# Patient Record
Sex: FEMALE | Race: WHITE | NOT HISPANIC OR LATINO | Employment: FULL TIME | URBAN - METROPOLITAN AREA
[De-identification: names, ages, dates, MRNs, and addresses within clinical notes are randomized per-mention and may not be internally consistent; named-entity substitution may affect disease eponyms.]

---

## 2017-04-18 ENCOUNTER — HOSPITAL ENCOUNTER (OUTPATIENT)
Dept: CARDIOLOGY | Facility: HOSPITAL | Age: 42
Discharge: HOME | End: 2017-04-18
Attending: INTERNAL MEDICINE | Admitting: INTERNAL MEDICINE

## 2019-08-05 ENCOUNTER — OFFICE VISIT (OUTPATIENT)
Dept: FAMILY MEDICINE CLINIC | Facility: CLINIC | Age: 44
End: 2019-08-05

## 2019-08-05 VITALS
OXYGEN SATURATION: 99 % | SYSTOLIC BLOOD PRESSURE: 162 MMHG | HEIGHT: 69 IN | TEMPERATURE: 97.8 F | DIASTOLIC BLOOD PRESSURE: 95 MMHG | BODY MASS INDEX: 36.58 KG/M2 | HEART RATE: 99 BPM | WEIGHT: 247 LBS

## 2019-08-05 DIAGNOSIS — R10.814 LEFT LOWER QUADRANT ABDOMINAL TENDERNESS WITHOUT REBOUND TENDERNESS: Primary | ICD-10-CM

## 2019-08-05 DIAGNOSIS — D49.0 LIVER NEOPLASM: ICD-10-CM

## 2019-08-05 DIAGNOSIS — R19.04 LEFT LOWER QUADRANT ABDOMINAL SWELLING: ICD-10-CM

## 2019-08-05 DIAGNOSIS — Z13.220 SCREENING, LIPID: ICD-10-CM

## 2019-08-05 DIAGNOSIS — Z12.39 SCREENING FOR BREAST CANCER: ICD-10-CM

## 2019-08-05 DIAGNOSIS — F41.9 ANXIETY: ICD-10-CM

## 2019-08-05 DIAGNOSIS — I27.20 PULMONARY HYPERTENSION, UNSPECIFIED (HCC): ICD-10-CM

## 2019-08-05 PROCEDURE — 99203 OFFICE O/P NEW LOW 30 MIN: CPT | Performed by: FAMILY MEDICINE

## 2019-08-05 RX ORDER — ESCITALOPRAM OXALATE 5 MG/1
5 TABLET ORAL DAILY
Qty: 30 TABLET | Refills: 1 | Status: SHIPPED | OUTPATIENT
Start: 2019-08-05 | End: 2019-09-09 | Stop reason: SDUPTHER

## 2019-08-05 RX ORDER — METOPROLOL SUCCINATE 25 MG/1
TABLET, EXTENDED RELEASE ORAL
COMMUNITY
Start: 2017-04-27 | End: 2020-06-29 | Stop reason: SDUPTHER

## 2019-08-05 NOTE — PROGRESS NOTES
Subjective   Chief Complaint   Patient presents with   • new pt to [facility] care   • Hip Pain   • Abdominal Pain     or pressure   • Anxiety     Raul MAHESH Gardner is a 44 y.o. female.     The pt reports left anterior hip pain and a fullness of her left lower abdomen.  No fever or chills.  No change in bowel habits.       Hip Pain    The incident occurred more than 1 week ago. There was no injury mechanism. The pain is present in the left hip. The quality of the pain is described as aching and cramping. The pain is moderate. The pain has been fluctuating since onset. She reports no foreign bodies present. The symptoms are aggravated by palpation.   Abdominal Pain   This is a new problem. The current episode started more than 1 month ago. The onset quality is gradual. The problem occurs daily. The problem has been waxing and waning. The pain is located in the LLQ. The pain is moderate. The quality of the pain is colicky. The abdominal pain does not radiate. Pertinent negatives include no constipation, diarrhea, fever, frequency, hematochezia, hematuria, melena or nausea. The pain is aggravated by certain positions. The pain is relieved by nothing.   Anxiety   Presents for initial visit. Onset was 1 to 6 months ago. The problem has been gradually worsening. Symptoms include excessive worry and nervous/anxious behavior. Patient reports no chest pain, decreased concentration, depressed mood, dizziness, nausea, palpitations, panic or shortness of breath. Symptoms occur most days. The severity of symptoms is moderate.     There are no known risk factors. Past treatments include nothing.      Past Medical History:   Diagnosis Date   • Melanoma (CMS/HCC)    • Pulmonary hypertension (CMS/HCC)    • Urinary tract infection     chronic hematuria, seen urology     Past Surgical History:   Procedure Laterality Date   •  SECTION  13 and 14   • SKIN CANCER EXCISION     • TUBAL ABDOMINAL LIGATION       Allergies   Allergen  Reactions   • Cephalexin Rash   • Hydrocodone-Acetaminophen Rash     Social History     Socioeconomic History   • Marital status: Single     Spouse name: Not on file   • Number of children: Not on file   • Years of education: Not on file   • Highest education level: Not on file   Occupational History   • Occupation: MA at [employer], ENT   Tobacco Use   • Smoking status: Never Smoker   • Smokeless tobacco: Never Used   Substance and Sexual Activity   • Alcohol use: Yes     Frequency: Monthly or less     Comment: caffeine 1 cup qd   • Drug use: No   • Sexual activity: Yes     Birth control/protection: Surgical     Social History     Tobacco Use   Smoking Status Never Smoker   Smokeless Tobacco Never Used       family history includes Cancer in her father; Diabetes in her brother; Heart disease in her brother; Kidney disease in her father and mother.  Current Outpatient Medications on File Prior to Visit   Medication Sig Dispense Refill   • metoprolol succinate XL (TOPROL-XL) 25 MG 24 hr tablet TAKE 1 TABLET BY MOUTH EVERY DAY       No current facility-administered medications on file prior to visit.      Patient Active Problem List   Diagnosis   • Pulmonary hypertension, unspecified (CMS/HCC)   • Screening for breast cancer   • Left lower quadrant abdominal tenderness without rebound tenderness   • Left lower quadrant abdominal swelling   • Anxiety   • Screening, lipid       The following portions of the patient's history were reviewed and updated as appropriate: allergies, current medications, past family history, past medical history, past social history, past surgical history and problem list.    Review of Systems   Constitutional: Negative for appetite change, chills and fever.   HENT: Negative for sinus pressure and sore throat.    Eyes: Negative for blurred vision.   Respiratory: Negative for cough and shortness of breath.    Cardiovascular: Negative for chest pain and palpitations.   Gastrointestinal: Positive for  "abdominal pain. Negative for constipation, diarrhea, hematochezia, melena and nausea.   Endocrine: Negative for polyuria.   Genitourinary: Negative for frequency and hematuria.   Skin: Negative for rash.   Neurological: Negative for dizziness and headache.   Hematological: Negative for adenopathy.   Psychiatric/Behavioral: Negative for decreased concentration and depressed mood. The patient is nervous/anxious.        Objective   /95 (BP Location: Right arm, Patient Position: Sitting, Cuff Size: Adult)   Pulse 99   Temp 97.8 °F (36.6 °C) (Oral)   Ht 174 cm (68.5\")   Wt 112 kg (247 lb)   SpO2 99%   BMI 37.01 kg/m²   Physical Exam   Constitutional: She is oriented to person, place, and time. She appears well-developed. No distress.   HENT:   Head: Normocephalic.   Eyes: Conjunctivae and lids are normal.   Neck: Trachea normal. No thyroid mass and no thyromegaly present.   Cardiovascular: Normal rate, regular rhythm and normal heart sounds.   Pulmonary/Chest: Effort normal and breath sounds normal.   Abdominal: Soft. There is tenderness in the left lower quadrant. There is no rigidity, no rebound and no guarding. No hernia.   Musculoskeletal: Normal range of motion.   Lymphadenopathy:     She has no cervical adenopathy.   Neurological: She is alert and oriented to person, place, and time.   Skin: Skin is warm and dry.   Psychiatric: Her speech is normal and behavior is normal. Thought content normal. Her mood appears anxious. She is attentive.       No visits with results within 1 Week(s) from this visit.   Latest known visit with results is:   No results found for any previous visit.           Assessment/Plan   Problems Addressed this Visit        Cardiovascular and Mediastinum    Pulmonary hypertension, unspecified (CMS/HCC)     Exercise induced.  Evaluated by Dr. Cavanaugh.  Stable on Toprol.         Relevant Orders    Lipid Panel    Comprehensive Metabolic Panel       Nervous and Auditory    Left lower " quadrant abdominal tenderness without rebound tenderness - Primary     Patient reports left hip pain but on exam her pain and area of swelling are in the left lower abdomen.  Plan to check CT scan.         Relevant Orders    CT Abdomen Pelvis With Contrast       Other    Screening for breast cancer    Relevant Orders    Mammo Screening Digital Tomosynthesis Bilateral With CAD    Left lower quadrant abdominal swelling    Relevant Orders    CT Abdomen Pelvis With Contrast    Anxiety     New problem.  Begin Lexapro 5 mg once daily as prescribed.          Screening, lipid    Relevant Orders    Lipid Panel    Comprehensive Metabolic Panel          Raul was seen today for new pt to est care, hip pain, abdominal pain and anxiety.    Diagnoses and all orders for this visit:    Left lower quadrant abdominal tenderness without rebound tenderness  -     CT Abdomen Pelvis With Contrast; Future    Left lower quadrant abdominal swelling  -     CT Abdomen Pelvis With Contrast; Future    Anxiety    Pulmonary hypertension, unspecified (CMS/HCC)  -     Lipid Panel  -     Comprehensive Metabolic Panel    Screening for breast cancer  -     Mammo Screening Digital Tomosynthesis Bilateral With CAD    Screening, lipid  -     Lipid Panel  -     Comprehensive Metabolic Panel    Other orders  -     escitalopram (LEXAPRO) 5 MG tablet; Take 1 tablet by mouth Daily.

## 2019-08-05 NOTE — ASSESSMENT & PLAN NOTE
Patient reports left hip pain but on exam her pain and area of swelling are in the left lower abdomen.  Plan to check CT scan.

## 2019-08-15 ENCOUNTER — TELEPHONE (OUTPATIENT)
Dept: FAMILY MEDICINE CLINIC | Facility: CLINIC | Age: 44
End: 2019-08-15

## 2019-09-09 RX ORDER — ESCITALOPRAM OXALATE 5 MG/1
TABLET ORAL
Qty: 30 TABLET | Refills: 5 | Status: SHIPPED | OUTPATIENT
Start: 2019-09-09 | End: 2019-11-15

## 2019-11-15 ENCOUNTER — OFFICE VISIT (OUTPATIENT)
Dept: FAMILY MEDICINE CLINIC | Facility: CLINIC | Age: 44
End: 2019-11-15

## 2019-11-15 VITALS
BODY MASS INDEX: 39.26 KG/M2 | SYSTOLIC BLOOD PRESSURE: 162 MMHG | OXYGEN SATURATION: 98 % | DIASTOLIC BLOOD PRESSURE: 97 MMHG | WEIGHT: 262 LBS | TEMPERATURE: 97.7 F | HEART RATE: 89 BPM

## 2019-11-15 DIAGNOSIS — E55.9 VITAMIN D DEFICIENCY: ICD-10-CM

## 2019-11-15 DIAGNOSIS — R53.83 FATIGUE, UNSPECIFIED TYPE: ICD-10-CM

## 2019-11-15 DIAGNOSIS — I10 ESSENTIAL HYPERTENSION: Primary | ICD-10-CM

## 2019-11-15 PROBLEM — Q75.2 HYPERTELORISM: Status: ACTIVE | Noted: 2019-11-15

## 2019-11-15 PROCEDURE — 99214 OFFICE O/P EST MOD 30 MIN: CPT | Performed by: FAMILY MEDICINE

## 2019-11-15 RX ORDER — HYDROCHLOROTHIAZIDE 12.5 MG/1
12.5 CAPSULE, GELATIN COATED ORAL DAILY
Qty: 90 CAPSULE | Refills: 1 | Status: SHIPPED | OUTPATIENT
Start: 2019-11-15 | End: 2020-07-31

## 2019-11-15 RX ORDER — LISINOPRIL 5 MG/1
5 TABLET ORAL DAILY
Qty: 30 TABLET | Refills: 0 | Status: SHIPPED | OUTPATIENT
Start: 2019-11-15 | End: 2019-12-08 | Stop reason: SDUPTHER

## 2019-11-15 RX ORDER — ESCITALOPRAM OXALATE 10 MG/1
10 TABLET ORAL DAILY
Qty: 90 TABLET | Refills: 3 | Status: SHIPPED | OUTPATIENT
Start: 2019-11-15 | End: 2021-01-08

## 2019-11-17 PROBLEM — I10 ESSENTIAL HYPERTENSION: Status: ACTIVE | Noted: 2019-11-17

## 2019-11-18 NOTE — PROGRESS NOTES
Subjective   Chief Complaint   Patient presents with   • Hypertension   • Headache     Raul Gardner is a 44 y.o. female.     Hypertension   This is a new problem. The current episode started more than 1 month ago. The problem has been waxing and waning since onset. The problem is uncontrolled. Associated symptoms include headaches and malaise/fatigue. Pertinent negatives include no anxiety, blurred vision, chest pain, palpitations, peripheral edema or shortness of breath. Current antihypertension treatment includes nothing. There are no compliance problems.    Headache    Pertinent negatives include no abdominal pain, blurred vision, coughing, dizziness, fever, sinus pressure or sore throat.   Fatigue   This is a new problem. The current episode started more than 1 month ago. The problem occurs intermittently. The problem has been waxing and waning. Associated symptoms include fatigue and headaches. Pertinent negatives include no abdominal pain, chest pain, chills, coughing, fever, rash or sore throat. Nothing aggravates the symptoms.      Past Medical History:   Diagnosis Date   • Melanoma (CMS/HCC)    • Pulmonary hypertension (CMS/HCC)    • Urinary tract infection     chronic hematuria, seen urology     Past Surgical History:   Procedure Laterality Date   •  SECTION  13 and 14   • SKIN CANCER EXCISION     • TUBAL ABDOMINAL LIGATION       Allergies   Allergen Reactions   • Cephalexin Rash   • Hydrocodone-Acetaminophen Rash     Social History     Socioeconomic History   • Marital status: Single     Spouse name: Not on file   • Number of children: Not on file   • Years of education: Not on file   • Highest education level: Not on file   Occupational History   • Occupation: MA at [employer], ENT   Tobacco Use   • Smoking status: Never Smoker   • Smokeless tobacco: Never Used   Substance and Sexual Activity   • Alcohol use: Yes     Frequency: Monthly or less     Comment: caffeine 1 cup qd   • Drug use: No    • Sexual activity: Yes     Birth control/protection: Surgical     Social History     Tobacco Use   Smoking Status Never Smoker   Smokeless Tobacco Never Used       family history includes Cancer in her father; Diabetes in her brother; Heart disease in her brother; Kidney disease in her father and mother.  Current Outpatient Medications on File Prior to Visit   Medication Sig Dispense Refill   • metoprolol succinate XL (TOPROL-XL) 25 MG 24 hr tablet TAKE 1 TABLET BY MOUTH EVERY DAY       No current facility-administered medications on file prior to visit.      Patient Active Problem List   Diagnosis   • Pulmonary hypertension, unspecified (CMS/HCC)   • Screening for breast cancer   • Left lower quadrant abdominal tenderness without rebound tenderness   • Left lower quadrant abdominal swelling   • Anxiety   • Screening, lipid   • Hypertelorism   • Fatigue   • Vitamin D deficiency   • Essential hypertension       The following portions of the patient's history were reviewed and updated as appropriate: allergies, current medications, past family history, past medical history, past social history, past surgical history and problem list.    Review of Systems   Constitutional: Positive for fatigue and malaise/fatigue. Negative for chills and fever.   HENT: Negative for sinus pressure and sore throat.    Eyes: Negative for blurred vision.   Respiratory: Negative for cough and shortness of breath.    Cardiovascular: Negative for chest pain and palpitations.   Gastrointestinal: Negative for abdominal pain.   Endocrine: Negative for polyuria.   Skin: Negative for rash.   Neurological: Negative for dizziness and headache.   Hematological: Negative for adenopathy.   Psychiatric/Behavioral: Negative for depressed mood.       Objective   /97 (BP Location: Left arm, Patient Position: Sitting, Cuff Size: Large Adult)   Pulse 89   Temp 97.7 °F (36.5 °C) (Oral)   Wt 119 kg (262 lb)   SpO2 98%   BMI 39.26 kg/m²   Physical  Exam   Constitutional: She is oriented to person, place, and time. She appears well-developed. No distress.   HENT:   Head: Normocephalic.   Eyes: Conjunctivae and lids are normal.   Neck: Trachea normal. No thyroid mass and no thyromegaly present.   Cardiovascular: Normal rate, regular rhythm and normal heart sounds.   Pulmonary/Chest: Effort normal and breath sounds normal.   Musculoskeletal: She exhibits edema.   Lymphadenopathy:     She has no cervical adenopathy.   Neurological: She is alert and oriented to person, place, and time.   Skin: Skin is warm and dry.   Psychiatric: She has a normal mood and affect. Her speech is normal and behavior is normal. She is attentive.       No visits with results within 1 Week(s) from this visit.   Latest known visit with results is:   No results found for any previous visit.           Assessment/Plan   Problems Addressed this Visit        Cardiovascular and Mediastinum    Essential hypertension - Primary    Relevant Medications    lisinopril (PRINIVIL,ZESTRIL) 5 MG tablet    hydroCHLOROthiazide (MICROZIDE) 12.5 MG capsule       Digestive    Vitamin D deficiency    Relevant Orders    Vitamin D 25 Hydroxy       Other    Fatigue    Relevant Orders    Vitamin D 25 Hydroxy    TSH          Raul was seen today for hypertension and headache.    Diagnoses and all orders for this visit:    Essential hypertension    Fatigue, unspecified type  -     Vitamin D 25 Hydroxy  -     TSH    Vitamin D deficiency  -     Vitamin D 25 Hydroxy    Other orders  -     lisinopril (PRINIVIL,ZESTRIL) 5 MG tablet; Take 1 tablet by mouth Daily.  -     escitalopram (LEXAPRO) 10 MG tablet; Take 1 tablet by mouth Daily.  -     hydroCHLOROthiazide (MICROZIDE) 12.5 MG capsule; Take 1 capsule by mouth Daily.

## 2019-12-09 RX ORDER — LISINOPRIL 5 MG/1
TABLET ORAL
Qty: 90 TABLET | Refills: 3 | Status: SHIPPED | OUTPATIENT
Start: 2019-12-09 | End: 2021-01-08

## 2019-12-23 RX ORDER — ESCITALOPRAM OXALATE 5 MG/1
TABLET ORAL
Qty: 90 TABLET | Refills: 1 | OUTPATIENT
Start: 2019-12-23

## 2020-01-19 RX ORDER — ESCITALOPRAM OXALATE 5 MG/1
TABLET ORAL
Qty: 90 TABLET | Refills: 1 | OUTPATIENT
Start: 2020-01-19

## 2020-06-29 RX ORDER — METOPROLOL SUCCINATE 25 MG/1
25 TABLET, EXTENDED RELEASE ORAL DAILY
Qty: 60 TABLET | Refills: 1 | Status: SHIPPED | OUTPATIENT
Start: 2020-06-29 | End: 2020-09-08

## 2020-06-29 RX ORDER — METOPROLOL SUCCINATE 25 MG/1
25 TABLET, EXTENDED RELEASE ORAL DAILY
Qty: 60 TABLET | Refills: 1 | Status: SHIPPED | OUTPATIENT
Start: 2020-06-29 | End: 2020-06-29 | Stop reason: SDUPTHER

## 2020-07-17 ENCOUNTER — TELEMEDICINE (OUTPATIENT)
Dept: FAMILY MEDICINE CLINIC | Facility: CLINIC | Age: 45
End: 2020-07-17

## 2020-07-17 DIAGNOSIS — J40 BRONCHITIS: Primary | ICD-10-CM

## 2020-07-17 PROCEDURE — 99213 OFFICE O/P EST LOW 20 MIN: CPT | Performed by: FAMILY MEDICINE

## 2020-07-17 RX ORDER — DOXYCYCLINE 100 MG/1
100 CAPSULE ORAL 2 TIMES DAILY
Qty: 20 CAPSULE | Refills: 0 | Status: SHIPPED | OUTPATIENT
Start: 2020-07-17 | End: 2020-07-31

## 2020-07-17 RX ORDER — GUAIFENESIN AND CODEINE PHOSPHATE 100; 10 MG/5ML; MG/5ML
5 SOLUTION ORAL 3 TIMES DAILY PRN
Qty: 120 ML | Refills: 0 | Status: SHIPPED | OUTPATIENT
Start: 2020-07-17 | End: 2020-07-31

## 2020-07-17 NOTE — PROGRESS NOTES
Subjective   Chief Complaint   Patient presents with   • Cough     Raul Gardner is a 45 y.o. female.   You have chosen to receive care through a telehealth visit.  Do you consent to use a video/audio connection for your medical care today? 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  Due to her symptoms and potential for exposure she was tested for COVID19 earlier this week and it was negative.     Cough   This is a new problem. The current episode started in the past 7 days. The problem has been gradually worsening. The problem occurs every few minutes. The cough is productive of sputum. Associated symptoms include a fever, a sore throat and shortness of breath. Pertinent negatives include no chest pain, chills, ear pain, rash or wheezing. Associated symptoms comments: fatigue. The symptoms are aggravated by exercise. Risk factors for lung disease include occupational exposure.      Past Medical History:   Diagnosis Date   • Melanoma (CMS/HCC)    • Pulmonary hypertension (CMS/HCC)    • Urinary tract infection     chronic hematuria, seen urology     Past Surgical History:   Procedure Laterality Date   •  SECTION  13 and 14   • SKIN CANCER EXCISION     • TUBAL ABDOMINAL LIGATION       Allergies   Allergen Reactions   • Cephalexin Rash   • Hydrocodone-Acetaminophen Rash     Social History     Socioeconomic History   • Marital status: Single     Spouse name: Not on file   • Number of children: Not on file   • Years of education: Not on file   • Highest education level: Not on file   Occupational History   • Occupation: MA at [employer], ENT   Tobacco Use   • Smoking status: Never Smoker   • Smokeless tobacco: Never Used   Substance and Sexual Activity   • Alcohol use: Yes     Frequency: Monthly or less     Comment: caffeine 1 cup qd   • Drug use: No   • Sexual activity: Yes     Birth control/protection: Surgical     Social History     Tobacco Use   Smoking Status Never Smoker   Smokeless Tobacco Never Used        family history includes Cancer in her father; Diabetes in her brother; Heart disease in her brother; Kidney disease in her father and mother.  Current Outpatient Medications on File Prior to Visit   Medication Sig Dispense Refill   • escitalopram (LEXAPRO) 10 MG tablet Take 1 tablet by mouth Daily. 90 tablet 3   • hydroCHLOROthiazide (MICROZIDE) 12.5 MG capsule Take 1 capsule by mouth Daily. 90 capsule 1   • lisinopril (PRINIVIL,ZESTRIL) 5 MG tablet TAKE 1 TABLET BY MOUTH EVERY DAY 90 tablet 3   • metoprolol succinate XL (TOPROL-XL) 25 MG 24 hr tablet Take 1 tablet by mouth Daily. 60 tablet 1     No current facility-administered medications on file prior to visit.      Patient Active Problem List   Diagnosis   • Pulmonary hypertension, unspecified (CMS/HCC)   • Screening for breast cancer   • Left lower quadrant abdominal tenderness without rebound tenderness   • Left lower quadrant abdominal swelling   • Anxiety   • Screening, lipid   • Hypertelorism   • Fatigue   • Vitamin D deficiency   • Essential hypertension   • Bronchitis       The following portions of the patient's history were reviewed and updated as appropriate: allergies, current medications, past family history, past medical history, past social history, past surgical history and problem list.    Review of Systems   Constitutional: Positive for fever. Negative for chills.   HENT: Positive for sore throat. Negative for ear pain and sinus pressure.    Eyes: Negative for blurred vision.   Respiratory: Positive for cough and shortness of breath. Negative for wheezing.    Cardiovascular: Negative for chest pain and palpitations.   Gastrointestinal: Negative for abdominal pain.   Endocrine: Negative for polyuria.   Skin: Negative for rash.   Neurological: Negative for dizziness and headache.   Hematological: Negative for adenopathy.   Psychiatric/Behavioral: Negative for depressed mood.       Objective   There were no vitals taken for this  visit.  Physical Exam   Constitutional: She appears well-developed and well-nourished.   HENT:   Head: Normocephalic.   Eyes: EOM are normal.   Neck: Normal range of motion.   Pulmonary/Chest: Effort normal.   Neurological: She is alert.   Psychiatric: She has a normal mood and affect.       No visits with results within 1 Week(s) from this visit.   Latest known visit with results is:   No results found for any previous visit.           Assessment/Plan   Problems Addressed this Visit        Respiratory    Bronchitis - Primary    Relevant Medications    guaiFENesin-codeine (GUAIFENESIN AC) 100-10 MG/5ML liquid    doxycycline (MONODOX) 100 MG capsule          Total time spent on evaluation of patient:  15 min

## 2020-07-20 ENCOUNTER — TELEPHONE (OUTPATIENT)
Dept: FAMILY MEDICINE CLINIC | Facility: CLINIC | Age: 45
End: 2020-07-20

## 2020-07-20 DIAGNOSIS — J40 BRONCHITIS: Primary | ICD-10-CM

## 2020-07-20 NOTE — TELEPHONE ENCOUNTER
PT STATES THAT SHE IS FEELING NO BETTER AND IS ASKING FOR A CHEST XRAY.  PLEASE SEND ORDER TO [facility] AT [phone]

## 2020-07-31 ENCOUNTER — OFFICE VISIT (OUTPATIENT)
Dept: CARDIOLOGY | Facility: CLINIC | Age: 45
End: 2020-07-31

## 2020-07-31 VITALS
HEIGHT: 69 IN | RESPIRATION RATE: 18 BRPM | SYSTOLIC BLOOD PRESSURE: 136 MMHG | HEART RATE: 87 BPM | DIASTOLIC BLOOD PRESSURE: 86 MMHG | WEIGHT: 274 LBS | BODY MASS INDEX: 40.58 KG/M2

## 2020-07-31 DIAGNOSIS — R06.09 DOE (DYSPNEA ON EXERTION): Primary | ICD-10-CM

## 2020-07-31 DIAGNOSIS — R53.83 FATIGUE, UNSPECIFIED TYPE: ICD-10-CM

## 2020-07-31 DIAGNOSIS — R06.02 SOB (SHORTNESS OF BREATH): ICD-10-CM

## 2020-07-31 DIAGNOSIS — R00.2 PALPITATIONS: ICD-10-CM

## 2020-07-31 DIAGNOSIS — I27.20 PULMONARY HYPERTENSION, UNSPECIFIED (HCC): ICD-10-CM

## 2020-07-31 DIAGNOSIS — I10 ESSENTIAL HYPERTENSION: ICD-10-CM

## 2020-07-31 DIAGNOSIS — Q75.2 HYPERTELORISM: ICD-10-CM

## 2020-07-31 PROCEDURE — 93000 ELECTROCARDIOGRAM COMPLETE: CPT | Performed by: INTERNAL MEDICINE

## 2020-07-31 PROCEDURE — 99203 OFFICE O/P NEW LOW 30 MIN: CPT | Performed by: INTERNAL MEDICINE

## 2020-07-31 NOTE — PROGRESS NOTES
Cardiology Office Visit      Encounter Date:  07/31/2020    Patient ID:   Raul Gardner is a 45 y.o. female.    Reason For Followup:  Shortness of breath  Dyspnea on exertion  Intermittent tachycardia  Severe fatigue    Brief Clinical History:  Dear Dr. Graf, Maribel Dumont MD    I had the pleasure of seeing Raul Gardner today. As you are well aware, this is a 45 y.o. female with past medical history that is significant for prior diagnosis of pulmonary hypertension history of palpitations in the past was seen and evaluated by cardiology 3 years back was diagnosed with a mild to moderate pulmonary hypertension and moderate tricuspid regurgitation patient lost follow-up in between here for follow-up for evaluation of symptoms of fatigue weakness shortness of breath dyspnea on exertion    Interval History:  Patient says for the last 2 months she is having intermittent episodes of severe shortness of breath with minimal activity  Significant change in the functional status in the last 2 months  Has had episodes of increased heart rate with minimal activity  Lower extremity edema requiring diuretics that is new  Symptoms have been progressively getting worse  Had a history of heart murmur with no specific diagnosis  Complaining of intermittent palpitations dizziness  Denies any syncope  Patient describes every time she has to do any activity that involves lifting her upper extremities above the head level starts having significant shortness of breath and tachyarrhythmias  Heart rate jumps up to 130-140 with minimal activity    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    Recommendations:  Recommend an echocardiogram to assess the LV systolic function rule out any significant valve pathology  Also consider doing a an echocardiogram with a definitive contrast to rule out any atrial myxoma  "that was suspected before  Schedule patient for exercise Cardiolite stress test to rule out any significant obstructive coronary artery disease contributing to patient's symptoms  Recommend a Holter monitor for 2 weeks to rule out underlying cardiac arrhythmia like atrial fibrillation contributing to patient's symptoms  Check basic labs including CBC chemistry and thyroid function  Follow-up in office in 1 month      Objective:    Vitals:  Vitals:    07/31/20 1101   BP: 136/86   BP Location: Left arm   Pulse: 87   Resp: 18   Weight: 124 kg (274 lb)   Height: 174 cm (68.5\")       Physical Exam:    General: Alert, cooperative, no distress, appears stated age  Head:  Normocephalic, atraumatic, mucous membranes moist  Eyes:  Conjunctiva/corneas clear, EOM's intact     Neck:  Supple,  no adenopathy;      Lungs: Clear to auscultation bilaterally, no wheezes rhonchi rales are noted  Chest wall: No tenderness  Heart::  Regular rate and rhythm, S1 and S2 normal, 2 x 6 systolic murmur right sternal border that is worse with standing position from sitting position  Abdomen: Soft, non-tender, nondistended bowel sounds active  Extremities: No cyanosis, clubbing, or edema  Pulses: 2+ and symmetric all extremities  Skin:  No rashes or lesions  Neuro/psych: A&O x3. CN II through XII are grossly intact with appropriate affect      Allergies:  Allergies   Allergen Reactions   • Cephalexin Rash   • Hydrocodone-Acetaminophen Rash       Medication Review:     Current Outpatient Medications:   •  escitalopram (LEXAPRO) 10 MG tablet, Take 1 tablet by mouth Daily., Disp: 90 tablet, Rfl: 3  •  lisinopril (PRINIVIL,ZESTRIL) 5 MG tablet, TAKE 1 TABLET BY MOUTH EVERY DAY, Disp: 90 tablet, Rfl: 3  •  metoprolol succinate XL (TOPROL-XL) 25 MG 24 hr tablet, Take 1 tablet by mouth Daily., Disp: 60 tablet, Rfl: 1    Family History:  Family History   Problem Relation Age of Onset   • Kidney disease Mother    • Kidney disease Father    • Cancer " Father         lung   • Diabetes Brother    • Heart disease Brother         CHF       Past Medical History:  Past Medical History:   Diagnosis Date   • Melanoma (CMS/HCC)    • Pulmonary hypertension (CMS/HCC)    • Urinary tract infection     chronic hematuria, seen urology       Past surgical History:  Past Surgical History:   Procedure Laterality Date   •  SECTION  13 and 14   • SKIN CANCER EXCISION     • TUBAL ABDOMINAL LIGATION         Social History:  Social History     Socioeconomic History   • Marital status: Single     Spouse name: Not on file   • Number of children: Not on file   • Years of education: Not on file   • Highest education level: Not on file   Occupational History   • Occupation: MA at [employer], ENT   Tobacco Use   • Smoking status: Never Smoker   • Smokeless tobacco: Never Used   Substance and Sexual Activity   • Alcohol use: Yes     Frequency: Monthly or less     Comment: caffeine 1 cup qd   • Drug use: No   • Sexual activity: Yes     Birth control/protection: Surgical       Review of Systems:  The following systems were reviewed as they relate to the cardiovascular system: Constitutional, Eyes, ENT, Cardiovascular, Respiratory, Gastrointestinal, Integumentary, Neurological, Psychiatric, Hematologic, Endocrine, Musculoskeletal, and Genitourinary. The pertinent cardiovascular findings are reported above with all other cardiovascular points within those systems being negative.    Diagnostic Study Review:     Current Electrocardiogram:    ECG 12 Lead  Date/Time: 2020 4:18 PM  Performed by: Dominic Richey MD  Authorized by: Dominic Richey MD   Comparison: compared with previous ECG   Similar to previous ECG  Rhythm: sinus rhythm  Rate: normal  BPM: 88  Conduction: conduction normal  QRS axis: normal    Clinical impression: normal ECG              NOTE: The following portions of the patient's history were reviewed and updated this visit as appropriate:  allergies, current medications, past family history, past medical history, past social history, past surgical history and problem list.

## 2020-08-26 ENCOUNTER — HOSPITAL ENCOUNTER (OUTPATIENT)
Dept: CARDIOLOGY | Facility: HOSPITAL | Age: 45
Discharge: HOME | End: 2020-08-26

## 2020-08-26 ENCOUNTER — HOSPITAL ENCOUNTER (OUTPATIENT)
Dept: CARDIOLOGY | Facility: HOSPITAL | Age: 45
Discharge: HOME | End: 2020-08-26
Admitting: INTERNAL MEDICINE

## 2020-08-26 VITALS
HEIGHT: 70 IN | SYSTOLIC BLOOD PRESSURE: 146 MMHG | HEART RATE: 76 BPM | DIASTOLIC BLOOD PRESSURE: 104 MMHG | BODY MASS INDEX: 39.22 KG/M2 | WEIGHT: 274 LBS

## 2020-08-26 DIAGNOSIS — I27.20 PULMONARY HYPERTENSION, UNSPECIFIED (HCC): ICD-10-CM

## 2020-08-26 DIAGNOSIS — Q75.2 HYPERTELORISM: ICD-10-CM

## 2020-08-26 DIAGNOSIS — R53.83 FATIGUE, UNSPECIFIED TYPE: ICD-10-CM

## 2020-08-26 DIAGNOSIS — I10 ESSENTIAL HYPERTENSION: ICD-10-CM

## 2020-08-26 LAB
ASCENDING AORTA: 3.1 CM
BH CV ECHO MEAS - ACS: 2.1 CM
BH CV ECHO MEAS - AO MAX PG (FULL): 6.6 MMHG
BH CV ECHO MEAS - AO MAX PG: 21.9 MMHG
BH CV ECHO MEAS - AO MEAN PG (FULL): 3.2 MMHG
BH CV ECHO MEAS - AO MEAN PG: 11.2 MMHG
BH CV ECHO MEAS - AO ROOT AREA (BSA CORRECTED): 1.4
BH CV ECHO MEAS - AO ROOT AREA: 8.3 CM^2
BH CV ECHO MEAS - AO ROOT DIAM: 3.3 CM
BH CV ECHO MEAS - AO V2 MAX: 233.8 CM/SEC
BH CV ECHO MEAS - AO V2 MEAN: 158.1 CM/SEC
BH CV ECHO MEAS - AO V2 VTI: 49.5 CM
BH CV ECHO MEAS - ASC AORTA: 3.1 CM
BH CV ECHO MEAS - AVA(I,A): 2.1 CM^2
BH CV ECHO MEAS - AVA(I,D): 2.1 CM^2
BH CV ECHO MEAS - AVA(V,A): 2.2 CM^2
BH CV ECHO MEAS - AVA(V,D): 2.2 CM^2
BH CV ECHO MEAS - BSA(HAYCOCK): 2.5 M^2
BH CV ECHO MEAS - BSA: 2.4 M^2
BH CV ECHO MEAS - BZI_BMI: 39.3 KILOGRAMS/M^2
BH CV ECHO MEAS - BZI_METRIC_HEIGHT: 177.8 CM
BH CV ECHO MEAS - BZI_METRIC_WEIGHT: 124.3 KG
BH CV ECHO MEAS - EDV(CUBED): 151.3 ML
BH CV ECHO MEAS - EDV(MOD-SP2): 90.6 ML
BH CV ECHO MEAS - EDV(MOD-SP4): 94.2 ML
BH CV ECHO MEAS - EDV(TEICH): 137 ML
BH CV ECHO MEAS - EF(CUBED): 71.5 %
BH CV ECHO MEAS - EF(MOD-BP): 59 %
BH CV ECHO MEAS - EF(MOD-SP2): 58.3 %
BH CV ECHO MEAS - EF(MOD-SP4): 59.8 %
BH CV ECHO MEAS - EF(TEICH): 62.8 %
BH CV ECHO MEAS - ESV(CUBED): 43 ML
BH CV ECHO MEAS - ESV(MOD-SP2): 37.7 ML
BH CV ECHO MEAS - ESV(MOD-SP4): 37.9 ML
BH CV ECHO MEAS - ESV(TEICH): 51 ML
BH CV ECHO MEAS - FS: 34.2 %
BH CV ECHO MEAS - IVS/LVPW: 1
BH CV ECHO MEAS - IVSD: 0.89 CM
BH CV ECHO MEAS - LA DIMENSION(2D): 4.3 CM
BH CV ECHO MEAS - LA DIMENSION: 4.2 CM
BH CV ECHO MEAS - LA/AO: 1.3
BH CV ECHO MEAS - LAT PEAK E' VEL: 13 CM/SEC
BH CV ECHO MEAS - LV DIASTOLIC VOL/BSA (35-75): 39.5 ML/M^2
BH CV ECHO MEAS - LV IVRT: 0.06 SEC
BH CV ECHO MEAS - LV MASS(C)D: 170.4 GRAMS
BH CV ECHO MEAS - LV MASS(C)DI: 71.4 GRAMS/M^2
BH CV ECHO MEAS - LV MAX PG: 15.3 MMHG
BH CV ECHO MEAS - LV MEAN PG: 8.1 MMHG
BH CV ECHO MEAS - LV SYSTOLIC VOL/BSA (12-30): 15.9 ML/M^2
BH CV ECHO MEAS - LV V1 MAX: 195.3 CM/SEC
BH CV ECHO MEAS - LV V1 MEAN: 134.6 CM/SEC
BH CV ECHO MEAS - LV V1 VTI: 41 CM
BH CV ECHO MEAS - LVIDD: 5.3 CM
BH CV ECHO MEAS - LVIDS: 3.5 CM
BH CV ECHO MEAS - LVOT AREA: 2.6 CM^2
BH CV ECHO MEAS - LVOT DIAM: 1.8 CM
BH CV ECHO MEAS - LVPWD: 0.86 CM
BH CV ECHO MEAS - MED PEAK E' VEL: 7 CM/SEC
BH CV ECHO MEAS - MV A MAX VEL: 83.4 CM/SEC
BH CV ECHO MEAS - MV DEC SLOPE: 640.5 CM/SEC^2
BH CV ECHO MEAS - MV DEC TIME: 0.19 SEC
BH CV ECHO MEAS - MV E MAX VEL: 121.8 CM/SEC
BH CV ECHO MEAS - MV E/A: 1.5
BH CV ECHO MEAS - MV MAX PG: 5.8 MMHG
BH CV ECHO MEAS - MV MEAN PG: 2 MMHG
BH CV ECHO MEAS - MV P1/2T: 49 MSEC
BH CV ECHO MEAS - MV V2 MAX: 120.6 CM/SEC
BH CV ECHO MEAS - MV V2 MEAN: 65.4 CM/SEC
BH CV ECHO MEAS - MV V2 VTI: 25.1 CM
BH CV ECHO MEAS - MVA(P1/2T): 4.5 CM2
BH CV ECHO MEAS - MVA(VTI): 4.2 CM^2
BH CV ECHO MEAS - PA ACC SLOPE: 804 CM/SEC2
BH CV ECHO MEAS - PA ACC TIME: 0.12 SEC
BH CV ECHO MEAS - PA MAX PG (FULL): 1.4 MMHG
BH CV ECHO MEAS - PA MAX PG: 5.8 MMHG
BH CV ECHO MEAS - PA MEAN PG (FULL): 1 MMHG
BH CV ECHO MEAS - PA MEAN PG: 3.4 MMHG
BH CV ECHO MEAS - PA PR(ACCEL): 23.2 MMHG
BH CV ECHO MEAS - PA V2 MAX: 120 CM/SEC
BH CV ECHO MEAS - PA V2 MEAN: 87.3 CM/SEC
BH CV ECHO MEAS - PA V2 VTI: 28.7 CM
BH CV ECHO MEAS - PULM A REVS DUR: 0.08 SEC
BH CV ECHO MEAS - PULM A REVS VEL: 20.8 CM/SEC
BH CV ECHO MEAS - PULM DIAS VEL: 59.6 CM/SEC
BH CV ECHO MEAS - PULM S/D: 0.87
BH CV ECHO MEAS - PULM SYS VEL: 51.7 CM/SEC
BH CV ECHO MEAS - RAP SYSTOLE: 8 MMHG
BH CV ECHO MEAS - RV MAX PG: 4.4 MMHG
BH CV ECHO MEAS - RV MEAN PG: 2.4 MMHG
BH CV ECHO MEAS - RV V1 MAX: 104.4 CM/SEC
BH CV ECHO MEAS - RV V1 MEAN: 73.5 CM/SEC
BH CV ECHO MEAS - RV V1 VTI: 21.8 CM
BH CV ECHO MEAS - RVSP: 40.5 MMHG
BH CV ECHO MEAS - SI(AO): 172.2 ML/M^2
BH CV ECHO MEAS - SI(CUBED): 45.4 ML/M^2
BH CV ECHO MEAS - SI(LVOT): 44.6 ML/M^2
BH CV ECHO MEAS - SI(MOD-SP2): 22.1 ML/M^2
BH CV ECHO MEAS - SI(MOD-SP4): 23.6 ML/M^2
BH CV ECHO MEAS - SI(TEICH): 36 ML/M^2
BH CV ECHO MEAS - SV(AO): 410.8 ML
BH CV ECHO MEAS - SV(CUBED): 108.2 ML
BH CV ECHO MEAS - SV(LVOT): 106.3 ML
BH CV ECHO MEAS - SV(MOD-SP2): 52.8 ML
BH CV ECHO MEAS - SV(MOD-SP4): 56.3 ML
BH CV ECHO MEAS - SV(TEICH): 86 ML
BH CV ECHO MEAS - TAPSE (>1.6): 2.4 CM2
BH CV ECHO MEAS - TR MAX PG: 33 MMHG
BH CV ECHO MEAS - TR MAX VEL: 285 CM/SEC
BH CV ECHO MEASUREMENTS AVERAGE E/E' RATIO: 12.18
BH CV STRESS BP STAGE 1: NORMAL
BH CV STRESS BP STAGE 2: NORMAL
BH CV STRESS DURATION MIN STAGE 1: 3
BH CV STRESS DURATION MIN STAGE 2: 2
BH CV STRESS DURATION SEC STAGE 1: 0
BH CV STRESS DURATION SEC STAGE 2: 30
BH CV STRESS GRADE STAGE 1: 10
BH CV STRESS GRADE STAGE 2: 12
BH CV STRESS HR STAGE 1: 129
BH CV STRESS HR STAGE 2: 157
BH CV STRESS METS STAGE 1: 5
BH CV STRESS METS STAGE 2: 7.5
BH CV STRESS PROTOCOL 1: NORMAL
BH CV STRESS RECOVERY BP: NORMAL MMHG
BH CV STRESS RECOVERY HR: 96 BPM
BH CV STRESS SPEED STAGE 1: 1.7
BH CV STRESS SPEED STAGE 2: 2.5
BH CV STRESS STAGE 1: 1
BH CV STRESS STAGE 2: 2
BH CV XLRA - RV BASE: 3.2 CM
BH CV XLRA - RV MID: 2.4 CM
BH CV XLRA - TDI S': 11 CM/SEC
IVRT: 61 MSEC
LEFT ATRIUM VOLUME INDEX: 24 ML/M2
LEFT ATRIUM VOLUME: 57 CM3
LV EF 2D ECHO EST: 60 %
LV EF NUC BP: 68 %
MAXIMAL PREDICTED HEART RATE: 177 BPM
PERCENT MAX PREDICTED HR: 88.7 %
STRESS BASELINE BP: NORMAL MMHG
STRESS BASELINE HR: 81 BPM
STRESS PERCENT HR: 104 %
STRESS POST ESTIMATED WORKLOAD: 7 METS
STRESS POST EXERCISE DUR MIN: 5 MIN
STRESS POST EXERCISE DUR SEC: 30 SEC
STRESS POST PEAK BP: NORMAL MMHG
STRESS POST PEAK HR: 157 BPM
STRESS TARGET HR: 150 BPM

## 2020-08-26 PROCEDURE — 0 TECHNETIUM SESTAMIBI: Performed by: INTERNAL MEDICINE

## 2020-08-26 PROCEDURE — A9500 TC99M SESTAMIBI: HCPCS | Performed by: INTERNAL MEDICINE

## 2020-08-26 PROCEDURE — 93017 CV STRESS TEST TRACING ONLY: CPT

## 2020-08-26 PROCEDURE — 93306 TTE W/DOPPLER COMPLETE: CPT | Performed by: INTERNAL MEDICINE

## 2020-08-26 PROCEDURE — 93018 CV STRESS TEST I&R ONLY: CPT | Performed by: INTERNAL MEDICINE

## 2020-08-26 PROCEDURE — 0296T HC EXT ECG > 48HR TO 21 DAY RCRD W/CONECT INTL RCRD: CPT

## 2020-08-26 PROCEDURE — 25010000002 SULFUR HEXAFLUORIDE MICROSPH 60.7-25 MG RECONSTITUTED SUSPENSION: Performed by: INTERNAL MEDICINE

## 2020-08-26 PROCEDURE — 78452 HT MUSCLE IMAGE SPECT MULT: CPT

## 2020-08-26 PROCEDURE — 93306 TTE W/DOPPLER COMPLETE: CPT

## 2020-08-26 PROCEDURE — 78452 HT MUSCLE IMAGE SPECT MULT: CPT | Performed by: INTERNAL MEDICINE

## 2020-08-26 PROCEDURE — 93016 CV STRESS TEST SUPVJ ONLY: CPT | Performed by: INTERNAL MEDICINE

## 2020-08-26 RX ADMIN — SULFUR HEXAFLUORIDE 1 ML: KIT at 09:40

## 2020-08-26 RX ADMIN — TECHNETIUM TC 99M SESTAMIBI 1 DOSE: 1 INJECTION INTRAVENOUS at 11:00

## 2020-08-26 RX ADMIN — TECHNETIUM TC 99M SESTAMIBI 1 DOSE: 1 INJECTION INTRAVENOUS at 08:40

## 2020-09-08 ENCOUNTER — OFFICE VISIT (OUTPATIENT)
Dept: CARDIOLOGY | Facility: CLINIC | Age: 45
End: 2020-09-08

## 2020-09-08 VITALS
OXYGEN SATURATION: 97 % | HEART RATE: 69 BPM | DIASTOLIC BLOOD PRESSURE: 84 MMHG | RESPIRATION RATE: 18 BRPM | HEIGHT: 70 IN | WEIGHT: 275 LBS | SYSTOLIC BLOOD PRESSURE: 149 MMHG | BODY MASS INDEX: 39.37 KG/M2

## 2020-09-08 DIAGNOSIS — R06.02 SOB (SHORTNESS OF BREATH): ICD-10-CM

## 2020-09-08 DIAGNOSIS — I27.20 PULMONARY HYPERTENSION, UNSPECIFIED (HCC): ICD-10-CM

## 2020-09-08 DIAGNOSIS — R00.2 PALPITATIONS: ICD-10-CM

## 2020-09-08 DIAGNOSIS — I10 ESSENTIAL HYPERTENSION: Primary | ICD-10-CM

## 2020-09-08 PROCEDURE — 99214 OFFICE O/P EST MOD 30 MIN: CPT | Performed by: INTERNAL MEDICINE

## 2020-09-08 RX ORDER — METOPROLOL SUCCINATE 50 MG/1
50 TABLET, EXTENDED RELEASE ORAL DAILY
Qty: 90 TABLET | Refills: 3 | Status: SHIPPED | OUTPATIENT
Start: 2020-09-08 | End: 2021-04-19

## 2020-09-08 NOTE — PROGRESS NOTES
Cardiology Office Visit      Encounter Date:  09/08/2020    Patient ID:   Raul Gardner is a 45 y.o. female.    Reason For Followup:  Shortness of breath  Dyspnea on exertion  Intermittent tachycardia  Severe fatigue    Brief Clinical History:  Dear Dr. Graf, Maribel Dumont MD    I had the pleasure of seeing Raul Gardner today. As you are well aware, this is a 45 y.o. female with past medical history that is significant for prior diagnosis of pulmonary hypertension history of palpitations in the past was seen and evaluated by cardiology 3 years back was diagnosed with a mild to moderate pulmonary hypertension and moderate tricuspid regurgitation patient lost follow-up in between here for follow-up for evaluation of symptoms of fatigue weakness shortness of breath dyspnea on exertion    Interval History:  Patient says for the last 2 months she is having intermittent episodes of severe shortness of breath with minimal activity  Significant change in the functional status in the last 2 months  Has had episodes of increased heart rate with minimal activity  Lower extremity edema requiring diuretics that is new  Symptoms have been progressively getting worse  Had a history of heart murmur with no specific diagnosis  Complaining of intermittent palpitations dizziness  Denies any syncope  Patient describes every time she has to do any activity that involves lifting her upper extremities above the head level starts having significant shortness of breath and tachyarrhythmias  Heart rate jumps up to 130-140 with minimal activity      Interpretation Summary     · Low risk for ischemic heart disease.  · Findings consistent with a normal ECG stress test.  · Left ventricular ejection fraction is normal (Calculated EF = 68%).  · Myocardial perfusion imaging indicates a normal myocardial perfusion study with no evidence of ischemia.  · Impressions are consistent with a low risk study.     Interpretation Summary     · Estimated EF =  "60%.  · Left ventricular systolic function is normal.           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    Recommendations:  Echocardiogram with normal LV systolic function and normal wall motion with no significant tricuspid regurgitation no significant pulmonary hypertension  Stress test with no evidence of any inducible ischemia  Patient does have some evidence of POTS syndrome with a tachycardia with minimal activity  Increase the dose of beta-blocker to help with symptoms of tachycardia and palpitations  Follow-up on the results of the Holter monitor  Aggressive blood pressure control  Consider increasing the dose of lisinopril if the blood pressure is still elevated  Follow-up in office in 3 months  Objective:    Vitals:  Vitals:    09/08/20 1126   BP: 149/84   BP Location: Left arm   Pulse: 69   Resp: 18   SpO2: 97%   Weight: 125 kg (275 lb)   Height: 177.8 cm (70\")       Physical Exam:    General: Alert, cooperative, no distress, appears stated age  Head:  Normocephalic, atraumatic, mucous membranes moist  Eyes:  Conjunctiva/corneas clear, EOM's intact     Neck:  Supple,  no adenopathy;      Lungs: Clear to auscultation bilaterally, no wheezes rhonchi rales are noted  Chest wall: No tenderness  Heart::  Regular rate and rhythm, S1 and S2 normal, no murmur, rub or gallop  Abdomen: Soft, non-tender, nondistended bowel sounds active  Extremities: No cyanosis, clubbing, or edema  Pulses: 2+ and symmetric all extremities  Skin:  No rashes or lesions  Neuro/psych: A&O x3. CN II through XII are grossly intact with appropriate affect      Allergies:  Allergies   Allergen Reactions   • Cephalexin Rash   • Hydrocodone-Acetaminophen Rash       Medication Review:     Current Outpatient Medications:   •  escitalopram (LEXAPRO) 10 MG tablet, Take 1 tablet by mouth Daily., Disp: 90 " tablet, Rfl: 3  •  lisinopril (PRINIVIL,ZESTRIL) 5 MG tablet, TAKE 1 TABLET BY MOUTH EVERY DAY, Disp: 90 tablet, Rfl: 3  •  metoprolol succinate XL (TOPROL-XL) 25 MG 24 hr tablet, Take 1 tablet by mouth Daily., Disp: 60 tablet, Rfl: 1    Family History:  Family History   Problem Relation Age of Onset   • Kidney disease Mother    • Kidney disease Father    • Cancer Father         lung   • Diabetes Brother    • Heart disease Brother         CHF       Past Medical History:  Past Medical History:   Diagnosis Date   • Melanoma (CMS/HCC)    • Pulmonary hypertension (CMS/HCC)    • Urinary tract infection     chronic hematuria, seen urology       Past surgical History:  Past Surgical History:   Procedure Laterality Date   •  SECTION  13 and 14   • SKIN CANCER EXCISION     • TUBAL ABDOMINAL LIGATION         Social History:  Social History     Socioeconomic History   • Marital status: Single     Spouse name: Not on file   • Number of children: Not on file   • Years of education: Not on file   • Highest education level: Not on file   Occupational History   • Occupation: MA at [employer], ENT   Tobacco Use   • Smoking status: Never Smoker   • Smokeless tobacco: Never Used   Substance and Sexual Activity   • Alcohol use: Yes     Frequency: Monthly or less     Comment: caffeine 1 cup qd   • Drug use: No   • Sexual activity: Yes     Birth control/protection: Surgical       Review of Systems:  The following systems were reviewed as they relate to the cardiovascular system: Constitutional, Eyes, ENT, Cardiovascular, Respiratory, Gastrointestinal, Integumentary, Neurological, Psychiatric, Hematologic, Endocrine, Musculoskeletal, and Genitourinary. The pertinent cardiovascular findings are reported above with all other cardiovascular points within those systems being negative.    Diagnostic Study Review:     Current Electrocardiogram:  Procedures      NOTE: The following portions of the patient's history were reviewed  and updated this visit as appropriate: allergies, current medications, past family history, past medical history, past social history, past surgical history and problem list.

## 2020-09-22 PROCEDURE — 0298T HOLTER MONITOR - 72 HOUR UP TO 21 DAY: CPT | Performed by: INTERNAL MEDICINE

## 2020-10-14 ENCOUNTER — TELEMEDICINE (OUTPATIENT)
Dept: FAMILY MEDICINE CLINIC | Facility: CLINIC | Age: 45
End: 2020-10-14

## 2020-10-14 DIAGNOSIS — J40 BRONCHITIS: Primary | ICD-10-CM

## 2020-10-14 PROCEDURE — 99213 OFFICE O/P EST LOW 20 MIN: CPT | Performed by: FAMILY MEDICINE

## 2020-10-14 RX ORDER — DOXYCYCLINE 100 MG/1
100 CAPSULE ORAL 2 TIMES DAILY
Qty: 20 CAPSULE | Refills: 0 | Status: SHIPPED | OUTPATIENT
Start: 2020-10-14 | End: 2021-04-19

## 2020-10-14 NOTE — PROGRESS NOTES
Subjective   Chief Complaint   Patient presents with   • Cough   • Sore Throat     Raul Gardner is a 45 y.o. female.   You have chosen to receive care through a telehealth visit.  Do you consent to use a video/audio connection for your medical care today? Yes    Cough  This is a new problem. The current episode started 1 to 4 weeks ago. The problem has been gradually worsening. The problem occurs every few minutes. The cough is productive of purulent sputum. Associated symptoms include nasal congestion, rhinorrhea and a sore throat. Pertinent negatives include no chest pain, chills, ear pain, fever, rash, shortness of breath or wheezing. The symptoms are aggravated by exercise. She has tried prescription cough suppressant for the symptoms. The treatment provided mild relief.   Sore Throat   This is a new problem. The current episode started 1 to 4 weeks ago. The problem has been gradually worsening. There has been no fever. Associated symptoms include congestion, coughing and a hoarse voice. Pertinent negatives include no abdominal pain, ear pain, shortness of breath, swollen glands or trouble swallowing. She has had no exposure to strep or mono. Exposure to: COVID-19 but had a negative test yesterday..      Past Medical History:   Diagnosis Date   • Melanoma (CMS/HCC)    • Pulmonary hypertension (CMS/HCC)    • Urinary tract infection     chronic hematuria, seen urology     Past Surgical History:   Procedure Laterality Date   •  SECTION  13 and 14   • SKIN CANCER EXCISION     • TUBAL ABDOMINAL LIGATION       Allergies   Allergen Reactions   • Cephalexin Rash   • Hydrocodone-Acetaminophen Rash     Social History     Socioeconomic History   • Marital status: Single     Spouse name: Not on file   • Number of children: Not on file   • Years of education: Not on file   • Highest education level: Not on file   Occupational History   • 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  Current Outpatient Medications on File Prior to Visit   Medication Sig Dispense Refill   • escitalopram (LEXAPRO) 10 MG tablet Take 1 tablet by mouth Daily. 90 tablet 3   • lisinopril (PRINIVIL,ZESTRIL) 5 MG tablet TAKE 1 TABLET BY MOUTH EVERY DAY 90 tablet 3   • metoprolol succinate XL (TOPROL-XL) 50 MG 24 hr tablet Take 1 tablet by mouth Daily. 90 tablet 3     No current facility-administered medications on file prior to visit.      Patient Active Problem List   Diagnosis   • Pulmonary hypertension, unspecified (CMS/HCC)   • Screening for breast cancer   • Left lower quadrant abdominal tenderness without rebound tenderness   • Left lower quadrant abdominal swelling   • Anxiety   • Screening, lipid   • Hypertelorism   • Fatigue   • Vitamin D deficiency   • Essential hypertension   • Bronchitis       The following portions of the patient's history were reviewed and updated as appropriate: allergies, current medications, past family history, past medical history, past social history, past surgical history and problem list.    Review of Systems   Constitutional: Negative for chills and fever.   HENT: Positive for congestion, hoarse voice, rhinorrhea and sore throat. Negative for ear pain, sinus pressure, swollen glands and trouble swallowing.    Eyes: Negative for blurred vision.   Respiratory: Positive for cough. Negative for shortness of breath and wheezing.    Cardiovascular: Negative for chest pain and palpitations.   Gastrointestinal:  Negative for abdominal pain.   Endocrine: Negative for polyuria.   Genitourinary: Negative for difficulty urinating.   Skin: Negative for rash.   Neurological: Negative for dizziness, seizures and headache.   Hematological: Negative for adenopathy.   Psychiatric/Behavioral: Negative for depressed mood.       Objective   There were no vitals taken for this visit.  Physical Exam  HENT:      Head: Normocephalic.   Pulmonary:      Effort: No respiratory distress.      Breath sounds: Wheezing present.   Neurological:      General: No focal deficit present.      Mental Status: She is alert.   Psychiatric:         Mood and Affect: Mood normal.         No visits with results within 1 Week(s) from this visit.   Latest known visit with results is:   Hospital Outpatient Visit on 08/26/2020   Component Date Value Ref Range Status   • BH CV STRESS PROTOCOL 1 08/26/2020 Nain   Final   • Stage 1 08/26/2020 1   Final   • HR Stage 1 08/26/2020 129   Final   • BP Stage 1 08/26/2020 160/90   Final   • Duration Min Stage 1 08/26/2020 3   Final   • Duration Sec Stage 1 08/26/2020 0   Final   • Grade Stage 1 08/26/2020 10   Final   • Speed Stage 1 08/26/2020 1.7   Final   • BH CV STRESS METS STAGE 1 08/26/2020 5   Final   • Stage 2 08/26/2020 2   Final   • HR Stage 2 08/26/2020 157   Final   • BP Stage 2 08/26/2020 210/80   Final   • Duration Min Stage 2 08/26/2020 2   Final   • Duration Sec Stage 2 08/26/2020 30   Final   • Grade Stage 2 08/26/2020 12   Final   • Speed Stage 2 08/26/2020 2.5   Final   • BH CV STRESS METS STAGE 2 08/26/2020 7.5   Final   • Baseline HR 08/26/2020 81  bpm Final   • Baseline BP 08/26/2020 148/88  mmHg Final   • Peak HR 08/26/2020 157  bpm Final   • Percent Max Pred HR 08/26/2020 88.70  % Final   • Percent Target HR 08/26/2020 104  % Final   • Peak BP 08/26/2020 210/80  mmHg Final   • Recovery HR 08/26/2020 96  bpm Final   • Recovery BP 08/26/2020 158/84  mmHg Final   • Target HR (85%) 08/26/2020 150  bpm  Final   • Max. Pred. HR (100%) 08/26/2020 177  bpm Final   • Exercise duration (sec) 08/26/2020 30  sec Final   • Exercise duration (min) 08/26/2020 5  min Final   • Estimated workload 08/26/2020 7.0  METS Final   • Nuc Stress EF 08/26/2020 68  % Final           Assessment/Plan   Diagnoses and all orders for this visit:    1. Bronchitis (Primary)  -     doxycycline (MONODOX) 100 MG capsule; Take 1 capsule by mouth 2 (Two) Times a Day.  Dispense: 20 capsule; Refill: 0      Total time spent on evaluation of patient:  15 min

## 2021-01-08 RX ORDER — ESCITALOPRAM OXALATE 10 MG/1
TABLET ORAL
Qty: 90 TABLET | Refills: 2 | Status: SHIPPED | OUTPATIENT
Start: 2021-01-08 | End: 2021-12-03

## 2021-01-08 RX ORDER — LISINOPRIL 5 MG/1
TABLET ORAL
Qty: 90 TABLET | Refills: 2 | Status: SHIPPED | OUTPATIENT
Start: 2021-01-08 | End: 2021-12-03

## 2021-04-19 ENCOUNTER — OFFICE VISIT (OUTPATIENT)
Dept: FAMILY MEDICINE CLINIC | Facility: CLINIC | Age: 46
End: 2021-04-19

## 2021-04-19 VITALS
DIASTOLIC BLOOD PRESSURE: 81 MMHG | BODY MASS INDEX: 36.16 KG/M2 | WEIGHT: 252 LBS | TEMPERATURE: 97.5 F | HEART RATE: 71 BPM | SYSTOLIC BLOOD PRESSURE: 125 MMHG | OXYGEN SATURATION: 98 %

## 2021-04-19 DIAGNOSIS — M76.891 TENDONITIS OF KNEE, RIGHT: Primary | ICD-10-CM

## 2021-04-19 PROCEDURE — 99213 OFFICE O/P EST LOW 20 MIN: CPT | Performed by: FAMILY MEDICINE

## 2021-04-19 RX ORDER — METOPROLOL SUCCINATE 25 MG/1
25 TABLET, EXTENDED RELEASE ORAL DAILY
COMMUNITY
Start: 2021-03-08 | End: 2021-06-01

## 2021-04-19 RX ORDER — NAPROXEN 500 MG/1
500 TABLET ORAL 2 TIMES DAILY WITH MEALS
Qty: 60 TABLET | Refills: 0 | Status: SHIPPED | OUTPATIENT
Start: 2021-04-19 | End: 2021-05-12

## 2021-04-19 NOTE — PROGRESS NOTES
Chief Complaint  Knee Pain (rt knee)    Subjective          Raul Gardner presents to [facility]  Knee Pain   The incident occurred 12 to 24 hours ago. The incident occurred at home. There was no injury mechanism. The pain is present in the right knee. The quality of the pain is described as stabbing. The pain is moderate. The pain has been constant since onset. Pertinent negatives include no numbness or tingling. She reports no foreign bodies present. The symptoms are aggravated by movement and weight bearing. She has tried acetaminophen and rest for the symptoms.       Objective   Vital Signs:   /81 (BP Location: Right arm, Patient Position: Sitting, Cuff Size: Adult)   Pulse 71   Temp 97.5 °F (36.4 °C)   Wt 114 kg (252 lb)   SpO2 98%   BMI 36.16 kg/m²     Physical Exam  Vitals reviewed.   Constitutional:       General: She is not in acute distress.     Appearance: She is well-developed.   HENT:      Head: Normocephalic.   Eyes:      General: Lids are normal.      Conjunctiva/sclera: Conjunctivae normal.   Neck:      Thyroid: No thyroid mass or thyromegaly.      Trachea: Trachea normal.   Cardiovascular:      Rate and Rhythm: Normal rate and regular rhythm.      Heart sounds: Normal heart sounds.   Pulmonary:      Effort: Pulmonary effort is normal.      Breath sounds: Normal breath sounds.   Abdominal:      Palpations: Abdomen is soft.   Musculoskeletal:      Cervical back: Normal range of motion.      Right knee: Swelling present. Decreased range of motion. Tenderness present over the lateral joint line.   Lymphadenopathy:      Cervical: No cervical adenopathy.   Skin:     General: Skin is warm and dry.   Neurological:      Mental Status: She is alert and oriented to person, place, and time.   Psychiatric:         Attention and Perception: She is attentive.         Mood and Affect: Mood normal.         Speech: Speech normal.         Behavior: Behavior normal.         Result Review :                 Assessment and Plan    Diagnoses and all orders for this visit:    1. Tendonitis of knee, right (Primary)  Assessment & Plan:  RICE  Naprosyn as prescribed  If no better in 2-3 days then consider x-ray of knee.    Orders:  -     naproxen (Naprosyn) 500 MG tablet; Take 1 tablet by mouth 2 (Two) Times a Day With Meals.  Dispense: 60 tablet; Refill: 0      Follow Up   Return if symptoms worsen or fail to improve.  Patient was given instructions and counseling regarding her condition or for health maintenance advice. Please see specific information pulled into the AVS if appropriate.

## 2021-05-12 DIAGNOSIS — M76.891 TENDONITIS OF KNEE, RIGHT: ICD-10-CM

## 2021-05-12 RX ORDER — NAPROXEN 500 MG/1
TABLET ORAL
Qty: 60 TABLET | Refills: 2 | Status: ON HOLD | OUTPATIENT
Start: 2021-05-12 | End: 2022-08-05

## 2021-06-01 RX ORDER — METOPROLOL SUCCINATE 25 MG/1
TABLET, EXTENDED RELEASE ORAL
Qty: 90 TABLET | Refills: 1 | Status: SHIPPED | OUTPATIENT
Start: 2021-06-01 | End: 2021-12-06

## 2021-12-03 RX ORDER — ESCITALOPRAM OXALATE 10 MG/1
TABLET ORAL
Qty: 90 TABLET | Refills: 2 | Status: SHIPPED | OUTPATIENT
Start: 2021-12-03 | End: 2022-08-29

## 2021-12-03 RX ORDER — LISINOPRIL 5 MG/1
TABLET ORAL
Qty: 90 TABLET | Refills: 2 | Status: SHIPPED | OUTPATIENT
Start: 2021-12-03 | End: 2022-08-17 | Stop reason: HOSPADM

## 2021-12-04 DIAGNOSIS — I10 ESSENTIAL HYPERTENSION: Primary | ICD-10-CM

## 2021-12-06 RX ORDER — METOPROLOL SUCCINATE 25 MG/1
TABLET, EXTENDED RELEASE ORAL
Qty: 90 TABLET | Refills: 1 | Status: ON HOLD | OUTPATIENT
Start: 2021-12-06 | End: 2022-08-05

## 2022-06-02 DIAGNOSIS — I10 ESSENTIAL HYPERTENSION: ICD-10-CM

## 2022-06-02 RX ORDER — METOPROLOL SUCCINATE 25 MG/1
TABLET, EXTENDED RELEASE ORAL
Qty: 90 TABLET | Refills: 1 | OUTPATIENT
Start: 2022-06-02

## 2022-07-28 DIAGNOSIS — I10 ESSENTIAL HYPERTENSION: ICD-10-CM

## 2022-07-28 RX ORDER — METOPROLOL SUCCINATE 25 MG/1
25 TABLET, EXTENDED RELEASE ORAL DAILY
Qty: 90 TABLET | Refills: 1 | OUTPATIENT
Start: 2022-07-28

## 2022-08-05 ENCOUNTER — APPOINTMENT (OUTPATIENT)
Dept: CT IMAGING | Facility: HOSPITAL | Age: 47
End: 2022-08-05

## 2022-08-05 ENCOUNTER — APPOINTMENT (OUTPATIENT)
Dept: ULTRASOUND IMAGING | Facility: HOSPITAL | Age: 47
End: 2022-08-05

## 2022-08-05 ENCOUNTER — APPOINTMENT (OUTPATIENT)
Dept: GENERAL RADIOLOGY | Facility: HOSPITAL | Age: 47
End: 2022-08-05

## 2022-08-05 ENCOUNTER — HOSPITAL ENCOUNTER (INPATIENT)
Facility: HOSPITAL | Age: 47
LOS: 12 days | Discharge: HOME | End: 2022-08-17
Attending: EMERGENCY MEDICINE | Admitting: INTERNAL MEDICINE

## 2022-08-05 ENCOUNTER — APPOINTMENT (OUTPATIENT)
Dept: CARDIOLOGY | Facility: HOSPITAL | Age: 47
End: 2022-08-05

## 2022-08-05 DIAGNOSIS — S30.1XXA ABDOMINAL HEMATOMA: ICD-10-CM

## 2022-08-05 DIAGNOSIS — R65.21 SEPTIC SHOCK: ICD-10-CM

## 2022-08-05 DIAGNOSIS — A41.9 SEPTIC SHOCK: ICD-10-CM

## 2022-08-05 DIAGNOSIS — N17.9 ACUTE KIDNEY INJURY: Primary | ICD-10-CM

## 2022-08-05 PROBLEM — R55 NEAR SYNCOPE: Status: ACTIVE | Noted: 2017-04-04

## 2022-08-05 PROBLEM — R07.89 CHEST DISCOMFORT: Status: ACTIVE | Noted: 2017-04-04

## 2022-08-05 PROBLEM — K75.0 LIVER ABSCESS: Status: ACTIVE | Noted: 2022-08-05

## 2022-08-05 PROBLEM — R53.83 FATIGUE: Status: RESOLVED | Noted: 2019-11-15 | Resolved: 2022-08-05

## 2022-08-05 PROBLEM — R07.89 CHEST DISCOMFORT: Status: RESOLVED | Noted: 2017-04-04 | Resolved: 2022-08-05

## 2022-08-05 PROBLEM — Z33.1 PREGNANCY, INCIDENTAL: Status: RESOLVED | Noted: 2022-08-05 | Resolved: 2022-08-05

## 2022-08-05 PROBLEM — R19.04: Status: RESOLVED | Noted: 2019-08-05 | Resolved: 2022-08-05

## 2022-08-05 PROBLEM — Z13.220 SCREENING, LIPID: Status: RESOLVED | Noted: 2019-08-05 | Resolved: 2022-08-05

## 2022-08-05 PROBLEM — Z12.39 SCREENING FOR BREAST CANCER: Status: RESOLVED | Noted: 2019-08-05 | Resolved: 2022-08-05

## 2022-08-05 PROBLEM — E87.20 METABOLIC ACIDOSIS: Status: ACTIVE | Noted: 2022-08-05

## 2022-08-05 PROBLEM — R06.00 DYSPNEA: Status: RESOLVED | Noted: 2017-04-04 | Resolved: 2022-08-05

## 2022-08-05 PROBLEM — R55 NEAR SYNCOPE: Status: RESOLVED | Noted: 2017-04-04 | Resolved: 2022-08-05

## 2022-08-05 PROBLEM — M76.891 TENDONITIS OF KNEE, RIGHT: Status: RESOLVED | Noted: 2021-04-19 | Resolved: 2022-08-05

## 2022-08-05 PROBLEM — Z33.1 PREGNANCY, INCIDENTAL: Status: ACTIVE | Noted: 2022-08-05

## 2022-08-05 PROBLEM — J40 BRONCHITIS: Status: RESOLVED | Noted: 2020-07-17 | Resolved: 2022-08-05

## 2022-08-05 PROBLEM — R19.7 DIARRHEA: Status: ACTIVE | Noted: 2022-08-05

## 2022-08-05 PROBLEM — R10.814 LEFT LOWER QUADRANT ABDOMINAL TENDERNESS WITHOUT REBOUND TENDERNESS: Status: RESOLVED | Noted: 2019-08-05 | Resolved: 2022-08-05

## 2022-08-05 PROBLEM — R06.00 DYSPNEA: Status: ACTIVE | Noted: 2017-04-04

## 2022-08-05 LAB
ABO GROUP BLD: NORMAL
ADV 40+41 DNA STL QL NAA+NON-PROBE: NOT DETECTED
ALBUMIN SERPL-MCNC: 2.9 G/DL (ref 3.5–5.2)
ALBUMIN SERPL-MCNC: 3.3 G/DL (ref 3.5–5.2)
ALBUMIN/GLOB SERPL: 1.1 G/DL
ALBUMIN/GLOB SERPL: 1.2 G/DL
ALP SERPL-CCNC: 64 U/L (ref 39–117)
ALP SERPL-CCNC: 64 U/L (ref 39–117)
ALT SERPL W P-5'-P-CCNC: 27 U/L (ref 1–33)
ALT SERPL W P-5'-P-CCNC: 30 U/L (ref 1–33)
AMYLASE SERPL-CCNC: 81 U/L (ref 28–100)
ANION GAP SERPL CALCULATED.3IONS-SCNC: 15 MMOL/L (ref 5–15)
ANION GAP SERPL CALCULATED.3IONS-SCNC: 17 MMOL/L (ref 5–15)
APTT PPP: 43.1 SECONDS (ref 24–31)
APTT PPP: 48.8 SECONDS (ref 24–31)
ARTERIAL PATENCY WRIST A: POSITIVE
AST SERPL-CCNC: 41 U/L (ref 1–32)
AST SERPL-CCNC: 45 U/L (ref 1–32)
ASTRO TYP 1-8 RNA STL QL NAA+NON-PROBE: NOT DETECTED
ATMOSPHERIC PRESS: ABNORMAL MM[HG]
B PARAPERT DNA SPEC QL NAA+PROBE: NOT DETECTED
B PERT DNA SPEC QL NAA+PROBE: NOT DETECTED
B-HCG UR QL: NEGATIVE
BACTERIA UR QL AUTO: ABNORMAL /HPF
BACTERIA UR QL AUTO: ABNORMAL /HPF
BASE EXCESS BLDA CALC-SCNC: -8.9 MMOL/L (ref 0–3)
BDY SITE: ABNORMAL
BH CV ECHO MEAS - ACS: 1.73 CM
BH CV ECHO MEAS - AO MAX PG: 11.6 MMHG
BH CV ECHO MEAS - AO MEAN PG: 5.4 MMHG
BH CV ECHO MEAS - AO ROOT DIAM: 2.9 CM
BH CV ECHO MEAS - AO V2 MAX: 169.9 CM/SEC
BH CV ECHO MEAS - AO V2 VTI: 24.6 CM
BH CV ECHO MEAS - AVA(I,D): 2.12 CM2
BH CV ECHO MEAS - EDV(CUBED): 54.1 ML
BH CV ECHO MEAS - EDV(MOD-SP4): 37.1 ML
BH CV ECHO MEAS - EF(MOD-SP4): 67.7 %
BH CV ECHO MEAS - ESV(CUBED): 18.9 ML
BH CV ECHO MEAS - ESV(MOD-SP4): 12 ML
BH CV ECHO MEAS - FS: 29.6 %
BH CV ECHO MEAS - IVS/LVPW: 1.04 CM
BH CV ECHO MEAS - IVSD: 1.39 CM
BH CV ECHO MEAS - LA DIMENSION: 3.1 CM
BH CV ECHO MEAS - LV DIASTOLIC VOL/BSA (35-75): 17.1 CM2
BH CV ECHO MEAS - LV MASS(C)D: 186.3 GRAMS
BH CV ECHO MEAS - LV MAX PG: 7.3 MMHG
BH CV ECHO MEAS - LV MEAN PG: 4.2 MMHG
BH CV ECHO MEAS - LV SYSTOLIC VOL/BSA (12-30): 5.5 CM2
BH CV ECHO MEAS - LV V1 MAX: 134.7 CM/SEC
BH CV ECHO MEAS - LV V1 VTI: 19.6 CM
BH CV ECHO MEAS - LVIDD: 3.8 CM
BH CV ECHO MEAS - LVIDS: 2.7 CM
BH CV ECHO MEAS - LVOT AREA: 2.7 CM2
BH CV ECHO MEAS - LVOT DIAM: 1.84 CM
BH CV ECHO MEAS - LVPWD: 1.34 CM
BH CV ECHO MEAS - MV A MAX VEL: 87.6 CM/SEC
BH CV ECHO MEAS - MV DEC SLOPE: 320.9 CM/SEC2
BH CV ECHO MEAS - MV DEC TIME: 0.2 MSEC
BH CV ECHO MEAS - MV E MAX VEL: 64 CM/SEC
BH CV ECHO MEAS - MV E/A: 0.73
BH CV ECHO MEAS - MV MAX PG: 3.9 MMHG
BH CV ECHO MEAS - MV MEAN PG: 2.08 MMHG
BH CV ECHO MEAS - MV V2 VTI: 13.9 CM
BH CV ECHO MEAS - MVA(VTI): 3.7 CM2
BH CV ECHO MEAS - PA ACC TIME: 0.09 SEC
BH CV ECHO MEAS - PA PR(ACCEL): 37 MMHG
BH CV ECHO MEAS - PA V2 MAX: 113.7 CM/SEC
BH CV ECHO MEAS - RAP SYSTOLE: 3 MMHG
BH CV ECHO MEAS - RV MAX PG: 3.9 MMHG
BH CV ECHO MEAS - RV V1 MAX: 98.6 CM/SEC
BH CV ECHO MEAS - RV V1 VTI: 15.8 CM
BH CV ECHO MEAS - RVDD: 2.43 CM
BH CV ECHO MEAS - RVSP: 26.7 MMHG
BH CV ECHO MEAS - SI(MOD-SP4): 11.6 ML/M2
BH CV ECHO MEAS - SV(LVOT): 52.2 ML
BH CV ECHO MEAS - SV(MOD-SP4): 25.1 ML
BH CV ECHO MEAS - TR MAX PG: 23.7 MMHG
BH CV ECHO MEAS - TR MAX VEL: 243.5 CM/SEC
BILIRUB SERPL-MCNC: 4.3 MG/DL (ref 0–1.2)
BILIRUB SERPL-MCNC: 4.6 MG/DL (ref 0–1.2)
BILIRUB UR QL STRIP: ABNORMAL
BILIRUB UR QL STRIP: ABNORMAL
BLD GP AB SCN SERPL QL: NEGATIVE
BUN SERPL-MCNC: 32 MG/DL (ref 6–20)
BUN SERPL-MCNC: 38 MG/DL (ref 6–20)
BUN/CREAT SERPL: 8.4 (ref 7–25)
BUN/CREAT SERPL: 9.4 (ref 7–25)
C CAYETANENSIS DNA STL QL NAA+NON-PROBE: NOT DETECTED
C COLI+JEJ+UPSA DNA STL QL NAA+NON-PROBE: NOT DETECTED
C PNEUM DNA NPH QL NAA+NON-PROBE: NOT DETECTED
CA-I BLDA-SCNC: 1 MMOL/L (ref 1.15–1.33)
CALCIUM SPEC-SCNC: 7.5 MG/DL (ref 8.6–10.5)
CALCIUM SPEC-SCNC: 8.3 MG/DL (ref 8.6–10.5)
CHLORIDE SERPL-SCNC: 94 MMOL/L (ref 98–107)
CHLORIDE SERPL-SCNC: 99 MMOL/L (ref 98–107)
CK SERPL-CCNC: 327 U/L (ref 20–180)
CLARITY UR: ABNORMAL
CLARITY UR: ABNORMAL
CO2 BLDA-SCNC: 16.3 MMOL/L (ref 22–29)
CO2 SERPL-SCNC: 19 MMOL/L (ref 22–29)
CO2 SERPL-SCNC: 21 MMOL/L (ref 22–29)
COLOR UR: ABNORMAL
COLOR UR: ABNORMAL
CORTIS SERPL-MCNC: 42.25 MCG/DL
CREAT SERPL-MCNC: 3.82 MG/DL (ref 0.57–1)
CREAT SERPL-MCNC: 4.03 MG/DL (ref 0.57–1)
CRP SERPL-MCNC: 19.55 MG/DL (ref 0–0.5)
CRYPTOSP DNA STL QL NAA+NON-PROBE: NOT DETECTED
D-LACTATE SERPL-SCNC: 2.4 MMOL/L (ref 0.5–2)
D-LACTATE SERPL-SCNC: 2.9 MMOL/L (ref 0.5–2)
D-LACTATE SERPL-SCNC: 3.2 MMOL/L (ref 0.5–2)
D-LACTATE SERPL-SCNC: 3.7 MMOL/L (ref 0.5–2)
DEPRECATED RDW RBC AUTO: 43.3 FL (ref 37–54)
DEPRECATED RDW RBC AUTO: 44.2 FL (ref 37–54)
E HISTOLYT DNA STL QL NAA+NON-PROBE: NOT DETECTED
EAEC PAA PLAS AGGR+AATA ST NAA+NON-PRB: NOT DETECTED
EC STX1+STX2 GENES STL QL NAA+NON-PROBE: NOT DETECTED
EGFRCR SERPLBLD CKD-EPI 2021: 13.3 ML/MIN/1.73
EGFRCR SERPLBLD CKD-EPI 2021: 14.2 ML/MIN/1.73
EOSINOPHIL SPEC QL MICRO: 0 % EOS/100 CELLS (ref 0–0)
EPEC EAE GENE STL QL NAA+NON-PROBE: DETECTED
ERYTHROCYTE [DISTWIDTH] IN BLOOD BY AUTOMATED COUNT: 14.4 % (ref 12.3–15.4)
ERYTHROCYTE [DISTWIDTH] IN BLOOD BY AUTOMATED COUNT: 14.5 % (ref 12.3–15.4)
ETEC LTA+ST1A+ST1B TOX ST NAA+NON-PROBE: NOT DETECTED
FLUAV SUBTYP SPEC NAA+PROBE: NOT DETECTED
FLUBV RNA ISLT QL NAA+PROBE: NOT DETECTED
G LAMBLIA DNA STL QL NAA+NON-PROBE: NOT DETECTED
GLOBULIN UR ELPH-MCNC: 2.6 GM/DL
GLOBULIN UR ELPH-MCNC: 2.8 GM/DL
GLUCOSE BLDC GLUCOMTR-MCNC: 117 MG/DL (ref 70–105)
GLUCOSE BLDC GLUCOMTR-MCNC: 122 MG/DL (ref 74–100)
GLUCOSE BLDC GLUCOMTR-MCNC: 122 MG/DL (ref 74–100)
GLUCOSE BLDC GLUCOMTR-MCNC: 149 MG/DL (ref 70–105)
GLUCOSE SERPL-MCNC: 141 MG/DL (ref 65–99)
GLUCOSE SERPL-MCNC: 165 MG/DL (ref 65–99)
GLUCOSE UR STRIP-MCNC: ABNORMAL MG/DL
GLUCOSE UR STRIP-MCNC: NEGATIVE MG/DL
GRAN CASTS URNS QL MICRO: ABNORMAL /LPF
HADV DNA SPEC NAA+PROBE: NOT DETECTED
HCO3 BLDA-SCNC: 15.5 MMOL/L (ref 21–28)
HCOV 229E RNA SPEC QL NAA+PROBE: NOT DETECTED
HCOV HKU1 RNA SPEC QL NAA+PROBE: NOT DETECTED
HCOV NL63 RNA SPEC QL NAA+PROBE: NOT DETECTED
HCOV OC43 RNA SPEC QL NAA+PROBE: NOT DETECTED
HCT VFR BLD AUTO: 40.8 % (ref 34–46.6)
HCT VFR BLD AUTO: 45.7 % (ref 34–46.6)
HCT VFR BLDA CALC: 35 % (ref 38–51)
HEMODILUTION: NO
HGB BLD-MCNC: 13.7 G/DL (ref 12–15.9)
HGB BLD-MCNC: 14.8 G/DL (ref 12–15.9)
HGB BLDA-MCNC: 12 G/DL (ref 12–17)
HGB UR QL STRIP.AUTO: ABNORMAL
HGB UR QL STRIP.AUTO: ABNORMAL
HMPV RNA NPH QL NAA+NON-PROBE: NOT DETECTED
HOLD SPECIMEN: NORMAL
HPIV1 RNA ISLT QL NAA+PROBE: NOT DETECTED
HPIV2 RNA SPEC QL NAA+PROBE: NOT DETECTED
HPIV3 RNA NPH QL NAA+PROBE: NOT DETECTED
HPIV4 P GENE NPH QL NAA+PROBE: NOT DETECTED
HYALINE CASTS UR QL AUTO: ABNORMAL /LPF
HYALINE CASTS UR QL AUTO: ABNORMAL /LPF
INHALED O2 CONCENTRATION: 100 %
INR PPP: 2.16 (ref 0.93–1.1)
INR PPP: 2.47 (ref 0.93–1.1)
INR PPP: 2.47 (ref 0.93–1.1)
KETONES UR QL STRIP: ABNORMAL
KETONES UR QL STRIP: NEGATIVE
L PNEUMO1 AG UR QL IA: NEGATIVE
LARGE PLATELETS: ABNORMAL
LARGE PLATELETS: ABNORMAL
LEUKOCYTE ESTERASE UR QL STRIP.AUTO: ABNORMAL
LEUKOCYTE ESTERASE UR QL STRIP.AUTO: ABNORMAL
LIPASE SERPL-CCNC: 12 U/L (ref 13–60)
LV EF 2D ECHO EST: 75 %
LYMPHOCYTES # BLD MANUAL: 0 10*3/MM3 (ref 0.7–3.1)
LYMPHOCYTES # BLD MANUAL: 0.16 10*3/MM3 (ref 0.7–3.1)
LYMPHOCYTES NFR BLD MANUAL: 4 % (ref 5–12)
LYMPHOCYTES NFR BLD MANUAL: 5 % (ref 5–12)
M PNEUMO IGG SER IA-ACNC: NOT DETECTED
MAGNESIUM SERPL-MCNC: 1.2 MG/DL (ref 1.6–2.6)
MAGNESIUM SERPL-MCNC: 1.8 MG/DL (ref 1.6–2.6)
MAXIMAL PREDICTED HEART RATE: 175 BPM
MCH RBC QN AUTO: 28.2 PG (ref 26.6–33)
MCH RBC QN AUTO: 28.8 PG (ref 26.6–33)
MCHC RBC AUTO-ENTMCNC: 32.4 G/DL (ref 31.5–35.7)
MCHC RBC AUTO-ENTMCNC: 33.7 G/DL (ref 31.5–35.7)
MCV RBC AUTO: 85.4 FL (ref 79–97)
MCV RBC AUTO: 86.9 FL (ref 79–97)
METAMYELOCYTES NFR BLD MANUAL: 20 % (ref 0–0)
METAMYELOCYTES NFR BLD MANUAL: 3 % (ref 0–0)
MODALITY: ABNORMAL
MONOCYTES # BLD: 0.64 10*3/MM3 (ref 0.1–0.9)
MONOCYTES # BLD: 1.02 10*3/MM3 (ref 0.1–0.9)
MRSA DNA SPEC QL NAA+PROBE: NORMAL
MYELOCYTES NFR BLD MANUAL: 4 % (ref 0–0)
NEUTROPHILS # BLD AUTO: 11.93 10*3/MM3 (ref 1.7–7)
NEUTROPHILS # BLD AUTO: 17.86 10*3/MM3 (ref 1.7–7)
NEUTROPHILS NFR BLD MANUAL: 37 % (ref 42.7–76)
NEUTROPHILS NFR BLD MANUAL: 59 % (ref 42.7–76)
NEUTS BAND NFR BLD MANUAL: 29 % (ref 0–5)
NEUTS BAND NFR BLD MANUAL: 38 % (ref 0–5)
NEUTS VAC BLD QL SMEAR: ABNORMAL
NEUTS VAC BLD QL SMEAR: ABNORMAL
NITRITE UR QL STRIP: NEGATIVE
NITRITE UR QL STRIP: NEGATIVE
NOROVIRUS GI+II RNA STL QL NAA+NON-PROBE: NOT DETECTED
NT-PROBNP SERPL-MCNC: ABNORMAL PG/ML (ref 0–450)
P SHIGELLOIDES DNA STL QL NAA+NON-PROBE: NOT DETECTED
PATHOLOGY REVIEW: YES
PCO2 BLDA: 28.3 MM HG (ref 35–48)
PH BLDA: 7.34 PH UNITS (ref 7.35–7.45)
PH UR STRIP.AUTO: 5 [PH] (ref 5–8)
PH UR STRIP.AUTO: <=5 [PH] (ref 5–8)
PHOSPHATE SERPL-MCNC: 2.9 MG/DL (ref 2.5–4.5)
PHOSPHATE SERPL-MCNC: 3.8 MG/DL (ref 2.5–4.5)
PLATELET # BLD AUTO: 188 10*3/MM3 (ref 140–450)
PLATELET # BLD AUTO: 241 10*3/MM3 (ref 140–450)
PMV BLD AUTO: 8.6 FL (ref 6–12)
PMV BLD AUTO: 8.9 FL (ref 6–12)
PO2 BLDA: 221.4 MM HG (ref 83–108)
POTASSIUM BLDA-SCNC: 3.2 MMOL/L (ref 3.5–4.5)
POTASSIUM SERPL-SCNC: 3.7 MMOL/L (ref 3.5–5.2)
POTASSIUM SERPL-SCNC: 3.9 MMOL/L (ref 3.5–5.2)
PROCALCITONIN SERPL-MCNC: 99.59 NG/ML (ref 0–0.25)
PROT ?TM UR-MCNC: 102 MG/DL
PROT SERPL-MCNC: 5.5 G/DL (ref 6–8.5)
PROT SERPL-MCNC: 6.1 G/DL (ref 6–8.5)
PROT UR QL STRIP: ABNORMAL
PROT UR QL STRIP: ABNORMAL
PROTHROMBIN TIME: 21.3 SECONDS (ref 9.6–11.7)
PROTHROMBIN TIME: 24.2 SECONDS (ref 9.6–11.7)
PROTHROMBIN TIME: 24.2 SECONDS (ref 9.6–11.7)
RBC # BLD AUTO: 4.77 10*6/MM3 (ref 3.77–5.28)
RBC # BLD AUTO: 5.26 10*6/MM3 (ref 3.77–5.28)
RBC # UR STRIP: ABNORMAL /HPF
RBC # UR STRIP: ABNORMAL /HPF
RBC MORPH BLD: NORMAL
RBC MORPH BLD: NORMAL
REF LAB TEST METHOD: ABNORMAL
REF LAB TEST METHOD: ABNORMAL
RH BLD: NEGATIVE
RHINOVIRUS RNA SPEC NAA+PROBE: NOT DETECTED
RSV RNA NPH QL NAA+NON-PROBE: NOT DETECTED
RVA RNA STL QL NAA+NON-PROBE: NOT DETECTED
S ENT+BONG DNA STL QL NAA+NON-PROBE: NOT DETECTED
S PNEUM AG SPEC QL LA: NEGATIVE
SAO2 % BLDCOA: 99.8 % (ref 94–98)
SAPO I+II+IV+V RNA STL QL NAA+NON-PROBE: NOT DETECTED
SARS-COV-2 RNA NPH QL NAA+NON-PROBE: NOT DETECTED
SCAN SLIDE: NORMAL
SCAN SLIDE: NORMAL
SHIGELLA SP+EIEC IPAH ST NAA+NON-PROBE: NOT DETECTED
SODIUM BLD-SCNC: 134 MMOL/L (ref 138–146)
SODIUM SERPL-SCNC: 132 MMOL/L (ref 136–145)
SODIUM SERPL-SCNC: 133 MMOL/L (ref 136–145)
SODIUM UR-SCNC: 61 MMOL/L
SP GR UR STRIP: 1.02 (ref 1–1.03)
SP GR UR STRIP: 1.03 (ref 1–1.03)
SQUAMOUS #/AREA URNS HPF: ABNORMAL /HPF
SQUAMOUS #/AREA URNS HPF: ABNORMAL /HPF
STRESS TARGET HR: 149 BPM
T&S EXPIRATION DATE: NORMAL
TROPONIN T SERPL-MCNC: 0.03 NG/ML (ref 0–0.03)
TROPONIN T SERPL-MCNC: 0.03 NG/ML (ref 0–0.03)
TROPONIN T SERPL-MCNC: 0.04 NG/ML (ref 0–0.03)
TROPONIN T SERPL-MCNC: <0.01 NG/ML (ref 0–0.03)
TSH SERPL DL<=0.05 MIU/L-ACNC: 1.57 UIU/ML (ref 0.27–4.2)
UROBILINOGEN UR QL STRIP: ABNORMAL
UROBILINOGEN UR QL STRIP: ABNORMAL
V CHOL+PARA+VUL DNA STL QL NAA+NON-PROBE: NOT DETECTED
V CHOLERAE DNA STL QL NAA+NON-PROBE: NOT DETECTED
VARIANT LYMPHS NFR BLD MANUAL: 0 % (ref 19.6–45.3)
VARIANT LYMPHS NFR BLD MANUAL: 1 % (ref 19.6–45.3)
WBC # UR STRIP: ABNORMAL /HPF
WBC # UR STRIP: ABNORMAL /HPF
WBC NRBC COR # BLD: 15.9 10*3/MM3 (ref 3.4–10.8)
WBC NRBC COR # BLD: 20.3 10*3/MM3 (ref 3.4–10.8)
WHOLE BLOOD HOLD COAG: NORMAL
WHOLE BLOOD HOLD SPECIMEN: NORMAL
Y ENTEROCOL DNA STL QL NAA+NON-PROBE: NOT DETECTED

## 2022-08-05 PROCEDURE — 86923 COMPATIBILITY TEST ELECTRIC: CPT

## 2022-08-05 PROCEDURE — 87086 URINE CULTURE/COLONY COUNT: CPT | Performed by: EMERGENCY MEDICINE

## 2022-08-05 PROCEDURE — 82570 ASSAY OF URINE CREATININE: CPT | Performed by: INTERNAL MEDICINE

## 2022-08-05 PROCEDURE — 88307 TISSUE EXAM BY PATHOLOGIST: CPT | Performed by: RADIOLOGY

## 2022-08-05 PROCEDURE — 82784 ASSAY IGA/IGD/IGG/IGM EACH: CPT | Performed by: INTERNAL MEDICINE

## 2022-08-05 PROCEDURE — 84300 ASSAY OF URINE SODIUM: CPT | Performed by: INTERNAL MEDICINE

## 2022-08-05 PROCEDURE — 25010000002 PIPERACILLIN SOD-TAZOBACTAM PER 1 G: Performed by: INTERNAL MEDICINE

## 2022-08-05 PROCEDURE — 81001 URINALYSIS AUTO W/SCOPE: CPT | Performed by: INTERNAL MEDICINE

## 2022-08-05 PROCEDURE — 86160 COMPLEMENT ANTIGEN: CPT | Performed by: INTERNAL MEDICINE

## 2022-08-05 PROCEDURE — 86927 PLASMA FRESH FROZEN: CPT

## 2022-08-05 PROCEDURE — 83516 IMMUNOASSAY NONANTIBODY: CPT | Performed by: INTERNAL MEDICINE

## 2022-08-05 PROCEDURE — 85610 PROTHROMBIN TIME: CPT | Performed by: NURSE PRACTITIONER

## 2022-08-05 PROCEDURE — 81025 URINE PREGNANCY TEST: CPT | Performed by: EMERGENCY MEDICINE

## 2022-08-05 PROCEDURE — 83880 ASSAY OF NATRIURETIC PEPTIDE: CPT | Performed by: INTERNAL MEDICINE

## 2022-08-05 PROCEDURE — 25010000002 SULFUR HEXAFLUORIDE MICROSPH 60.7-25 MG RECONSTITUTED SUSPENSION: Performed by: INTERNAL MEDICINE

## 2022-08-05 PROCEDURE — 83605 ASSAY OF LACTIC ACID: CPT

## 2022-08-05 PROCEDURE — 82962 GLUCOSE BLOOD TEST: CPT

## 2022-08-05 PROCEDURE — 93306 TTE W/DOPPLER COMPLETE: CPT

## 2022-08-05 PROCEDURE — 87507 IADNA-DNA/RNA PROBE TQ 12-25: CPT | Performed by: NURSE PRACTITIONER

## 2022-08-05 PROCEDURE — 82803 BLOOD GASES ANY COMBINATION: CPT

## 2022-08-05 PROCEDURE — 83605 ASSAY OF LACTIC ACID: CPT | Performed by: NURSE PRACTITIONER

## 2022-08-05 PROCEDURE — 71275 CT ANGIOGRAPHY CHEST: CPT

## 2022-08-05 PROCEDURE — 36430 TRANSFUSION BLD/BLD COMPNT: CPT

## 2022-08-05 PROCEDURE — 87449 NOS EACH ORGANISM AG IA: CPT | Performed by: NURSE PRACTITIONER

## 2022-08-05 PROCEDURE — 87040 BLOOD CULTURE FOR BACTERIA: CPT | Performed by: EMERGENCY MEDICINE

## 2022-08-05 PROCEDURE — P9017 PLASMA 1 DONOR FRZ W/IN 8 HR: HCPCS

## 2022-08-05 PROCEDURE — 82550 ASSAY OF CK (CPK): CPT | Performed by: NURSE PRACTITIONER

## 2022-08-05 PROCEDURE — 84484 ASSAY OF TROPONIN QUANT: CPT | Performed by: NURSE PRACTITIONER

## 2022-08-05 PROCEDURE — 86901 BLOOD TYPING SEROLOGIC RH(D): CPT | Performed by: NURSE PRACTITIONER

## 2022-08-05 PROCEDURE — 87205 SMEAR GRAM STAIN: CPT | Performed by: INTERNAL MEDICINE

## 2022-08-05 PROCEDURE — 86037 ANCA TITER EACH ANTIBODY: CPT | Performed by: INTERNAL MEDICINE

## 2022-08-05 PROCEDURE — 76775 US EXAM ABDO BACK WALL LIM: CPT

## 2022-08-05 PROCEDURE — 25010000002 MAGNESIUM SULFATE 2 GM/50ML SOLUTION: Performed by: NURSE PRACTITIONER

## 2022-08-05 PROCEDURE — 84156 ASSAY OF PROTEIN URINE: CPT | Performed by: INTERNAL MEDICINE

## 2022-08-05 PROCEDURE — 25010000002 HYDROCORTISONE SODIUM SUCCINATE 100 MG RECONSTITUTED SOLUTION: Performed by: NURSE PRACTITIONER

## 2022-08-05 PROCEDURE — 84165 PROTEIN E-PHORESIS SERUM: CPT | Performed by: INTERNAL MEDICINE

## 2022-08-05 PROCEDURE — 86334 IMMUNOFIX E-PHORESIS SERUM: CPT | Performed by: INTERNAL MEDICINE

## 2022-08-05 PROCEDURE — 81001 URINALYSIS AUTO W/SCOPE: CPT | Performed by: EMERGENCY MEDICINE

## 2022-08-05 PROCEDURE — 82330 ASSAY OF CALCIUM: CPT

## 2022-08-05 PROCEDURE — 36600 WITHDRAWAL OF ARTERIAL BLOOD: CPT

## 2022-08-05 PROCEDURE — C1751 CATH, INF, PER/CENT/MIDLINE: HCPCS

## 2022-08-05 PROCEDURE — 84100 ASSAY OF PHOSPHORUS: CPT | Performed by: NURSE PRACTITIONER

## 2022-08-05 PROCEDURE — 83690 ASSAY OF LIPASE: CPT | Performed by: EMERGENCY MEDICINE

## 2022-08-05 PROCEDURE — 25010000002 VANCOMYCIN 10 G RECONSTITUTED SOLUTION: Performed by: EMERGENCY MEDICINE

## 2022-08-05 PROCEDURE — 83735 ASSAY OF MAGNESIUM: CPT | Performed by: NURSE PRACTITIONER

## 2022-08-05 PROCEDURE — 87075 CULTR BACTERIA EXCEPT BLOOD: CPT | Performed by: INTERNAL MEDICINE

## 2022-08-05 PROCEDURE — 82533 TOTAL CORTISOL: CPT | Performed by: NURSE PRACTITIONER

## 2022-08-05 PROCEDURE — 99291 CRITICAL CARE FIRST HOUR: CPT

## 2022-08-05 PROCEDURE — 0 IOPAMIDOL PER 1 ML: Performed by: EMERGENCY MEDICINE

## 2022-08-05 PROCEDURE — 25010000002 FENTANYL CITRATE (PF) 50 MCG/ML SOLUTION: Performed by: RADIOLOGY

## 2022-08-05 PROCEDURE — 0202U NFCT DS 22 TRGT SARS-COV-2: CPT | Performed by: EMERGENCY MEDICINE

## 2022-08-05 PROCEDURE — 84145 PROCALCITONIN (PCT): CPT | Performed by: EMERGENCY MEDICINE

## 2022-08-05 PROCEDURE — 85025 COMPLETE CBC W/AUTO DIFF WBC: CPT | Performed by: EMERGENCY MEDICINE

## 2022-08-05 PROCEDURE — 76937 US GUIDE VASCULAR ACCESS: CPT | Performed by: NURSE PRACTITIONER

## 2022-08-05 PROCEDURE — 74177 CT ABD & PELVIS W/CONTRAST: CPT

## 2022-08-05 PROCEDURE — 0 LIDOCAINE 1 % SOLUTION: Performed by: RADIOLOGY

## 2022-08-05 PROCEDURE — 80051 ELECTROLYTE PANEL: CPT

## 2022-08-05 PROCEDURE — 86850 RBC ANTIBODY SCREEN: CPT | Performed by: NURSE PRACTITIONER

## 2022-08-05 PROCEDURE — 85730 THROMBOPLASTIN TIME PARTIAL: CPT | Performed by: EMERGENCY MEDICINE

## 2022-08-05 PROCEDURE — 82150 ASSAY OF AMYLASE: CPT | Performed by: NURSE PRACTITIONER

## 2022-08-05 PROCEDURE — 0FB03ZX EXCISION OF LIVER, PERCUTANEOUS APPROACH, DIAGNOSTIC: ICD-10-PCS | Performed by: RADIOLOGY

## 2022-08-05 PROCEDURE — 85025 COMPLETE CBC W/AUTO DIFF WBC: CPT | Performed by: NURSE PRACTITIONER

## 2022-08-05 PROCEDURE — 85007 BL SMEAR W/DIFF WBC COUNT: CPT | Performed by: NURSE PRACTITIONER

## 2022-08-05 PROCEDURE — 86901 BLOOD TYPING SEROLOGIC RH(D): CPT

## 2022-08-05 PROCEDURE — 85610 PROTHROMBIN TIME: CPT | Performed by: EMERGENCY MEDICINE

## 2022-08-05 PROCEDURE — 85018 HEMOGLOBIN: CPT

## 2022-08-05 PROCEDURE — 86038 ANTINUCLEAR ANTIBODIES: CPT | Performed by: INTERNAL MEDICINE

## 2022-08-05 PROCEDURE — 80053 COMPREHEN METABOLIC PANEL: CPT | Performed by: EMERGENCY MEDICINE

## 2022-08-05 PROCEDURE — 86900 BLOOD TYPING SEROLOGIC ABO: CPT

## 2022-08-05 PROCEDURE — 25010000002 CALCIUM GLUCONATE 2-0.675 GM/100ML-% SOLUTION: Performed by: NURSE PRACTITIONER

## 2022-08-05 PROCEDURE — 93306 TTE W/DOPPLER COMPLETE: CPT | Performed by: INTERNAL MEDICINE

## 2022-08-05 PROCEDURE — 86140 C-REACTIVE PROTEIN: CPT | Performed by: NURSE PRACTITIONER

## 2022-08-05 PROCEDURE — 77012 CT SCAN FOR NEEDLE BIOPSY: CPT

## 2022-08-05 PROCEDURE — 36620 INSERTION CATHETER ARTERY: CPT | Performed by: NURSE PRACTITIONER

## 2022-08-05 PROCEDURE — 86900 BLOOD TYPING SEROLOGIC ABO: CPT | Performed by: NURSE PRACTITIONER

## 2022-08-05 PROCEDURE — 80053 COMPREHEN METABOLIC PANEL: CPT | Performed by: NURSE PRACTITIONER

## 2022-08-05 PROCEDURE — 93005 ELECTROCARDIOGRAM TRACING: CPT

## 2022-08-05 PROCEDURE — 51702 INSERT TEMP BLADDER CATH: CPT

## 2022-08-05 PROCEDURE — 87070 CULTURE OTHR SPECIMN AEROBIC: CPT | Performed by: INTERNAL MEDICINE

## 2022-08-05 PROCEDURE — 87641 MR-STAPH DNA AMP PROBE: CPT | Performed by: NURSE PRACTITIONER

## 2022-08-05 PROCEDURE — 25010000002 PIPERACILLIN SOD-TAZOBACTAM PER 1 G: Performed by: EMERGENCY MEDICINE

## 2022-08-05 PROCEDURE — 84484 ASSAY OF TROPONIN QUANT: CPT | Performed by: EMERGENCY MEDICINE

## 2022-08-05 PROCEDURE — 84443 ASSAY THYROID STIM HORMONE: CPT | Performed by: NURSE PRACTITIONER

## 2022-08-05 PROCEDURE — 85007 BL SMEAR W/DIFF WBC COUNT: CPT | Performed by: EMERGENCY MEDICINE

## 2022-08-05 PROCEDURE — 76705 ECHO EXAM OF ABDOMEN: CPT

## 2022-08-05 PROCEDURE — 4A133B3 MONITORING OF ARTERIAL PRESSURE, PULMONARY, PERCUTANEOUS APPROACH: ICD-10-PCS | Performed by: NURSE PRACTITIONER

## 2022-08-05 PROCEDURE — 71045 X-RAY EXAM CHEST 1 VIEW: CPT

## 2022-08-05 RX ORDER — OLANZAPINE 10 MG/2ML
1 INJECTION, POWDER, LYOPHILIZED, FOR SOLUTION INTRAMUSCULAR
Status: DISCONTINUED | OUTPATIENT
Start: 2022-08-05 | End: 2022-08-17 | Stop reason: HOSPADM

## 2022-08-05 RX ORDER — SODIUM CHLORIDE, SODIUM LACTATE, POTASSIUM CHLORIDE, CALCIUM CHLORIDE 600; 310; 30; 20 MG/100ML; MG/100ML; MG/100ML; MG/100ML
125 INJECTION, SOLUTION INTRAVENOUS CONTINUOUS
Status: DISCONTINUED | OUTPATIENT
Start: 2022-08-05 | End: 2022-08-05

## 2022-08-05 RX ORDER — NICOTINE POLACRILEX 4 MG
15 LOZENGE BUCCAL
Status: DISCONTINUED | OUTPATIENT
Start: 2022-08-05 | End: 2022-08-17 | Stop reason: HOSPADM

## 2022-08-05 RX ORDER — SODIUM CHLORIDE 0.9 % (FLUSH) 0.9 %
10 SYRINGE (ML) INJECTION AS NEEDED
Status: DISCONTINUED | OUTPATIENT
Start: 2022-08-05 | End: 2022-08-17 | Stop reason: HOSPADM

## 2022-08-05 RX ORDER — DEXTROSE MONOHYDRATE 25 G/50ML
25 INJECTION, SOLUTION INTRAVENOUS
Status: DISCONTINUED | OUTPATIENT
Start: 2022-08-05 | End: 2022-08-17 | Stop reason: HOSPADM

## 2022-08-05 RX ORDER — MAGNESIUM SULFATE HEPTAHYDRATE 40 MG/ML
2 INJECTION, SOLUTION INTRAVENOUS ONCE
Status: COMPLETED | OUTPATIENT
Start: 2022-08-05 | End: 2022-08-05

## 2022-08-05 RX ORDER — ACETAMINOPHEN 650 MG/1
650 SUPPOSITORY RECTAL EVERY 4 HOURS PRN
Status: DISCONTINUED | OUTPATIENT
Start: 2022-08-05 | End: 2022-08-17 | Stop reason: HOSPADM

## 2022-08-05 RX ORDER — NOREPINEPHRINE BIT/0.9 % NACL 8 MG/250ML
.02-.3 INFUSION BOTTLE (ML) INTRAVENOUS
Status: DISCONTINUED | OUTPATIENT
Start: 2022-08-05 | End: 2022-08-05

## 2022-08-05 RX ORDER — SODIUM CHLORIDE 0.9 % (FLUSH) 0.9 %
10 SYRINGE (ML) INJECTION EVERY 12 HOURS SCHEDULED
Status: DISCONTINUED | OUTPATIENT
Start: 2022-08-05 | End: 2022-08-17 | Stop reason: HOSPADM

## 2022-08-05 RX ORDER — ONDANSETRON 2 MG/ML
4 INJECTION INTRAMUSCULAR; INTRAVENOUS EVERY 6 HOURS PRN
Status: DISCONTINUED | OUTPATIENT
Start: 2022-08-05 | End: 2022-08-17 | Stop reason: HOSPADM

## 2022-08-05 RX ORDER — CALCIUM GLUCONATE 20 MG/ML
2 INJECTION, SOLUTION INTRAVENOUS ONCE
Status: COMPLETED | OUTPATIENT
Start: 2022-08-05 | End: 2022-08-05

## 2022-08-05 RX ORDER — ONDANSETRON 4 MG/1
4 TABLET, FILM COATED ORAL EVERY 6 HOURS PRN
Status: DISCONTINUED | OUTPATIENT
Start: 2022-08-05 | End: 2022-08-17 | Stop reason: HOSPADM

## 2022-08-05 RX ORDER — NITROGLYCERIN 0.4 MG/1
0.4 TABLET SUBLINGUAL
Status: DISCONTINUED | OUTPATIENT
Start: 2022-08-05 | End: 2022-08-17 | Stop reason: HOSPADM

## 2022-08-05 RX ORDER — LIDOCAINE HYDROCHLORIDE 10 MG/ML
INJECTION, SOLUTION INFILTRATION; PERINEURAL
Status: COMPLETED | OUTPATIENT
Start: 2022-08-05 | End: 2022-08-05

## 2022-08-05 RX ORDER — ACETAMINOPHEN 500 MG
1000 TABLET ORAL ONCE
Status: COMPLETED | OUTPATIENT
Start: 2022-08-05 | End: 2022-08-05

## 2022-08-05 RX ORDER — FENTANYL CITRATE 50 UG/ML
INJECTION, SOLUTION INTRAMUSCULAR; INTRAVENOUS
Status: COMPLETED | OUTPATIENT
Start: 2022-08-05 | End: 2022-08-05

## 2022-08-05 RX ORDER — METRONIDAZOLE 500 MG/100ML
500 INJECTION, SOLUTION INTRAVENOUS EVERY 8 HOURS
Status: DISCONTINUED | OUTPATIENT
Start: 2022-08-05 | End: 2022-08-05

## 2022-08-05 RX ORDER — NOREPINEPHRINE BIT/0.9 % NACL 8 MG/250ML
INFUSION BOTTLE (ML) INTRAVENOUS
Status: COMPLETED
Start: 2022-08-05 | End: 2022-08-05

## 2022-08-05 RX ORDER — ACETAMINOPHEN 325 MG/1
650 TABLET ORAL EVERY 4 HOURS PRN
Status: DISCONTINUED | OUTPATIENT
Start: 2022-08-05 | End: 2022-08-17 | Stop reason: HOSPADM

## 2022-08-05 RX ORDER — INSULIN LISPRO 100 [IU]/ML
0-7 INJECTION, SOLUTION INTRAVENOUS; SUBCUTANEOUS EVERY 6 HOURS SCHEDULED
Status: DISCONTINUED | OUTPATIENT
Start: 2022-08-05 | End: 2022-08-06

## 2022-08-05 RX ORDER — ALUMINA, MAGNESIA, AND SIMETHICONE 2400; 2400; 240 MG/30ML; MG/30ML; MG/30ML
15 SUSPENSION ORAL EVERY 6 HOURS PRN
Status: DISCONTINUED | OUTPATIENT
Start: 2022-08-05 | End: 2022-08-06

## 2022-08-05 RX ORDER — PANTOPRAZOLE SODIUM 40 MG/10ML
40 INJECTION, POWDER, LYOPHILIZED, FOR SOLUTION INTRAVENOUS DAILY
Status: DISCONTINUED | OUTPATIENT
Start: 2022-08-05 | End: 2022-08-06

## 2022-08-05 RX ADMIN — Medication 0.3 MCG/KG/MIN: at 10:45

## 2022-08-05 RX ADMIN — Medication 50 MEQ: at 15:39

## 2022-08-05 RX ADMIN — HYDROCORTISONE SODIUM SUCCINATE 100 MG: 100 INJECTION, POWDER, FOR SOLUTION INTRAMUSCULAR; INTRAVENOUS at 18:42

## 2022-08-05 RX ADMIN — PIPERACILLIN AND TAZOBACTAM 4.5 G: 4; .5 INJECTION, POWDER, FOR SOLUTION INTRAVENOUS at 16:37

## 2022-08-05 RX ADMIN — HYDROCORTISONE SODIUM SUCCINATE 100 MG: 100 INJECTION, POWDER, FOR SOLUTION INTRAMUSCULAR; INTRAVENOUS at 23:56

## 2022-08-05 RX ADMIN — CALCIUM GLUCONATE 2 G: 20 INJECTION, SOLUTION INTRAVENOUS at 10:24

## 2022-08-05 RX ADMIN — Medication 10 ML: at 11:31

## 2022-08-05 RX ADMIN — VASOPRESSIN 0.03 UNITS/MIN: 0.2 INJECTION INTRAVENOUS at 09:10

## 2022-08-05 RX ADMIN — SULFUR HEXAFLUORIDE 4 ML: KIT at 14:36

## 2022-08-05 RX ADMIN — PANTOPRAZOLE SODIUM 40 MG: 40 INJECTION, POWDER, FOR SOLUTION INTRAVENOUS at 16:37

## 2022-08-05 RX ADMIN — FENTANYL CITRATE 50 MCG: 50 INJECTION, SOLUTION INTRAMUSCULAR; INTRAVENOUS at 17:48

## 2022-08-05 RX ADMIN — IOPAMIDOL 100 ML: 755 INJECTION, SOLUTION INTRAVENOUS at 08:30

## 2022-08-05 RX ADMIN — SODIUM CHLORIDE, POTASSIUM CHLORIDE, SODIUM LACTATE AND CALCIUM CHLORIDE 1000 ML: 600; 310; 30; 20 INJECTION, SOLUTION INTRAVENOUS at 09:14

## 2022-08-05 RX ADMIN — SODIUM CHLORIDE, POTASSIUM CHLORIDE, SODIUM LACTATE AND CALCIUM CHLORIDE 2000 ML: 600; 310; 30; 20 INJECTION, SOLUTION INTRAVENOUS at 09:14

## 2022-08-05 RX ADMIN — PIPERACILLIN AND TAZOBACTAM 4.5 G: 4; .5 INJECTION, POWDER, FOR SOLUTION INTRAVENOUS at 09:04

## 2022-08-05 RX ADMIN — NOREPINEPHRINE BITARTRATE 0.3 MCG/KG/MIN: 1 INJECTION, SOLUTION, CONCENTRATE INTRAVENOUS at 11:08

## 2022-08-05 RX ADMIN — FENTANYL CITRATE 50 MCG: 50 INJECTION, SOLUTION INTRAMUSCULAR; INTRAVENOUS at 17:25

## 2022-08-05 RX ADMIN — VANCOMYCIN HYDROCHLORIDE 1500 MG: 10 INJECTION, POWDER, LYOPHILIZED, FOR SOLUTION INTRAVENOUS at 09:58

## 2022-08-05 RX ADMIN — METRONIDAZOLE 500 MG: 500 INJECTION, SOLUTION INTRAVENOUS at 11:31

## 2022-08-05 RX ADMIN — SODIUM BICARBONATE 150 MEQ: 84 INJECTION, SOLUTION INTRAVENOUS at 18:42

## 2022-08-05 RX ADMIN — SODIUM BICARBONATE 50 MEQ: 84 INJECTION, SOLUTION INTRAVENOUS at 15:39

## 2022-08-05 RX ADMIN — CALCIUM GLUCONATE 2 G: 20 INJECTION, SOLUTION INTRAVENOUS at 15:50

## 2022-08-05 RX ADMIN — LIDOCAINE HYDROCHLORIDE 10 ML: 10 INJECTION, SOLUTION INFILTRATION; PERINEURAL at 17:26

## 2022-08-05 RX ADMIN — Medication 0.1 MCG/KG/MIN: at 08:14

## 2022-08-05 RX ADMIN — SODIUM CHLORIDE 500 ML: 9 INJECTION, SOLUTION INTRAVENOUS at 15:49

## 2022-08-05 RX ADMIN — ACETAMINOPHEN 650 MG: 325 TABLET, FILM COATED ORAL at 15:26

## 2022-08-05 RX ADMIN — GELATIN ABSORBABLE SPONGE SIZE 100 1 EACH: MISC at 17:56

## 2022-08-05 RX ADMIN — SODIUM CHLORIDE, POTASSIUM CHLORIDE, SODIUM LACTATE AND CALCIUM CHLORIDE 125 ML/HR: 600; 310; 30; 20 INJECTION, SOLUTION INTRAVENOUS at 09:16

## 2022-08-05 RX ADMIN — ACETAMINOPHEN 1000 MG: 500 TABLET ORAL at 09:49

## 2022-08-05 RX ADMIN — NOREPINEPHRINE BITARTRATE 0.2 MCG/KG/MIN: 1 INJECTION, SOLUTION, CONCENTRATE INTRAVENOUS at 18:42

## 2022-08-05 RX ADMIN — HYDROCORTISONE SODIUM SUCCINATE 100 MG: 100 INJECTION, POWDER, FOR SOLUTION INTRAMUSCULAR; INTRAVENOUS at 11:31

## 2022-08-05 RX ADMIN — SODIUM BICARBONATE 100 MEQ: 84 INJECTION, SOLUTION INTRAVENOUS at 09:57

## 2022-08-05 RX ADMIN — Medication 10 ML: at 20:41

## 2022-08-05 RX ADMIN — FENTANYL CITRATE 50 MCG: 50 INJECTION, SOLUTION INTRAMUSCULAR; INTRAVENOUS at 17:16

## 2022-08-05 RX ADMIN — PIPERACILLIN AND TAZOBACTAM 4.5 G: 4; .5 INJECTION, POWDER, FOR SOLUTION INTRAVENOUS at 23:56

## 2022-08-05 RX ADMIN — VASOPRESSIN 0.03 UNITS/MIN: 0.2 INJECTION INTRAVENOUS at 18:42

## 2022-08-05 RX ADMIN — MAGNESIUM SULFATE HEPTAHYDRATE 2 G: 40 INJECTION, SOLUTION INTRAVENOUS at 15:51

## 2022-08-05 RX ADMIN — SODIUM BICARBONATE 150 MEQ: 84 INJECTION, SOLUTION INTRAVENOUS at 10:49

## 2022-08-05 RX ADMIN — ACETAMINOPHEN 650 MG: 325 TABLET, FILM COATED ORAL at 21:47

## 2022-08-05 NOTE — ED PROVIDER NOTES
Subjective   History of Present Illness  47-year-old female presents with fever chills some body aches.  She states that started 2 days ago.  No cough or shortness of breath.  She states she had had vomiting once had some diarrhea today.  No blood in stool or emesis.  She complained of abdominal pain today.  She had syncopal episode this morning.  Review of Systems  Complete review of systems not obtained due to critical nature of situation.  Past Medical History:   Diagnosis Date   • Melanoma (CMS/HCC)    • Pulmonary hypertension (CMS/HCC)    • Urinary tract infection     chronic hematuria, seen urology       Allergies   Allergen Reactions   • Cephalexin Rash   • Hydrocodone-Acetaminophen Rash       Past Surgical History:   Procedure Laterality Date   •  SECTION  13 and 14   • SKIN CANCER EXCISION     • TUBAL ABDOMINAL LIGATION         Family History   Problem Relation Age of Onset   • Kidney disease Mother    • Kidney disease Father    • Cancer Father         lung   • Diabetes Brother    • Heart disease Brother         CHF       Social History     Socioeconomic History   • Marital status:    Tobacco Use   • Smoking status: Never Smoker   • Smokeless tobacco: Never Used   Substance and Sexual Activity   • Alcohol use: Yes     Comment: caffeine 1 cup qd   • Drug use: No   • Sexual activity: Yes     Birth control/protection: Surgical     Prior to Admission medications    Medication Sig Start Date End Date Taking? Authorizing Provider   escitalopram (LEXAPRO) 10 MG tablet TAKE 1 TABLET BY MOUTH EVERY DAY 12/3/21   Maribel Graf MD   lisinopril (PRINIVIL,ZESTRIL) 5 MG tablet TAKE 1 TABLET BY MOUTH EVERY DAY 12/3/21   Maribel Graf MD   metoprolol succinate XL (TOPROL-XL) 25 MG 24 hr tablet TAKE 1 TABLET BY MOUTH EVERY DAY 21   Hugh Lancaster,    naproxen (NAPROSYN) 500 MG tablet TAKE 1 TABLET BY MOUTH TWICE A DAY WITH MEALS 21   Maribel Graf MD            Objective   Physical Exam  47-year-old female lethargic.  She is ill.  Skin is dusky.  Pupils conjunctiva injected.  Oropharynx mucous membranes mildly dry neck supple chest clear cardiovascular regular in rhythm abdomen is soft complains of some upper abdominal tenderness without involuntary guarding or rebound.  Extremities without tenderness significant edema.  Neurologic exam lethargic without focal findings.  Central Line At Bedside    Date/Time: 8/5/2022 9:12 AM  Performed by: Leonard Francis MD  Authorized by: Leonard Francis MD     Consent:     Consent obtained:  Emergent situation    Consent given by:  Patient  Universal protocol:     Procedure explained and questions answered to patient or proxy's satisfaction: yes      Immediately prior to procedure, a time out was called: yes      Patient identity confirmed:  Verbally with patient  Pre-procedure details:     Skin preparation:  Chlorhexidine    Skin preparation agent: Skin preparation agent completely dried prior to procedure    Anesthesia:     Anesthesia method:  Local infiltration    Local anesthetic:  Lidocaine 1% w/o epi  Procedure details:     Location:  R internal jugular    Patient position:  Supine    Procedural supplies:  Triple lumen    Landmarks identified: yes      Ultrasound guidance: yes      Ultrasound guidance timing: prior to insertion and real time      Sterile ultrasound techniques: Sterile gel and sterile probe covers were used    Post-procedure details:     Post-procedure:  Dressing applied and line sutured    Assessment:  Blood return through all ports and free fluid flow    Procedure completion:  Tolerated well, no immediate complications      I performed immediate abdominal ultrasound on patient's arrival.  I did not see evidence of free fluid in any quadrant examined         ED Course  ED Course as of 08/05/22 0924   Fri Aug 05, 2022   0919 Modality: NRB [SP]      ED Course User Index  [SP] Leonard Francis MD       Results for orders placed or performed during the hospital encounter of 08/05/22   Respiratory Panel PCR w/COVID-19(SARS-CoV-2) CELSA/JONATHAN/DOMINIQUE/PAD/COR/MAD/LISA In-House, NP Swab in UTM/VTM, 3-4 HR TAT - Swab, Nasopharynx    Specimen: Nasopharynx; Swab   Result Value Ref Range    ADENOVIRUS, PCR Not Detected Not Detected    Coronavirus 229E Not Detected Not Detected    Coronavirus HKU1 Not Detected Not Detected    Coronavirus NL63 Not Detected Not Detected    Coronavirus OC43 Not Detected Not Detected    COVID19 Not Detected Not Detected - Ref. Range    Human Metapneumovirus Not Detected Not Detected    Human Rhinovirus/Enterovirus Not Detected Not Detected    Influenza A PCR Not Detected Not Detected    Influenza B PCR Not Detected Not Detected    Parainfluenza Virus 1 Not Detected Not Detected    Parainfluenza Virus 2 Not Detected Not Detected    Parainfluenza Virus 3 Not Detected Not Detected    Parainfluenza Virus 4 Not Detected Not Detected    RSV, PCR Not Detected Not Detected    Bordetella pertussis pcr Not Detected Not Detected    Bordetella parapertussis PCR Not Detected Not Detected    Chlamydophila pneumoniae PCR Not Detected Not Detected    Mycoplasma pneumo by PCR Not Detected Not Detected   Comprehensive Metabolic Panel    Specimen: Blood   Result Value Ref Range    Glucose 141 (H) 65 - 99 mg/dL    BUN 32 (H) 6 - 20 mg/dL    Creatinine 3.82 (H) 0.57 - 1.00 mg/dL    Sodium 132 (L) 136 - 145 mmol/L    Potassium 3.9 3.5 - 5.2 mmol/L    Chloride 94 (L) 98 - 107 mmol/L    CO2 21.0 (L) 22.0 - 29.0 mmol/L    Calcium 8.3 (L) 8.6 - 10.5 mg/dL    Total Protein 6.1 6.0 - 8.5 g/dL    Albumin 3.30 (L) 3.50 - 5.20 g/dL    ALT (SGPT) 30 1 - 33 U/L    AST (SGOT) 41 (H) 1 - 32 U/L    Alkaline Phosphatase 64 39 - 117 U/L    Total Bilirubin 4.3 (H) 0.0 - 1.2 mg/dL    Globulin 2.8 gm/dL    A/G Ratio 1.2 g/dL    BUN/Creatinine Ratio 8.4 7.0 - 25.0    Anion Gap 17.0 (H) 5.0 - 15.0 mmol/L    eGFR 14.2 (L) >60.0 mL/min/1.73    Protime-INR    Specimen: Blood   Result Value Ref Range    Protime 21.3 (H) 9.6 - 11.7 Seconds    INR 2.16 (C) 0.93 - 1.10   aPTT    Specimen: Blood   Result Value Ref Range    PTT 43.1 (H) 24.0 - 31.0 seconds   Procalcitonin    Specimen: Blood   Result Value Ref Range    Procalcitonin 99.59 (H) 0.00 - 0.25 ng/mL   Troponin    Specimen: Blood   Result Value Ref Range    Troponin T <0.010 0.000 - 0.030 ng/mL   CBC Auto Differential    Specimen: Blood   Result Value Ref Range    WBC 15.90 (H) 3.40 - 10.80 10*3/mm3    RBC 5.26 3.77 - 5.28 10*6/mm3    Hemoglobin 14.8 12.0 - 15.9 g/dL    Hematocrit 45.7 34.0 - 46.6 %    MCV 86.9 79.0 - 97.0 fL    MCH 28.2 26.6 - 33.0 pg    MCHC 32.4 31.5 - 35.7 g/dL    RDW 14.5 12.3 - 15.4 %    RDW-SD 44.2 37.0 - 54.0 fl    MPV 8.9 6.0 - 12.0 fL    Platelets 241 140 - 450 10*3/mm3   Scan Slide    Specimen: Blood   Result Value Ref Range    Scan Slide     Blood Gas, Arterial -    Specimen: Arterial Blood   Result Value Ref Range    Site Right Radial     Jonathan's Test Positive     pH, Arterial 7.345 (L) 7.350 - 7.450 pH units    pCO2, Arterial 28.3 (L) 35.0 - 48.0 mm Hg    pO2, Arterial 221.4 (H) 83.0 - 108.0 mm Hg    HCO3, Arterial 15.5 (L) 21.0 - 28.0 mmol/L    Base Excess, Arterial -8.9 (L) 0.0 - 3.0 mmol/L    O2 Saturation, Arterial 99.8 (H) 94.0 - 98.0 %    CO2 Content 16.3 (L) 22 - 29 mmol/L    Barometric Pressure for Blood Gas      Modality NRB     FIO2 100 %    Hemodilution No    Lipase    Specimen: Blood   Result Value Ref Range    Lipase 12 (L) 13 - 60 U/L   Manual Differential    Specimen: Blood   Result Value Ref Range    Neutrophil % 37.0 (L) 42.7 - 76.0 %    Lymphocyte % 1.0 (L) 19.6 - 45.3 %    Monocyte % 4.0 (L) 5.0 - 12.0 %    Bands %  38.0 (H) 0.0 - 5.0 %    Metamyelocyte % 20.0 (H) 0.0 - 0.0 %    Neutrophils Absolute 11.93 (H) 1.70 - 7.00 10*3/mm3    Lymphocytes Absolute 0.16 (L) 0.70 - 3.10 10*3/mm3    Monocytes Absolute 0.64 0.10 - 0.90 10*3/mm3    RBC Morphology  Normal Normal    Vacuolated Neutrophils Slight/1+ None Seen    Large Platelets Slight/1+ None Seen   Path Consult Reflex    Specimen: Blood   Result Value Ref Range    Pathology Review Yes    POCT Electrolytes +HGB +HCT    Specimen: Blood   Result Value Ref Range    Sodium 134 (L) 138 - 146 mmol/L    POC Potassium 3.2 (L) 3.5 - 4.5 mmol/L    Ionized Calcium 1.00 (L) 1.15 - 1.33 mmol/L    Glucose 122 (H) 74 - 100 mg/dL    Hematocrit 35 (L) 38 - 51 %    Hemoglobin 12.0 12.0 - 17.0 g/dL   POC Glucose Once    Specimen: Blood   Result Value Ref Range    Glucose 122 (H) 74 - 100 mg/dL   ECG 12 Lead   Result Value Ref Range    QT Interval 286 ms   Lavender Top   Result Value Ref Range    Extra Tube done    Gold Top - SST   Result Value Ref Range    Extra Tube Hold for add-ons.    Light Blue Top   Result Value Ref Range    Extra Tube Hold for add-ons.      CT Abdomen Pelvis With Contrast    Result Date: 8/5/2022  1. Multiple ill-defined low-density lesions are scattered within the liver parenchyma. These do not represent the appearance of typical cysts. In the context of history of septic shock, hepatic phlegmon or evolving hepatic abscesses could be considered. 2. Minimal stranding is seen at the level of the pancreatic tail which is nonspecific. This could represent normal variant for this patient. Very mild pancreatitis could have a similar appearance, although the remainder the pancreas itself appears unremarkable. Correlate with laboratory findings. 3. There is very mild interstitial thickening in both lungs and faint stranding within the anterior superior mediastinum, which may raise the possibility of mild early edema. 4. There is long segment thickening of the mid to lower thoracic esophagus with small esophageal hiatal hernia. Correlate for esophagitis symptoms. Endoscopic correlation may prove helpful. 5. Mild dependent atelectasis in both lungs. 6. No pulmonary embolism is seen.    Electronically Signed By-Franca  MD Rosy On:8/5/2022 8:58 AM This report was finalized on [id number] by  Franca Gallegos MD.    CT Angiogram Chest Pulmonary Embolism    Result Date: 8/5/2022  1. Multiple ill-defined low-density lesions are scattered within the liver parenchyma. These do not represent the appearance of typical cysts. In the context of history of septic shock, hepatic phlegmon or evolving hepatic abscesses could be considered. 2. Minimal stranding is seen at the level of the pancreatic tail which is nonspecific. This could represent normal variant for this patient. Very mild pancreatitis could have a similar appearance, although the remainder the pancreas itself appears unremarkable. Correlate with laboratory findings. 3. There is very mild interstitial thickening in both lungs and faint stranding within the anterior superior mediastinum, which may raise the possibility of mild early edema. 4. There is long segment thickening of the mid to lower thoracic esophagus with small esophageal hiatal hernia. Correlate for esophagitis symptoms. Endoscopic correlation may prove helpful. 5. Mild dependent atelectasis in both lungs. 6. No pulmonary embolism is seen.    Electronically Signed By-Franca Gallegos MD On:8/5/2022 8:58 AM This report was finalized on [id number] by  Franca Gallegos MD.    Medications   sodium chloride 0.9 % flush 10 mL (has no administration in time range)   piperacillin-tazobactam (ZOSYN) IVPB 4.5 g in 100 mL NS (CD) (4.5 g Intravenous New Bag 8/5/22 0904)   norepinephrine (LEVOPHED) 8 mg in 250 mL NS infusion (premix) (0.3 mcg/kg/min × 113 kg Intravenous Rate/Dose Change 8/5/22 2061)   Vasopressin (VASOSTRICT) 0.2 UNIT/ML solution (0.03 Units/min Intravenous New Bag 8/5/22 0910)   lactated ringers infusion (125 mL/hr Intravenous New Bag 8/5/22 0916)   vancomycin (VANCOCIN) 2,250 mg in sodium chloride 0.9 % 500 mL IVPB (has no administration in time range)   sodium chloride 0.9% - IBW for BMI > 30 bolus 1,507  "mL (has no administration in time range)   lactated ringers bolus 2,000 mL (0 mL Intravenous Stopped 8/5/22 0915)   lactated ringers bolus 1,000 mL (0 mL Intravenous Stopped 8/5/22 0915)   iopamidol (ISOVUE-370) 76 % injection 100 mL (100 mL Intravenous Given 8/5/22 0830)     BP (!) 83/53   Pulse (!) 131   Temp (!) 103.1 °F (39.5 °C) (Rectal)   Resp 27   Ht 165.1 cm (65\")   Wt 113 kg (250 lb)   SpO2 100%   BMI 41.60 kg/m²                                        MDM  Chart review: Patient has had previous cardiology evaluation for pulmonary hypertension and essential hypertension  Comorbidity: As per past history  Differential: Septic shock  My EKG interpretation: Sinus tachycardia rate of 145  Lab: Arterial blood gas pH 7.34 PCO2 28 PO2 of 221 on a nonrebreather.  She has base excess of -10 lactic acid was 2.37.  Glucose 122, white count 15.9 with hemoglobin 14.8 platelet count 241 differential shows 37 segs 38 bands 20 metamyelocytes.  Respiratory virus panel all negative.  Procalcitonin 99.59 troponin negative.  Patient's initial PTT was 43 INR 2.16 this was rechecked when central line was placed.  Radiology: I reviewed CT chest abdomen pelvis and discussed with radiologist.  There are multiple ill-defined low-density lesions scattered within the liver parenchyma.  There is minimal nonspecific stranding seen in level of the pancreatic tail.  There is very mild interstitial thickening of both lungs.  There is a long segment of thickening of the mid to lower thoracic esophagus with small hiatal hernia.  There is no vascular abnormality noted.  I reviewed postprocedure chest x-ray.  Central line in good position without complication  Discussion/treatment: Patient received 3 L IV fluid.  Patient was started on Levophed and vasopressin.  She was started on Zosyn and vancomycin she was noted to have improvement in her mental status and also her tissue perfusion.  She had improvement in skin color.  She is now able " to answer questions.  She reported she had just vomited once had diarrhea once or twice today.  No blood in stool or emesis.  She states she had not been eating or drinking much.  She reports she has known hemangiomas in her liver that have been evaluated previously.  She did not have history of kidney disease.  Patient was discussed with the nurse practitioner the intensivist.  Admitted to the ICU for continued care.  Patient critical care time 40 minutes dependent of procedures  Patient was evaluated using appropriate PPE    SEPTIC SHOCK FOCUSED EXAM ATTESTATION    I attest that I have reassessed tissue perfusion after the fluid bolus given.    Leonard Francis MD  08/05/22  09:24 EDT  Final diagnoses:   Septic shock (HCC)   Acute kidney injury (HCC)       ED Disposition  ED Disposition     ED Disposition   Decision to Admit    Condition   --    Comment   Level of Care: Critical Care [6]  Admitting Physician: TRENA THOMPSON [[id number]]               No follow-up provider specified.       Medication List      No changes were made to your prescriptions during this visit.          Leonard Francis MD  08/05/22 0922       Leonard Francis MD  08/05/22 0922

## 2022-08-05 NOTE — ED NOTES
Patient placed in room, hooked up to the monitor, noticed patients color wasn't looking good, called for ER provider to room

## 2022-08-05 NOTE — H&P
Patient Care Team:  Maribel Graf MD as PCP - General (Family Medicine)  AVA Cavanaugh MD as Consulting Physician (Cardiology)    Chief complaint fever and vomiting        Assessment & Plan   Sepsis with septic shock  UTI  MARIANNE  Coagulopathy probably from DIC  Leukocytosis  Anion gap metabolic acidosis  Electrolyte imbalance with hypokalemia and hypocalcemia and hyponatremia  Abnormal CT scan of the liver with possible hepatic abscess  Mild atelectasis with early noncardiogenic edema probably early ARDS  Esophagitis  Mild pancreatic changes on the CT scan but the lipase is not elevated  History of melanoma resected in  without signs of spread or recurrence  Mild exercise-induced pulmonary hypertension for which she has been on metoprolol but she is out of it for 1 month      Plan:  Patient is critically ill we will keep her in ICU  Hemodynamic support: Pressors  IV fluid  Ultrasound of the liver  Antibiotics: Consult ID  DVT: SCD/GI prophylaxis  Check daily labs and correct electrolytes as needed    History  47-year-old female with past medical history of melanoma, pulmonary hypertension who was feeling well until 2 days ago when she started to have fever with body aches and today she started to have vomiting and diarrhea.  In ED she had a fever of 103.1 and heart rate 146 with blood pressure 36/23 however that improved after IV fluid resuscitation and vasopressors to 103/62.          Septic shock (HCC)      Past Medical History:   Diagnosis Date   • Melanoma (CMS/HCC)    • Pulmonary hypertension (CMS/HCC)    • Urinary tract infection     chronic hematuria, seen urology       Past Surgical History:   Procedure Laterality Date   •  SECTION  13 and 14   • SKIN CANCER EXCISION     • TUBAL ABDOMINAL LIGATION         Family History   Problem Relation Age of Onset   • Kidney disease Mother    • Kidney disease Father    • Cancer Father         lung   • Diabetes Brother    • Heart disease  Brother         CHF       Social History     Socioeconomic History   • Marital status:    Tobacco Use   • Smoking status: Never Smoker   • Smokeless tobacco: Never Used   Substance and Sexual Activity   • Alcohol use: Yes     Comment: caffeine 1 cup qd   • Drug use: No   • Sexual activity: Yes     Birth control/protection: Surgical       Review of Systems  Review of Systems   Constitutional: Positive for chills, fever and malaise/fatigue.   HENT: Negative.    Eyes: Negative.    Cardiovascular: Negative.    Respiratory: Negative for cough and shortness of breath.    Skin: Negative.    Musculoskeletal: Negative.    Gastrointestinal: Mild abdominal pain with vomiting and diarrhea  Genitourinary: Chronic cystitis  Neurological: Negative.    Psychiatric/Behavioral: Negative.  Vital Signs  Temp:  [97.7 °F (36.5 °C)-103.1 °F (39.5 °C)] 103.1 °F (39.5 °C)  Heart Rate:  [] 124  Resp:  [20-27] 27  BP: ()/(12-89) 103/62    Physical Exam:  Physical Exam  General Appearance:  Alert   HEENT:  Normocephalic, without obvious abnormality, Conjunctiva/corneas clear,.   Nares normal, no drainage, dry oral mucosa     Neck:  Supple, symmetrical, trachea midline. No JVD.  Lungs /Chest wall: Minimal bilateral basal rhonchi, respirations unlabored, symmetrical wall movement.     Heart:  Regular tachycardia, S1 S2 normal, no murmur  Abdomen: Soft, tenderness in right upper quadrant, no masses, no organomegaly.  Bowel sounds positive  Extremities: No edema, no clubbing or cyanosis, no palpable cords in the calves  Skin: No rash  Neuro exam: Moving 4 extremities no apparent focal deficit by observation    Radiology  Imaging Results (Last 24 Hours)     Procedure Component Value Units Date/Time    XR Chest 1 View [[id number]] Collected: 08/05/22 0927     Updated: 08/05/22 0936    Narrative:      DATE OF EXAM:  8/5/2022 9:14 AM     PROCEDURE:  XR CHEST 1 VW-     INDICATIONS:  Severe Sepsis Protocol     COMPARISON:  CT chest PE  protocol 08/05/2022. No previous chest x-ray for comparison.     TECHNIQUE:   Single radiographic AP view of the chest was obtained.     FINDINGS:  Tip of the right internal jugular central venous catheter terminates in  the right atrium. No pneumothorax is seen. Allowing for low lung  volumes, the lungs appear grossly clear. Cardia mediastinal contours  appear within normal limits.        Impression:      1.     Tip of the right internal jugular central venous catheter  terminates in the right atrium. No visible pneumothorax.  2.     Low lung volumes without evidence of acute cardiopulmonary  abnormality.     Electronically Signed By-Hui Raman MD On:8/5/2022 9:34 AM  This report was finalized on [id number] by  Hui Raman MD.    CT Angiogram Chest Pulmonary Embolism [[id number]] Collected: 08/05/22 0846     Updated: 08/05/22 0900    Narrative:      CT ABDOMEN PELVIS W CONTRAST-, CT ANGIOGRAM CHEST PULMONARY EMBOLISM-     Date of Exam: 8/5/2022 8:25 AM     Indication: septic shock abd pain.     Comparison: None available.     Technique: Contiguous axial CT images were obtained from the lung bases  to the pubic symphysis following uneventful administration of 100 cc  Isovue-370 intravenous and oral contrast. Sagittal and coronal  reconstructions were performed.  Automated exposure control and  iterative reconstruction methods were used.     FINDINGS:     CHEST: No pulmonary embolism is seen. Heart size is within normal  limits. There is no pericardial effusion. There is no pleural effusion.     There is mild concentric thickening of the mid to lower thoracic  esophagus, and there is a small esophageal hiatal hernia.     There is mild ill-defined the soft tissue stranding within the anterior  superior mediastinum. Trace fluid is seen within the pericardial  recesses. No pleural effusion is seen.     No dense lung consolidations are identified. There is dependent  atelectasis in both lungs. There is very  mild interstitial thickening in  the lungs which could reflect changes of early interstitial edema and  the appropriate clinical context. No abnormal bronchial wall thickening  or bronchiectasis is evident.     No acute or suspicious osseous abnormalities are identified.           ABDOMEN AND PELVIS: Multiple irregularly marginated and somewhat  ill-defined low-density lesions are seen within the liver parenchyma. A  dominant bilobed lesion in the posterior right hepatic lobe segment 6  measures 2.5 x 5.7 cm. Another index lesion within the right hepatic  lobe more superiorly and segment 8 measures 2.6 x 1.4 cm. An index  lesion in the left hepatic lobe laterally segment 2 measures 1.7 x 1.3  cm. Other scattered smaller lesions are noted. These do not have the  appearance of cysts. They could represent atypical hemangiomas; however,  in the context of septic shock and abdominal pain, hepatic phlegmon or  evolving abscess is could be considered.     The gallbladder is normal. No abnormal biliary dilation is identified.  The spleen, adrenals, and left kidney are normal. A right renal cyst  measures 1.3 cm. Kidneys maintain normal enhancement.     There is minimal stranding near the level of the pancreatic tail. The  pancreas itself, however, otherwise appears unremarkable.     The unopacified bowel does not appear abnormally thickened, dilated, or  inflamed. The appendix is normal. No free air, free fluid or  pathologically enlarged lymph nodes are identified.     The uterus and rectum appear unremarkable. The urinary bladder is  decompressed.     There are no acute or suspicious osseous abnormalities.             Impression:      1. Multiple ill-defined low-density lesions are scattered within the  liver parenchyma. These do not represent the appearance of typical  cysts. In the context of history of septic shock, hepatic phlegmon or  evolving hepatic abscesses could be considered.  2. Minimal stranding is seen at  the level of the pancreatic tail which  is nonspecific. This could represent normal variant for this patient.  Very mild pancreatitis could have a similar appearance, although the  remainder the pancreas itself appears unremarkable. Correlate with  laboratory findings.  3. There is very mild interstitial thickening in both lungs and faint  stranding within the anterior superior mediastinum, which may raise the  possibility of mild early edema.  4. There is long segment thickening of the mid to lower thoracic  esophagus with small esophageal hiatal hernia. Correlate for esophagitis  symptoms. Endoscopic correlation may prove helpful.  5. Mild dependent atelectasis in both lungs.  6. No pulmonary embolism is seen.           Electronically Signed By-Franca Gallegos MD On:8/5/2022 8:58 AM  This report was finalized on [id number] by  Franca Gallegos MD.    CT Abdomen Pelvis With Contrast [[id number]] Collected: 08/05/22 0846     Updated: 08/05/22 0900    Narrative:      CT ABDOMEN PELVIS W CONTRAST-, CT ANGIOGRAM CHEST PULMONARY EMBOLISM-     Date of Exam: 8/5/2022 8:25 AM     Indication: septic shock abd pain.     Comparison: None available.     Technique: Contiguous axial CT images were obtained from the lung bases  to the pubic symphysis following uneventful administration of 100 cc  Isovue-370 intravenous and oral contrast. Sagittal and coronal  reconstructions were performed.  Automated exposure control and  iterative reconstruction methods were used.     FINDINGS:     CHEST: No pulmonary embolism is seen. Heart size is within normal  limits. There is no pericardial effusion. There is no pleural effusion.     There is mild concentric thickening of the mid to lower thoracic  esophagus, and there is a small esophageal hiatal hernia.     There is mild ill-defined the soft tissue stranding within the anterior  superior mediastinum. Trace fluid is seen within the pericardial  recesses. No pleural effusion is seen.      No dense lung consolidations are identified. There is dependent  atelectasis in both lungs. There is very mild interstitial thickening in  the lungs which could reflect changes of early interstitial edema and  the appropriate clinical context. No abnormal bronchial wall thickening  or bronchiectasis is evident.     No acute or suspicious osseous abnormalities are identified.           ABDOMEN AND PELVIS: Multiple irregularly marginated and somewhat  ill-defined low-density lesions are seen within the liver parenchyma. A  dominant bilobed lesion in the posterior right hepatic lobe segment 6  measures 2.5 x 5.7 cm. Another index lesion within the right hepatic  lobe more superiorly and segment 8 measures 2.6 x 1.4 cm. An index  lesion in the left hepatic lobe laterally segment 2 measures 1.7 x 1.3  cm. Other scattered smaller lesions are noted. These do not have the  appearance of cysts. They could represent atypical hemangiomas; however,  in the context of septic shock and abdominal pain, hepatic phlegmon or  evolving abscess is could be considered.     The gallbladder is normal. No abnormal biliary dilation is identified.  The spleen, adrenals, and left kidney are normal. A right renal cyst  measures 1.3 cm. Kidneys maintain normal enhancement.     There is minimal stranding near the level of the pancreatic tail. The  pancreas itself, however, otherwise appears unremarkable.     The unopacified bowel does not appear abnormally thickened, dilated, or  inflamed. The appendix is normal. No free air, free fluid or  pathologically enlarged lymph nodes are identified.     The uterus and rectum appear unremarkable. The urinary bladder is  decompressed.     There are no acute or suspicious osseous abnormalities.             Impression:      1. Multiple ill-defined low-density lesions are scattered within the  liver parenchyma. These do not represent the appearance of typical  cysts. In the context of history of septic  shock, hepatic phlegmon or  evolving hepatic abscesses could be considered.  2. Minimal stranding is seen at the level of the pancreatic tail which  is nonspecific. This could represent normal variant for this patient.  Very mild pancreatitis could have a similar appearance, although the  remainder the pancreas itself appears unremarkable. Correlate with  laboratory findings.  3. There is very mild interstitial thickening in both lungs and faint  stranding within the anterior superior mediastinum, which may raise the  possibility of mild early edema.  4. There is long segment thickening of the mid to lower thoracic  esophagus with small esophageal hiatal hernia. Correlate for esophagitis  symptoms. Endoscopic correlation may prove helpful.  5. Mild dependent atelectasis in both lungs.  6. No pulmonary embolism is seen.           Electronically Signed By-Franca Gallegos MD On:8/5/2022 8:58 AM  This report was finalized on [id number] by  Franca Gallegos MD.          Labs:  Results from last 7 days   Lab Units 08/05/22  0815 08/05/22  0811   WBC 10*3/mm3  --  15.90*   HEMOGLOBIN g/dL  --  14.8   HEMOGLOBIN, POC g/dL 12.0  --    HEMATOCRIT %  --  45.7   HEMATOCRIT POC % 35*  --    PLATELETS 10*3/mm3  --  241     Results from last 7 days   Lab Units 08/05/22  0811   SODIUM mmol/L 132*   POTASSIUM mmol/L 3.9   CHLORIDE mmol/L 94*   CO2 mmol/L 21.0*   BUN mg/dL 32*   CREATININE mg/dL 3.82*   CALCIUM mg/dL 8.3*   BILIRUBIN mg/dL 4.3*   ALK PHOS U/L 64   ALT (SGPT) U/L 30   AST (SGOT) U/L 41*   GLUCOSE mg/dL 141*     Results from last 7 days   Lab Units 08/05/22  0815   PH, ARTERIAL pH units 7.345*   PO2 ART mm Hg 221.4*   PCO2, ARTERIAL mm Hg 28.3*   HCO3 ART mmol/L 15.5*     Results from last 7 days   Lab Units 08/05/22  0811   ALBUMIN g/dL 3.30*     Results from last 7 days   Lab Units 08/05/22  0811   CK TOTAL U/L 327*   TROPONIN T ng/mL <0.010             Results from last 7 days   Lab Units 08/05/22  0911  08/05/22  0811   INR  2.47* 2.16*   APTT seconds 48.8* 43.1*               Meds:   SCHEDULE  insulin lispro, 0-7 Units, Subcutaneous, Q6H  Norepinephrine-Sodium Chloride, , ,   sodium chloride, 10 mL, Intravenous, Q12H  sodium chloride 0.9% - IBW for BMI > 30, 30 mL/kg (Ideal), Intravenous, Once      Infusions  norepinephrine, 0.02-0.3 mcg/kg/min  sodium bicarbonate drip (greater than 75 mEq/bag), 150 mEq  vasopressin, 0.03 Units/min, Last Rate: 0.03 Units/min (08/05/22 0910)      PRNs  •  acetaminophen **OR** acetaminophen  •  aluminum-magnesium hydroxide-simethicone  •  dextrose  •  dextrose  •  glucagon (human recombinant)  •  nitroglycerin  •  ondansetron **OR** ondansetron  •  sodium chloride  •  sodium chloride      I discussed the patients findings and my recommendations with patient and nursing staff.     Shy Moreno MD  08/05/22  10:42 EDT    Time: Critical care 50 min

## 2022-08-05 NOTE — NURSING NOTE
1716 MD marks entry point with CT imaging.  1723 Site prepped with chlorhexidine and sterile drape when dry.  1727 MD places needle.  1730 MD unable to aspirate fluid from needle.  1744 MD images area with ultrasound.  1752 MD obtains core tissue samples.  1756 MD injects gel foam and removes needle.  1758 Site dressed with bacitracin, sterile gauze and tegaderm.

## 2022-08-05 NOTE — CONSULTS
INITIAL CONSULT NOTE      Patient Name: Raul Gardner  : 1975  MRN: [id number]  Primary Care Physician: Maribel Graf MD  Date of admission: 2022    Patient Care Team:  Maribel Graf MD as PCP - General (Family Medicine)  AVA Cavanaugh MD as Consulting Physician (Cardiology)        Reason for Consult:       MARIANNE  Subjective   History of Present Illness:   Chief Complaint:   Chief Complaint   Patient presents with   • Syncope     HISTORY:  Raul Gardner is a 47 y.o. female with past medical history of melanoma, hypertension, borderline diabetes, DVT, chronic kidney disease. who presents with fever of 103, nausea vomiting diarrhea.  Found to be in septic shock and found to have liver lesion patient has gone for aspiration of liver abscess.  Renal ultrasound consistent with some chronic kidney disease baseline creatinine is not known.        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 Otherwise pertinent FHx was reviewed and unremarkable.     Social History:  reports that she has never smoked. She has never used smokeless tobacco. She reports current alcohol use. She reports that she does not use drugs.    Medications:  Prior to Admission medications    Medication Sig Start Date End Date Taking? Authorizing Provider   escitalopram (LEXAPRO) 10 MG tablet TAKE 1 TABLET BY MOUTH EVERY DAY 12/3/21  Yes Maribel Graf MD   lisinopril  (PRINIVIL,ZESTRIL) 5 MG tablet TAKE 1 TABLET BY MOUTH EVERY DAY 12/3/21  Yes Maribel Graf MD   metoprolol succinate XL (TOPROL-XL) 25 MG 24 hr tablet TAKE 1 TABLET BY MOUTH EVERY DAY 12/6/21 8/5/22 Yes Hugh Lancaster DO   naproxen (NAPROSYN) 500 MG tablet TAKE 1 TABLET BY MOUTH TWICE A DAY WITH MEALS 5/12/21 8/5/22  Maribel Graf MD     Scheduled Meds:gelatin absorbable, , ,   hydrocortisone sodium succinate, 100 mg, Intravenous, Q6H  insulin lispro, 0-7 Units, Subcutaneous, Q6H  pantoprazole, 40 mg, Intravenous, Daily  piperacillin-tazobactam, 4.5 g, Intravenous, Q8H  sodium chloride, 10 mL, Intravenous, Q12H  sodium chloride 0.9% - IBW for BMI > 30, 30 mL/kg (Ideal), Intravenous, Once      Continuous Infusions:norepinephrine, 0.02-0.3 mcg/kg/min, Last Rate: 0.3 mcg/kg/min (08/05/22 1108)  sodium bicarbonate drip (greater than 75 mEq/bag), 150 mEq, Last Rate: 150 mEq (08/05/22 1049)  vasopressin, 0.03 Units/min, Last Rate: 0.03 Units/min (08/05/22 0910)      PRN Meds:•  acetaminophen **OR** acetaminophen  •  aluminum-magnesium hydroxide-simethicone  •  dextrose  •  dextrose  •  fentaNYL citrate (PF)  •  glucagon (human recombinant)  •  lidocaine  •  nitroglycerin  •  ondansetron **OR** ondansetron  •  sodium chloride  •  sodium chloride  Allergies:    Allergies   Allergen Reactions   • Cephalexin Rash   • Hydrocodone-Acetaminophen Rash       Objective   Exam:     Vital Signs  Temp:  [97.7 °F (36.5 °C)-103.1 °F (39.5 °C)] 98.1 °F (36.7 °C)  Heart Rate:  [] 119  Resp:  [20-28] 21  BP: ()/(12-92) 108/70  SpO2:  [0 %-100 %] 100 %  on   ;      Body mass index is 41.6 kg/m².  EXAM         Results Review:  I have personally reviewed most recent Data :  [facility]  CBC    Results from last 7 days   Lab Units 08/05/22  1400 08/05/22  0815 08/05/22  0811   WBC 10*3/mm3 20.30*  --  15.90*   HEMOGLOBIN g/dL 13.7  --  14.8   HEMOGLOBIN, POC g/dL  --  12.0  --    PLATELETS  10*3/mm3 188  --  241     CMP   Results from last 7 days   Lab Units 08/05/22  1400 08/05/22  0811   SODIUM mmol/L 133* 132*   POTASSIUM mmol/L 3.7 3.9   CHLORIDE mmol/L 99 94*   CO2 mmol/L 19.0* 21.0*   BUN mg/dL 38* 32*   CREATININE mg/dL 4.03* 3.82*   GLUCOSE mg/dL 165* 141*   ALBUMIN g/dL 2.90* 3.30*   BILIRUBIN mg/dL 4.6* 4.3*   ALK PHOS U/L 64 64   AST (SGOT) U/L 45* 41*   ALT (SGPT) U/L 27 30   AMYLASE U/L  --  81   LIPASE U/L  --  12*     ABG    Results from last 7 days   Lab Units 08/05/22  0815   PH, ARTERIAL pH units 7.345*   PCO2, ARTERIAL mm Hg 28.3*   PO2 ART mm Hg 221.4*   O2 SATURATION ART % 99.8*   BASE EXCESS ART mmol/L -8.9*     CT Abdomen Pelvis With Contrast    Result Date: 8/5/2022  1. Multiple ill-defined low-density lesions are scattered within the liver parenchyma. These do not represent the appearance of typical cysts. In the context of history of septic shock, hepatic phlegmon or evolving hepatic abscesses could be considered. 2. Minimal stranding is seen at the level of the pancreatic tail which is nonspecific. This could represent normal variant for this patient. Very mild pancreatitis could have a similar appearance, although the remainder the pancreas itself appears unremarkable. Correlate with laboratory findings. 3. There is very mild interstitial thickening in both lungs and faint stranding within the anterior superior mediastinum, which may raise the possibility of mild early edema. 4. There is long segment thickening of the mid to lower thoracic esophagus with small esophageal hiatal hernia. Correlate for esophagitis symptoms. Endoscopic correlation may prove helpful. 5. Mild dependent atelectasis in both lungs. 6. No pulmonary embolism is seen.    Electronically Signed By-Franca Gallegos MD On:8/5/2022 8:58 AM This report was finalized on [id number] by  Franca Gallegos MD.    US Liver    Result Date: 8/5/2022    IMPRESSION: 1. Echogenic foci within the right and left hepatic  lobe without internal hypervascularity, superimposed upon background hepatic hypoechoic parenchyma. The echogenic foci do not have a typical appearance for fluid collections or abscesses. Other etiologies such as hemangiomas, areas of fatty sparing upon background steatosis, focal fibrosis/scarring, could be considered. These may be further and better characterize utilizing MRI without and with contrast liver protocol. 2. No abnormal biliary dilation is seen.  Electronically Signed By-Franca Gallegos MD On:8/5/2022 1:17 PM This report was finalized on [id number] by  Franca Gallegos MD.    XR Chest 1 View    Result Date: 8/5/2022  1.     Tip of the right internal jugular central venous catheter terminates in the right atrium. No visible pneumothorax. 2.     Low lung volumes without evidence of acute cardiopulmonary abnormality.  Electronically Signed By-Hui Raman MD On:8/5/2022 9:34 AM This report was finalized on [id number] by  Hui Raman MD.    CT Angiogram Chest Pulmonary Embolism    Result Date: 8/5/2022  1. Multiple ill-defined low-density lesions are scattered within the liver parenchyma. These do not represent the appearance of typical cysts. In the context of history of septic shock, hepatic phlegmon or evolving hepatic abscesses could be considered. 2. Minimal stranding is seen at the level of the pancreatic tail which is nonspecific. This could represent normal variant for this patient. Very mild pancreatitis could have a similar appearance, although the remainder the pancreas itself appears unremarkable. Correlate with laboratory findings. 3. There is very mild interstitial thickening in both lungs and faint stranding within the anterior superior mediastinum, which may raise the possibility of mild early edema. 4. There is long segment thickening of the mid to lower thoracic esophagus with small esophageal hiatal hernia. Correlate for esophagitis symptoms. Endoscopic correlation may prove  helpful. 5. Mild dependent atelectasis in both lungs. 6. No pulmonary embolism is seen.    Electronically Signed By-Franca Gallegos MD On:8/5/2022 8:58 AM This report was finalized on [id number] by  Franca Gallegos MD.    US Renal Bilateral    Result Date: 8/5/2022  Nonobstructed hyperechoic kidneys compatible with medical renal disease  Electronically Signed By-Dave Cunningham On:8/5/2022 1:15 PM This report was finalized on [id number] by  Dave Cunningham, .      Results for orders placed during the hospital encounter of 08/05/22    Adult Transthoracic Echo Complete w/ Color, Spectral and Contrast if Necessary Per Protocol    Interpretation Summary  · Left ventricular wall thickness is consistent with mild concentric hypertrophy.  · Estimated left ventricular EF = 75% Left ventricular systolic function is hyperdynamic (EF > 70%).  · Left ventricular diastolic function is consistent with (grade I) impaired relaxation.        Assessment & Plan   Assessment and Plan:         Pulmonary hypertension, unspecified (HCC)    Anxiety    Vitamin D deficiency    Essential hypertension    Septic shock (HCC)    Suspected liver abscess    Diarrhea    Acute kidney injury (HCC)    ASSESSMENT:  • Acute kidney injury  • CKD secondary work-up consistent with chronic kidney disease  • Liver abscess  • Septic shock  • 3 hypertension        Plan:     • Patient in septic shock with MARIANNE continue ABX   • Agree with IV fluid at this time difficult IV fluid to monitor again  • Will get previous urine studies done  • Patient is already on bicarbonate based fluid we will follow-up with pH  • Urine analysis with significant active sediment with significant hematuria and proteinuria  • Will estimate protein excretion I will also send serology  • Thank you for letting me participate    Note started  by Vikram Acosta MD,   [facility] kidney consultant  [phone]

## 2022-08-05 NOTE — CONSULTS
Infectious Diseases Consult Note    Referring Provider: Shy Moreno MD    Reason for Consultation: Possible liver abscess    Patient Care Team:  Maribel Graf MD as PCP - General (Family Medicine)  AVA Cavanaugh MD as Consulting Physician (Cardiology)    Chief complaint     Fever and chills    Subjective     History of present illness:      This is 47-year-old white female who presented to [facility] on 2022 with a complains of fever chills and nausea vomiting.  She started having diarrhea after she presented emergency room.  Symptoms started night before coming to the hospital.  She was found to be hypotensive and required to be admitted to ICU and currently on norepinephrine and vasopressin drips.  The patient had abnormal CT scan of abdomen pelvis with abnormalities of the liver.  The patient was started on IV vancomycin and Zosyn x1 dose and she was started after that on IV Flagyl.    Review of Systems   Review of Systems   Constitutional: Positive for chills and fever.   Gastrointestinal: Positive for abdominal pain, diarrhea, nausea and vomiting.       Medications  Medications Prior to Admission   Medication Sig Dispense Refill Last Dose   • escitalopram (LEXAPRO) 10 MG tablet TAKE 1 TABLET BY MOUTH EVERY DAY 90 tablet 2 Past Week at Unknown time   • lisinopril (PRINIVIL,ZESTRIL) 5 MG tablet TAKE 1 TABLET BY MOUTH EVERY DAY 90 tablet 2 Past Week at Unknown time   • metoprolol succinate XL (TOPROL-XL) 25 MG 24 hr tablet TAKE 1 TABLET BY MOUTH EVERY DAY 90 tablet 1 Past Month at Unknown time   • naproxen (NAPROSYN) 500 MG tablet TAKE 1 TABLET BY MOUTH TWICE A DAY WITH MEALS 60 tablet 2 More than a month at Unknown time       History  Past Medical History:   Diagnosis Date   • Melanoma (CMS/HCC)    • Pulmonary hypertension (CMS/HCC)    • Urinary tract infection     chronic hematuria, seen urology     Past Surgical History:   Procedure Laterality Date   •  SECTION  13  and 12/16/14   • SKIN CANCER EXCISION     • TUBAL ABDOMINAL LIGATION         Family History  Family History   Problem Relation Age of Onset   • Kidney disease Mother    • Kidney disease Father    • Cancer Father         lung   • Diabetes Brother    • Heart disease Brother         CHF       Social History   reports that she has never smoked. She has never used smokeless tobacco. She reports current alcohol use. She reports that she does not use drugs.    Allergies  Cephalexin and Hydrocodone-acetaminophen    Objective     Vital Signs   Vital Signs (last 24 hours)       08/04 0700  08/05 0659 08/05 0700  08/05 1329   Most Recent      Temp (°F)   97.7 -  103.1     98.4 (36.9) 08/05 1100    Heart Rate   78 -  146     124 08/05 1013    Resp   20 -  27     27 08/05 0803    BP   32/19 -  148/80     103/62 08/05 1013    SpO2 (%)   0 -  100     100 08/05 1013          Physical Exam:  Physical Exam  Vitals and nursing note reviewed.   Constitutional:       Appearance: She is well-developed.   HENT:      Head: Normocephalic and atraumatic.   Eyes:      Pupils: Pupils are equal, round, and reactive to light.   Cardiovascular:      Rate and Rhythm: Normal rate and regular rhythm.      Heart sounds: Normal heart sounds.   Pulmonary:      Effort: Pulmonary effort is normal. No respiratory distress.      Breath sounds: Normal breath sounds. No wheezing or rales.   Abdominal:      General: Bowel sounds are normal. There is no distension.      Palpations: Abdomen is soft. There is no mass.      Tenderness: There is abdominal tenderness. There is no guarding or rebound.   Musculoskeletal:         General: No deformity. Normal range of motion.      Cervical back: Normal range of motion and neck supple.   Skin:     General: Skin is warm.      Findings: No erythema or rash.   Neurological:      Mental Status: She is alert and oriented to person, place, and time.      Cranial Nerves: No cranial nerve deficit.          Microbiology  Microbiology Results (last 10 days)     Procedure Component Value - Date/Time    S. Pneumo Ag Urine or CSF - Urine, Urine, Clean Catch [[id number]]  (Normal) Collected: 08/05/22 1154    Lab Status: Final result Specimen: Urine, Clean Catch Updated: 08/05/22 1234     Strep Pneumo Ag Negative    Legionella Antigen, Urine - Urine, Urine, Clean Catch [[id number]]  (Normal) Collected: 08/05/22 1154    Lab Status: Final result Specimen: Urine, Clean Catch Updated: 08/05/22 1234     LEGIONELLA ANTIGEN, URINE Negative    MRSA Screen, PCR (Inpatient) - Swab, Nares [[id number]]  (Normal) Collected: 08/05/22 1057    Lab Status: Final result Specimen: Swab from Nares Updated: 08/05/22 1222     MRSA PCR No MRSA Detected    Narrative:      The negative predictive value of this diagnostic test is high and should only be used to consider de-escalating anti-MRSA therapy. A positive result may indicate colonization with MRSA and must be correlated clinically.    Gastrointestinal Panel, PCR - Stool, Per Rectum [[id number]]  (Abnormal) Collected: 08/05/22 1057    Lab Status: Final result Specimen: Stool from Per Rectum Updated: 08/05/22 1238     Campylobacter Not Detected     Plesiomonas shigelloides Not Detected     Salmonella Not Detected     Vibrio Not Detected     Vibrio cholerae Not Detected     Yersinia enterocolitica Not Detected     Enteroaggregative E. coli (EAEC) Not Detected     Enteropathogenic E. coli (EPEC) Detected     Enterotoxigenic E. coli (ETEC) lt/st Not Detected     Shiga-like toxin-producing E. coli (STEC) stx1/stx2 Not Detected     Shigella/Enteroinvasive E. coli (EIEC) Not Detected     Cryptosporidium Not Detected     Cyclospora cayetanensis Not Detected     Entamoeba histolytica Not Detected     Giardia lamblia Not Detected     Adenovirus F40/41 Not Detected     Astrovirus Not Detected     Norovirus GI/GII Not Detected     Rotavirus A Not Detected     Sapovirus (I, II, IV or V) Not Detected     Respiratory Panel PCR w/COVID-19(SARS-CoV-2) CELSA/JONATHAN/DOMINIQUE/PAD/COR/MAD/LISA In-House, NP Swab in UTM/VTM, 3-4 HR TAT - Swab, Nasopharynx [[id number]]  (Normal) Collected: 08/05/22 0815    Lab Status: Final result Specimen: Swab from Nasopharynx Updated: 08/05/22 0909     ADENOVIRUS, PCR Not Detected     Coronavirus 229E Not Detected     Coronavirus HKU1 Not Detected     Coronavirus NL63 Not Detected     Coronavirus OC43 Not Detected     COVID19 Not Detected     Human Metapneumovirus Not Detected     Human Rhinovirus/Enterovirus Not Detected     Influenza A PCR Not Detected     Influenza B PCR Not Detected     Parainfluenza Virus 1 Not Detected     Parainfluenza Virus 2 Not Detected     Parainfluenza Virus 3 Not Detected     Parainfluenza Virus 4 Not Detected     RSV, PCR Not Detected     Bordetella pertussis pcr Not Detected     Bordetella parapertussis PCR Not Detected     Chlamydophila pneumoniae PCR Not Detected     Mycoplasma pneumo by PCR Not Detected    Narrative:      In the setting of a positive respiratory panel with a viral infection PLUS a negative procalcitonin without other underlying concern for bacterial infection, consider observing off antibiotics or discontinuation of antibiotics and continue supportive care. If the respiratory panel is positive for atypical bacterial infection (Bordetella pertussis, Chlamydophila pneumoniae, or Mycoplasma pneumoniae), consider antibiotic de-escalation to target atypical bacterial infection.          Laboratory  Results from last 7 days   Lab Units 08/05/22 0815 08/05/22 0811   WBC 10*3/mm3  --  15.90*   HEMOGLOBIN g/dL  --  14.8   HEMOGLOBIN, POC g/dL 12.0  --    HEMATOCRIT %  --  45.7   HEMATOCRIT POC % 35*  --    PLATELETS 10*3/mm3  --  241     Results from last 7 days   Lab Units 08/05/22  0811   SODIUM mmol/L 132*   POTASSIUM mmol/L 3.9   CHLORIDE mmol/L 94*   CO2 mmol/L 21.0*   BUN mg/dL 32*   CREATININE mg/dL 3.82*   GLUCOSE mg/dL 141*   CALCIUM mg/dL 8.3*      Results from last 7 days   Lab Units 08/05/22  0811   SODIUM mmol/L 132*   POTASSIUM mmol/L 3.9   CHLORIDE mmol/L 94*   CO2 mmol/L 21.0*   BUN mg/dL 32*   CREATININE mg/dL 3.82*   GLUCOSE mg/dL 141*   CALCIUM mg/dL 8.3*     Results from last 7 days   Lab Units 08/05/22  0811   CK TOTAL U/L 327*               Radiology  Imaging Results (Last 72 Hours)     Procedure Component Value Units Date/Time    US Liver [[id number]] Collected: 08/05/22 1309     Updated: 08/05/22 1319    Narrative:      DATE OF EXAM:  8/5/2022 12:05 PM     PROCEDURE:  US LIVER-     INDICATIONS:  Abdominal pain/tenderness; A41.9-Sepsis, unspecified organism;  R65.21-Severe sepsis with septic shock; N17.9-Acute kidney failure,  unspecified     COMPARISON:  CT abdomen and pelvis 8/5/2022.     TECHNIQUE:   Grayscale and color Doppler ultrasound evaluation of the limited abdomen  was performed.     FINDINGS:  The liver size is within normal limits, 17 cm in length. The liver  demonstrates a coarse heterogeneous echotexture with diffuse  hypoechogenicity. The liver surface contour remains smooth without  evidence of cirrhosis. Echogenic nonshadowing lesion is seen within the  posterior right hepatic lobe, 5.1 x 2.5 x 2.5 cm, without internal  hypervascularity. Echogenic circumscribed lesion within the left hepatic  lobe without acoustic shadowing or hypervascularity measures 0.9 x 0.9 x  1.1 cm.     Common bile duct caliber is normal at 4 mm. No intrahepatic biliary  ductal dilation is seen. No gross ascites is evident. Main portal vein  is patent with hepatopedal flow.        Impression:            IMPRESSION:  1. Echogenic foci within the right and left hepatic lobe without  internal hypervascularity, superimposed upon background hepatic  hypoechoic parenchyma. The echogenic foci do not have a typical  appearance for fluid collections or abscesses. Other etiologies such as  hemangiomas, areas of fatty sparing upon background steatosis,  focal  fibrosis/scarring, could be considered. These may be further and better  characterize utilizing MRI without and with contrast liver protocol.  2. No abnormal biliary dilation is seen.     Electronically Signed By-Franca Gallegos MD On:8/5/2022 1:17 PM  This report was finalized on [id number] by  Franca Gallegos MD.    US Renal Bilateral [[id number]] Collected: 08/05/22 1313     Updated: 08/05/22 1317    Narrative:      DATE OF EXAM:  8/5/2022 12:04 PM     PROCEDURE:  US RENAL BILATERAL-     INDICATIONS:  MARIANNE; A41.9-Sepsis, unspecified organism; R65.21-Severe sepsis with  septic shock; N17.9-Acute kidney failure, unspecified     COMPARISON:  No Comparisons Available     TECHNIQUE:   Grayscale and color Doppler ultrasound evaluation of the kidneys and  urinary bladder was performed.        FINDINGS:  Suboptimal visualization of the left kidney due to limited patient  mobility. Right kidney measures 10.6 cm in length. Left kidney measures  11.5 cm in length. The kidneys appear hyperechoic. There is no  hydronephrosis. Urinary bladder not visualized        Impression:      Nonobstructed hyperechoic kidneys compatible with medical renal disease     Electronically Signed By-Dave Cunningham On:8/5/2022 1:15 PM  This report was finalized on [id number] by  Dave Cunningham, .    XR Chest 1 View [[id number]] Collected: 08/05/22 0927     Updated: 08/05/22 0936    Narrative:      DATE OF EXAM:  8/5/2022 9:14 AM     PROCEDURE:  XR CHEST 1 VW-     INDICATIONS:  Severe Sepsis Protocol     COMPARISON:  CT chest PE protocol 08/05/2022. No previous chest x-ray for comparison.     TECHNIQUE:   Single radiographic AP view of the chest was obtained.     FINDINGS:  Tip of the right internal jugular central venous catheter terminates in  the right atrium. No pneumothorax is seen. Allowing for low lung  volumes, the lungs appear grossly clear. Cardia mediastinal contours  appear within normal limits.        Impression:      1.      Tip of the right internal jugular central venous catheter  terminates in the right atrium. No visible pneumothorax.  2.     Low lung volumes without evidence of acute cardiopulmonary  abnormality.     Electronically Signed By-Hui Raman MD On:8/5/2022 9:34 AM  This report was finalized on [id number] by  Hui Raman MD.    CT Angiogram Chest Pulmonary Embolism [[id number]] Collected: 08/05/22 0846     Updated: 08/05/22 0900    Narrative:      CT ABDOMEN PELVIS W CONTRAST-, CT ANGIOGRAM CHEST PULMONARY EMBOLISM-     Date of Exam: 8/5/2022 8:25 AM     Indication: septic shock abd pain.     Comparison: None available.     Technique: Contiguous axial CT images were obtained from the lung bases  to the pubic symphysis following uneventful administration of 100 cc  Isovue-370 intravenous and oral contrast. Sagittal and coronal  reconstructions were performed.  Automated exposure control and  iterative reconstruction methods were used.     FINDINGS:     CHEST: No pulmonary embolism is seen. Heart size is within normal  limits. There is no pericardial effusion. There is no pleural effusion.     There is mild concentric thickening of the mid to lower thoracic  esophagus, and there is a small esophageal hiatal hernia.     There is mild ill-defined the soft tissue stranding within the anterior  superior mediastinum. Trace fluid is seen within the pericardial  recesses. No pleural effusion is seen.     No dense lung consolidations are identified. There is dependent  atelectasis in both lungs. There is very mild interstitial thickening in  the lungs which could reflect changes of early interstitial edema and  the appropriate clinical context. No abnormal bronchial wall thickening  or bronchiectasis is evident.     No acute or suspicious osseous abnormalities are identified.           ABDOMEN AND PELVIS: Multiple irregularly marginated and somewhat  ill-defined low-density lesions are seen within the liver parenchyma.  A  dominant bilobed lesion in the posterior right hepatic lobe segment 6  measures 2.5 x 5.7 cm. Another index lesion within the right hepatic  lobe more superiorly and segment 8 measures 2.6 x 1.4 cm. An index  lesion in the left hepatic lobe laterally segment 2 measures 1.7 x 1.3  cm. Other scattered smaller lesions are noted. These do not have the  appearance of cysts. They could represent atypical hemangiomas; however,  in the context of septic shock and abdominal pain, hepatic phlegmon or  evolving abscess is could be considered.     The gallbladder is normal. No abnormal biliary dilation is identified.  The spleen, adrenals, and left kidney are normal. A right renal cyst  measures 1.3 cm. Kidneys maintain normal enhancement.     There is minimal stranding near the level of the pancreatic tail. The  pancreas itself, however, otherwise appears unremarkable.     The unopacified bowel does not appear abnormally thickened, dilated, or  inflamed. The appendix is normal. No free air, free fluid or  pathologically enlarged lymph nodes are identified.     The uterus and rectum appear unremarkable. The urinary bladder is  decompressed.     There are no acute or suspicious osseous abnormalities.             Impression:      1. Multiple ill-defined low-density lesions are scattered within the  liver parenchyma. These do not represent the appearance of typical  cysts. In the context of history of septic shock, hepatic phlegmon or  evolving hepatic abscesses could be considered.  2. Minimal stranding is seen at the level of the pancreatic tail which  is nonspecific. This could represent normal variant for this patient.  Very mild pancreatitis could have a similar appearance, although the  remainder the pancreas itself appears unremarkable. Correlate with  laboratory findings.  3. There is very mild interstitial thickening in both lungs and faint  stranding within the anterior superior mediastinum, which may raise  the  possibility of mild early edema.  4. There is long segment thickening of the mid to lower thoracic  esophagus with small esophageal hiatal hernia. Correlate for esophagitis  symptoms. Endoscopic correlation may prove helpful.  5. Mild dependent atelectasis in both lungs.  6. No pulmonary embolism is seen.           Electronically Signed By-Franca Gallegos MD On:8/5/2022 8:58 AM  This report was finalized on [id number] by  Franca Gallegos MD.    CT Abdomen Pelvis With Contrast [[id number]] Collected: 08/05/22 0846     Updated: 08/05/22 0900    Narrative:      CT ABDOMEN PELVIS W CONTRAST-, CT ANGIOGRAM CHEST PULMONARY EMBOLISM-     Date of Exam: 8/5/2022 8:25 AM     Indication: septic shock abd pain.     Comparison: None available.     Technique: Contiguous axial CT images were obtained from the lung bases  to the pubic symphysis following uneventful administration of 100 cc  Isovue-370 intravenous and oral contrast. Sagittal and coronal  reconstructions were performed.  Automated exposure control and  iterative reconstruction methods were used.     FINDINGS:     CHEST: No pulmonary embolism is seen. Heart size is within normal  limits. There is no pericardial effusion. There is no pleural effusion.     There is mild concentric thickening of the mid to lower thoracic  esophagus, and there is a small esophageal hiatal hernia.     There is mild ill-defined the soft tissue stranding within the anterior  superior mediastinum. Trace fluid is seen within the pericardial  recesses. No pleural effusion is seen.     No dense lung consolidations are identified. There is dependent  atelectasis in both lungs. There is very mild interstitial thickening in  the lungs which could reflect changes of early interstitial edema and  the appropriate clinical context. No abnormal bronchial wall thickening  or bronchiectasis is evident.     No acute or suspicious osseous abnormalities are identified.           ABDOMEN AND PELVIS:  Multiple irregularly marginated and somewhat  ill-defined low-density lesions are seen within the liver parenchyma. A  dominant bilobed lesion in the posterior right hepatic lobe segment 6  measures 2.5 x 5.7 cm. Another index lesion within the right hepatic  lobe more superiorly and segment 8 measures 2.6 x 1.4 cm. An index  lesion in the left hepatic lobe laterally segment 2 measures 1.7 x 1.3  cm. Other scattered smaller lesions are noted. These do not have the  appearance of cysts. They could represent atypical hemangiomas; however,  in the context of septic shock and abdominal pain, hepatic phlegmon or  evolving abscess is could be considered.     The gallbladder is normal. No abnormal biliary dilation is identified.  The spleen, adrenals, and left kidney are normal. A right renal cyst  measures 1.3 cm. Kidneys maintain normal enhancement.     There is minimal stranding near the level of the pancreatic tail. The  pancreas itself, however, otherwise appears unremarkable.     The unopacified bowel does not appear abnormally thickened, dilated, or  inflamed. The appendix is normal. No free air, free fluid or  pathologically enlarged lymph nodes are identified.     The uterus and rectum appear unremarkable. The urinary bladder is  decompressed.     There are no acute or suspicious osseous abnormalities.             Impression:      1. Multiple ill-defined low-density lesions are scattered within the  liver parenchyma. These do not represent the appearance of typical  cysts. In the context of history of septic shock, hepatic phlegmon or  evolving hepatic abscesses could be considered.  2. Minimal stranding is seen at the level of the pancreatic tail which  is nonspecific. This could represent normal variant for this patient.  Very mild pancreatitis could have a similar appearance, although the  remainder the pancreas itself appears unremarkable. Correlate with  laboratory findings.  3. There is very mild  interstitial thickening in both lungs and faint  stranding within the anterior superior mediastinum, which may raise the  possibility of mild early edema.  4. There is long segment thickening of the mid to lower thoracic  esophagus with small esophageal hiatal hernia. Correlate for esophagitis  symptoms. Endoscopic correlation may prove helpful.  5. Mild dependent atelectasis in both lungs.  6. No pulmonary embolism is seen.           Electronically Signed By-Franca Gallegos MD On:8/5/2022 8:58 AM  This report was finalized on [id number] by  Franca Gallegos MD.          Cardiology      Results Review:  I have reviewed all clinical data, test, lab, and imaging results.       Schedule Meds  hydrocortisone sodium succinate, 100 mg, Intravenous, Q6H  insulin lispro, 0-7 Units, Subcutaneous, Q6H  piperacillin-tazobactam, 4.5 g, Intravenous, Once  piperacillin-tazobactam, 4.5 g, Intravenous, Q8H  sodium chloride, 10 mL, Intravenous, Q12H  sodium chloride 0.9% - IBW for BMI > 30, 30 mL/kg (Ideal), Intravenous, Once        Infusion Meds  norepinephrine, 0.02-0.3 mcg/kg/min, Last Rate: 0.3 mcg/kg/min (08/05/22 1108)  sodium bicarbonate drip (greater than 75 mEq/bag), 150 mEq, Last Rate: 150 mEq (08/05/22 1049)  vasopressin, 0.03 Units/min, Last Rate: 0.03 Units/min (08/05/22 0910)        PRN Meds  •  acetaminophen **OR** acetaminophen  •  aluminum-magnesium hydroxide-simethicone  •  dextrose  •  dextrose  •  glucagon (human recombinant)  •  nitroglycerin  •  ondansetron **OR** ondansetron  •  sodium chloride  •  sodium chloride      Assessment & Plan       Assessment    Possible liver abscess.  Patient presented hospital with fever and chills and found to have abnormality of the liver.    Septic shock requiring 2 vasopressors including vasopressin and norepinephrine secondary to above    Acute kidney injury probably secondary to sepsis    Reactive leukocytosis with significant bandemia secondary to above    Positive stool  for enteropathogenic E. coli.    Plan    Start Zosyn 4.5 g IV every 8 hours  Waiting on blood culture results  Case was discussed with IR and patient will undergo aspirations/biopsy of the right liver lobe lesion later today after receiving FFP  Orders for pathology, fluid culture and anaerobic culture was put in the system  Continue supportive care and pressors  A.m. labs  Case was discussed with the patient and her  at bedside    Sagar Orozco MD  08/05/22  13:29 EDT    Note is dictated utilizing voice recognition software/Dragon

## 2022-08-05 NOTE — PAYOR COMM NOTE
"Clinical for case# [id number]    Inpatient order 8/5/22    ER admit to ICU   Treatment of UTI with septic shock. Patient requiring IV Levophed & Vasopressin support.   Clinical attached   ======  AUTHORIZATION PENDING:   PLEASE FAX OR CALL DETERMINATION TO CONTACT BELOW:       THANK YOU,    ARSEN Lopez, RN  Utilization Review  [facility]  Phone: [phone]  Fax: [phone]      NPI: [id number]  Tax ID: [id number]      Raul Middleton (47 y.o. Female)             Date of Birth   1975    Social Security Number       Address   [address] [place] [address]    Home Phone   [phone]    MRN   [id number]       Scientologist   None    Marital Status                               Admission Date   8/5/22    Admission Type   Emergency    Admitting Provider   Shy Moreno MD    Attending Provider   Shy Moreno MD    Department, Room/Bed   [facility] INTENSIVE CARE UNIT, 2305/1       Discharge Date       Discharge Disposition       Discharge Destination                               Attending Provider: Shy Moreno MD    Allergies: Cephalexin, Hydrocodone-acetaminophen    Isolation: Enh Drop/Con   Infection: COVID (rule out) (08/05/22)   Code Status: CPR   Advance Care Planning Activity    Ht: 165.1 cm (65\")   Wt: 113 kg (250 lb)    Admission Cmt: None   Principal Problem: None                Active Insurance as of 8/5/2022     Primary Coverage     Payor Plan Insurance Group Employer/Plan Group    KEY BENEFIT ADMINISTRATORS [facility] KEY BENEFIT ADMINISTRATORS [facility] [id number]     Payor Plan Address Payor Plan Phone Number Payor Plan Fax Number Effective Dates    [address] [phone]  7/28/2022 - None Entered    [facility] [id number]       Subscriber Name Subscriber Birth Date Member ID       RAUL MIDDLETON 1975 [id number]                 Emergency Contacts      (Rel.) Home Phone Work Phone Mobile Phone    CHUN MIDDLETON (Spouse) -- -- [phone]    "            History & Physical      Shy Moreno MD at 22 1042          Patient Care Team:  Maribel Graf MD as PCP - General (Family Medicine)  AVA Cavanaugh MD as Consulting Physician (Cardiology)    Chief complaint fever and vomiting        Assessment & Plan   Sepsis with septic shock  UTI  MARIANNE  Coagulopathy probably from DIC  Leukocytosis  Anion gap metabolic acidosis  Electrolyte imbalance with hypokalemia and hypocalcemia and hyponatremia  Abnormal CT scan of the liver with possible hepatic abscess  Mild atelectasis with early noncardiogenic edema probably early ARDS  Esophagitis  Mild pancreatic changes on the CT scan but the lipase is not elevated  History of melanoma resected in  without signs of spread or recurrence  Mild exercise-induced pulmonary hypertension for which she has been on metoprolol but she is out of it for 1 month      Plan:  Patient is critically ill we will keep her in ICU  Hemodynamic support: Pressors  IV fluid  Ultrasound of the liver  Antibiotics: Consult ID  DVT: SCD/GI prophylaxis  Check daily labs and correct electrolytes as needed    History  47-year-old female with past medical history of melanoma, pulmonary hypertension who was feeling well until 2 days ago when she started to have fever with body aches and today she started to have vomiting and diarrhea.  In ED she had a fever of 103.1 and heart rate 146 with blood pressure 36/23 however that improved after IV fluid resuscitation and vasopressors to 103/62.          Septic shock (HCC)      Past Medical History:   Diagnosis Date   • Melanoma (CMS/HCC)    • Pulmonary hypertension (CMS/HCC)    • Urinary tract infection     chronic hematuria, seen urology       Past Surgical History:   Procedure Laterality Date   •  SECTION  13 and 14   • SKIN CANCER EXCISION     • TUBAL ABDOMINAL LIGATION         Family History   Problem Relation Age of Onset   • Kidney disease Mother    • Kidney disease  Father    • Cancer Father         lung   • Diabetes Brother    • Heart disease Brother         CHF       Social History     Socioeconomic History   • Marital status:    Tobacco Use   • Smoking status: Never Smoker   • Smokeless tobacco: Never Used   Substance and Sexual Activity   • Alcohol use: Yes     Comment: caffeine 1 cup qd   • Drug use: No   • Sexual activity: Yes     Birth control/protection: Surgical       Review of Systems  Review of Systems   Constitutional: Positive for chills, fever and malaise/fatigue.   HENT: Negative.    Eyes: Negative.    Cardiovascular: Negative.    Respiratory: Negative for cough and shortness of breath.    Skin: Negative.    Musculoskeletal: Negative.    Gastrointestinal: Mild abdominal pain with vomiting and diarrhea  Genitourinary: Chronic cystitis  Neurological: Negative.    Psychiatric/Behavioral: Negative.  Vital Signs  Temp:  [97.7 °F (36.5 °C)-103.1 °F (39.5 °C)] 103.1 °F (39.5 °C)  Heart Rate:  [] 124  Resp:  [20-27] 27  BP: ()/(12-89) 103/62    Physical Exam:  Physical Exam  General Appearance:  Alert   HEENT:  Normocephalic, without obvious abnormality, Conjunctiva/corneas clear,.   Nares normal, no drainage, dry oral mucosa     Neck:  Supple, symmetrical, trachea midline. No JVD.  Lungs /Chest wall: Minimal bilateral basal rhonchi, respirations unlabored, symmetrical wall movement.     Heart:  Regular tachycardia, S1 S2 normal, no murmur  Abdomen: Soft, tenderness in right upper quadrant, no masses, no organomegaly.  Bowel sounds positive  Extremities: No edema, no clubbing or cyanosis, no palpable cords in the calves  Skin: No rash  Neuro exam: Moving 4 extremities no apparent focal deficit by observation    Radiology  Imaging Results (Last 24 Hours)     Procedure Component Value Units Date/Time    XR Chest 1 View [[id number]] Collected: 08/05/22 0927     Updated: 08/05/22 0936    Narrative:      DATE OF EXAM:  8/5/2022 9:14 AM     PROCEDURE:  XR  CHEST 1 VW-     INDICATIONS:  Severe Sepsis Protocol     COMPARISON:  CT chest PE protocol 08/05/2022. No previous chest x-ray for comparison.     TECHNIQUE:   Single radiographic AP view of the chest was obtained.     FINDINGS:  Tip of the right internal jugular central venous catheter terminates in  the right atrium. No pneumothorax is seen. Allowing for low lung  volumes, the lungs appear grossly clear. Cardia mediastinal contours  appear within normal limits.        Impression:      1.     Tip of the right internal jugular central venous catheter  terminates in the right atrium. No visible pneumothorax.  2.     Low lung volumes without evidence of acute cardiopulmonary  abnormality.     Electronically Signed By-Hui Raman MD On:8/5/2022 9:34 AM  This report was finalized on [id number] by  Hui Raman MD.    CT Angiogram Chest Pulmonary Embolism [[id number]] Collected: 08/05/22 0846     Updated: 08/05/22 0900    Narrative:      CT ABDOMEN PELVIS W CONTRAST-, CT ANGIOGRAM CHEST PULMONARY EMBOLISM-     Date of Exam: 8/5/2022 8:25 AM     Indication: septic shock abd pain.     Comparison: None available.     Technique: Contiguous axial CT images were obtained from the lung bases  to the pubic symphysis following uneventful administration of 100 cc  Isovue-370 intravenous and oral contrast. Sagittal and coronal  reconstructions were performed.  Automated exposure control and  iterative reconstruction methods were used.     FINDINGS:     CHEST: No pulmonary embolism is seen. Heart size is within normal  limits. There is no pericardial effusion. There is no pleural effusion.     There is mild concentric thickening of the mid to lower thoracic  esophagus, and there is a small esophageal hiatal hernia.     There is mild ill-defined the soft tissue stranding within the anterior  superior mediastinum. Trace fluid is seen within the pericardial  recesses. No pleural effusion is seen.     No dense lung  consolidations are identified. There is dependent  atelectasis in both lungs. There is very mild interstitial thickening in  the lungs which could reflect changes of early interstitial edema and  the appropriate clinical context. No abnormal bronchial wall thickening  or bronchiectasis is evident.     No acute or suspicious osseous abnormalities are identified.           ABDOMEN AND PELVIS: Multiple irregularly marginated and somewhat  ill-defined low-density lesions are seen within the liver parenchyma. A  dominant bilobed lesion in the posterior right hepatic lobe segment 6  measures 2.5 x 5.7 cm. Another index lesion within the right hepatic  lobe more superiorly and segment 8 measures 2.6 x 1.4 cm. An index  lesion in the left hepatic lobe laterally segment 2 measures 1.7 x 1.3  cm. Other scattered smaller lesions are noted. These do not have the  appearance of cysts. They could represent atypical hemangiomas; however,  in the context of septic shock and abdominal pain, hepatic phlegmon or  evolving abscess is could be considered.     The gallbladder is normal. No abnormal biliary dilation is identified.  The spleen, adrenals, and left kidney are normal. A right renal cyst  measures 1.3 cm. Kidneys maintain normal enhancement.     There is minimal stranding near the level of the pancreatic tail. The  pancreas itself, however, otherwise appears unremarkable.     The unopacified bowel does not appear abnormally thickened, dilated, or  inflamed. The appendix is normal. No free air, free fluid or  pathologically enlarged lymph nodes are identified.     The uterus and rectum appear unremarkable. The urinary bladder is  decompressed.     There are no acute or suspicious osseous abnormalities.             Impression:      1. Multiple ill-defined low-density lesions are scattered within the  liver parenchyma. These do not represent the appearance of typical  cysts. In the context of history of septic shock, hepatic  phlegmon or  evolving hepatic abscesses could be considered.  2. Minimal stranding is seen at the level of the pancreatic tail which  is nonspecific. This could represent normal variant for this patient.  Very mild pancreatitis could have a similar appearance, although the  remainder the pancreas itself appears unremarkable. Correlate with  laboratory findings.  3. There is very mild interstitial thickening in both lungs and faint  stranding within the anterior superior mediastinum, which may raise the  possibility of mild early edema.  4. There is long segment thickening of the mid to lower thoracic  esophagus with small esophageal hiatal hernia. Correlate for esophagitis  symptoms. Endoscopic correlation may prove helpful.  5. Mild dependent atelectasis in both lungs.  6. No pulmonary embolism is seen.           Electronically Signed By-Franca Gallegos MD On:8/5/2022 8:58 AM  This report was finalized on [id number] by  Franca Gallegos MD.    CT Abdomen Pelvis With Contrast [[id number]] Collected: 08/05/22 0846     Updated: 08/05/22 0900    Narrative:      CT ABDOMEN PELVIS W CONTRAST-, CT ANGIOGRAM CHEST PULMONARY EMBOLISM-     Date of Exam: 8/5/2022 8:25 AM     Indication: septic shock abd pain.     Comparison: None available.     Technique: Contiguous axial CT images were obtained from the lung bases  to the pubic symphysis following uneventful administration of 100 cc  Isovue-370 intravenous and oral contrast. Sagittal and coronal  reconstructions were performed.  Automated exposure control and  iterative reconstruction methods were used.     FINDINGS:     CHEST: No pulmonary embolism is seen. Heart size is within normal  limits. There is no pericardial effusion. There is no pleural effusion.     There is mild concentric thickening of the mid to lower thoracic  esophagus, and there is a small esophageal hiatal hernia.     There is mild ill-defined the soft tissue stranding within the anterior  superior  mediastinum. Trace fluid is seen within the pericardial  recesses. No pleural effusion is seen.     No dense lung consolidations are identified. There is dependent  atelectasis in both lungs. There is very mild interstitial thickening in  the lungs which could reflect changes of early interstitial edema and  the appropriate clinical context. No abnormal bronchial wall thickening  or bronchiectasis is evident.     No acute or suspicious osseous abnormalities are identified.           ABDOMEN AND PELVIS: Multiple irregularly marginated and somewhat  ill-defined low-density lesions are seen within the liver parenchyma. A  dominant bilobed lesion in the posterior right hepatic lobe segment 6  measures 2.5 x 5.7 cm. Another index lesion within the right hepatic  lobe more superiorly and segment 8 measures 2.6 x 1.4 cm. An index  lesion in the left hepatic lobe laterally segment 2 measures 1.7 x 1.3  cm. Other scattered smaller lesions are noted. These do not have the  appearance of cysts. They could represent atypical hemangiomas; however,  in the context of septic shock and abdominal pain, hepatic phlegmon or  evolving abscess is could be considered.     The gallbladder is normal. No abnormal biliary dilation is identified.  The spleen, adrenals, and left kidney are normal. A right renal cyst  measures 1.3 cm. Kidneys maintain normal enhancement.     There is minimal stranding near the level of the pancreatic tail. The  pancreas itself, however, otherwise appears unremarkable.     The unopacified bowel does not appear abnormally thickened, dilated, or  inflamed. The appendix is normal. No free air, free fluid or  pathologically enlarged lymph nodes are identified.     The uterus and rectum appear unremarkable. The urinary bladder is  decompressed.     There are no acute or suspicious osseous abnormalities.             Impression:      1. Multiple ill-defined low-density lesions are scattered within the  liver  parenchyma. These do not represent the appearance of typical  cysts. In the context of history of septic shock, hepatic phlegmon or  evolving hepatic abscesses could be considered.  2. Minimal stranding is seen at the level of the pancreatic tail which  is nonspecific. This could represent normal variant for this patient.  Very mild pancreatitis could have a similar appearance, although the  remainder the pancreas itself appears unremarkable. Correlate with  laboratory findings.  3. There is very mild interstitial thickening in both lungs and faint  stranding within the anterior superior mediastinum, which may raise the  possibility of mild early edema.  4. There is long segment thickening of the mid to lower thoracic  esophagus with small esophageal hiatal hernia. Correlate for esophagitis  symptoms. Endoscopic correlation may prove helpful.  5. Mild dependent atelectasis in both lungs.  6. No pulmonary embolism is seen.           Electronically Signed By-Franca Gallegos MD On:8/5/2022 8:58 AM  This report was finalized on [id number] by  Franca Gallegos MD.          Labs:  Results from last 7 days   Lab Units 08/05/22  0815 08/05/22  0811   WBC 10*3/mm3  --  15.90*   HEMOGLOBIN g/dL  --  14.8   HEMOGLOBIN, POC g/dL 12.0  --    HEMATOCRIT %  --  45.7   HEMATOCRIT POC % 35*  --    PLATELETS 10*3/mm3  --  241     Results from last 7 days   Lab Units 08/05/22  0811   SODIUM mmol/L 132*   POTASSIUM mmol/L 3.9   CHLORIDE mmol/L 94*   CO2 mmol/L 21.0*   BUN mg/dL 32*   CREATININE mg/dL 3.82*   CALCIUM mg/dL 8.3*   BILIRUBIN mg/dL 4.3*   ALK PHOS U/L 64   ALT (SGPT) U/L 30   AST (SGOT) U/L 41*   GLUCOSE mg/dL 141*     Results from last 7 days   Lab Units 08/05/22  0815   PH, ARTERIAL pH units 7.345*   PO2 ART mm Hg 221.4*   PCO2, ARTERIAL mm Hg 28.3*   HCO3 ART mmol/L 15.5*     Results from last 7 days   Lab Units 08/05/22  0811   ALBUMIN g/dL 3.30*     Results from last 7 days   Lab Units 08/05/22  0811   CK TOTAL U/L  327*   TROPONIN T ng/mL <0.010             Results from last 7 days   Lab Units 08/05/22  0911 08/05/22  0811   INR  2.47* 2.16*   APTT seconds 48.8* 43.1*               Meds:   SCHEDULE  insulin lispro, 0-7 Units, Subcutaneous, Q6H  Norepinephrine-Sodium Chloride, , ,   sodium chloride, 10 mL, Intravenous, Q12H  sodium chloride 0.9% - IBW for BMI > 30, 30 mL/kg (Ideal), Intravenous, Once      Infusions  norepinephrine, 0.02-0.3 mcg/kg/min  sodium bicarbonate drip (greater than 75 mEq/bag), 150 mEq  vasopressin, 0.03 Units/min, Last Rate: 0.03 Units/min (08/05/22 0910)      PRNs  •  acetaminophen **OR** acetaminophen  •  aluminum-magnesium hydroxide-simethicone  •  dextrose  •  dextrose  •  glucagon (human recombinant)  •  nitroglycerin  •  ondansetron **OR** ondansetron  •  sodium chloride  •  sodium chloride      I discussed the patients findings and my recommendations with patient and nursing staff.     Shy Moreno MD  08/05/22  10:42 EDT    Time: Critical care 50 min      Electronically signed by Shy Moreno MD at 08/05/22 1103          Emergency Department Notes      Yesica Solorio RN at 08/05/22 0745         Patient placed in room, hooked up to the monitor, noticed patients color wasn't looking good, called for ER provider to room    Electronically signed by Yesica Solorio RN at 08/05/22 6373     Yesica Solorio RN at 08/05/22 0747         bedside with nurse and patient.    Electronically signed by Yesica Solorio RN at 08/05/22 0800     Leonard Francis MD at 08/05/22 0816      Procedure Orders    1. Central Line At Bedside [[id number]] ordered by Leonard Francis MD               Subjective   History of Present Illness  47-year-old female presents with fever chills some body aches.  She states that started 2 days ago.  No cough or shortness of breath.  She states she had had vomiting once had some diarrhea today.  No blood in stool or emesis.  She complained of abdominal  pain today.  She had syncopal episode this morning.  Review of Systems  Complete review of systems not obtained due to critical nature of situation.  Past Medical History:   Diagnosis Date   • Melanoma (CMS/HCC)    • Pulmonary hypertension (CMS/HCC)    • Urinary tract infection     chronic hematuria, seen urology       Allergies   Allergen Reactions   • Cephalexin Rash   • Hydrocodone-Acetaminophen Rash       Past Surgical History:   Procedure Laterality Date   •  SECTION  13 and 14   • SKIN CANCER EXCISION     • TUBAL ABDOMINAL LIGATION         Family History   Problem Relation Age of Onset   • Kidney disease Mother    • Kidney disease Father    • Cancer Father         lung   • Diabetes Brother    • Heart disease Brother         CHF       Social History     Socioeconomic History   • Marital status:    Tobacco Use   • Smoking status: Never Smoker   • Smokeless tobacco: Never Used   Substance and Sexual Activity   • Alcohol use: Yes     Comment: caffeine 1 cup qd   • Drug use: No   • Sexual activity: Yes     Birth control/protection: Surgical     Prior to Admission medications    Medication Sig Start Date End Date Taking? Authorizing Provider   escitalopram (LEXAPRO) 10 MG tablet TAKE 1 TABLET BY MOUTH EVERY DAY 12/3/21   Maribel Graf MD   lisinopril (PRINIVIL,ZESTRIL) 5 MG tablet TAKE 1 TABLET BY MOUTH EVERY DAY 12/3/21   Maribel Graf MD   metoprolol succinate XL (TOPROL-XL) 25 MG 24 hr tablet TAKE 1 TABLET BY MOUTH EVERY DAY 21   Hugh Lancaster DO   naproxen (NAPROSYN) 500 MG tablet TAKE 1 TABLET BY MOUTH TWICE A DAY WITH MEALS 21   Maribel Graf MD           Objective   Physical Exam  47-year-old female lethargic.  She is ill.  Skin is dusky.  Pupils conjunctiva injected.  Oropharynx mucous membranes mildly dry neck supple chest clear cardiovascular regular in rhythm abdomen is soft complains of some upper abdominal tenderness without involuntary  guarding or rebound.  Extremities without tenderness significant edema.  Neurologic exam lethargic without focal findings.  Central Line At Bedside    Date/Time: 8/5/2022 9:12 AM  Performed by: Leonard Francis MD  Authorized by: Leonard Francis MD     Consent:     Consent obtained:  Emergent situation    Consent given by:  Patient  Universal protocol:     Procedure explained and questions answered to patient or proxy's satisfaction: yes      Immediately prior to procedure, a time out was called: yes      Patient identity confirmed:  Verbally with patient  Pre-procedure details:     Skin preparation:  Chlorhexidine    Skin preparation agent: Skin preparation agent completely dried prior to procedure    Anesthesia:     Anesthesia method:  Local infiltration    Local anesthetic:  Lidocaine 1% w/o epi  Procedure details:     Location:  R internal jugular    Patient position:  Supine    Procedural supplies:  Triple lumen    Landmarks identified: yes      Ultrasound guidance: yes      Ultrasound guidance timing: prior to insertion and real time      Sterile ultrasound techniques: Sterile gel and sterile probe covers were used    Post-procedure details:     Post-procedure:  Dressing applied and line sutured    Assessment:  Blood return through all ports and free fluid flow    Procedure completion:  Tolerated well, no immediate complications      I performed immediate abdominal ultrasound on patient's arrival.  I did not see evidence of free fluid in any quadrant examined        ED Course  ED Course as of 08/05/22 0924   Fri Aug 05, 2022   0919 Modality: NRB [SP]      ED Course User Index  [SP] Leonard Francis MD      Results for orders placed or performed during the hospital encounter of 08/05/22   Respiratory Panel PCR w/COVID-19(SARS-CoV-2) CELSA/JONATHAN/DOMINIQUE/PAD/COR/MAD/LISA In-House, NP Swab in UTM/VTM, 3-4 HR TAT - Swab, Nasopharynx    Specimen: Nasopharynx; Swab   Result Value Ref Range    ADENOVIRUS, PCR Not Detected  Not Detected    Coronavirus 229E Not Detected Not Detected    Coronavirus HKU1 Not Detected Not Detected    Coronavirus NL63 Not Detected Not Detected    Coronavirus OC43 Not Detected Not Detected    COVID19 Not Detected Not Detected - Ref. Range    Human Metapneumovirus Not Detected Not Detected    Human Rhinovirus/Enterovirus Not Detected Not Detected    Influenza A PCR Not Detected Not Detected    Influenza B PCR Not Detected Not Detected    Parainfluenza Virus 1 Not Detected Not Detected    Parainfluenza Virus 2 Not Detected Not Detected    Parainfluenza Virus 3 Not Detected Not Detected    Parainfluenza Virus 4 Not Detected Not Detected    RSV, PCR Not Detected Not Detected    Bordetella pertussis pcr Not Detected Not Detected    Bordetella parapertussis PCR Not Detected Not Detected    Chlamydophila pneumoniae PCR Not Detected Not Detected    Mycoplasma pneumo by PCR Not Detected Not Detected   Comprehensive Metabolic Panel    Specimen: Blood   Result Value Ref Range    Glucose 141 (H) 65 - 99 mg/dL    BUN 32 (H) 6 - 20 mg/dL    Creatinine 3.82 (H) 0.57 - 1.00 mg/dL    Sodium 132 (L) 136 - 145 mmol/L    Potassium 3.9 3.5 - 5.2 mmol/L    Chloride 94 (L) 98 - 107 mmol/L    CO2 21.0 (L) 22.0 - 29.0 mmol/L    Calcium 8.3 (L) 8.6 - 10.5 mg/dL    Total Protein 6.1 6.0 - 8.5 g/dL    Albumin 3.30 (L) 3.50 - 5.20 g/dL    ALT (SGPT) 30 1 - 33 U/L    AST (SGOT) 41 (H) 1 - 32 U/L    Alkaline Phosphatase 64 39 - 117 U/L    Total Bilirubin 4.3 (H) 0.0 - 1.2 mg/dL    Globulin 2.8 gm/dL    A/G Ratio 1.2 g/dL    BUN/Creatinine Ratio 8.4 7.0 - 25.0    Anion Gap 17.0 (H) 5.0 - 15.0 mmol/L    eGFR 14.2 (L) >60.0 mL/min/1.73   Protime-INR    Specimen: Blood   Result Value Ref Range    Protime 21.3 (H) 9.6 - 11.7 Seconds    INR 2.16 (C) 0.93 - 1.10   aPTT    Specimen: Blood   Result Value Ref Range    PTT 43.1 (H) 24.0 - 31.0 seconds   Procalcitonin    Specimen: Blood   Result Value Ref Range    Procalcitonin 99.59 (H) 0.00 - 0.25  ng/mL   Troponin    Specimen: Blood   Result Value Ref Range    Troponin T <0.010 0.000 - 0.030 ng/mL   CBC Auto Differential    Specimen: Blood   Result Value Ref Range    WBC 15.90 (H) 3.40 - 10.80 10*3/mm3    RBC 5.26 3.77 - 5.28 10*6/mm3    Hemoglobin 14.8 12.0 - 15.9 g/dL    Hematocrit 45.7 34.0 - 46.6 %    MCV 86.9 79.0 - 97.0 fL    MCH 28.2 26.6 - 33.0 pg    MCHC 32.4 31.5 - 35.7 g/dL    RDW 14.5 12.3 - 15.4 %    RDW-SD 44.2 37.0 - 54.0 fl    MPV 8.9 6.0 - 12.0 fL    Platelets 241 140 - 450 10*3/mm3   Scan Slide    Specimen: Blood   Result Value Ref Range    Scan Slide     Blood Gas, Arterial -    Specimen: Arterial Blood   Result Value Ref Range    Site Right Radial     Ojnathan's Test Positive     pH, Arterial 7.345 (L) 7.350 - 7.450 pH units    pCO2, Arterial 28.3 (L) 35.0 - 48.0 mm Hg    pO2, Arterial 221.4 (H) 83.0 - 108.0 mm Hg    HCO3, Arterial 15.5 (L) 21.0 - 28.0 mmol/L    Base Excess, Arterial -8.9 (L) 0.0 - 3.0 mmol/L    O2 Saturation, Arterial 99.8 (H) 94.0 - 98.0 %    CO2 Content 16.3 (L) 22 - 29 mmol/L    Barometric Pressure for Blood Gas      Modality NRB     FIO2 100 %    Hemodilution No    Lipase    Specimen: Blood   Result Value Ref Range    Lipase 12 (L) 13 - 60 U/L   Manual Differential    Specimen: Blood   Result Value Ref Range    Neutrophil % 37.0 (L) 42.7 - 76.0 %    Lymphocyte % 1.0 (L) 19.6 - 45.3 %    Monocyte % 4.0 (L) 5.0 - 12.0 %    Bands %  38.0 (H) 0.0 - 5.0 %    Metamyelocyte % 20.0 (H) 0.0 - 0.0 %    Neutrophils Absolute 11.93 (H) 1.70 - 7.00 10*3/mm3    Lymphocytes Absolute 0.16 (L) 0.70 - 3.10 10*3/mm3    Monocytes Absolute 0.64 0.10 - 0.90 10*3/mm3    RBC Morphology Normal Normal    Vacuolated Neutrophils Slight/1+ None Seen    Large Platelets Slight/1+ None Seen   Path Consult Reflex    Specimen: Blood   Result Value Ref Range    Pathology Review Yes    POCT Electrolytes +HGB +HCT    Specimen: Blood   Result Value Ref Range    Sodium 134 (L) 138 - 146 mmol/L    POC Potassium  3.2 (L) 3.5 - 4.5 mmol/L    Ionized Calcium 1.00 (L) 1.15 - 1.33 mmol/L    Glucose 122 (H) 74 - 100 mg/dL    Hematocrit 35 (L) 38 - 51 %    Hemoglobin 12.0 12.0 - 17.0 g/dL   POC Glucose Once    Specimen: Blood   Result Value Ref Range    Glucose 122 (H) 74 - 100 mg/dL   ECG 12 Lead   Result Value Ref Range    QT Interval 286 ms   Lavender Top   Result Value Ref Range    Extra Tube done    Gold Top - SST   Result Value Ref Range    Extra Tube Hold for add-ons.    Light Blue Top   Result Value Ref Range    Extra Tube Hold for add-ons.      CT Abdomen Pelvis With Contrast    Result Date: 8/5/2022  1. Multiple ill-defined low-density lesions are scattered within the liver parenchyma. These do not represent the appearance of typical cysts. In the context of history of septic shock, hepatic phlegmon or evolving hepatic abscesses could be considered. 2. Minimal stranding is seen at the level of the pancreatic tail which is nonspecific. This could represent normal variant for this patient. Very mild pancreatitis could have a similar appearance, although the remainder the pancreas itself appears unremarkable. Correlate with laboratory findings. 3. There is very mild interstitial thickening in both lungs and faint stranding within the anterior superior mediastinum, which may raise the possibility of mild early edema. 4. There is long segment thickening of the mid to lower thoracic esophagus with small esophageal hiatal hernia. Correlate for esophagitis symptoms. Endoscopic correlation may prove helpful. 5. Mild dependent atelectasis in both lungs. 6. No pulmonary embolism is seen.    Electronically Signed By-Franca Gallegos MD On:8/5/2022 8:58 AM This report was finalized on [id number] by  Franca Gallegos MD.    CT Angiogram Chest Pulmonary Embolism    Result Date: 8/5/2022  1. Multiple ill-defined low-density lesions are scattered within the liver parenchyma. These do not represent the appearance of typical cysts. In the  context of history of septic shock, hepatic phlegmon or evolving hepatic abscesses could be considered. 2. Minimal stranding is seen at the level of the pancreatic tail which is nonspecific. This could represent normal variant for this patient. Very mild pancreatitis could have a similar appearance, although the remainder the pancreas itself appears unremarkable. Correlate with laboratory findings. 3. There is very mild interstitial thickening in both lungs and faint stranding within the anterior superior mediastinum, which may raise the possibility of mild early edema. 4. There is long segment thickening of the mid to lower thoracic esophagus with small esophageal hiatal hernia. Correlate for esophagitis symptoms. Endoscopic correlation may prove helpful. 5. Mild dependent atelectasis in both lungs. 6. No pulmonary embolism is seen.    Electronically Signed By-Franca Gallegos MD On:8/5/2022 8:58 AM This report was finalized on [id number] by  Franca Gallegos MD.    Medications   sodium chloride 0.9 % flush 10 mL (has no administration in time range)   piperacillin-tazobactam (ZOSYN) IVPB 4.5 g in 100 mL NS (CD) (4.5 g Intravenous New Bag 8/5/22 0904)   norepinephrine (LEVOPHED) 8 mg in 250 mL NS infusion (premix) (0.3 mcg/kg/min × 113 kg Intravenous Rate/Dose Change 8/5/22 0853)   Vasopressin (VASOSTRICT) 0.2 UNIT/ML solution (0.03 Units/min Intravenous New Bag 8/5/22 0910)   lactated ringers infusion (125 mL/hr Intravenous New Bag 8/5/22 0916)   vancomycin (VANCOCIN) 2,250 mg in sodium chloride 0.9 % 500 mL IVPB (has no administration in time range)   sodium chloride 0.9% - IBW for BMI > 30 bolus 1,710 mL (has no administration in time range)   lactated ringers bolus 2,000 mL (0 mL Intravenous Stopped 8/5/22 0915)   lactated ringers bolus 1,000 mL (0 mL Intravenous Stopped 8/5/22 0915)   iopamidol (ISOVUE-370) 76 % injection 100 mL (100 mL Intravenous Given 8/5/22 0830)     BP (!) 83/53   Pulse (!) 131   Temp  "(!) 103.1 °F (39.5 °C) (Rectal)   Resp 27   Ht 165.1 cm (65\")   Wt 113 kg (250 lb)   SpO2 100%   BMI 41.60 kg/m²                                        MDM  Chart review: Patient has had previous cardiology evaluation for pulmonary hypertension and essential hypertension  Comorbidity: As per past history  Differential: Septic shock  My EKG interpretation: Sinus tachycardia rate of 145  Lab: Arterial blood gas pH 7.34 PCO2 28 PO2 of 221 on a nonrebreather.  She has base excess of -10 lactic acid was 2.37.  Glucose 122, white count 15.9 with hemoglobin 14.8 platelet count 241 differential shows 37 segs 38 bands 20 metamyelocytes.  Respiratory virus panel all negative.  Procalcitonin 99.59 troponin negative.  Patient's initial PTT was 43 INR 2.16 this was rechecked when central line was placed.  Radiology: I reviewed CT chest abdomen pelvis and discussed with radiologist.  There are multiple ill-defined low-density lesions scattered within the liver parenchyma.  There is minimal nonspecific stranding seen in level of the pancreatic tail.  There is very mild interstitial thickening of both lungs.  There is a long segment of thickening of the mid to lower thoracic esophagus with small hiatal hernia.  There is no vascular abnormality noted.  I reviewed postprocedure chest x-ray.  Central line in good position without complication  Discussion/treatment: Patient received 3 L IV fluid.  Patient was started on Levophed and vasopressin.  She was started on Zosyn and vancomycin she was noted to have improvement in her mental status and also her tissue perfusion.  She had improvement in skin color.  She is now able to answer questions.  She reported she had just vomited once had diarrhea once or twice today.  No blood in stool or emesis.  She states she had not been eating or drinking much.  She reports she has known hemangiomas in her liver that have been evaluated previously.  She did not have history of kidney disease.  " Patient was discussed with the nurse practitioner the intensivist.  Admitted to the ICU for continued care.  Patient critical care time 40 minutes dependent of procedures  Patient was evaluated using appropriate PPE    SEPTIC SHOCK FOCUSED EXAM ATTESTATION    I attest that I have reassessed tissue perfusion after the fluid bolus given.    Leonard Francis MD  08/05/22  09:24 EDT  Final diagnoses:   Septic shock (HCC)   Acute kidney injury (HCC)       ED Disposition  ED Disposition     ED Disposition   Decision to Admit    Condition   --    Comment   Level of Care: Critical Care [6]  Admitting Physician: TRENA THOMPSON [[id number]]               No follow-up provider specified.       Medication List      No changes were made to your prescriptions during this visit.          Leonard Francis MD  08/05/22 0922       Leonard Francis MD  08/05/22 0924      Electronically signed by Leonard Francis MD at 08/05/22 0924       Vital Signs (last day)     Date/Time Temp Temp src Pulse Resp BP Patient Position SpO2    08/05/22 1100 98.4 (36.9) Oral -- -- -- -- --    08/05/22 1013 -- -- 124 -- 103/62 -- 100    08/05/22 1008 -- -- 131 -- 88/71 -- 100    08/05/22 1005 -- -- 132 -- 128/50 -- 98    08/05/22 1000 -- -- 126 -- 102/64 -- 99    08/05/22 0943 -- -- 117 -- 103/63 -- 95    08/05/22 0940 -- -- 119 -- 98/68 -- 98    08/05/22 0937 -- -- 121 -- 100/57 -- 100    08/05/22 0935 -- -- 128 -- 105/89 -- 95    08/05/22 0931 -- -- 126 -- 90/62 -- 97    08/05/22 0925 -- -- 129 -- 88/52 -- 100    08/05/22 0916 -- -- 131 -- 104/86 -- 100    08/05/22 0914 -- -- 132 -- 101/84 -- 100    08/05/22 0911 -- -- 131 -- 83/53 -- 100    08/05/22 0907 -- -- 128 -- 83/53 -- 100    08/05/22 0905 -- -- 124 -- 71/33 -- 100    08/05/22 0902 -- -- 121 -- 95/74 -- 100    08/05/22 0857 -- -- 81 -- 96/51 -- 100    08/05/22 0852 -- -- 123 -- 79/53 -- 100    08/05/22 0847 -- -- 123 -- 70/52 -- 99    08/05/22 0842 -- -- 119 -- 85/55 -- 100    08/05/22 0837 --  -- 121 -- -- -- 93    08/05/22 0832 -- -- 117 -- 77/48 -- 96    08/05/22 0827 -- -- 121 -- 107/82 -- 100    08/05/22 0822 -- -- 122 -- 93/71 -- 100    08/05/22 0817 -- -- 120 -- 85/66 -- 94    08/05/22 0812 -- -- 119 -- 61/27 -- 90    08/05/22 0807 -- -- 122 -- 32/19 -- 100    08/05/22 08:03:52 103.1 (39.5) Rectal 127 27 36/12 -- 100    08/05/22 0802 -- -- 127 -- 36/12 -- 100    08/05/22 0757 -- -- 146 -- 48/27 -- --    08/05/22 0752 -- -- 146 -- 36/23 -- 0    08/05/22 0741 97.7 (36.5) -- 78 20 148/80 -- 95            Facility-Administered Medications as of 8/5/2022   Medication Dose Route Frequency Provider Last Rate Last Admin   • acetaminophen (TYLENOL) tablet 650 mg  650 mg Oral Q4H PRN Connor Rod APRN        Or   • acetaminophen (TYLENOL) suppository 650 mg  650 mg Rectal Q4H PRN Connor Rod APRN       • [COMPLETED] acetaminophen (TYLENOL) tablet 1,000 mg  1,000 mg Oral Once Leonard Francis MD   1,000 mg at 08/05/22 0949   • aluminum-magnesium hydroxide-simethicone (MAALOX MAX) 400-400-40 MG/5ML suspension 15 mL  15 mL Oral Q6H PRN Connor Rod APRN       • [COMPLETED] calcium gluconate 2-0.675 GM/100ML NACL IVPB  2 g Intravenous Once Connor Rod APRN   2 g at 08/05/22 1024   • dextrose (D50W) (25 g/50 mL) IV injection 25 g  25 g Intravenous Q15 Min PRN Connor Rod APRN       • dextrose (GLUTOSE) oral gel 15 g  15 g Oral Q15 Min PRN Love, Connor E, APRN       • glucagon (human recombinant) (GLUCAGEN DIAGNOSTIC) 1 mg in sterile water (preservative free) 1 mL injection  1 mg Intramuscular Q15 Min PRN Connor Rod APRN       • hydrocortisone sodium succinate (Solu-CORTEF) injection 100 mg  100 mg Intravenous Q6H Connor Rod APRN   100 mg at 08/05/22 1131   • insulin lispro (ADMELOG) injection 0-7 Units  0-7 Units Subcutaneous Q6H Connor Rod APRN       • [COMPLETED] iopamidol (ISOVUE-370) 76 % injection 100 mL  100 mL Intravenous Once in imaging Leonard Francis MD   100  mL at 08/05/22 0830   • [COMPLETED] lactated ringers bolus 1,000 mL  1,000 mL Intravenous Once Leonard Francis MD   Stopped at 08/05/22 0915   • [COMPLETED] lactated ringers bolus 2,000 mL  2,000 mL Intravenous Once Leonard Francis MD   Stopped at 08/05/22 0915   • metroNIDAZOLE (FLAGYL) IVPB 500 mg  500 mg Intravenous Q8H Connor Rod APRN   500 mg at 08/05/22 1131   • nitroglycerin (NITROSTAT) SL tablet 0.4 mg  0.4 mg Sublingual Q5 Min PRN Connor Rod APRN       • norepinephrine (LEVOPHED) 16 mg in 250 mL NS infusion  0.02-0.3 mcg/kg/min Intravenous Titrated Connor Rod APRN 31.8 mL/hr at 08/05/22 1108 0.3 mcg/kg/min at 08/05/22 1108   • ondansetron (ZOFRAN) tablet 4 mg  4 mg Oral Q6H PRN Connor Rod APRN        Or   • ondansetron (ZOFRAN) injection 4 mg  4 mg Intravenous Q6H PRN Connor Rod APRN       • [COMPLETED] piperacillin-tazobactam (ZOSYN) IVPB 4.5 g in 100 mL NS (CD)  4.5 g Intravenous Once Leonard Francis MD   Stopped at 08/05/22 1011   • sodium bicarbonate 8.4 % 150 mEq in dextrose (D5W) 5 % 1,000 mL infusion (greater than 75 mEq)  150 mEq Intravenous Continuous Connor Rod APRN 125 mL/hr at 08/05/22 1049 150 mEq at 08/05/22 1049   • [COMPLETED] sodium bicarbonate injection 8.4% 100 mEq  100 mEq Intravenous Once Connor Rod APRN   100 mEq at 08/05/22 0957   • sodium chloride 0.9 % flush 10 mL  10 mL Intravenous PRN Leonard Francis MD       • sodium chloride 0.9 % flush 10 mL  10 mL Intravenous Q12H Connor Rod APRN   10 mL at 08/05/22 1131   • sodium chloride 0.9 % flush 10 mL  10 mL Intravenous PRN Connor Rod APRN       • sodium chloride 0.9% - IBW for BMI > 30 bolus 1,710 mL  30 mL/kg (Ideal) Intravenous Once Leonard Francis MD       • [COMPLETED] vancomycin 1500 mg/500 mL 0.9% NS IVPB ([facility])  20 mg/kg (Adjusted) Intravenous Once Leonard Francis MD   1,500 mg at 08/05/22 0958   • Vasopressin (VASOSTRICT) 0.2 UNIT/ML solution  0.03 Units/min  Intravenous Continuous Leonard Francis MD 9 mL/hr at 08/05/22 0910 0.03 Units/min at 08/05/22 0910       Lab Results (last 24 hours)     Procedure Component Value Units Date/Time    Pregnancy, Urine - Urine, Clean Catch [[id number]]  (Normal) Collected: 08/05/22 1154    Specimen: Urine, Clean Catch Updated: 08/05/22 1201     HCG, Urine QL Negative    S. Pneumo Ag Urine or CSF - Urine, Urine, Clean Catch [[id number]] Collected: 08/05/22 1154    Specimen: Urine, Clean Catch Updated: 08/05/22 1157    Legionella Antigen, Urine - Urine, Urine, Clean Catch [[id number]] Collected: 08/05/22 1154    Specimen: Urine, Clean Catch Updated: 08/05/22 1157    Lactic Acid, Plasma [[id number]]  (Abnormal) Collected: 08/05/22 1056    Specimen: Blood Updated: 08/05/22 1124     Lactate 3.2 mmol/L     Troponin [[id number]]  (Normal) Collected: 08/05/22 1056    Specimen: Blood Updated: 08/05/22 1121     Troponin T 0.026 ng/mL     Narrative:      Troponin T Reference Range:  <= 0.03 ng/mL-   Negative for AMI  >0.03 ng/mL-     Abnormal for myocardial necrosis.  Clinicians would have to utilize clinical acumen, EKG, Troponin and serial changes to determine if it is an Acute Myocardial Infarction or myocardial injury due to an underlying chronic condition.       Results may be falsely decreased if patient taking Biotin.      Amylase [[id number]]  (Normal) Collected: 08/05/22 0811    Specimen: Blood Updated: 08/05/22 1103     Amylase 81 U/L     POC Lactate [[id number]]  (Abnormal) Collected: 08/05/22 0815    Specimen: Blood Updated: 08/05/22 1102     Lactate 2.4 mmol/L      Comment: Serial Number: [id number]:  [id number]       POC Glucose Once [[id number]]  (Abnormal) Collected: 08/05/22 1100    Specimen: Blood Updated: 08/05/22 1101     Glucose 117 mg/dL      Comment: Serial Number: [id number]:  [id number]       MRSA Screen, PCR (Inpatient) - Swab, Nares [[id number]] Collected: 08/05/22 1057    Specimen: Swab from Nares Updated: 08/05/22 1059     Gastrointestinal Panel, PCR - Stool, Per Rectum [[id number]] Collected: 08/05/22 1057    Specimen: Stool from Per Rectum Updated: 08/05/22 1059    Cortisol [[id number]] Collected: 08/05/22 0811    Specimen: Blood Updated: 08/05/22 1052     Cortisol 42.25 mcg/dL     Narrative:      Cortisol Reference Ranges:    Cortisol 6AM - 10AM Range: 6.02-18.40 mcg/dl  Cortisol 4PM - 8PM Range: 2.68-10.50 mcg/dl      Results may be falsely increased if patient taking Biotin.      TSH [[id number]]  (Normal) Collected: 08/05/22 0811    Specimen: Blood Updated: 08/05/22 1052     TSH 1.570 uIU/mL     Urinalysis, Microscopic Only - Urine, Catheter [[id number]]  (Abnormal) Collected: 08/05/22 1012    Specimen: Urine, Catheter Updated: 08/05/22 1028     RBC, UA 3-5 /HPF      WBC, UA 6-12 /HPF      Bacteria, UA 2+ /HPF      Squamous Epithelial Cells, UA 3-6 /HPF      Hyaline Casts, UA None Seen /LPF      Methodology Automated Microscopy    Urine Culture - Urine, Urine, Catheter [[id number]] Collected: 08/05/22 1012    Specimen: Urine, Catheter Updated: 08/05/22 1026    Urinalysis With Culture If Indicated - Urine, Catheter [[id number]]  (Abnormal) Collected: 08/05/22 1012    Specimen: Urine, Catheter Updated: 08/05/22 1020     Color, UA Dark Yellow     Appearance, UA Turbid     pH, UA 5.0     Specific Gravity, UA 1.020     Glucose,  mg/dL (Trace)     Ketones, UA Trace     Bilirubin, UA Moderate (2+)     Comment: Confirmation testing is unavailable.  A serum bilirubin is recommended for further assessment.        Blood, UA Large (3+)     Protein, UA >=300 mg/dL (3+)     Leuk Esterase, UA Large (3+)     Nitrite, UA Negative     Urobilinogen, UA 0.2 E.U./dL    Narrative:      In absence of clinical symptoms, the presence of pyuria, bacteria, and/or nitrites on the urinalysis result does not correlate with infection.    CK [[id number]]  (Abnormal) Collected: 08/05/22 0811    Specimen: Blood Updated: 08/05/22 1012     Creatine Kinase 327  U/L     C-reactive Protein [[id number]]  (Abnormal) Collected: 08/05/22 0811    Specimen: Blood Updated: 08/05/22 1012     C-Reactive Protein 19.55 mg/dL     Protime-INR [[id number]]  (Abnormal) Collected: 08/05/22 0911    Specimen: Blood Updated: 08/05/22 0929     Protime 24.2 Seconds      INR 2.47    aPTT [[id number]]  (Abnormal) Collected: 08/05/22 0911    Specimen: Blood Updated: 08/05/22 0929     PTT 48.8 seconds     Lipase [[id number]]  (Abnormal) Collected: 08/05/22 0811    Specimen: Blood Updated: 08/05/22 0921     Lipase 12 U/L     [facility] [facility] [[id number]] Collected: 08/05/22 0811    Specimen: Blood Updated: 08/05/22 0918    Narrative:      The following orders were created for panel order [facility] [facility].  Procedure                               Abnormality         Status                     ---------                               -----------         ------                     Green Top (Gel)[[id number]]                                                             Lavender Top[[id number]]                                     Final result               Gold Top - SST[[id number]]                                   Final result               Light Blue Top[[id number]]                                   Final result                 Please view results for these tests on the individual orders.    Gold Top - SST [[id number]] Collected: 08/05/22 0811    Specimen: Blood Updated: 08/05/22 0918     Extra Tube Hold for add-ons.     Comment: Auto resulted.       Light Blue Top [[id number]] Collected: 08/05/22 0811    Specimen: Blood Updated: 08/05/22 0918     Extra Tube Hold for add-ons.     Comment: Auto resulted       Respiratory Panel PCR w/COVID-19(SARS-CoV-2) CELSA/JONATHAN/DOMINIQUE/PAD/COR/MAD/LISA In-House, NP Swab in UTM/VTM, 3-4 HR TAT - Swab, Nasopharynx [[id number]]  (Normal) Collected: 08/05/22 0815    Specimen: Swab from Nasopharynx Updated: 08/05/22 0909     ADENOVIRUS, PCR Not Detected     Coronavirus 229E Not Detected      Coronavirus HKU1 Not Detected     Coronavirus NL63 Not Detected     Coronavirus OC43 Not Detected     COVID19 Not Detected     Human Metapneumovirus Not Detected     Human Rhinovirus/Enterovirus Not Detected     Influenza A PCR Not Detected     Influenza B PCR Not Detected     Parainfluenza Virus 1 Not Detected     Parainfluenza Virus 2 Not Detected     Parainfluenza Virus 3 Not Detected     Parainfluenza Virus 4 Not Detected     RSV, PCR Not Detected     Bordetella pertussis pcr Not Detected     Bordetella parapertussis PCR Not Detected     Chlamydophila pneumoniae PCR Not Detected     Mycoplasma pneumo by PCR Not Detected    Narrative:      In the setting of a positive respiratory panel with a viral infection PLUS a negative procalcitonin without other underlying concern for bacterial infection, consider observing off antibiotics or discontinuation of antibiotics and continue supportive care. If the respiratory panel is positive for atypical bacterial infection (Bordetella pertussis, Chlamydophila pneumoniae, or Mycoplasma pneumoniae), consider antibiotic de-escalation to target atypical bacterial infection.    Blood Culture - Blood, Blood, Central Line [[id number]] Collected: 08/05/22 0902    Specimen: Blood, Central Line Updated: 08/05/22 0907    Blood Culture - Blood, Blood, Central Line [[id number]] Collected: 08/05/22 0902    Specimen: Blood, Central Line Updated: 08/05/22 0907    Lavender Top [[id number]] Collected: 08/05/22 0811    Specimen: Blood Updated: 08/05/22 0849     Extra Tube done    Manual Differential [[id number]]  (Abnormal) Collected: 08/05/22 0811    Specimen: Blood Updated: 08/05/22 0849     Neutrophil % 37.0 %      Lymphocyte % 1.0 %      Monocyte % 4.0 %      Bands %  38.0 %      Metamyelocyte % 20.0 %      Neutrophils Absolute 11.93 10*3/mm3      Lymphocytes Absolute 0.16 10*3/mm3      Monocytes Absolute 0.64 10*3/mm3      RBC Morphology Normal     Vacuolated Neutrophils Slight/1+     Large  Platelets Slight/1+    Path Consult Reflex [[id number]] Collected: 08/05/22 0811    Specimen: Blood Updated: 08/05/22 0849     Pathology Review Yes    CBC & Differential [[id number]]  (Abnormal) Collected: 08/05/22 0811    Specimen: Blood Updated: 08/05/22 0849    Narrative:      The following orders were created for panel order CBC & Differential.  Procedure                               Abnormality         Status                     ---------                               -----------         ------                     CBC Auto Differential[[id number]]        Abnormal            Final result               Scan Slide[[id number]]                                       Final result                 Please view results for these tests on the individual orders.    CBC Auto Differential [[id number]]  (Abnormal) Collected: 08/05/22 0811    Specimen: Blood Updated: 08/05/22 0849     WBC 15.90 10*3/mm3      RBC 5.26 10*6/mm3      Hemoglobin 14.8 g/dL      Hematocrit 45.7 %      MCV 86.9 fL      MCH 28.2 pg      MCHC 32.4 g/dL      RDW 14.5 %      RDW-SD 44.2 fl      MPV 8.9 fL      Platelets 241 10*3/mm3     Narrative:      Modified report. Previous result was Hemogram on 8/5/2022 at 0817 EDT.  The previously reported component NRBC is no longer being reported. Previous result was 0.1 /100 WBC (Reference Range: 0.0-0.2 /100 WBC) on 8/5/2022 at 0817 EDT.    Scan Slide [[id number]] Collected: 08/05/22 0811    Specimen: Blood Updated: 08/05/22 0849     Scan Slide --     Comment: See Manual Differential Results       Procalcitonin [[id number]]  (Abnormal) Collected: 08/05/22 0811    Specimen: Blood Updated: 08/05/22 0848     Procalcitonin 99.59 ng/mL     Narrative:      As a Marker for Sepsis (Non-Neonates):    1. <0.5 ng/mL represents a low risk of severe sepsis and/or septic shock.  2. >2 ng/mL represents a high risk of severe sepsis and/or septic shock.    As a Marker for Lower Respiratory Tract Infections that require antibiotic  "therapy:    PCT on Admission    Antibiotic Therapy       6-12 Hrs later    >0.5                Strongly Recommended  >0.25 - <0.5        Recommended   0.1 - 0.25          Discouraged              Remeasure/reassess PCT  <0.1                Strongly Discouraged     Remeasure/reassess PCT    As 28 day mortality risk marker: \"Change in Procalcitonin Result\" (>80% or <=80%) if Day 0 (or Day 1) and Day 4 values are available. Refer to [email or web address] [email or web address]    Change in PCT <=80%  A decrease of PCT levels below or equal to 80% defines a positive change in PCT test result representing a higher risk for 28-day all-cause mortality of patients diagnosed with severe sepsis for septic shock.    Change in PCT >80%  A decrease of PCT levels of more than 80% defines a negative change in PCT result representing a lower risk for 28-day all-cause mortality of patients diagnosed with severe sepsis or septic shock.       Comprehensive Metabolic Panel [[id number]]  (Abnormal) Collected: 08/05/22 0811    Specimen: Blood Updated: 08/05/22 0844     Glucose 141 mg/dL      BUN 32 mg/dL      Creatinine 3.82 mg/dL      Sodium 132 mmol/L      Potassium 3.9 mmol/L      Comment: Slight hemolysis detected by analyzer. Results may be affected.        Chloride 94 mmol/L      CO2 21.0 mmol/L      Calcium 8.3 mg/dL      Total Protein 6.1 g/dL      Albumin 3.30 g/dL      ALT (SGPT) 30 U/L      AST (SGOT) 41 U/L      Alkaline Phosphatase 64 U/L      Total Bilirubin 4.3 mg/dL      Globulin 2.8 gm/dL      A/G Ratio 1.2 g/dL      BUN/Creatinine Ratio 8.4     Anion Gap 17.0 mmol/L      eGFR 14.2 mL/min/1.73      Comment: <15 Indicative of kidney failure       Narrative:      GFR Normal >60  Chronic Kidney Disease <60  Kidney Failure <15      Troponin [[id number]]  (Normal) Collected: 08/05/22 0811    Specimen: Blood Updated: 08/05/22 0844     Troponin T <0.010 ng/mL     Narrative:      Troponin T Reference Range:  <= 0.03 ng/mL-   Negative for " AMI  >0.03 ng/mL-     Abnormal for myocardial necrosis.  Clinicians would have to utilize clinical acumen, EKG, Troponin and serial changes to determine if it is an Acute Myocardial Infarction or myocardial injury due to an underlying chronic condition.       Results may be falsely decreased if patient taking Biotin.      Protime-INR [[id number]]  (Abnormal) Collected: 08/05/22 0811    Specimen: Blood Updated: 08/05/22 0829     Protime 21.3 Seconds      INR 2.16    aPTT [[id number]]  (Abnormal) Collected: 08/05/22 0811    Specimen: Blood Updated: 08/05/22 0829     PTT 43.1 seconds     POC Glucose Once [[id number]]  (Abnormal) Collected: 08/05/22 0815    Specimen: Blood Updated: 08/05/22 0818     Glucose 122 mg/dL      Comment: Serial Number: [id number]:  [id number]       Blood Gas, Arterial - [[id number]]  (Abnormal) Collected: 08/05/22 0815    Specimen: Arterial Blood Updated: 08/05/22 0818     Site Right Radial     Jonathan's Test Positive     pH, Arterial 7.345 pH units      pCO2, Arterial 28.3 mm Hg      pO2, Arterial 221.4 mm Hg      HCO3, Arterial 15.5 mmol/L      Base Excess, Arterial -8.9 mmol/L      Comment: Serial Number: [id number]:  [id number]        O2 Saturation, Arterial 99.8 %      CO2 Content 16.3 mmol/L      Barometric Pressure for Blood Gas --     Comment: N/A        Modality NRB     FIO2 100 %      Hemodilution No    POCT Electrolytes +HGB +HCT [[id number]]  (Abnormal) Collected: 08/05/22 0815    Specimen: Blood Updated: 08/05/22 0818     Sodium 134 mmol/L      POC Potassium 3.2 mmol/L      Ionized Calcium 1.00 mmol/L      Comment: Serial Number: [id number]:  [id number]        Glucose 122 mg/dL      Hematocrit 35 %      Hemoglobin 12.0 g/dL         Imaging Results (Last 24 Hours)     Procedure Component Value Units Date/Time    XR Chest 1 View [[id number]] Collected: 08/05/22 0927     Updated: 08/05/22 0936    Narrative:      DATE OF EXAM:  8/5/2022 9:14 AM     PROCEDURE:  XR CHEST 1 VW-      INDICATIONS:  Severe Sepsis Protocol     COMPARISON:  CT chest PE protocol 08/05/2022. No previous chest x-ray for comparison.     TECHNIQUE:   Single radiographic AP view of the chest was obtained.     FINDINGS:  Tip of the right internal jugular central venous catheter terminates in  the right atrium. No pneumothorax is seen. Allowing for low lung  volumes, the lungs appear grossly clear. Cardia mediastinal contours  appear within normal limits.        Impression:      1.     Tip of the right internal jugular central venous catheter  terminates in the right atrium. No visible pneumothorax.  2.     Low lung volumes without evidence of acute cardiopulmonary  abnormality.     Electronically Signed By-Hui Raman MD On:8/5/2022 9:34 AM  This report was finalized on [id number] by  Hui Raman MD.    CT Angiogram Chest Pulmonary Embolism [[id number]] Collected: 08/05/22 0846     Updated: 08/05/22 0900    Narrative:      CT ABDOMEN PELVIS W CONTRAST-, CT ANGIOGRAM CHEST PULMONARY EMBOLISM-     Date of Exam: 8/5/2022 8:25 AM     Indication: septic shock abd pain.     Comparison: None available.     Technique: Contiguous axial CT images were obtained from the lung bases  to the pubic symphysis following uneventful administration of 100 cc  Isovue-370 intravenous and oral contrast. Sagittal and coronal  reconstructions were performed.  Automated exposure control and  iterative reconstruction methods were used.     FINDINGS:     CHEST: No pulmonary embolism is seen. Heart size is within normal  limits. There is no pericardial effusion. There is no pleural effusion.     There is mild concentric thickening of the mid to lower thoracic  esophagus, and there is a small esophageal hiatal hernia.     There is mild ill-defined the soft tissue stranding within the anterior  superior mediastinum. Trace fluid is seen within the pericardial  recesses. No pleural effusion is seen.     No dense lung consolidations are  identified. There is dependent  atelectasis in both lungs. There is very mild interstitial thickening in  the lungs which could reflect changes of early interstitial edema and  the appropriate clinical context. No abnormal bronchial wall thickening  or bronchiectasis is evident.     No acute or suspicious osseous abnormalities are identified.           ABDOMEN AND PELVIS: Multiple irregularly marginated and somewhat  ill-defined low-density lesions are seen within the liver parenchyma. A  dominant bilobed lesion in the posterior right hepatic lobe segment 6  measures 2.5 x 5.7 cm. Another index lesion within the right hepatic  lobe more superiorly and segment 8 measures 2.6 x 1.4 cm. An index  lesion in the left hepatic lobe laterally segment 2 measures 1.7 x 1.3  cm. Other scattered smaller lesions are noted. These do not have the  appearance of cysts. They could represent atypical hemangiomas; however,  in the context of septic shock and abdominal pain, hepatic phlegmon or  evolving abscess is could be considered.     The gallbladder is normal. No abnormal biliary dilation is identified.  The spleen, adrenals, and left kidney are normal. A right renal cyst  measures 1.3 cm. Kidneys maintain normal enhancement.     There is minimal stranding near the level of the pancreatic tail. The  pancreas itself, however, otherwise appears unremarkable.     The unopacified bowel does not appear abnormally thickened, dilated, or  inflamed. The appendix is normal. No free air, free fluid or  pathologically enlarged lymph nodes are identified.     The uterus and rectum appear unremarkable. The urinary bladder is  decompressed.     There are no acute or suspicious osseous abnormalities.             Impression:      1. Multiple ill-defined low-density lesions are scattered within the  liver parenchyma. These do not represent the appearance of typical  cysts. In the context of history of septic shock, hepatic phlegmon or  evolving  hepatic abscesses could be considered.  2. Minimal stranding is seen at the level of the pancreatic tail which  is nonspecific. This could represent normal variant for this patient.  Very mild pancreatitis could have a similar appearance, although the  remainder the pancreas itself appears unremarkable. Correlate with  laboratory findings.  3. There is very mild interstitial thickening in both lungs and faint  stranding within the anterior superior mediastinum, which may raise the  possibility of mild early edema.  4. There is long segment thickening of the mid to lower thoracic  esophagus with small esophageal hiatal hernia. Correlate for esophagitis  symptoms. Endoscopic correlation may prove helpful.  5. Mild dependent atelectasis in both lungs.  6. No pulmonary embolism is seen.           Electronically Signed By-Franca Gallegos MD On:8/5/2022 8:58 AM  This report was finalized on [id number] by  Franca Gallegos MD.    CT Abdomen Pelvis With Contrast [[id number]] Collected: 08/05/22 0846     Updated: 08/05/22 0900    Narrative:      CT ABDOMEN PELVIS W CONTRAST-, CT ANGIOGRAM CHEST PULMONARY EMBOLISM-     Date of Exam: 8/5/2022 8:25 AM     Indication: septic shock abd pain.     Comparison: None available.     Technique: Contiguous axial CT images were obtained from the lung bases  to the pubic symphysis following uneventful administration of 100 cc  Isovue-370 intravenous and oral contrast. Sagittal and coronal  reconstructions were performed.  Automated exposure control and  iterative reconstruction methods were used.     FINDINGS:     CHEST: No pulmonary embolism is seen. Heart size is within normal  limits. There is no pericardial effusion. There is no pleural effusion.     There is mild concentric thickening of the mid to lower thoracic  esophagus, and there is a small esophageal hiatal hernia.     There is mild ill-defined the soft tissue stranding within the anterior  superior mediastinum. Trace fluid is  seen within the pericardial  recesses. No pleural effusion is seen.     No dense lung consolidations are identified. There is dependent  atelectasis in both lungs. There is very mild interstitial thickening in  the lungs which could reflect changes of early interstitial edema and  the appropriate clinical context. No abnormal bronchial wall thickening  or bronchiectasis is evident.     No acute or suspicious osseous abnormalities are identified.           ABDOMEN AND PELVIS: Multiple irregularly marginated and somewhat  ill-defined low-density lesions are seen within the liver parenchyma. A  dominant bilobed lesion in the posterior right hepatic lobe segment 6  measures 2.5 x 5.7 cm. Another index lesion within the right hepatic  lobe more superiorly and segment 8 measures 2.6 x 1.4 cm. An index  lesion in the left hepatic lobe laterally segment 2 measures 1.7 x 1.3  cm. Other scattered smaller lesions are noted. These do not have the  appearance of cysts. They could represent atypical hemangiomas; however,  in the context of septic shock and abdominal pain, hepatic phlegmon or  evolving abscess is could be considered.     The gallbladder is normal. No abnormal biliary dilation is identified.  The spleen, adrenals, and left kidney are normal. A right renal cyst  measures 1.3 cm. Kidneys maintain normal enhancement.     There is minimal stranding near the level of the pancreatic tail. The  pancreas itself, however, otherwise appears unremarkable.     The unopacified bowel does not appear abnormally thickened, dilated, or  inflamed. The appendix is normal. No free air, free fluid or  pathologically enlarged lymph nodes are identified.     The uterus and rectum appear unremarkable. The urinary bladder is  decompressed.     There are no acute or suspicious osseous abnormalities.             Impression:      1. Multiple ill-defined low-density lesions are scattered within the  liver parenchyma. These do not represent the  appearance of typical  cysts. In the context of history of septic shock, hepatic phlegmon or  evolving hepatic abscesses could be considered.  2. Minimal stranding is seen at the level of the pancreatic tail which  is nonspecific. This could represent normal variant for this patient.  Very mild pancreatitis could have a similar appearance, although the  remainder the pancreas itself appears unremarkable. Correlate with  laboratory findings.  3. There is very mild interstitial thickening in both lungs and faint  stranding within the anterior superior mediastinum, which may raise the  possibility of mild early edema.  4. There is long segment thickening of the mid to lower thoracic  esophagus with small esophageal hiatal hernia. Correlate for esophagitis  symptoms. Endoscopic correlation may prove helpful.  5. Mild dependent atelectasis in both lungs.  6. No pulmonary embolism is seen.           Electronically Signed By-Franca Gallegos MD On:8/5/2022 8:58 AM  This report was finalized on [id number] by  Franca Gallegos MD.

## 2022-08-05 NOTE — PLAN OF CARE
Goal Outcome Evaluation:  Plan of Care Reviewed With: patient        Progress: no change  Pt currently on Levo, Vaso and Bicarb. A-line placed. 1unit FFP given. 2g Ca and 2g Mag given. Multiple BM. Echo, liver US and Liver Biopsy completed. Nephrology consulted. Pt a&o x 4. Family at bedside.

## 2022-08-05 NOTE — POST-PROCEDURE NOTE
CT without contrast did not show any collections. The area was biopsied and cultures sent after reviewing needle placement with ultrasound and CT. Three 18 gauge specimens obtained.   Aortic aneurysm    Fracture of femur  1983 & had surgery - Right  HLD (hyperlipidemia)    Knee injury  right  MI (myocardial infarction)  in 1997  Obesity    Sleep apnea  not  using any Device at present

## 2022-08-06 ENCOUNTER — APPOINTMENT (OUTPATIENT)
Dept: GENERAL RADIOLOGY | Facility: HOSPITAL | Age: 47
End: 2022-08-06

## 2022-08-06 ENCOUNTER — APPOINTMENT (OUTPATIENT)
Dept: CT IMAGING | Facility: HOSPITAL | Age: 47
End: 2022-08-06

## 2022-08-06 ENCOUNTER — INPATIENT HOSPITAL (OUTPATIENT)
Dept: URBAN - METROPOLITAN AREA HOSPITAL 84 | Facility: HOSPITAL | Age: 47
End: 2022-08-06

## 2022-08-06 ENCOUNTER — APPOINTMENT (OUTPATIENT)
Dept: ULTRASOUND IMAGING | Facility: HOSPITAL | Age: 47
End: 2022-08-06

## 2022-08-06 DIAGNOSIS — R74.8 ABNORMAL LEVELS OF OTHER SERUM ENZYMES: ICD-10-CM

## 2022-08-06 DIAGNOSIS — D64.9 ANEMIA, UNSPECIFIED: ICD-10-CM

## 2022-08-06 DIAGNOSIS — R10.11 RIGHT UPPER QUADRANT PAIN: ICD-10-CM

## 2022-08-06 DIAGNOSIS — K76.9 LIVER DISEASE, UNSPECIFIED: ICD-10-CM

## 2022-08-06 LAB
ALBUMIN SERPL-MCNC: 1.9 G/DL (ref 3.5–5.2)
ALBUMIN SERPL-MCNC: 2.5 G/DL (ref 3.5–5.2)
ALBUMIN SERPL-MCNC: 2.7 G/DL (ref 3.5–5.2)
ALBUMIN SERPL-MCNC: 3.3 G/DL (ref 3.5–5.2)
ALBUMIN/GLOB SERPL: 1.1 G/DL
ALBUMIN/GLOB SERPL: 1.2 G/DL
ALBUMIN/GLOB SERPL: 1.3 G/DL
ALP SERPL-CCNC: 72 U/L (ref 39–117)
ALP SERPL-CCNC: 75 U/L (ref 39–117)
ALP SERPL-CCNC: 89 U/L (ref 39–117)
ALT SERPL W P-5'-P-CCNC: 109 U/L (ref 1–33)
ALT SERPL W P-5'-P-CCNC: 222 U/L (ref 1–33)
ALT SERPL W P-5'-P-CCNC: 98 U/L (ref 1–33)
AMYLASE SERPL-CCNC: 39 U/L (ref 28–100)
ANION GAP SERPL CALCULATED.3IONS-SCNC: 14 MMOL/L (ref 5–15)
ANION GAP SERPL CALCULATED.3IONS-SCNC: 14 MMOL/L (ref 5–15)
ANION GAP SERPL CALCULATED.3IONS-SCNC: 18 MMOL/L (ref 5–15)
ANION GAP SERPL CALCULATED.3IONS-SCNC: 24 MMOL/L (ref 5–15)
APTT PPP: 51.9 SECONDS (ref 24–31)
AST SERPL-CCNC: 177 U/L (ref 1–32)
AST SERPL-CCNC: 194 U/L (ref 1–32)
AST SERPL-CCNC: 393 U/L (ref 1–32)
BACTERIA SPEC AEROBE CULT: NO GROWTH
BASOPHILS # BLD AUTO: 0.1 10*3/MM3 (ref 0–0.2)
BASOPHILS # BLD AUTO: 0.1 10*3/MM3 (ref 0–0.2)
BASOPHILS NFR BLD AUTO: 0.6 % (ref 0–1.5)
BASOPHILS NFR BLD AUTO: 1.2 % (ref 0–1.5)
BILIRUB SERPL-MCNC: 3.1 MG/DL (ref 0–1.2)
BILIRUB SERPL-MCNC: 4.5 MG/DL (ref 0–1.2)
BILIRUB SERPL-MCNC: 4.6 MG/DL (ref 0–1.2)
BUN SERPL-MCNC: 40 MG/DL (ref 6–20)
BUN SERPL-MCNC: 46 MG/DL (ref 6–20)
BUN SERPL-MCNC: 53 MG/DL (ref 6–20)
BUN SERPL-MCNC: 55 MG/DL (ref 6–20)
BUN/CREAT SERPL: 11.3 (ref 7–25)
BUN/CREAT SERPL: 11.6 (ref 7–25)
BUN/CREAT SERPL: 12.3 (ref 7–25)
BUN/CREAT SERPL: 12.9 (ref 7–25)
C DIFF GDH STL QL: NEGATIVE
C3 SERPL-MCNC: 92 MG/DL (ref 82–167)
C4 SERPL-MCNC: 25 MG/DL (ref 14–44)
CA-I SERPL ISE-MCNC: 0.72 MMOL/L (ref 1.2–1.3)
CA-I SERPL ISE-MCNC: 0.93 MMOL/L (ref 1.2–1.3)
CA-I SERPL ISE-MCNC: 0.95 MMOL/L (ref 1.2–1.3)
CA-I SERPL ISE-MCNC: 0.95 MMOL/L (ref 1.2–1.3)
CALCIUM SPEC-SCNC: 5.1 MG/DL (ref 8.6–10.5)
CALCIUM SPEC-SCNC: 6.9 MG/DL (ref 8.6–10.5)
CALCIUM SPEC-SCNC: 7.2 MG/DL (ref 8.6–10.5)
CALCIUM SPEC-SCNC: 7.2 MG/DL (ref 8.6–10.5)
CHLORIDE SERPL-SCNC: 96 MMOL/L (ref 98–107)
CHLORIDE SERPL-SCNC: 98 MMOL/L (ref 98–107)
CHOLEST SERPL-MCNC: 77 MG/DL (ref 0–200)
CO2 SERPL-SCNC: 16 MMOL/L (ref 22–29)
CO2 SERPL-SCNC: 24 MMOL/L (ref 22–29)
CO2 SERPL-SCNC: 29 MMOL/L (ref 22–29)
CO2 SERPL-SCNC: 29 MMOL/L (ref 22–29)
CREAT SERPL-MCNC: 3.25 MG/DL (ref 0.57–1)
CREAT SERPL-MCNC: 4.06 MG/DL (ref 0.57–1)
CREAT SERPL-MCNC: 4.25 MG/DL (ref 0.57–1)
CREAT SERPL-MCNC: 4.58 MG/DL (ref 0.57–1)
CREAT UR-MCNC: 82.1 MG/DL
D DIMER PPP FEU-MCNC: 6.46 MG/L (FEU) (ref 0–0.59)
D-LACTATE SERPL-SCNC: 1.6 MMOL/L (ref 0.5–2)
D-LACTATE SERPL-SCNC: 4.2 MMOL/L (ref 0.5–2)
D-LACTATE SERPL-SCNC: 6.4 MMOL/L (ref 0.5–2)
D-LACTATE SERPL-SCNC: 7.4 MMOL/L (ref 0.5–2)
D-LACTATE SERPL-SCNC: 8.7 MMOL/L (ref 0.5–2)
DEPRECATED RDW RBC AUTO: 42 FL (ref 37–54)
DEPRECATED RDW RBC AUTO: 42.9 FL (ref 37–54)
DEPRECATED RDW RBC AUTO: 42.9 FL (ref 37–54)
DEPRECATED RDW RBC AUTO: 43.3 FL (ref 37–54)
EGFRCR SERPLBLD CKD-EPI 2021: 11.4 ML/MIN/1.73
EGFRCR SERPLBLD CKD-EPI 2021: 12.5 ML/MIN/1.73
EGFRCR SERPLBLD CKD-EPI 2021: 13.2 ML/MIN/1.73
EGFRCR SERPLBLD CKD-EPI 2021: 17.2 ML/MIN/1.73
EOSINOPHIL # BLD AUTO: 0.1 10*3/MM3 (ref 0–0.4)
EOSINOPHIL # BLD AUTO: 0.1 10*3/MM3 (ref 0–0.4)
EOSINOPHIL NFR BLD AUTO: 0.8 % (ref 0.3–6.2)
EOSINOPHIL NFR BLD AUTO: 1.1 % (ref 0.3–6.2)
ERYTHROCYTE [DISTWIDTH] IN BLOOD BY AUTOMATED COUNT: 14.4 % (ref 12.3–15.4)
ERYTHROCYTE [DISTWIDTH] IN BLOOD BY AUTOMATED COUNT: 14.6 % (ref 12.3–15.4)
FIBRINOGEN PPP-MCNC: 169 MG/DL (ref 210–450)
GLOBULIN UR ELPH-MCNC: 1.6 GM/DL
GLOBULIN UR ELPH-MCNC: 2.1 GM/DL
GLOBULIN UR ELPH-MCNC: 2.2 GM/DL
GLUCOSE BLDC GLUCOMTR-MCNC: 141 MG/DL (ref 70–105)
GLUCOSE BLDC GLUCOMTR-MCNC: 188 MG/DL (ref 70–105)
GLUCOSE SERPL-MCNC: 162 MG/DL (ref 65–99)
GLUCOSE SERPL-MCNC: 218 MG/DL (ref 65–99)
GLUCOSE SERPL-MCNC: 264 MG/DL (ref 65–99)
GLUCOSE SERPL-MCNC: 543 MG/DL (ref 65–99)
HAV IGM SERPL QL IA: NORMAL
HBV CORE IGM SERPL QL IA: NORMAL
HBV SURFACE AG SERPL QL IA: NORMAL
HCT VFR BLD AUTO: 18.3 % (ref 34–46.6)
HCT VFR BLD AUTO: 21.4 % (ref 34–46.6)
HCT VFR BLD AUTO: 22.3 % (ref 34–46.6)
HCT VFR BLD AUTO: 31.1 % (ref 34–46.6)
HCV AB SER DONR QL: NORMAL
HDLC SERPL-MCNC: 19 MG/DL (ref 40–60)
HGB BLD-MCNC: 10.3 G/DL (ref 12–15.9)
HGB BLD-MCNC: 5.9 G/DL (ref 12–15.9)
HGB BLD-MCNC: 7.3 G/DL (ref 12–15.9)
HGB BLD-MCNC: 7.3 G/DL (ref 12–15.9)
INR PPP: 2.07 (ref 0.93–1.1)
LARGE PLATELETS: ABNORMAL
LDLC SERPL CALC-MCNC: 27 MG/DL (ref 0–100)
LDLC/HDLC SERPL: 1.03 {RATIO}
LIPASE SERPL-CCNC: 4 U/L (ref 13–60)
LYMPHOCYTES # BLD AUTO: 0.5 10*3/MM3 (ref 0.7–3.1)
LYMPHOCYTES # BLD AUTO: 0.5 10*3/MM3 (ref 0.7–3.1)
LYMPHOCYTES # BLD MANUAL: 0 10*3/MM3 (ref 0.7–3.1)
LYMPHOCYTES # BLD MANUAL: 0.15 10*3/MM3 (ref 0.7–3.1)
LYMPHOCYTES NFR BLD AUTO: 3.6 % (ref 19.6–45.3)
LYMPHOCYTES NFR BLD AUTO: 4.9 % (ref 19.6–45.3)
LYMPHOCYTES NFR BLD MANUAL: 1 % (ref 5–12)
LYMPHOCYTES NFR BLD MANUAL: 5 % (ref 5–12)
MAGNESIUM SERPL-MCNC: 1.6 MG/DL (ref 1.6–2.6)
MAGNESIUM SERPL-MCNC: 2.1 MG/DL (ref 1.6–2.6)
MAGNESIUM SERPL-MCNC: 2.2 MG/DL (ref 1.6–2.6)
MCH RBC QN AUTO: 27.9 PG (ref 26.6–33)
MCH RBC QN AUTO: 28.1 PG (ref 26.6–33)
MCH RBC QN AUTO: 28.3 PG (ref 26.6–33)
MCH RBC QN AUTO: 28.3 PG (ref 26.6–33)
MCHC RBC AUTO-ENTMCNC: 32.4 G/DL (ref 31.5–35.7)
MCHC RBC AUTO-ENTMCNC: 32.7 G/DL (ref 31.5–35.7)
MCHC RBC AUTO-ENTMCNC: 33.2 G/DL (ref 31.5–35.7)
MCHC RBC AUTO-ENTMCNC: 34.2 G/DL (ref 31.5–35.7)
MCV RBC AUTO: 82.9 FL (ref 79–97)
MCV RBC AUTO: 85.5 FL (ref 79–97)
MCV RBC AUTO: 85.8 FL (ref 79–97)
MCV RBC AUTO: 85.9 FL (ref 79–97)
MONOCYTES # BLD AUTO: 0.5 10*3/MM3 (ref 0.1–0.9)
MONOCYTES # BLD AUTO: 0.7 10*3/MM3 (ref 0.1–0.9)
MONOCYTES # BLD: 0.16 10*3/MM3 (ref 0.1–0.9)
MONOCYTES # BLD: 0.74 10*3/MM3 (ref 0.1–0.9)
MONOCYTES NFR BLD AUTO: 4 % (ref 5–12)
MONOCYTES NFR BLD AUTO: 6.2 % (ref 5–12)
NEUTROPHILS # BLD AUTO: 13.91 10*3/MM3 (ref 1.7–7)
NEUTROPHILS # BLD AUTO: 16.14 10*3/MM3 (ref 1.7–7)
NEUTROPHILS NFR BLD AUTO: 11.2 10*3/MM3 (ref 1.7–7)
NEUTROPHILS NFR BLD AUTO: 86.9 % (ref 42.7–76)
NEUTROPHILS NFR BLD AUTO: 9.7 10*3/MM3 (ref 1.7–7)
NEUTROPHILS NFR BLD AUTO: 90.7 % (ref 42.7–76)
NEUTROPHILS NFR BLD MANUAL: 64 % (ref 42.7–76)
NEUTROPHILS NFR BLD MANUAL: 71 % (ref 42.7–76)
NEUTS BAND NFR BLD MANUAL: 28 % (ref 0–5)
NEUTS BAND NFR BLD MANUAL: 30 % (ref 0–5)
NEUTS VAC BLD QL SMEAR: ABNORMAL
NRBC BLD AUTO-RTO: 0 /100 WBC (ref 0–0.2)
NRBC BLD AUTO-RTO: 0 /100 WBC (ref 0–0.2)
PHOSPHATE SERPL-MCNC: 3.9 MG/DL (ref 2.5–4.5)
PHOSPHATE SERPL-MCNC: 4.6 MG/DL (ref 2.5–4.5)
PHOSPHATE SERPL-MCNC: 4.8 MG/DL (ref 2.5–4.5)
PLATELET # BLD AUTO: 120 10*3/MM3 (ref 140–450)
PLATELET # BLD AUTO: 55 10*3/MM3 (ref 140–450)
PLATELET # BLD AUTO: 67 10*3/MM3 (ref 140–450)
PLATELET # BLD AUTO: 95 10*3/MM3 (ref 140–450)
PMV BLD AUTO: 9.1 FL (ref 6–12)
PMV BLD AUTO: 9.1 FL (ref 6–12)
PMV BLD AUTO: 9.4 FL (ref 6–12)
PMV BLD AUTO: 9.6 FL (ref 6–12)
POTASSIUM SERPL-SCNC: 2.5 MMOL/L (ref 3.5–5.2)
POTASSIUM SERPL-SCNC: 3.4 MMOL/L (ref 3.5–5.2)
POTASSIUM SERPL-SCNC: 3.4 MMOL/L (ref 3.5–5.2)
POTASSIUM SERPL-SCNC: 3.5 MMOL/L (ref 3.5–5.2)
PROT SERPL-MCNC: 3.5 G/DL (ref 6–8.5)
PROT SERPL-MCNC: 4.7 G/DL (ref 6–8.5)
PROT SERPL-MCNC: 4.8 G/DL (ref 6–8.5)
PROTHROMBIN TIME: 20.5 SECONDS (ref 9.6–11.7)
RBC # BLD AUTO: 2.13 10*6/MM3 (ref 3.77–5.28)
RBC # BLD AUTO: 2.58 10*6/MM3 (ref 3.77–5.28)
RBC # BLD AUTO: 2.6 10*6/MM3 (ref 3.77–5.28)
RBC # BLD AUTO: 3.64 10*6/MM3 (ref 3.77–5.28)
RBC MORPH BLD: NORMAL
RBC MORPH BLD: NORMAL
SCAN SLIDE: NORMAL
SCAN SLIDE: NORMAL
SMALL PLATELETS BLD QL SMEAR: ABNORMAL
SODIUM SERPL-SCNC: 136 MMOL/L (ref 136–145)
SODIUM SERPL-SCNC: 138 MMOL/L (ref 136–145)
SODIUM SERPL-SCNC: 139 MMOL/L (ref 136–145)
SODIUM SERPL-SCNC: 141 MMOL/L (ref 136–145)
TRIGL SERPL-MCNC: 192 MG/DL (ref 0–150)
VARIANT LYMPHS NFR BLD MANUAL: 0 % (ref 19.6–45.3)
VARIANT LYMPHS NFR BLD MANUAL: 1 % (ref 19.6–45.3)
VLDLC SERPL-MCNC: 31 MG/DL (ref 5–40)
WBC MORPH BLD: NORMAL
WBC NRBC COR # BLD: 11.1 10*3/MM3 (ref 3.4–10.8)
WBC NRBC COR # BLD: 12.4 10*3/MM3 (ref 3.4–10.8)
WBC NRBC COR # BLD: 14.8 10*3/MM3 (ref 3.4–10.8)
WBC NRBC COR # BLD: 16.3 10*3/MM3 (ref 3.4–10.8)

## 2022-08-06 PROCEDURE — 86663 EPSTEIN-BARR ANTIBODY: CPT | Performed by: NURSE PRACTITIONER

## 2022-08-06 PROCEDURE — P9047 ALBUMIN (HUMAN), 25%, 50ML: HCPCS | Performed by: NURSE PRACTITIONER

## 2022-08-06 PROCEDURE — 25010000002 MIDAZOLAM PER 1 MG

## 2022-08-06 PROCEDURE — 25010000002 HYDROMORPHONE 1 MG/ML SOLUTION: Performed by: NURSE PRACTITIONER

## 2022-08-06 PROCEDURE — 25010000002 ONDANSETRON PER 1 MG: Performed by: NURSE PRACTITIONER

## 2022-08-06 PROCEDURE — 93010 ELECTROCARDIOGRAM REPORT: CPT | Performed by: INTERNAL MEDICINE

## 2022-08-06 PROCEDURE — 25010000002 PHENYLEPHRINE 10 MG/ML SOLUTION 5 ML VIAL: Performed by: NURSE PRACTITIONER

## 2022-08-06 PROCEDURE — 02HV33Z INSERTION OF INFUSION DEVICE INTO SUPERIOR VENA CAVA, PERCUTANEOUS APPROACH: ICD-10-PCS | Performed by: NURSE PRACTITIONER

## 2022-08-06 PROCEDURE — 36430 TRANSFUSION BLD/BLD COMPNT: CPT

## 2022-08-06 PROCEDURE — 85379 FIBRIN DEGRADATION QUANT: CPT | Performed by: INTERNAL MEDICINE

## 2022-08-06 PROCEDURE — 25010000002 CALCIUM GLUCONATE PER 10 ML

## 2022-08-06 PROCEDURE — 85730 THROMBOPLASTIN TIME PARTIAL: CPT | Performed by: NURSE PRACTITIONER

## 2022-08-06 PROCEDURE — B518ZZA FLUOROSCOPY OF SUPERIOR VENA CAVA, GUIDANCE: ICD-10-PCS | Performed by: NURSE PRACTITIONER

## 2022-08-06 PROCEDURE — 63710000001 INSULIN LISPRO (HUMAN) PER 5 UNITS: Performed by: NURSE PRACTITIONER

## 2022-08-06 PROCEDURE — 86645 CMV ANTIBODY IGM: CPT | Performed by: NURSE PRACTITIONER

## 2022-08-06 PROCEDURE — 83735 ASSAY OF MAGNESIUM: CPT | Performed by: NURSE PRACTITIONER

## 2022-08-06 PROCEDURE — 25010000002 FENTANYL CITRATE (PF) 50 MCG/ML SOLUTION

## 2022-08-06 PROCEDURE — 85025 COMPLETE CBC W/AUTO DIFF WBC: CPT | Performed by: NURSE PRACTITIONER

## 2022-08-06 PROCEDURE — 84100 ASSAY OF PHOSPHORUS: CPT | Performed by: NURSE PRACTITIONER

## 2022-08-06 PROCEDURE — 85007 BL SMEAR W/DIFF WBC COUNT: CPT | Performed by: NURSE PRACTITIONER

## 2022-08-06 PROCEDURE — 80061 LIPID PANEL: CPT | Performed by: NURSE PRACTITIONER

## 2022-08-06 PROCEDURE — 25010000002 MEROPENEM PER 100 MG: Performed by: INTERNAL MEDICINE

## 2022-08-06 PROCEDURE — 82150 ASSAY OF AMYLASE: CPT | Performed by: NURSE PRACTITIONER

## 2022-08-06 PROCEDURE — 82330 ASSAY OF CALCIUM: CPT | Performed by: INTERNAL MEDICINE

## 2022-08-06 PROCEDURE — 83605 ASSAY OF LACTIC ACID: CPT

## 2022-08-06 PROCEDURE — 71045 X-RAY EXAM CHEST 1 VIEW: CPT

## 2022-08-06 PROCEDURE — 82105 ALPHA-FETOPROTEIN SERUM: CPT | Performed by: NURSE PRACTITIONER

## 2022-08-06 PROCEDURE — 25010000002 FENTANYL CITRATE (PF) 50 MCG/ML SOLUTION: Performed by: NURSE PRACTITIONER

## 2022-08-06 PROCEDURE — 82977 ASSAY OF GGT: CPT | Performed by: NURSE PRACTITIONER

## 2022-08-06 PROCEDURE — P9047 ALBUMIN (HUMAN), 25%, 50ML: HCPCS

## 2022-08-06 PROCEDURE — 86900 BLOOD TYPING SEROLOGIC ABO: CPT

## 2022-08-06 PROCEDURE — 80053 COMPREHEN METABOLIC PANEL: CPT | Performed by: NURSE PRACTITIONER

## 2022-08-06 PROCEDURE — 25010000002 HYDROCORTISONE SODIUM SUCCINATE 100 MG RECONSTITUTED SOLUTION: Performed by: NURSE PRACTITIONER

## 2022-08-06 PROCEDURE — 0 POTASSIUM CHLORIDE 10 MEQ/100ML SOLUTION: Performed by: NURSE PRACTITIONER

## 2022-08-06 PROCEDURE — 82330 ASSAY OF CALCIUM: CPT | Performed by: NURSE PRACTITIONER

## 2022-08-06 PROCEDURE — 93005 ELECTROCARDIOGRAM TRACING: CPT | Performed by: INTERNAL MEDICINE

## 2022-08-06 PROCEDURE — 86664 EPSTEIN-BARR NUCLEAR ANTIGEN: CPT | Performed by: NURSE PRACTITIONER

## 2022-08-06 PROCEDURE — C1751 CATH, INF, PER/CENT/MIDLINE: HCPCS

## 2022-08-06 PROCEDURE — 86381 MITOCHONDRIAL ANTIBODY EACH: CPT | Performed by: NURSE PRACTITIONER

## 2022-08-06 PROCEDURE — 74018 RADEX ABDOMEN 1 VIEW: CPT

## 2022-08-06 PROCEDURE — P9017 PLASMA 1 DONOR FRZ W/IN 8 HR: HCPCS

## 2022-08-06 PROCEDURE — 86015 ACTIN ANTIBODY EACH: CPT | Performed by: NURSE PRACTITIONER

## 2022-08-06 PROCEDURE — 86376 MICROSOMAL ANTIBODY EACH: CPT | Performed by: NURSE PRACTITIONER

## 2022-08-06 PROCEDURE — 25010000002 CALCIUM GLUCONATE 2-0.675 GM/100ML-% SOLUTION

## 2022-08-06 PROCEDURE — 87449 NOS EACH ORGANISM AG IA: CPT

## 2022-08-06 PROCEDURE — P9016 RBC LEUKOCYTES REDUCED: HCPCS

## 2022-08-06 PROCEDURE — 25010000002 ALBUMIN HUMAN 25% PER 50 ML

## 2022-08-06 PROCEDURE — 0 PHYTONADIONE 10 MG/ML SOLUTION 1 ML AMPULE: Performed by: INTERNAL MEDICINE

## 2022-08-06 PROCEDURE — 82390 ASSAY OF CERULOPLASMIN: CPT | Performed by: NURSE PRACTITIONER

## 2022-08-06 PROCEDURE — 25010000002 MICAFUNGIN SODIUM 100 MG RECONSTITUTED SOLUTION 1 EACH VIAL: Performed by: INTERNAL MEDICINE

## 2022-08-06 PROCEDURE — 86665 EPSTEIN-BARR CAPSID VCA: CPT | Performed by: NURSE PRACTITIONER

## 2022-08-06 PROCEDURE — 82962 GLUCOSE BLOOD TEST: CPT

## 2022-08-06 PROCEDURE — 25010000002 PIPERACILLIN SOD-TAZOBACTAM PER 1 G: Performed by: INTERNAL MEDICINE

## 2022-08-06 PROCEDURE — 85610 PROTHROMBIN TIME: CPT | Performed by: NURSE PRACTITIONER

## 2022-08-06 PROCEDURE — 76705 ECHO EXAM OF ABDOMEN: CPT

## 2022-08-06 PROCEDURE — 74176 CT ABD & PELVIS W/O CONTRAST: CPT

## 2022-08-06 PROCEDURE — 36556 INSERT NON-TUNNEL CV CATH: CPT | Performed by: NURSE PRACTITIONER

## 2022-08-06 PROCEDURE — 25010000002 DOPAMINE PER 40 MG: Performed by: INTERNAL MEDICINE

## 2022-08-06 PROCEDURE — 99255 IP/OBS CONSLTJ NEW/EST HI 80: CPT | Performed by: SURGERY

## 2022-08-06 PROCEDURE — 83735 ASSAY OF MAGNESIUM: CPT | Performed by: INTERNAL MEDICINE

## 2022-08-06 PROCEDURE — 83690 ASSAY OF LIPASE: CPT | Performed by: NURSE PRACTITIONER

## 2022-08-06 PROCEDURE — 80074 ACUTE HEPATITIS PANEL: CPT | Performed by: NURSE PRACTITIONER

## 2022-08-06 PROCEDURE — 25010000002 CALCIUM GLUCONATE 2-0.675 GM/100ML-% SOLUTION: Performed by: NURSE PRACTITIONER

## 2022-08-06 PROCEDURE — 99223 1ST HOSP IP/OBS HIGH 75: CPT | Performed by: NURSE PRACTITIONER

## 2022-08-06 PROCEDURE — 25010000002 DIPHENHYDRAMINE PER 50 MG

## 2022-08-06 PROCEDURE — 86927 PLASMA FRESH FROZEN: CPT

## 2022-08-06 PROCEDURE — 25010000002 ALBUMIN HUMAN 25% PER 50 ML: Performed by: NURSE PRACTITIONER

## 2022-08-06 PROCEDURE — 25010000002 VANCOMYCIN 10 G RECONSTITUTED SOLUTION: Performed by: INTERNAL MEDICINE

## 2022-08-06 PROCEDURE — 25010000002 MIDAZOLAM PER 1 MG: Performed by: NURSE PRACTITIONER

## 2022-08-06 PROCEDURE — 85384 FIBRINOGEN ACTIVITY: CPT | Performed by: INTERNAL MEDICINE

## 2022-08-06 PROCEDURE — 87324 CLOSTRIDIUM AG IA: CPT

## 2022-08-06 PROCEDURE — 86038 ANTINUCLEAR ANTIBODIES: CPT | Performed by: NURSE PRACTITIONER

## 2022-08-06 PROCEDURE — 86644 CMV ANTIBODY: CPT | Performed by: NURSE PRACTITIONER

## 2022-08-06 PROCEDURE — C1887 CATHETER, GUIDING: HCPCS

## 2022-08-06 RX ORDER — ALBUMIN (HUMAN) 12.5 G/50ML
12.5 SOLUTION INTRAVENOUS EVERY 4 HOURS
Status: DISPENSED | OUTPATIENT
Start: 2022-08-06 | End: 2022-08-06

## 2022-08-06 RX ORDER — OLANZAPINE 10 MG/2ML
1 INJECTION, POWDER, LYOPHILIZED, FOR SOLUTION INTRAMUSCULAR
Status: DISCONTINUED | OUTPATIENT
Start: 2022-08-06 | End: 2022-08-06

## 2022-08-06 RX ORDER — MIDAZOLAM HYDROCHLORIDE 1 MG/ML
2 INJECTION INTRAMUSCULAR; INTRAVENOUS ONCE
Status: COMPLETED | OUTPATIENT
Start: 2022-08-06 | End: 2022-08-06

## 2022-08-06 RX ORDER — ESCITALOPRAM OXALATE 10 MG/1
10 TABLET ORAL DAILY
Status: DISCONTINUED | OUTPATIENT
Start: 2022-08-06 | End: 2022-08-17 | Stop reason: HOSPADM

## 2022-08-06 RX ORDER — CALCIUM CHLORIDE, MAGNESIUM CHLORIDE, SODIUM CHLORIDE, SODIUM BICARBONATE, POTASSIUM CHLORIDE AND SODIUM PHOSPHATE DIBASIC DIHYDRATE 3.68; 3.05; 6.34; 3.09; .314; .187 G/L; G/L; G/L; G/L; G/L; G/L
2000 INJECTION INTRAVENOUS CONTINUOUS
Status: DISCONTINUED | OUTPATIENT
Start: 2022-08-06 | End: 2022-08-08

## 2022-08-06 RX ORDER — MAGNESIUM SULFATE HEPTAHYDRATE 40 MG/ML
2 INJECTION, SOLUTION INTRAVENOUS AS NEEDED
Status: DISCONTINUED | OUTPATIENT
Start: 2022-08-06 | End: 2022-08-08

## 2022-08-06 RX ORDER — HEPARIN SODIUM 1000 [USP'U]/ML
INJECTION, SOLUTION INTRAVENOUS; SUBCUTANEOUS AS NEEDED
Status: DISCONTINUED | OUTPATIENT
Start: 2022-08-06 | End: 2022-08-08

## 2022-08-06 RX ORDER — SODIUM CHLORIDE 0.9 % (FLUSH) 0.9 %
10 SYRINGE (ML) INJECTION EVERY 12 HOURS SCHEDULED
Status: DISCONTINUED | OUTPATIENT
Start: 2022-08-06 | End: 2022-08-17 | Stop reason: HOSPADM

## 2022-08-06 RX ORDER — FENTANYL CITRATE 50 UG/ML
25 INJECTION, SOLUTION INTRAMUSCULAR; INTRAVENOUS ONCE
Status: COMPLETED | OUTPATIENT
Start: 2022-08-06 | End: 2022-08-06

## 2022-08-06 RX ORDER — SODIUM CHLORIDE 0.9 % (FLUSH) 0.9 %
20 SYRINGE (ML) INJECTION AS NEEDED
Status: DISCONTINUED | OUTPATIENT
Start: 2022-08-06 | End: 2022-08-17 | Stop reason: HOSPADM

## 2022-08-06 RX ORDER — SODIUM CHLORIDE 0.9 % (FLUSH) 0.9 %
10 SYRINGE (ML) INJECTION AS NEEDED
Status: DISCONTINUED | OUTPATIENT
Start: 2022-08-06 | End: 2022-08-17 | Stop reason: HOSPADM

## 2022-08-06 RX ORDER — CALCIUM CHLORIDE, MAGNESIUM CHLORIDE, SODIUM CHLORIDE, SODIUM BICARBONATE, POTASSIUM CHLORIDE AND SODIUM PHOSPHATE DIBASIC DIHYDRATE 3.68; 3.05; 6.34; 3.09; .314; .187 G/L; G/L; G/L; G/L; G/L; G/L
1000 INJECTION INTRAVENOUS CONTINUOUS
Status: DISCONTINUED | OUTPATIENT
Start: 2022-08-06 | End: 2022-08-08

## 2022-08-06 RX ORDER — FENTANYL CITRATE 50 UG/ML
50 INJECTION, SOLUTION INTRAMUSCULAR; INTRAVENOUS
Status: DISCONTINUED | OUTPATIENT
Start: 2022-08-06 | End: 2022-08-06

## 2022-08-06 RX ORDER — DEXMEDETOMIDINE HYDROCHLORIDE 4 UG/ML
.2-1.5 INJECTION, SOLUTION INTRAVENOUS
Status: DISCONTINUED | OUTPATIENT
Start: 2022-08-06 | End: 2022-08-07

## 2022-08-06 RX ORDER — DOPAMINE HYDROCHLORIDE 160 MG/100ML
2-20 INJECTION, SOLUTION INTRAVENOUS
Status: DISCONTINUED | OUTPATIENT
Start: 2022-08-06 | End: 2022-08-07

## 2022-08-06 RX ORDER — ALBUMIN (HUMAN) 12.5 G/50ML
50 SOLUTION INTRAVENOUS ONCE
Status: COMPLETED | OUTPATIENT
Start: 2022-08-06 | End: 2022-08-06

## 2022-08-06 RX ORDER — CALCIUM GLUCONATE 20 MG/ML
4 INJECTION, SOLUTION INTRAVENOUS ONCE
Status: COMPLETED | OUTPATIENT
Start: 2022-08-06 | End: 2022-08-06

## 2022-08-06 RX ORDER — CALCIUM GLUCONATE 20 MG/ML
1 INJECTION, SOLUTION INTRAVENOUS ONCE AS NEEDED
Status: DISCONTINUED | OUTPATIENT
Start: 2022-08-06 | End: 2022-08-08

## 2022-08-06 RX ORDER — NICOTINE POLACRILEX 4 MG
15 LOZENGE BUCCAL
Status: DISCONTINUED | OUTPATIENT
Start: 2022-08-06 | End: 2022-08-06

## 2022-08-06 RX ORDER — SIMETHICONE 80 MG
80 TABLET,CHEWABLE ORAL 4 TIMES DAILY PRN
Status: DISCONTINUED | OUTPATIENT
Start: 2022-08-06 | End: 2022-08-17 | Stop reason: HOSPADM

## 2022-08-06 RX ORDER — POTASSIUM CHLORIDE 7.45 MG/ML
10 INJECTION INTRAVENOUS
Status: COMPLETED | OUTPATIENT
Start: 2022-08-06 | End: 2022-08-06

## 2022-08-06 RX ORDER — POTASSIUM CHLORIDE 7.45 MG/ML
10 INJECTION INTRAVENOUS ONCE
Status: COMPLETED | OUTPATIENT
Start: 2022-08-06 | End: 2022-08-06

## 2022-08-06 RX ORDER — CALCIUM CHLORIDE, MAGNESIUM CHLORIDE, SODIUM CHLORIDE, SODIUM BICARBONATE, POTASSIUM CHLORIDE AND SODIUM PHOSPHATE DIBASIC DIHYDRATE 3.68; 3.05; 6.34; 3.09; .314; .187 G/L; G/L; G/L; G/L; G/L; G/L
1500 INJECTION INTRAVENOUS CONTINUOUS
Status: DISCONTINUED | OUTPATIENT
Start: 2022-08-06 | End: 2022-08-08

## 2022-08-06 RX ORDER — FENTANYL CITRATE 50 UG/ML
25 INJECTION, SOLUTION INTRAMUSCULAR; INTRAVENOUS
Status: DISCONTINUED | OUTPATIENT
Start: 2022-08-06 | End: 2022-08-06

## 2022-08-06 RX ORDER — DEXTROSE MONOHYDRATE 25 G/50ML
25 INJECTION, SOLUTION INTRAVENOUS
Status: DISCONTINUED | OUTPATIENT
Start: 2022-08-06 | End: 2022-08-06

## 2022-08-06 RX ORDER — PANTOPRAZOLE SODIUM 40 MG/10ML
40 INJECTION, POWDER, LYOPHILIZED, FOR SOLUTION INTRAVENOUS EVERY 12 HOURS SCHEDULED
Status: DISCONTINUED | OUTPATIENT
Start: 2022-08-06 | End: 2022-08-08

## 2022-08-06 RX ORDER — MIDAZOLAM HYDROCHLORIDE 1 MG/ML
1 INJECTION INTRAMUSCULAR; INTRAVENOUS ONCE
Status: COMPLETED | OUTPATIENT
Start: 2022-08-06 | End: 2022-08-06

## 2022-08-06 RX ORDER — FENTANYL CITRATE 50 UG/ML
INJECTION, SOLUTION INTRAMUSCULAR; INTRAVENOUS
Status: COMPLETED
Start: 2022-08-06 | End: 2022-08-06

## 2022-08-06 RX ORDER — POTASSIUM CHLORIDE 29.8 MG/ML
20 INJECTION INTRAVENOUS AS NEEDED
Status: DISCONTINUED | OUTPATIENT
Start: 2022-08-06 | End: 2022-08-08

## 2022-08-06 RX ORDER — INSULIN LISPRO 100 [IU]/ML
0-7 INJECTION, SOLUTION INTRAVENOUS; SUBCUTANEOUS EVERY 6 HOURS SCHEDULED
Status: DISCONTINUED | OUTPATIENT
Start: 2022-08-06 | End: 2022-08-11

## 2022-08-06 RX ORDER — CALCIUM GLUCONATE 20 MG/ML
2 INJECTION, SOLUTION INTRAVENOUS ONCE AS NEEDED
Status: DISCONTINUED | OUTPATIENT
Start: 2022-08-06 | End: 2022-08-08

## 2022-08-06 RX ORDER — CALCIUM GLUCONATE 20 MG/ML
2 INJECTION, SOLUTION INTRAVENOUS ONCE
Status: COMPLETED | OUTPATIENT
Start: 2022-08-06 | End: 2022-08-06

## 2022-08-06 RX ORDER — LIDOCAINE HYDROCHLORIDE 10 MG/ML
10 INJECTION, SOLUTION EPIDURAL; INFILTRATION; INTRACAUDAL; PERINEURAL ONCE
Status: DISCONTINUED | OUTPATIENT
Start: 2022-08-06 | End: 2022-08-07

## 2022-08-06 RX ORDER — DIPHENHYDRAMINE HYDROCHLORIDE 50 MG/ML
25 INJECTION INTRAMUSCULAR; INTRAVENOUS ONCE
Status: COMPLETED | OUTPATIENT
Start: 2022-08-06 | End: 2022-08-06

## 2022-08-06 RX ADMIN — CALCIUM CHLORIDE, MAGNESIUM CHLORIDE, SODIUM CHLORIDE, SODIUM BICARBONATE, POTASSIUM CHLORIDE AND SODIUM PHOSPHATE DIBASIC DIHYDRATE 2000 ML/HR: 3.68; 3.05; 6.34; 3.09; .314; .187 INJECTION INTRAVENOUS at 23:39

## 2022-08-06 RX ADMIN — MEROPENEM 1 G: 1 INJECTION, POWDER, FOR SOLUTION INTRAVENOUS at 16:04

## 2022-08-06 RX ADMIN — ESCITALOPRAM OXALATE 10 MG: 10 TABLET ORAL at 06:45

## 2022-08-06 RX ADMIN — FENTANYL CITRATE 25 MCG: 50 INJECTION, SOLUTION INTRAMUSCULAR; INTRAVENOUS at 09:06

## 2022-08-06 RX ADMIN — MIDAZOLAM 1 MG: 1 INJECTION INTRAMUSCULAR; INTRAVENOUS at 21:01

## 2022-08-06 RX ADMIN — PHENYLEPHRINE HYDROCHLORIDE 1 MCG/KG/MIN: 10 INJECTION INTRAVENOUS at 09:59

## 2022-08-06 RX ADMIN — ALBUMIN HUMAN 50 G: 0.25 SOLUTION INTRAVENOUS at 15:00

## 2022-08-06 RX ADMIN — Medication 10 ML: at 12:00

## 2022-08-06 RX ADMIN — FENTANYL CITRATE 50 MCG: 50 INJECTION, SOLUTION INTRAMUSCULAR; INTRAVENOUS at 15:59

## 2022-08-06 RX ADMIN — SODIUM BICARBONATE 150 MEQ: 84 INJECTION, SOLUTION INTRAVENOUS at 02:35

## 2022-08-06 RX ADMIN — SODIUM BICARBONATE 150 MEQ: 84 INJECTION, SOLUTION INTRAVENOUS at 18:14

## 2022-08-06 RX ADMIN — MIDAZOLAM 2 MG: 1 INJECTION INTRAMUSCULAR; INTRAVENOUS at 16:59

## 2022-08-06 RX ADMIN — CALCIUM GLUCONATE 3 G: 98 INJECTION, SOLUTION INTRAVENOUS at 23:14

## 2022-08-06 RX ADMIN — PHENYLEPHRINE HYDROCHLORIDE 6 MCG/KG/MIN: 10 INJECTION INTRAVENOUS at 11:06

## 2022-08-06 RX ADMIN — CALCIUM CHLORIDE, MAGNESIUM CHLORIDE, SODIUM CHLORIDE, SODIUM BICARBONATE, POTASSIUM CHLORIDE AND SODIUM PHOSPHATE DIBASIC DIHYDRATE 1000 ML/HR: 3.68; 3.05; 6.34; 3.09; .314; .187 INJECTION INTRAVENOUS at 23:38

## 2022-08-06 RX ADMIN — FENTANYL CITRATE 25 MCG: 50 INJECTION, SOLUTION INTRAMUSCULAR; INTRAVENOUS at 15:18

## 2022-08-06 RX ADMIN — MICAFUNGIN 100 MG: 20 INJECTION, POWDER, LYOPHILIZED, FOR SOLUTION INTRAVENOUS at 14:20

## 2022-08-06 RX ADMIN — FENTANYL CITRATE 25 MCG: 50 INJECTION, SOLUTION INTRAMUSCULAR; INTRAVENOUS at 21:58

## 2022-08-06 RX ADMIN — POTASSIUM CHLORIDE 10 MEQ: 7.46 INJECTION, SOLUTION INTRAVENOUS at 12:17

## 2022-08-06 RX ADMIN — PIPERACILLIN AND TAZOBACTAM 4.5 G: 4; .5 INJECTION, POWDER, FOR SOLUTION INTRAVENOUS at 08:36

## 2022-08-06 RX ADMIN — POTASSIUM CHLORIDE 10 MEQ: 7.46 INJECTION, SOLUTION INTRAVENOUS at 13:20

## 2022-08-06 RX ADMIN — INSULIN LISPRO 3 UNITS: 100 INJECTION, SOLUTION INTRAVENOUS; SUBCUTANEOUS at 06:45

## 2022-08-06 RX ADMIN — CALCIUM CHLORIDE, MAGNESIUM CHLORIDE, SODIUM CHLORIDE, SODIUM BICARBONATE, POTASSIUM CHLORIDE AND SODIUM PHOSPHATE DIBASIC DIHYDRATE 1500 ML/HR: 3.68; 3.05; 6.34; 3.09; .314; .187 INJECTION INTRAVENOUS at 23:39

## 2022-08-06 RX ADMIN — VANCOMYCIN HYDROCHLORIDE 1500 MG: 10 INJECTION, POWDER, LYOPHILIZED, FOR SOLUTION INTRAVENOUS at 16:01

## 2022-08-06 RX ADMIN — POTASSIUM CHLORIDE 10 MEQ: 7.46 INJECTION, SOLUTION INTRAVENOUS at 08:34

## 2022-08-06 RX ADMIN — ONDANSETRON 4 MG: 2 INJECTION INTRAMUSCULAR; INTRAVENOUS at 20:42

## 2022-08-06 RX ADMIN — DIPHENHYDRAMINE HYDROCHLORIDE 25 MG: 50 INJECTION, SOLUTION INTRAMUSCULAR; INTRAVENOUS at 02:35

## 2022-08-06 RX ADMIN — VASOPRESSIN 0.03 UNITS/MIN: 0.2 INJECTION INTRAVENOUS at 18:13

## 2022-08-06 RX ADMIN — DOPAMINE HYDROCHLORIDE IN DEXTROSE 2 MCG/KG/MIN: 1.6 INJECTION, SOLUTION INTRAVENOUS at 10:11

## 2022-08-06 RX ADMIN — PANTOPRAZOLE SODIUM 40 MG: 40 INJECTION, POWDER, FOR SOLUTION INTRAVENOUS at 08:35

## 2022-08-06 RX ADMIN — HYDROCORTISONE SODIUM SUCCINATE 100 MG: 100 INJECTION, POWDER, FOR SOLUTION INTRAMUSCULAR; INTRAVENOUS at 06:45

## 2022-08-06 RX ADMIN — HYDROCORTISONE SODIUM SUCCINATE 100 MG: 100 INJECTION, POWDER, FOR SOLUTION INTRAMUSCULAR; INTRAVENOUS at 17:19

## 2022-08-06 RX ADMIN — ALBUMIN (HUMAN) 12.5 G: 0.25 INJECTION, SOLUTION INTRAVENOUS at 08:34

## 2022-08-06 RX ADMIN — CALCIUM GLUCONATE 4 G: 20 INJECTION, SOLUTION INTRAVENOUS at 12:17

## 2022-08-06 RX ADMIN — ONDANSETRON 4 MG: 2 INJECTION INTRAMUSCULAR; INTRAVENOUS at 01:52

## 2022-08-06 RX ADMIN — VASOPRESSIN 0.03 UNITS/MIN: 0.2 INJECTION INTRAVENOUS at 04:25

## 2022-08-06 RX ADMIN — PHYTONADIONE 10 MG: 10 INJECTION, EMULSION INTRAMUSCULAR; INTRAVENOUS; SUBCUTANEOUS at 12:17

## 2022-08-06 RX ADMIN — HYDROCORTISONE SODIUM SUCCINATE 100 MG: 100 INJECTION, POWDER, FOR SOLUTION INTRAMUSCULAR; INTRAVENOUS at 15:00

## 2022-08-06 RX ADMIN — CALCIUM GLUCONATE 2 G: 20 INJECTION, SOLUTION INTRAVENOUS at 06:46

## 2022-08-06 RX ADMIN — Medication 10 ML: at 08:37

## 2022-08-06 RX ADMIN — INSULIN LISPRO 2 UNITS: 100 INJECTION, SOLUTION INTRAVENOUS; SUBCUTANEOUS at 00:09

## 2022-08-06 RX ADMIN — SIMETHICONE 80 MG: 80 TABLET, CHEWABLE ORAL at 01:28

## 2022-08-06 RX ADMIN — DEXMEDETOMIDINE HYDROCHLORIDE 0.5 MCG/KG/HR: 4 INJECTION, SOLUTION INTRAVENOUS at 08:34

## 2022-08-06 RX ADMIN — HYDROMORPHONE HYDROCHLORIDE 2 MG: 1 INJECTION, SOLUTION INTRAMUSCULAR; INTRAVENOUS; SUBCUTANEOUS at 17:19

## 2022-08-06 RX ADMIN — PANTOPRAZOLE SODIUM 40 MG: 40 INJECTION, POWDER, FOR SOLUTION INTRAVENOUS at 20:42

## 2022-08-06 NOTE — NURSING NOTE
Following PICC insertion, pt began to have increased ectopy and became more lethargic. Precedex stopped, pt then became hypotensive and bradycardic. Dr. Moreno and Susan at bedside. Pt placed in reverse trendelenburg and STAT EKG ordered. STAT labs obtained. Pt then became tachycardic prior to dopamine being started. Lab called with questionable Hgb, recollect obtained. General surgery consult placed and PRBCs ordered, will confirm hgb before transfusing. Family at bedside, updated by treatment team. O2 demands increasing, on 6L at this time. Will update Nazeer when labs have resulted.

## 2022-08-06 NOTE — PROGRESS NOTES
Infectious Diseases Progress Note      LOS: 1 day   Patient Care Team:  Maribel Graf MD as PCP - General (Family Medicine)  AVA Cavanaugh MD as Consulting Physician (Cardiology)    Chief Complaint: Fever and weakness and diarrhea    Subjective     The patient had fever up to 101.8 during the last 24 hours.  She is severely hypertensive as her condition worsened this morning and currently on 4 vasopressors including Levophed, norepinephrine, vasopressin and dopamine on and off.  Her hemoglobin was noted to be low today.  She was having mild abdominal tenderness.  She continued to have diarrhea throughout the night.  There was no blood in the stool.  Urine output remains low    Review of Systems:   Review of Systems   Constitutional: Positive for chills, fatigue and fever.   Gastrointestinal: Positive for abdominal pain, diarrhea and nausea.        Objective     Vital Signs  Temp:  [98.1 °F (36.7 °C)-101.8 °F (38.8 °C)] 101.66 °F (38.7 °C)  Heart Rate:  [] 147  Resp:  [15-28] 25  BP: ()/(23-92) 46/23  Arterial Line BP: (126)/(80) 126/80    Physical Exam:  Physical Exam  Vitals and nursing note reviewed.   Constitutional:       Appearance: She is well-developed. She is ill-appearing.   HENT:      Head: Normocephalic and atraumatic.   Eyes:      Pupils: Pupils are equal, round, and reactive to light.   Cardiovascular:      Rate and Rhythm: Normal rate and regular rhythm.      Heart sounds: Normal heart sounds.   Pulmonary:      Effort: Pulmonary effort is normal. No respiratory distress.      Breath sounds: Normal breath sounds. No wheezing or rales.   Abdominal:      General: Bowel sounds are normal. There is no distension.      Palpations: Abdomen is soft. There is no mass.      Tenderness: There is abdominal tenderness. There is no guarding or rebound.      Comments: Mild tenderness in the right upper quadrant but there was no rebound or guarding.  There was no ecchymosis at the abdomen wall    Musculoskeletal:         General: No deformity. Normal range of motion.      Cervical back: Normal range of motion and neck supple.   Skin:     General: Skin is warm.      Findings: No erythema or rash.      Comments: No skin mottling by physical examination   Neurological:      Mental Status: She is alert and oriented to person, place, and time.      Cranial Nerves: No cranial nerve deficit.          Results Review:    I have reviewed all clinical data, test, lab, and imaging results.     Radiology  Adult Transthoracic Echo Complete w/ Color, Spectral and Contrast if Necessary Per Protocol    Result Date: 8/5/2022  · Left ventricular wall thickness is consistent with mild concentric hypertrophy. · Estimated left ventricular EF = 75% Left ventricular systolic function is hyperdynamic (EF > 70%). · Left ventricular diastolic function is consistent with (grade I) impaired relaxation.      US Liver    Result Date: 8/5/2022  DATE OF EXAM: 8/5/2022 12:05 PM  PROCEDURE: US LIVER-  INDICATIONS: Abdominal pain/tenderness; A41.9-Sepsis, unspecified organism; R65.21-Severe sepsis with septic shock; N17.9-Acute kidney failure, unspecified  COMPARISON: CT abdomen and pelvis 8/5/2022.  TECHNIQUE: Grayscale and color Doppler ultrasound evaluation of the limited abdomen was performed.  FINDINGS: The liver size is within normal limits, 17 cm in length. The liver demonstrates a coarse heterogeneous echotexture with diffuse hypoechogenicity. The liver surface contour remains smooth without evidence of cirrhosis. Echogenic nonshadowing lesion is seen within the posterior right hepatic lobe, 5.1 x 2.5 x 2.5 cm, without internal hypervascularity. Echogenic circumscribed lesion within the left hepatic lobe without acoustic shadowing or hypervascularity measures 0.9 x 0.9 x 1.1 cm.  Common bile duct caliber is normal at 4 mm. No intrahepatic biliary ductal dilation is seen. No gross ascites is evident. Main portal vein is patent with  hepatopedal flow.        IMPRESSION: 1. Echogenic foci within the right and left hepatic lobe without internal hypervascularity, superimposed upon background hepatic hypoechoic parenchyma. The echogenic foci do not have a typical appearance for fluid collections or abscesses. Other etiologies such as hemangiomas, areas of fatty sparing upon background steatosis, focal fibrosis/scarring, could be considered. These may be further and better characterize utilizing MRI without and with contrast liver protocol. 2. No abnormal biliary dilation is seen.  Electronically Signed By-Franca Gallegos MD On:8/5/2022 1:17 PM This report was finalized on [id number] by  Franca Gallegos MD.    XR Chest 1 View    Result Date: 8/6/2022  EXAM: XR CHEST 1 VW-  DATE OF EXAM: 8/6/2022 8:51 AM  INDICATION: central line dislodged, check position; A41.9-Sepsis, unspecified organism; R65.21-Severe sepsis with septic shock; N17.9-Acute kidney failure, unspecified.   COMPARISONS: August 5  FINDINGS:  Right-sided central line tip is in the right lower neck above the clavicle.  No pneumothorax. Mild right basilar atelectasis versus pneumonia. The stent is unchanged.       Right sided central line tip in the lower neck in the location of the lower internal jugular vein, just above the clavicle.  No pneumothorax. Mild right basilar atelectasis versus pneumonia.  Electronically Signed By-Ankur Thapa On:8/6/2022 9:08 AM This report was finalized on [id number] by  Ankur Thapa, .    CT Needle Biopsy Liver    Result Date: 8/5/2022  DATE OF EXAM: 8/5/2022 5:07 PM  PROCEDURE: CT NEEDLE BIOPSY LIVER-  INDICATIONS: Possible liver abscess versus mass; A41.9-Sepsis, unspecified organism; R65.21-Severe sepsis with septic shock; N17.9-Acute kidney failure, unspecified  COMPARISON: No Comparisons Available  FLUOROSCOPIC TIME: CT fluoroscopy time 5.84 seconds  PHYSICIAN MONITORED CONSCIOUS SEDATION TIME: Fentanyl only  CONTRAST MEDIA: None due to acute renal  failure  TECHNIQUE: The patient's medications were reviewed prior to the procedure. The procedure, risks and alternatives were discussed and informed written consent was obtained. Maximal sterile barrier technique was utilized throughout the procedure. The patient was placed in the supine position in the CT suite. Preliminary noncontrast images were obtained. The areas in question particularly in the right lobe are not clearly identified. Aspiration was performed of the areas yielding only blood. Ultrasound was then utilized to confirm placement of the needle and the area in question. Following confirmation, core biopsies were obtained and sent for both histology and for cultures. Gelfoam pledgets were injected through the needle and the needle was removed. The patient was returned to the intensive care area.        1. The areas identified on the patient's contrast-enhanced CT and recent ultrasound are not clearly identified on the noncontrast scan. Despite targeting the area when compared with CT, no purulent fluid could be aspirated. Ultrasound was utilized and to confirm that the area in question was being sampled. Core specimens were then obtained of this area. Given the absence of any purulent fluid and the nonvisualization on the noncontrast scan this is very unlikely to represent an intrahepatic abscess. These findings were discussed with Dr. Orozco by telephone immediately following the biopsy.  Electronically Signed By-Nahum Ross MD On:8/5/2022 6:26 PM This report was finalized on [id number] by  Nahum Ross MD.    US Renal Bilateral    Result Date: 8/5/2022  DATE OF EXAM: 8/5/2022 12:04 PM  PROCEDURE: US RENAL BILATERAL-  INDICATIONS: MARIANNE; A41.9-Sepsis, unspecified organism; R65.21-Severe sepsis with septic shock; N17.9-Acute kidney failure, unspecified  COMPARISON: No Comparisons Available  TECHNIQUE: Grayscale and color Doppler ultrasound evaluation of the kidneys and urinary bladder was performed.    FINDINGS: Suboptimal visualization of the left kidney due to limited patient mobility. Right kidney measures 10.6 cm in length. Left kidney measures 11.5 cm in length. The kidneys appear hyperechoic. There is no hydronephrosis. Urinary bladder not visualized      Nonobstructed hyperechoic kidneys compatible with medical renal disease  Electronically Signed By-Dave Cunningham On:8/5/2022 1:15 PM This report was finalized on [id number] by  Dave Cunningham, .    XR Abdomen KUB    Result Date: 8/6/2022  EXAMINATION: XR ABDOMEN KUB DATE: 8/6/2022 6:22 AM SLOT:  60 INDICATION: Abdominal pain  COMPARISON: None available.  TECHNIQUE: Supine and upright radiographs of the abdomen.  FINDINGS/     Body habitus and a generalized paucity of bowel gas mildly limits evaluation. No significantly dilated gas-filled loops of small bowel are seen to suggest an obstruction. No free peritoneal air.  No suspicious calcifications detected.  No acute osseous pathology.     Electronically signed by:  Nj Rosario  8/6/2022 4:47 AM      Cardiology    Laboratory    Results from last 7 days   Lab Units 08/06/22  1033 08/06/22  0510 08/05/22  1400 08/05/22  0815 08/05/22  0811   WBC 10*3/mm3 14.80* 16.30* 20.30*  --  15.90*   HEMOGLOBIN g/dL 5.9* 10.3* 13.7  --  14.8   HEMOGLOBIN, POC g/dL  --   --   --  12.0  --    HEMATOCRIT % 18.3* 31.1* 40.8  --  45.7   HEMATOCRIT POC %  --   --   --  35*  --    PLATELETS 10*3/mm3 95* 120* 188  --  241     Results from last 7 days   Lab Units 08/06/22  1013 08/06/22  0510 08/05/22  1400 08/05/22  0811   SODIUM mmol/L 139 138 133* 132*   POTASSIUM mmol/L 2.5* 3.4* 3.7 3.9   CHLORIDE mmol/L 96* 96* 99 94*   CO2 mmol/L 29.0 24.0 19.0* 21.0*   BUN mg/dL 40* 46* 38* 32*   CREATININE mg/dL 3.25* 4.06* 4.03* 3.82*   GLUCOSE mg/dL 543* 218* 165* 141*   ALBUMIN g/dL 1.90* 2.50* 2.90* 3.30*   BILIRUBIN mg/dL 3.1* 4.5* 4.6* 4.3*   ALK PHOS U/L 72 75 64 64   AST (SGOT) U/L 194* 177* 45* 41*   ALT (SGPT) U/L 109*  98* 27 30   AMYLASE U/L 39  --   --  81   LIPASE U/L 4*  --   --  12*   CALCIUM mg/dL 5.1* 7.2* 7.5* 8.3*     Results from last 7 days   Lab Units 08/05/22  0811   CK TOTAL U/L 327*             Microbiology   Microbiology Results (last 10 days)     Procedure Component Value - Date/Time    Clostridioides difficile Toxin - Stool, Per Rectum [[id number]]  (Normal) Collected: 08/06/22 0703    Lab Status: Final result Specimen: Stool from Per Rectum Updated: 08/06/22 0758    Narrative:      The following orders were created for panel order Clostridioides difficile Toxin - Stool, Per Rectum.  Procedure                               Abnormality         Status                     ---------                               -----------         ------                     Clostridioides difficile...[[id number]]  Normal              Final result                 Please view results for these tests on the individual orders.    Clostridioides difficile EIA - Stool, Per Rectum [[id number]]  (Normal) Collected: 08/06/22 0703    Lab Status: Final result Specimen: Stool from Per Rectum Updated: 08/06/22 0758     C Diff GDH / Toxin Negative    Eosinophil Smear - Urine, Urine, Clean Catch [[id number]]  (Normal) Collected: 08/05/22 1917    Lab Status: Final result Specimen: Urine, Clean Catch Updated: 08/05/22 2047     Eosinophil Smear 0 % EOS/100 Cells     Body Fluid Culture - Body Fluid, Liver [[id number]] Collected: 08/05/22 1755    Lab Status: Preliminary result Specimen: Body Fluid from Liver Updated: 08/06/22 1054     Body Fluid Culture No growth     Gram Stain Few (2+) WBCs per low power field      No organisms seen    S. Pneumo Ag Urine or CSF - Urine, Urine, Clean Catch [[id number]]  (Normal) Collected: 08/05/22 1154    Lab Status: Final result Specimen: Urine, Clean Catch Updated: 08/05/22 1234     Strep Pneumo Ag Negative    Legionella Antigen, Urine - Urine, Urine, Clean Catch [[id number]]  (Normal) Collected: 08/05/22 1154    Lab  Status: Final result Specimen: Urine, Clean Catch Updated: 08/05/22 1234     LEGIONELLA ANTIGEN, URINE Negative    MRSA Screen, PCR (Inpatient) - Swab, Nares [[id number]]  (Normal) Collected: 08/05/22 1057    Lab Status: Final result Specimen: Swab from Nares Updated: 08/05/22 1222     MRSA PCR No MRSA Detected    Narrative:      The negative predictive value of this diagnostic test is high and should only be used to consider de-escalating anti-MRSA therapy. A positive result may indicate colonization with MRSA and must be correlated clinically.    Gastrointestinal Panel, PCR - Stool, Per Rectum [[id number]]  (Abnormal) Collected: 08/05/22 1057    Lab Status: Final result Specimen: Stool from Per Rectum Updated: 08/05/22 1238     Campylobacter Not Detected     Plesiomonas shigelloides Not Detected     Salmonella Not Detected     Vibrio Not Detected     Vibrio cholerae Not Detected     Yersinia enterocolitica Not Detected     Enteroaggregative E. coli (EAEC) Not Detected     Enteropathogenic E. coli (EPEC) Detected     Enterotoxigenic E. coli (ETEC) lt/st Not Detected     Shiga-like toxin-producing E. coli (STEC) stx1/stx2 Not Detected     Shigella/Enteroinvasive E. coli (EIEC) Not Detected     Cryptosporidium Not Detected     Cyclospora cayetanensis Not Detected     Entamoeba histolytica Not Detected     Giardia lamblia Not Detected     Adenovirus F40/41 Not Detected     Astrovirus Not Detected     Norovirus GI/GII Not Detected     Rotavirus A Not Detected     Sapovirus (I, II, IV or V) Not Detected    Blood Culture - Blood, Blood, Central Line [[id number]]  (Normal) Collected: 08/05/22 0902    Lab Status: Preliminary result Specimen: Blood, Central Line Updated: 08/06/22 0918     Blood Culture No growth at 24 hours    Blood Culture - Blood, Blood, Central Line [[id number]]  (Normal) Collected: 08/05/22 0902    Lab Status: Preliminary result Specimen: Blood, Central Line Updated: 08/06/22 0918     Blood Culture No  growth at 24 hours    Respiratory Panel PCR w/COVID-19(SARS-CoV-2) CELSA/JONATHAN/DOMINIQUE/PAD/COR/MAD/LISA In-House, NP Swab in UTM/VTM, 3-4 HR TAT - Swab, Nasopharynx [[id number]]  (Normal) Collected: 08/05/22 0815    Lab Status: Final result Specimen: Swab from Nasopharynx Updated: 08/05/22 0909     ADENOVIRUS, PCR Not Detected     Coronavirus 229E Not Detected     Coronavirus HKU1 Not Detected     Coronavirus NL63 Not Detected     Coronavirus OC43 Not Detected     COVID19 Not Detected     Human Metapneumovirus Not Detected     Human Rhinovirus/Enterovirus Not Detected     Influenza A PCR Not Detected     Influenza B PCR Not Detected     Parainfluenza Virus 1 Not Detected     Parainfluenza Virus 2 Not Detected     Parainfluenza Virus 3 Not Detected     Parainfluenza Virus 4 Not Detected     RSV, PCR Not Detected     Bordetella pertussis pcr Not Detected     Bordetella parapertussis PCR Not Detected     Chlamydophila pneumoniae PCR Not Detected     Mycoplasma pneumo by PCR Not Detected    Narrative:      In the setting of a positive respiratory panel with a viral infection PLUS a negative procalcitonin without other underlying concern for bacterial infection, consider observing off antibiotics or discontinuation of antibiotics and continue supportive care. If the respiratory panel is positive for atypical bacterial infection (Bordetella pertussis, Chlamydophila pneumoniae, or Mycoplasma pneumoniae), consider antibiotic de-escalation to target atypical bacterial infection.          Medication Review:       Schedule Meds  albumin human, 12.5 g, Intravenous, Q4H  albumin human, 50 g, Intravenous, Once  atropine sulfate, , ,   calcium gluconate, 4 g, Intravenous, Once  escitalopram, 10 mg, Oral, Daily  hydrocortisone sodium succinate, 100 mg, Intravenous, Q6H  insulin lispro, 0-7 Units, Subcutaneous, Q6H  meropenem, 1 g, Intravenous, Once  [START ON 8/7/2022] meropenem, 1 g, Intravenous, Q24H  micafungin (MYCAMINE) IV, 100 mg,  Intravenous, Q24H  pantoprazole, 40 mg, Intravenous, Q12H  phytonadione (VITAMIN K) IVPB, 10 mg, Intravenous, Once  potassium chloride, 10 mEq, Intravenous, Q1H  sodium chloride, 10 mL, Intravenous, Q12H  sodium chloride, 10 mL, Intravenous, Q12H  vancomycin, 20 mg/kg (Adjusted), Intravenous, Once        Infusion Meds  dexmedetomidine, 0.2-1.5 mcg/kg/hr, Last Rate: Stopped (08/06/22 1000)  DOPamine, 2-20 mcg/kg/min, Last Rate: 5 mcg/kg/min (08/06/22 1100)  norepinephrine, 0.02-0.3 mcg/kg/min, Last Rate: 0.5 mcg/kg/min (08/06/22 0854)  Pharmacy to dose vancomycin,   phenylephrine, 0.5-3 mcg/kg/min  sodium bicarbonate drip (greater than 75 mEq/bag), 150 mEq, Last Rate: 150 mEq (08/06/22 0235)  vasopressin, 0.03 Units/min, Last Rate: 0.03 Units/min (08/06/22 0900)        PRN Meds  •  acetaminophen **OR** acetaminophen  •  dextrose  •  dextrose  •  fentaNYL citrate (PF)  •  glucagon (human recombinant)  •  nitroglycerin  •  ondansetron **OR** ondansetron  •  Pharmacy to dose vancomycin  •  simethicone  •  sodium chloride  •  sodium chloride  •  sodium chloride  •  sodium chloride  •  Vancomycin Pharmacy Intermittent/Pulse Dosing        Assessment & Plan       Antimicrobial Therapy   1.  IV Zosyn        2.        3.        4.        5.               Assessment     Severe septic shock.  The presentation was concerning for hypovolemic shock and currently might have intra-abdominal hemorrhage.  The patient is currently on 4 vasopressors including vasopressin, norepinephrine, Tobias-Synephrine and dopamine on and off    Very abnormal lesion in the liver initially suspected to be liver abscess.  S/p IR aspirating the lesion/obtaining biopsy.  There was no purulence to suspect abscess.  Patient received 1 unit of fresh frozen plasma before the procedure    Acute kidney injury secondary to above and possibly prerenal    Positive stool for enteropathogenic E. coli.  Usually self-limiting disease but patient probably has severe  infection resulted in severe diarrhea and dehydration    Elevated INR.  Patient received fresh frozen plasma yesterday.  Probably secondary to sepsis    Reactive leukocytosis secondary to above      Plan    Prognosis is very guarded  Secondary to severe sepsis I am going to place patient on broad-spectrum antimicrobial therapy  Discontinue Zosyn  Start vancomycin IV and ask pharmacy to follow and dose  Start IV meropenem we will give a loading dose of 1 g.  If patient goes on dialysis she will be on 1 g every 8 hours otherwise she will be on 1 g daily  Start micafungin 100 mg IV daily  The patient will receive fresh frozen plasma and vitamin K and currently receiving blood product transfusion  If patient became more stable then recommend CT scan of the abdomen pelvis  Continue supportive care and pressors  Waiting on liver biopsy results    The case was discussed with ICU service  The case was discussed with the patient's   The case was discussed with the patient's RN about her current condition and update me with any changes down the road        Sagar Orozco MD  08/06/22  11:58 EDT    Note is dictated utilizing voice recognition software/Dragon

## 2022-08-06 NOTE — PROGRESS NOTES
ICU Daily Progress Note        Septic shock (HCC)    Pulmonary hypertension, unspecified (HCC)    Anxiety    Vitamin D deficiency    Essential hypertension    Suspected liver abscess    Diarrhea    Acute kidney injury (HCC)    Metabolic acidosis      Assessment & Plan   Sepsis with septic shock  UTI  MARIANNE  Coagulopathy probably from DIC  Leukocytosis  Acute anemia  Anion gap metabolic acidosis  Lactic acidosis  Electrolyte imbalance with hypokalemia and hypocalcemia and hyponatremia and hypomagnesemia  Abnormal CT scan of the liver with possible hepatic abscess: IR attempted drainage but there was no fluid collection 8/5/2022 so CT-guided biopsy was obtained  Mild atelectasis with early noncardiogenic edema probably early ARDS  Esophagitis  Mild pancreatic changes on the CT scan but the lipase is not elevated  History of melanoma resected in 1999 without signs of spread or recurrence  Mild exercise-induced pulmonary hypertension for which she has been on metoprolol but she is out of it for 1 month  Hyperglycemia  Elevated liver enzymes probably from shock liver        Plan:  Patient is critically ill and getting worse: We will monitor respiratory status closely and if she becomes more hypoxic she will need intubation  Hemodynamic support: Multiple pressors and stress dose hydrocortisone  Blood transfusion  Discussed with nephrology: We will place Shiley for possible CRRT if acidosis gets worse  IV fluid  Insulin sliding scale  Antibiotics: Consult ID  DVT: SCD/GI prophylaxis  Check daily labs and correct electrolytes as needed       LOS: 1 day         Vital signs for last 24 hours:  Vitals:    08/06/22 0000 08/06/22 0135 08/06/22 0236 08/06/22 0314   BP: 120/67 130/83 135/79 (!) 75/45   Pulse: 108 106 107 97   Resp:       Temp:       TempSrc:       SpO2:       Weight:       Height:           Intake/Output last 3 shifts:  I/O last 3 completed shifts:  In: 9424 [I.V.:5996; Blood:328; IV Piggyback:3100]  Out: 850  [Urine:850]  Intake/Output this shift:  No intake/output data recorded.    Vent settings for last 24 hours:       Hemodynamic parameters for last 24 hours:       Radiology  Imaging Results (Last 24 Hours)     Procedure Component Value Units Date/Time    XR Chest 1 View [[id number]] Collected: 08/06/22 0907     Updated: 08/06/22 0911    Narrative:      EXAM: XR CHEST 1 VW-     DATE OF EXAM: 8/6/2022 8:51 AM     INDICATION: central line dislodged, check position; A41.9-Sepsis,  unspecified organism; R65.21-Severe sepsis with septic shock;  N17.9-Acute kidney failure, unspecified.       COMPARISONS: August 5      FINDINGS:     Right-sided central line tip is in the right lower neck above the  clavicle.     No pneumothorax. Mild right basilar atelectasis versus pneumonia. The  stent is unchanged.       Impression:         Right sided central line tip in the lower neck in the location of the  lower internal jugular vein, just above the clavicle.     No pneumothorax. Mild right basilar atelectasis versus pneumonia.     Electronically Signed By-Ankur Thapa On:8/6/2022 9:08 AM  This report was finalized on [id number] by  Ankur Thapa, .    XR Abdomen KUB [[id number]] Collected: 08/06/22 0646     Updated: 08/06/22 0648    Narrative:      EXAMINATION: XR ABDOMEN KUB    DATE: 8/6/2022 6:22 AM  SLOT:  60    INDICATION: Abdominal pain     COMPARISON: None available.     TECHNIQUE: Supine and upright radiographs of the abdomen.     FINDINGS/    Impression:         Body habitus and a generalized paucity of bowel gas mildly limits evaluation. No significantly dilated gas-filled loops of small bowel are seen to suggest an obstruction. No free peritoneal air.  No suspicious calcifications detected.  No acute osseous   pathology.                  Electronically signed by:  Nj Rosario    8/6/2022 4:47 AM    CT Needle Biopsy Liver [[id number]] Collected: 08/05/22 1823    Specimen: Tissue Updated: 08/05/22 1828    Narrative:       DATE OF EXAM:  8/5/2022 5:07 PM     PROCEDURE:  CT NEEDLE BIOPSY LIVER-     INDICATIONS:  Possible liver abscess versus mass; A41.9-Sepsis, unspecified organism;  R65.21-Severe sepsis with septic shock; N17.9-Acute kidney failure,  unspecified     COMPARISON:  No Comparisons Available     FLUOROSCOPIC TIME:  CT fluoroscopy time 5.84 seconds     PHYSICIAN MONITORED CONSCIOUS SEDATION TIME:  Fentanyl only     CONTRAST MEDIA:  None due to acute renal failure     TECHNIQUE:   The patient's medications were reviewed prior to the procedure. The  procedure, risks and alternatives were discussed and informed written  consent was obtained. Maximal sterile barrier technique was utilized  throughout the procedure. The patient was placed in the supine position  in the CT suite. Preliminary noncontrast images were obtained. The areas  in question particularly in the right lobe are not clearly identified.  Aspiration was performed of the areas yielding only blood. Ultrasound  was then utilized to confirm placement of the needle and the area in  question. Following confirmation, core biopsies were obtained and sent  for both histology and for cultures. Gelfoam pledgets were injected  through the needle and the needle was removed. The patient was returned  to the intensive care area.          Impression:         1. The areas identified on the patient's contrast-enhanced CT and recent  ultrasound are not clearly identified on the noncontrast scan. Despite  targeting the area when compared with CT, no purulent fluid could be  aspirated. Ultrasound was utilized and to confirm that the area in  question was being sampled. Core specimens were then obtained of this  area. Given the absence of any purulent fluid and the nonvisualization  on the noncontrast scan this is very unlikely to represent an  intrahepatic abscess. These findings were discussed with Dr. Orozco by  telephone immediately following the biopsy.     Electronically Signed  By-Nahum Ross MD On:8/5/2022 6:26 PM  This report was finalized on [id number] by  Nahum Ross MD.    US Liver [[id number]] Collected: 08/05/22 1309     Updated: 08/05/22 1319    Narrative:      DATE OF EXAM:  8/5/2022 12:05 PM     PROCEDURE:  US LIVER-     INDICATIONS:  Abdominal pain/tenderness; A41.9-Sepsis, unspecified organism;  R65.21-Severe sepsis with septic shock; N17.9-Acute kidney failure,  unspecified     COMPARISON:  CT abdomen and pelvis 8/5/2022.     TECHNIQUE:   Grayscale and color Doppler ultrasound evaluation of the limited abdomen  was performed.     FINDINGS:  The liver size is within normal limits, 17 cm in length. The liver  demonstrates a coarse heterogeneous echotexture with diffuse  hypoechogenicity. The liver surface contour remains smooth without  evidence of cirrhosis. Echogenic nonshadowing lesion is seen within the  posterior right hepatic lobe, 5.1 x 2.5 x 2.5 cm, without internal  hypervascularity. Echogenic circumscribed lesion within the left hepatic  lobe without acoustic shadowing or hypervascularity measures 0.9 x 0.9 x  1.1 cm.     Common bile duct caliber is normal at 4 mm. No intrahepatic biliary  ductal dilation is seen. No gross ascites is evident. Main portal vein  is patent with hepatopedal flow.        Impression:            IMPRESSION:  1. Echogenic foci within the right and left hepatic lobe without  internal hypervascularity, superimposed upon background hepatic  hypoechoic parenchyma. The echogenic foci do not have a typical  appearance for fluid collections or abscesses. Other etiologies such as  hemangiomas, areas of fatty sparing upon background steatosis, focal  fibrosis/scarring, could be considered. These may be further and better  characterize utilizing MRI without and with contrast liver protocol.  2. No abnormal biliary dilation is seen.     Electronically Signed By-Franca Gallegos MD On:8/5/2022 1:17 PM  This report was finalized on [id number] by  Franca  MD Rosy.    US Renal Bilateral [[id number]] Collected: 08/05/22 1313     Updated: 08/05/22 1317    Narrative:      DATE OF EXAM:  8/5/2022 12:04 PM     PROCEDURE:  US RENAL BILATERAL-     INDICATIONS:  MARIANNE; A41.9-Sepsis, unspecified organism; R65.21-Severe sepsis with  septic shock; N17.9-Acute kidney failure, unspecified     COMPARISON:  No Comparisons Available     TECHNIQUE:   Grayscale and color Doppler ultrasound evaluation of the kidneys and  urinary bladder was performed.        FINDINGS:  Suboptimal visualization of the left kidney due to limited patient  mobility. Right kidney measures 10.6 cm in length. Left kidney measures  11.5 cm in length. The kidneys appear hyperechoic. There is no  hydronephrosis. Urinary bladder not visualized        Impression:      Nonobstructed hyperechoic kidneys compatible with medical renal disease     Electronically Signed By-Dave Cunningham On:8/5/2022 1:15 PM  This report was finalized on [id number] by  Dave Cunningham, .    XR Chest 1 View [[id number]] Collected: 08/05/22 0927     Updated: 08/05/22 0936    Narrative:      DATE OF EXAM:  8/5/2022 9:14 AM     PROCEDURE:  XR CHEST 1 VW-     INDICATIONS:  Severe Sepsis Protocol     COMPARISON:  CT chest PE protocol 08/05/2022. No previous chest x-ray for comparison.     TECHNIQUE:   Single radiographic AP view of the chest was obtained.     FINDINGS:  Tip of the right internal jugular central venous catheter terminates in  the right atrium. No pneumothorax is seen. Allowing for low lung  volumes, the lungs appear grossly clear. Cardia mediastinal contours  appear within normal limits.        Impression:      1.     Tip of the right internal jugular central venous catheter  terminates in the right atrium. No visible pneumothorax.  2.     Low lung volumes without evidence of acute cardiopulmonary  abnormality.     Electronically Signed By-Hui Raman MD On:8/5/2022 9:34 AM  This report was finalized on [id number] by   Hui Raman MD.          Labs:  Results from last 7 days   Lab Units 08/06/22  0510   WBC 10*3/mm3 16.30*   HEMOGLOBIN g/dL 10.3*   HEMATOCRIT % 31.1*   PLATELETS 10*3/mm3 120*     Results from last 7 days   Lab Units 08/06/22  0510   SODIUM mmol/L 138   POTASSIUM mmol/L 3.4*   CHLORIDE mmol/L 96*   CO2 mmol/L 24.0   BUN mg/dL 46*   CREATININE mg/dL 4.06*   CALCIUM mg/dL 7.2*   BILIRUBIN mg/dL 4.5*   ALK PHOS U/L 75   ALT (SGPT) U/L 98*   AST (SGOT) U/L 177*   GLUCOSE mg/dL 218*     Results from last 7 days   Lab Units 08/05/22  0815   PH, ARTERIAL pH units 7.345*   PO2 ART mm Hg 221.4*   PCO2, ARTERIAL mm Hg 28.3*   HCO3 ART mmol/L 15.5*     Results from last 7 days   Lab Units 08/06/22  0510 08/05/22  1400 08/05/22  0811   ALBUMIN g/dL 2.50* 2.90* 3.30*     Results from last 7 days   Lab Units 08/05/22  1852 08/05/22  1400 08/05/22  1056 08/05/22  0811   CK TOTAL U/L  --   --   --  327*   TROPONIN T ng/mL 0.045* 0.032* 0.026 <0.010         Results from last 7 days   Lab Units 08/06/22  0510   MAGNESIUM mg/dL 2.1     Results from last 7 days   Lab Units 08/05/22  1400 08/05/22  0911 08/05/22  0811   INR  2.47* 2.47* 2.16*   APTT seconds  --  48.8* 43.1*     Results from last 7 days   Lab Units 08/05/22  0811   TSH uIU/mL 1.570           Meds:   SCHEDULE  albumin human, 12.5 g, Intravenous, Q4H  escitalopram, 10 mg, Oral, Daily  hydrocortisone sodium succinate, 100 mg, Intravenous, Q6H  insulin lispro, 0-7 Units, Subcutaneous, Q6H  pantoprazole, 40 mg, Intravenous, Daily  piperacillin-tazobactam, 4.5 g, Intravenous, Q8H  potassium chloride, 10 mEq, Intravenous, Once  sodium chloride, 10 mL, Intravenous, Q12H  sodium chloride 0.9% - IBW for BMI > 30, 30 mL/kg (Ideal), Intravenous, Once      Infusions  dexmedetomidine, 0.2-1.5 mcg/kg/hr, Last Rate: 0.5 mcg/kg/hr (08/06/22 0834)  norepinephrine, 0.02-0.3 mcg/kg/min, Last Rate: 0.5 mcg/kg/min (08/06/22 0854)  sodium bicarbonate drip (greater than 75 mEq/bag), 150  mEq, Last Rate: 150 mEq (08/06/22 0235)  vasopressin, 0.03 Units/min, Last Rate: 0.03 Units/min (08/06/22 0425)      PRNs  •  acetaminophen **OR** acetaminophen  •  dextrose  •  dextrose  •  fentaNYL citrate (PF)  •  glucagon (human recombinant)  •  nitroglycerin  •  ondansetron **OR** ondansetron  •  simethicone  •  sodium chloride  •  sodium chloride    Physical Exam:  Physical Exam  General Appearance:  Alert but lethargic  HEENT:  Normocephalic, without obvious abnormality, Conjunctiva/corneas clear,.   Nares normal, no drainage     Neck:  Supple, symmetrical, trachea midline. No JVD.  Lungs /Chest wall: Mild bilateral basal rhonchi, respirations unlabored, symmetrical wall movement.     Heart:  Regular rate and rhythm, S1 S2 normal  Abdomen: Soft, mild tenderness epigastric and right upper quadrant, no masses, no organomegaly.  Bowel sounds positive  Extremities: No edema, no clubbing or cyanosis  Neuro exam: Patient moving 4 extremities with no focal deficit by observation  Skin: No rash  ROS  Review of Systems  Constitutional: Positive for chills, fever and malaise/fatigue.   HENT: Negative.    Eyes: Negative.    Cardiovascular: Negative.    Respiratory: Positive for mild cough and shortness of breath.    Skin: Negative.    Musculoskeletal: Negative.    Gastrointestinal: Positive for mild abdominal pain  Genitourinary: Negative.    Neurological: Negative.    Psychiatric/Behavioral: Negative.    Critical Care Time greater than: 45 Minutes      Much of this encounter note is an electronic transcription/translation of spoken language to printed text using Dragon Software which might include inadvertent errors in transcription.

## 2022-08-06 NOTE — PROCEDURES
"Non-Tunneled Catheter    Date/Time: 8/6/2022 6:01 PM  Performed by: Susan Borges APRN  Authorized by: Susan Borges APRN   Consent: Written consent obtained.  Risks and benefits: risks, benefits and alternatives were discussed  Consent given by: patient  Patient understanding: patient states understanding of the procedure being performed  Patient consent: the patient's understanding of the procedure matches consent given  Procedure consent: procedure consent matches procedure scheduled  Relevant documents: relevant documents present and verified  Test results: test results available and properly labeled  Site marked: the operative site was marked (RIJ laterality determined by US surveillance identifying adequate vessel)  Imaging studies: imaging studies available  Required items: required blood products, implants, devices, and special equipment available  Patient identity confirmed: verbally with patient  Time out: Immediately prior to procedure a \"time out\" was called to verify the correct patient, procedure, equipment, support staff and site/side marked as required.  Indications: vascular access (temporary dialysis access)  Anesthesia: local infiltration    Anesthesia:  Local Anesthetic: lidocaine 1% without epinephrine    Sedation:  Patient sedated: no    Preparation: skin prepped with 2% chlorhexidine  Skin prep agent dried: skin prep agent completely dried prior to procedure  Sterile barriers: all five maximum sterile barriers used - cap, mask, sterile gown, sterile gloves, and large sterile sheet  Hand hygiene: hand hygiene performed prior to central venous catheter insertion  Location details: right internal jugular  Patient position: flat  Catheter type: triple lumen  Catheter size: 9 Fr  Pre-procedure: landmarks identified  Ultrasound guidance: yes  Number of attempts: 1  Successful placement: yes  Post-procedure: line sutured and dressing applied  Assessment: blood return through all ports,  free fluid flow,  " placement verified by x-ray and no pneumothorax on x-ray  Patient tolerance: patient tolerated the procedure well with no immediate complications

## 2022-08-06 NOTE — CONSULTS
"A-line insertion     Date/Time:08- @ 1210   Performed by: Micheal Simms RN     Consent: Written consent obtained.  Risks and benefits: risks, benefits and alternatives were discussed  Consent given by: Next of Kin.  Patient understanding: Pt's critical condition interfering with understanding of the procedure.  Procedure consent: procedure consent matches procedure scheduled  Required items: devices, and special equipment available  Patient identity confirmed: arm band, hospital-assigned identification number.  Time out: Immediately prior to procedure a \"time out\" was called to verify the correct patient, procedure, equipment, support staff and site/side marked as required.  Indications: Accurate monitoring of blood pressure on a pt with vasoactive IV medications infusing and multiple lab draws.  Anesthesia: local infiltration   Local Anesthetic: lidocaine 1% without epinephrine  Anesthetic total: 0.5 mL     Sedation:  Patient sedated:n/a       Preparation: skin prepped with ChloraPrep  Skin prep agent dried: skin prep agent completely dried prior to procedure  Sterile barriers: cap, mask, sterile gloves, and small sterile sheet  Hand hygiene: hand hygiene performed prior to A-line catheter insertion  Location details: right radial artery  Patient position: semi-fowlers  Catheter type: Arrow radial artery catheterization set  Catheter size: 20G  Ultrasound guidance: yes  Sterile ultrasound techniques: sterile gel and sterile probe covers were used  Number of attempts: 1  Successful placement: yes  Post-procedure: line sutured, dressing, and antimicrobial patch applied  Assessment: pulsatile blood return through catheter. Placed to transducer with appropriate waveform and dicrotic notch noted.  Patient tolerance: patient tolerated the procedure well with no immediate complications    "

## 2022-08-06 NOTE — PROGRESS NOTES
"Pharmacy Antimicrobial Dosing Service    Subjective:  Raul Gardner is a 47 y.o.female admitted with sepsis. Pharmacy has been consulted to dose Vancomycin for possible sepsis.      Assessment/Plan    1. Day #1 Vancomycin: Pulse dosing d/t HD status. Will dose with vancomycin 1500 mg (19 mg/kg AdjBW) today. Will obtain random with AM labs tomorrow. Plan to redose with level < 20 mcg/mL.    2. Day #1 Meropenem: 1 gm IV q24h for MARIANNE and est CrCl < 25 mL/min.    3. Day #1 Micafungin: 100 mg IV q24h.    Will continue to monitor drug levels, renal function, culture and sensitivities, and patient clinical status.       Objective:  Relevant clinical data and objective history reviewed:  165.1 cm (65\")   113 kg (250 lb)   Ideal body weight: 57 kg (125 lb 10.6 oz)  Adjusted ideal body weight: 79.6 kg (175 lb 6.4 oz)  Body mass index is 41.6 kg/m².        Results from last 7 days   Lab Units 08/06/22  1013 08/06/22  0510 08/05/22  1400   CREATININE mg/dL 3.25* 4.06* 4.03*     Estimated Creatinine Clearance: 27.4 mL/min (A) (by C-G formula based on SCr of 3.25 mg/dL (H)).  I/O last 3 completed shifts:  In: 9424 [I.V.:5996; Blood:328; IV Piggyback:3100]  Out: 850 [Urine:850]    Results from last 7 days   Lab Units 08/06/22  1033 08/06/22  0510 08/05/22  1400   WBC 10*3/mm3 14.80* 16.30* 20.30*     Temperature    08/05/22 1616 08/05/22 1949 08/06/22 1053   Temp: 98.1 °F (36.7 °C) (!) 101.7 °F (38.7 °C) (!) 100.9 °F (38.3 °C)     Baseline culture/source/susceptibility:  Microbiology Results (last 10 days)       Procedure Component Value - Date/Time    Clostridioides difficile Toxin - Stool, Per Rectum [[id number]]  (Normal) Collected: 08/06/22 0703    Lab Status: Final result Specimen: Stool from Per Rectum Updated: 08/06/22 0758    Narrative:      The following orders were created for panel order Clostridioides difficile Toxin - Stool, Per Rectum.  Procedure                               Abnormality         Status                   "   ---------                               -----------         ------                     Clostridioides difficile...[[id number]]  Normal              Final result                 Please view results for these tests on the individual orders.    Clostridioides difficile EIA - Stool, Per Rectum [[id number]]  (Normal) Collected: 08/06/22 0703    Lab Status: Final result Specimen: Stool from Per Rectum Updated: 08/06/22 0758     C Diff GDH / Toxin Negative    Eosinophil Smear - Urine, Urine, Clean Catch [[id number]]  (Normal) Collected: 08/05/22 1917    Lab Status: Final result Specimen: Urine, Clean Catch Updated: 08/05/22 2047     Eosinophil Smear 0 % EOS/100 Cells     Body Fluid Culture - Body Fluid, Liver [[id number]] Collected: 08/05/22 1755    Lab Status: Preliminary result Specimen: Body Fluid from Liver Updated: 08/06/22 1054     Body Fluid Culture No growth     Gram Stain Few (2+) WBCs per low power field      No organisms seen    S. Pneumo Ag Urine or CSF - Urine, Urine, Clean Catch [[id number]]  (Normal) Collected: 08/05/22 1154    Lab Status: Final result Specimen: Urine, Clean Catch Updated: 08/05/22 1234     Strep Pneumo Ag Negative    Legionella Antigen, Urine - Urine, Urine, Clean Catch [[id number]]  (Normal) Collected: 08/05/22 1154    Lab Status: Final result Specimen: Urine, Clean Catch Updated: 08/05/22 1234     LEGIONELLA ANTIGEN, URINE Negative    MRSA Screen, PCR (Inpatient) - Swab, Nares [[id number]]  (Normal) Collected: 08/05/22 1057    Lab Status: Final result Specimen: Swab from Nares Updated: 08/05/22 1222     MRSA PCR No MRSA Detected    Narrative:      The negative predictive value of this diagnostic test is high and should only be used to consider de-escalating anti-MRSA therapy. A positive result may indicate colonization with MRSA and must be correlated clinically.    Gastrointestinal Panel, PCR - Stool, Per Rectum [[id number]]  (Abnormal) Collected: 08/05/22 1057    Lab Status: Final result  Specimen: Stool from Per Rectum Updated: 08/05/22 1238     Campylobacter Not Detected     Plesiomonas shigelloides Not Detected     Salmonella Not Detected     Vibrio Not Detected     Vibrio cholerae Not Detected     Yersinia enterocolitica Not Detected     Enteroaggregative E. coli (EAEC) Not Detected     Enteropathogenic E. coli (EPEC) Detected     Enterotoxigenic E. coli (ETEC) lt/st Not Detected     Shiga-like toxin-producing E. coli (STEC) stx1/stx2 Not Detected     Shigella/Enteroinvasive E. coli (EIEC) Not Detected     Cryptosporidium Not Detected     Cyclospora cayetanensis Not Detected     Entamoeba histolytica Not Detected     Giardia lamblia Not Detected     Adenovirus F40/41 Not Detected     Astrovirus Not Detected     Norovirus GI/GII Not Detected     Rotavirus A Not Detected     Sapovirus (I, II, IV or V) Not Detected    Blood Culture - Blood, Blood, Central Line [[id number]]  (Normal) Collected: 08/05/22 0902    Lab Status: Preliminary result Specimen: Blood, Central Line Updated: 08/06/22 0918     Blood Culture No growth at 24 hours    Blood Culture - Blood, Blood, Central Line [[id number]]  (Normal) Collected: 08/05/22 0902    Lab Status: Preliminary result Specimen: Blood, Central Line Updated: 08/06/22 0918     Blood Culture No growth at 24 hours    Respiratory Panel PCR w/COVID-19(SARS-CoV-2) CELSA/JONATHAN/DOMINIQUE/PAD/COR/MAD/LISA In-House, NP Swab in UTM/VTM, 3-4 HR TAT - Swab, Nasopharynx [[id number]]  (Normal) Collected: 08/05/22 0815    Lab Status: Final result Specimen: Swab from Nasopharynx Updated: 08/05/22 0909     ADENOVIRUS, PCR Not Detected     Coronavirus 229E Not Detected     Coronavirus HKU1 Not Detected     Coronavirus NL63 Not Detected     Coronavirus OC43 Not Detected     COVID19 Not Detected     Human Metapneumovirus Not Detected     Human Rhinovirus/Enterovirus Not Detected     Influenza A PCR Not Detected     Influenza B PCR Not Detected     Parainfluenza Virus 1 Not Detected      Parainfluenza Virus 2 Not Detected     Parainfluenza Virus 3 Not Detected     Parainfluenza Virus 4 Not Detected     RSV, PCR Not Detected     Bordetella pertussis pcr Not Detected     Bordetella parapertussis PCR Not Detected     Chlamydophila pneumoniae PCR Not Detected     Mycoplasma pneumo by PCR Not Detected    Narrative:      In the setting of a positive respiratory panel with a viral infection PLUS a negative procalcitonin without other underlying concern for bacterial infection, consider observing off antibiotics or discontinuation of antibiotics and continue supportive care. If the respiratory panel is positive for atypical bacterial infection (Bordetella pertussis, Chlamydophila pneumoniae, or Mycoplasma pneumoniae), consider antibiotic de-escalation to target atypical bacterial infection.            Anti-Infectives (From admission, onward)      Ordered     Dose/Rate Route Frequency Start Stop    08/06/22 1125  meropenem (MERREM) 1 g in sodium chloride 0.9 % 100 mL IVPB        Ordering Provider: Sagar Orozco MD    1 g  over 3 Hours Intravenous Every 24 Hours 08/07/22 1300 08/13/22 1259    08/06/22 1125  meropenem (MERREM) 1 g in sodium chloride 0.9 % 100 mL IVPB        Ordering Provider: Sagar Orozco MD    1 g  over 30 Minutes Intravenous Once 08/06/22 1500      08/06/22 1125  micafungin sodium (MYCAMINE) 100 mg in sodium chloride 0.9 % 100 mL IVPB        Ordering Provider: Sagar Orozco MD    100 mg  over 60 Minutes Intravenous Every 24 Hours 08/06/22 1300 08/13/22 1259    08/06/22 1125  Pharmacy to dose vancomycin        Ordering Provider: Sagar Orozco MD     Does not apply Continuous PRN 08/06/22 1124 08/13/22 1123    08/05/22 0916  vancomycin 1500 mg/500 mL 0.9% NS IVPB ([facility])        Ordering Provider: Leonard Francis MD    20 mg/kg × 79.4 kg (Adjusted) Intravenous Once 08/05/22 0918 08/05/22 0958    08/05/22 0809  piperacillin-tazobactam (ZOSYN) IVPB 4.5 g in 100 mL NS (CD)        Ordering  Provider: Leonard Francis MD    4.5 g  over 30 Minutes Intravenous Once 08/05/22 0811 08/05/22 1011            Nati Ortez PharmD  08/06/22 11:27 EDT

## 2022-08-06 NOTE — CONSULTS
After a procedure timeout, a triple lumen PICC was placed to the right upper arm under sterile technique and without difficulty. No immediate complications noted. Pt tolerated the procedure well. Primary RN notified that the line is good to use at this time.

## 2022-08-06 NOTE — PROCEDURES
"Insert Arterial Line    Date/Time: 8/5/2022 3:45 PM  Performed by: Connor Rod APRN  Authorized by: Connor Rod APRN   Consent: Written consent obtained.  Risks and benefits: risks, benefits and alternatives were discussed  Consent given by: patient  Patient understanding: patient states understanding of the procedure being performed  Patient consent: the patient's understanding of the procedure matches consent given  Procedure consent: procedure consent matches procedure scheduled  Relevant documents: relevant documents present and verified  Test results: test results available and properly labeled  Required items: required blood products, implants, devices, and special equipment available  Patient identity confirmed: arm band, verbally with patient and provided demographic data  Time out: Immediately prior to procedure a \"time out\" was called to verify the correct patient, procedure, equipment, support staff and site/side marked as required.  Preparation: Patient was prepped and draped in the usual sterile fashion.  Indications: multiple ABGs and hemodynamic monitoring  Location: right radial  Anesthesia: local infiltration    Anesthesia:  Local Anesthetic: lidocaine 1% without epinephrine  Anesthetic total: 5 mL    Sedation:  Patient sedated: no    Jonathan's test normal: yes  Needle gauge: 20  Number of attempts: 2  Post-procedure: line sutured and dressing applied  Post-procedure CMS: unchanged  Patient tolerance: patient tolerated the procedure well with no immediate complications  Comments: Arterial waveform visualized on monitor with dicrotic notch.        Electronically signed by GRACIELA Mohr, 08/05/22, 8:17 PM EDT.    "

## 2022-08-06 NOTE — CONSULTS
GI CONSULT  NOTE:    Referring Provider:    Susan Borges NP    Chief complaint:   Acute liver injury     Subjective    Presented with fever, chills, aches x 2 days     History of present illness:    Patient is 47-year-old female with a history of hyperlipidemia, pulmonary hypertension, melanoma of the neck in 1999, chronic hematuria who presented to the ER on 8/5/2022 with fever, chills, and aches for 2 days.  Patient states she was in her usual state of health until Wednesday, 8/3/2022.  States she felt chills before going to bed Wednesday night.  She woke up Thursday morning at 8/4/2022 with vomiting and muscle aches.  She vomited up Jell-O that she had eaten.  No hematemesis or coffee-ground emesis.  She also had diarrhea on Thursday.  She was extremely fatigued and her  stayed home to take care of her which is very unusual for her.  Patient had a syncopal episode in the bathroom early Friday morning and that is when her  decided to call EMS.  Patient has had no further vomiting.  She did have continued diarrhea that was brown on Friday and quite mucousy.  C. difficile and GI stool panel were done.  GI stool panel came back positive for E. Coli.  Procalcitonin was 99 in the ER.  Her hemoglobin was 14.8 upon admission.  Patient's white blood cell count was 15.9 upon admission with 38 bands, platelets are 241.  INR was elevated at 2.16 and mell to 2.47.  Fibrinogen was low at 169.  CMP upon admission showed sodium 132, potassium 3.9, creatinine 3.82.  Total bilirubin 4.3, alk phos 64, AST 41, ALT 30.  Troponin was less than 0.01 and is risen to 0.032.  CK is 327.    CT of the abdomen and pelvis 8/5/2022 showed multiple ill-defined low-density lesions scattered throughout the liver parenchyma.  Minimal stranding at the level of the pancreas tail which is nonspecific.  Long segment thickening of the mid to lower thoracic esophagus with small esophageal hiatal hernia, consider esophagitis.  Mild dependent  atelectasis.  No PE.  Patient underwent CT-guided liver biopsy yesterday with Dr. Ross.  No signs of fluid collection on CT.  Area was biopsied and cultured.  Patient's hemoglobin dropped to 10.3 this morning at 5 AM and then dropped to 5.9 at 1030.  Patient was symptomatic with hypotension and had to be started on multiple pressors.  Patient was given FFP, plasma and blood products as well as fluids and pressors.  She is much more stable this afternoon.  Patient has been seen by general surgery no plans for surgical intervention at this time.  Recommend correcting coagulopathy.  Patient states she had a CT at [facility] in 2019, I do note one done 2019 that showed several lesions in her liver.  MRI was recommended.  Patient underwent MRI on 2019 that showed hemangiomas.      Endo History:  Nothing in [facility].  Patient denies ever having endoscopies.    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   • Melanoma (HCC)    • Near syncope 2017   • Pulmonary hypertension (HCC)    • Urinary tract infection     chronic hematuria, seen urology       Past Surgical History:  Past Surgical History:   Procedure Laterality Date   •  SECTION  13 and 14   • SKIN CANCER EXCISION     • TUBAL ABDOMINAL LIGATION         Social History:  Social History     Tobacco Use   • Smoking status: Never Smoker   • Smokeless tobacco: Never Used   Substance Use Topics   • Alcohol use: Yes     Comment: caffeine 1 cup qd   • Drug use: No       Family History:  Family History   Problem Relation Age of Onset   • Kidney disease Mother    • Kidney disease Father    • Cancer Father         lung   • Diabetes Brother    • Heart disease Brother         CHF       Medications:  Medications Prior to Admission   Medication Sig Dispense Refill Last Dose   • escitalopram (LEXAPRO) 10 MG tablet TAKE 1 TABLET BY MOUTH EVERY DAY 90 tablet 2 Past Week at Unknown time   • lisinopril  (PRINIVIL,ZESTRIL) 5 MG tablet TAKE 1 TABLET BY MOUTH EVERY DAY 90 tablet 2 Past Week at Unknown time       Scheduled Meds:atropine sulfate, , ,   escitalopram, 10 mg, Oral, Daily  hydrocortisone sodium succinate, 100 mg, Intravenous, Q6H  insulin lispro, 0-7 Units, Subcutaneous, Q6H  meropenem, 1 g, Intravenous, Once  [START ON 8/7/2022] meropenem, 1 g, Intravenous, Q24H  micafungin (MYCAMINE) IV, 100 mg, Intravenous, Q24H  pantoprazole, 40 mg, Intravenous, Q12H  sodium chloride, 10 mL, Intravenous, Q12H  sodium chloride, 10 mL, Intravenous, Q12H  vancomycin, 20 mg/kg (Adjusted), Intravenous, Once      Continuous Infusions:dexmedetomidine, 0.2-1.5 mcg/kg/hr, Last Rate: Stopped (08/06/22 1000)  DOPamine, 2-20 mcg/kg/min, Last Rate: 5 mcg/kg/min (08/06/22 1100)  norepinephrine, 0.02-0.3 mcg/kg/min, Last Rate: 0.5 mcg/kg/min (08/06/22 0854)  Pharmacy to dose vancomycin,   phenylephrine, 0.5-3 mcg/kg/min  sodium bicarbonate drip (greater than 75 mEq/bag), 150 mEq, Last Rate: 150 mEq (08/06/22 0235)  vasopressin, 0.03 Units/min, Last Rate: 0.03 Units/min (08/06/22 0900)      PRN Meds:.•  acetaminophen **OR** acetaminophen  •  dextrose  •  dextrose  •  fentaNYL citrate (PF)  •  glucagon (human recombinant)  •  nitroglycerin  •  ondansetron **OR** ondansetron  •  Pharmacy to dose vancomycin  •  simethicone  •  sodium chloride  •  sodium chloride  •  sodium chloride  •  sodium chloride  •  Vancomycin Pharmacy Intermittent/Pulse Dosing    ALLERGIES:  Cephalexin and Hydrocodone-acetaminophen    ROS:  Review of Systems    The following systems were reviewed and negative;   Constitution:  No fevers, chills, no unintentional weight loss  Skin: no rash, no jaundice  Eyes:  No blurry vision, no eye pain  HENT:  No change in hearing or smell  Resp:  No dyspnea or cough  CV:  No chest pain or palpitations  :  No dysuria, hematuria  Musculoskeletal:  No leg cramps or arthralgias  Neuro:  No tremor, no numbness  Psych:  No  depression or confusion    Objective  Resting in hosptial bed. NAD. Family present     Vital Signs:   Vitals:    08/06/22 1246 08/06/22 1300 08/06/22 1329 08/06/22 1400   BP:   148/70    Pulse:  110 96 94   Resp:   25    Temp: (!) 102 °F (38.9 °C) (!) 101.8 °F (38.8 °C) (!) 102.2 °F (39 °C) (!) 102.4 °F (39.1 °C)   TempSrc:   Bladder    SpO2:  92% 94% 95%   Weight:       Height:           Physical Exam:   General Appearance:    Awake and alert, in no acute distress   Head:    Normocephalic, without obvious abnormality, atraumatic   Eyes:            Conjunctivae normal, anicteric sclerae, pupils equal   Ears:    Ears appear intact with no abnormalities noted   Throat:   No oral lesions, no thrush, oral mucosa moist   Neck:   Supple, no JVD   Lungs:      respirations regular, even and unlabored        Chest Wall:    No abnormalities observed   Abdomen:     Normal bowel sounds, soft, nontender, no rebound or guarding, nondistended, no hepatosplenomegaly   Rectal:     Deferred   Extremities:   Moves all extremities, no edema, no cyanosis   Pulses:   Pulses palpable and equal bilaterally   Skin:   No rash, no jaundice, normal palpation        Neurologic:   Cranial nerves 2 - 12 grossly intact, no asterixis       Results Review:   I reviewed the patient's labs and imaging.  Lab Results (last 24 hours)     Procedure Component Value Units Date/Time    STAT Lactic Acid, Reflex [[id number]] Collected: 08/06/22 1447    Specimen: Blood Updated: 08/06/22 1451    Urine Culture - Urine, Urine, Catheter [[id number]]  (Normal) Collected: 08/05/22 1012    Specimen: Urine, Catheter Updated: 08/06/22 1409     Urine Culture No growth    Comprehensive Metabolic Panel [[id number]]  (Abnormal) Collected: 08/06/22 1302    Specimen: Blood Updated: 08/06/22 1336     Glucose 264 mg/dL      BUN 53 mg/dL      Creatinine 4.58 mg/dL      Sodium 136 mmol/L      Potassium 3.5 mmol/L      Comment: Result checked  Slight hemolysis detected by analyzer.  Results may be affected.        Chloride 96 mmol/L      CO2 16.0 mmol/L      Calcium 6.9 mg/dL      Comment: Result checked          Total Protein 4.8 g/dL      Albumin 2.70 g/dL      ALT (SGPT) 222 U/L      AST (SGOT) 393 U/L      Alkaline Phosphatase 89 U/L      Total Bilirubin 4.6 mg/dL      Globulin 2.1 gm/dL      A/G Ratio 1.3 g/dL      BUN/Creatinine Ratio 11.6     Anion Gap 24.0 mmol/L      eGFR 11.4 mL/min/1.73      Comment: <15 Indicative of kidney failure       Narrative:      GFR Normal >60  Chronic Kidney Disease <60  Kidney Failure <15      Fibrinogen [[id number]]  (Abnormal) Collected: 08/06/22 1110    Specimen: Blood Updated: 08/06/22 1220     Fibrinogen 169 mg/dL     D-dimer, Quantitative [[id number]]  (Abnormal) Collected: 08/06/22 1110    Specimen: Blood Updated: 08/06/22 1220     D-Dimer, Quantitative 6.46 mg/L (FEU)     Narrative:      Reference Range  --------------------------------------------------------------------     < 0.50   Negative Predictive Value  0.50-0.59   Indeterminate    >= 0.60   Probable VTE             A very low percentage of patients with DVT may yield D-Dimer results   below the cut-off of 0.50 mg/L FEU.  This is known to be more   prevalent in patients with distal DVT.             Results of this test should always be interpreted in conjunction with   the patient's medical history, clinical presentation and other   findings.  Clinical diagnosis should not be based on the result of   [place] D-Dimer alone.    aPTT [[id number]]  (Abnormal) Collected: 08/06/22 1110    Specimen: Blood Updated: 08/06/22 1140     PTT 51.9 seconds     Protime-INR [[id number]]  (Abnormal) Collected: 08/06/22 1110    Specimen: Blood Updated: 08/06/22 1140     Protime 20.5 Seconds      INR 2.07    Manual Differential [[id number]]  (Abnormal) Collected: 08/06/22 1033    Specimen: Blood Updated: 08/06/22 1137     Neutrophil % 64.0 %      Lymphocyte % 1.0 %      Monocyte % 5.0 %      Bands %  30.0 %       Neutrophils Absolute 13.91 10*3/mm3      Lymphocytes Absolute 0.15 10*3/mm3      Monocytes Absolute 0.74 10*3/mm3      RBC Morphology Normal     WBC Morphology Normal     Platelet Estimate Decreased    CBC & Differential [[id number]]  (Abnormal) Collected: 08/06/22 1033    Specimen: Blood Updated: 08/06/22 1137    Narrative:      The following orders were created for panel order CBC & Differential.  Procedure                               Abnormality         Status                     ---------                               -----------         ------                     CBC Auto Differential[[id number]]        Abnormal            Final result               Scan Slide[[id number]]                                       Final result                 Please view results for these tests on the individual orders.    Scan Slide [[id number]] Collected: 08/06/22 1033    Specimen: Blood Updated: 08/06/22 1137     Scan Slide --     Comment: See Manual Differential Results       CBC Auto Differential [[id number]]  (Abnormal) Collected: 08/06/22 1033    Specimen: Blood Updated: 08/06/22 1137     WBC 14.80 10*3/mm3      RBC 2.13 10*6/mm3      Hemoglobin 5.9 g/dL      Comment: Result checked          Hematocrit 18.3 %      MCV 85.9 fL      MCH 27.9 pg      MCHC 32.4 g/dL      RDW 14.6 %      RDW-SD 43.3 fl      MPV 9.6 fL      Platelets 95 10*3/mm3     Narrative:      The previously reported component NRBC is no longer being reported. Previous result was 0.0 /100 WBC (Reference Range: 0.0-0.2 /100 WBC) on 8/6/2022 at 1049 EDT.    STAT Lactic Acid, Reflex [[id number]]  (Abnormal) Collected: 08/06/22 1033    Specimen: Blood Updated: 08/06/22 1105     Lactate 6.4 mmol/L     Body Fluid Culture - Body Fluid, Liver [[id number]] Collected: 08/05/22 1755    Specimen: Body Fluid from Liver Updated: 08/06/22 1054     Body Fluid Culture No growth     Gram Stain Few (2+) WBCs per low power field      No organisms seen    Lipase [[id number]]  (Abnormal)  Collected: 08/06/22 1013    Specimen: Blood Updated: 08/06/22 1048     Lipase 4 U/L     Amylase [[id number]]  (Normal) Collected: 08/06/22 1013    Specimen: Blood Updated: 08/06/22 1048     Amylase 39 U/L     Comprehensive Metabolic Panel [[id number]]  (Abnormal) Collected: 08/06/22 1013    Specimen: Blood Updated: 08/06/22 1047     Glucose 543 mg/dL      BUN 40 mg/dL      Creatinine 3.25 mg/dL      Sodium 139 mmol/L      Potassium 2.5 mmol/L      Chloride 96 mmol/L      CO2 29.0 mmol/L      Calcium 5.1 mg/dL      Total Protein 3.5 g/dL      Albumin 1.90 g/dL      ALT (SGPT) 109 U/L      AST (SGOT) 194 U/L      Alkaline Phosphatase 72 U/L      Total Bilirubin 3.1 mg/dL      Globulin 1.6 gm/dL      A/G Ratio 1.2 g/dL      BUN/Creatinine Ratio 12.3     Anion Gap 14.0 mmol/L      eGFR 17.2 mL/min/1.73      Comment: National Kidney Foundation and American Society of Nephrology (ASN) Task Force recommended calculation based on the Chronic Kidney Disease Epidemiology Collaboration (CKD-EPI) equation refit without adjustment for race.       Narrative:      GFR Normal >60  Chronic Kidney Disease <60  Kidney Failure <15      Phosphorus [[id number]]  (Normal) Collected: 08/06/22 1013    Specimen: Blood Updated: 08/06/22 1040     Phosphorus 3.9 mg/dL     Magnesium [[id number]]  (Normal) Collected: 08/06/22 1013    Specimen: Blood Updated: 08/06/22 1040     Magnesium 1.6 mg/dL     Calcium, Ionized [[id number]]  (Abnormal) Collected: 08/06/22 1013    Specimen: Blood Updated: 08/06/22 1030     Ionized Calcium 0.72 mmol/L     Blood Culture - Blood, Blood, Central Line [[id number]]  (Normal) Collected: 08/05/22 0902    Specimen: Blood, Central Line Updated: 08/06/22 0918     Blood Culture No growth at 24 hours    Blood Culture - Blood, Blood, Central Line [[id number]]  (Normal) Collected: 08/05/22 0902    Specimen: Blood, Central Line Updated: 08/06/22 0918     Blood Culture No growth at 24 hours    Lactic Acid, Plasma [[id number]]   (Abnormal) Collected: 08/06/22 0804    Specimen: Blood Updated: 08/06/22 0832     Lactate 8.7 mmol/L     Clostridioides difficile Toxin - Stool, Per Rectum [[id number]]  (Normal) Collected: 08/06/22 0703    Specimen: Stool from Per Rectum Updated: 08/06/22 0758    Narrative:      The following orders were created for panel order Clostridioides difficile Toxin - Stool, Per Rectum.  Procedure                               Abnormality         Status                     ---------                               -----------         ------                     Clostridioides difficile...[[id number]]  Normal              Final result                 Please view results for these tests on the individual orders.    Clostridioides difficile EIA - Stool, Per Rectum [[id number]]  (Normal) Collected: 08/06/22 0703    Specimen: Stool from Per Rectum Updated: 08/06/22 0758     C Diff GDH / Toxin Negative    Manual Differential [[id number]]  (Abnormal) Collected: 08/06/22 0510    Specimen: Blood Updated: 08/06/22 0643     Neutrophil % 71.0 %      Lymphocyte % 0.0 %      Monocyte % 1.0 %      Bands %  28.0 %      Neutrophils Absolute 16.14 10*3/mm3      Lymphocytes Absolute 0.00 10*3/mm3      Monocytes Absolute 0.16 10*3/mm3      RBC Morphology Normal     Vacuolated Neutrophils Mod/2+     Large Platelets Slight/1+    CBC & Differential [[id number]]  (Abnormal) Collected: 08/06/22 0510    Specimen: Blood Updated: 08/06/22 0643    Narrative:      The following orders were created for panel order CBC & Differential.  Procedure                               Abnormality         Status                     ---------                               -----------         ------                     CBC Auto Differential[[id number]]        Abnormal            Final result               Scan Slide[[id number]]                                       Final result                 Please view results for these tests on the individual orders.    Scan Slide  [[id number]] Collected: 08/06/22 0510    Specimen: Blood Updated: 08/06/22 0643     Scan Slide --     Comment: See Manual Differential Results       CBC Auto Differential [[id number]]  (Abnormal) Collected: 08/06/22 0510    Specimen: Blood Updated: 08/06/22 0643     WBC 16.30 10*3/mm3      RBC 3.64 10*6/mm3      Hemoglobin 10.3 g/dL      Comment: Result checked         Hematocrit 31.1 %      MCV 85.5 fL      MCH 28.3 pg      MCHC 33.2 g/dL      RDW 14.4 %      RDW-SD 42.9 fl      MPV 9.1 fL      Platelets 120 10*3/mm3     Narrative:      The previously reported component NRBC is no longer being reported. Previous result was 0.0 /100 WBC (Reference Range: 0.0-0.2 /100 WBC) on 8/6/2022 at 05 EDT.    Comprehensive Metabolic Panel [[id number]]  (Abnormal) Collected: 08/06/22 0510    Specimen: Blood Updated: 08/06/22 0550     Glucose 218 mg/dL      BUN 46 mg/dL      Creatinine 4.06 mg/dL      Sodium 138 mmol/L      Potassium 3.4 mmol/L      Chloride 96 mmol/L      CO2 24.0 mmol/L      Calcium 7.2 mg/dL      Total Protein 4.7 g/dL      Albumin 2.50 g/dL      ALT (SGPT) 98 U/L      AST (SGOT) 177 U/L      Alkaline Phosphatase 75 U/L      Total Bilirubin 4.5 mg/dL      Globulin 2.2 gm/dL      A/G Ratio 1.1 g/dL      BUN/Creatinine Ratio 11.3     Anion Gap 18.0 mmol/L      eGFR 13.2 mL/min/1.73      Comment: <15 Indicative of kidney failure       Narrative:      GFR Normal >60  Chronic Kidney Disease <60  Kidney Failure <15      Lipid Panel [[id number]]  (Abnormal) Collected: 08/06/22 0510    Specimen: Blood Updated: 08/06/22 0550     Total Cholesterol 77 mg/dL      Triglycerides 192 mg/dL      HDL Cholesterol 19 mg/dL      LDL Cholesterol  27 mg/dL      VLDL Cholesterol 31 mg/dL      LDL/HDL Ratio 1.03    Narrative:      Cholesterol Reference Ranges  (U.S. Department of Health and Human Services ATP III Classifications)    Desirable          <200 mg/dL  Borderline High    200-239 mg/dL  High Risk          >240  mg/dL      Triglyceride Reference Ranges  (U.S. Department of Health and Human Services ATP III Classifications)    Normal           <150 mg/dL  Borderline High  150-199 mg/dL  High             200-499 mg/dL  Very High        >500 mg/dL    HDL Reference Ranges  (U.S. Department of Health and Human Services ATP III Classifications)    Low     <40 mg/dl (major risk factor for CHD)  High    >60 mg/dl ('negative' risk factor for CHD)        LDL Reference Ranges  (U.S. Department of Health and Human Services ATP III Classifications)    Optimal          <100 mg/dL  Near Optimal     100-129 mg/dL  Borderline High  130-159 mg/dL  High             160-189 mg/dL  Very High        >189 mg/dL    Magnesium [[id number]]  (Normal) Collected: 08/06/22 0510    Specimen: Blood Updated: 08/06/22 0550     Magnesium 2.1 mg/dL     Phosphorus [[id number]]  (Abnormal) Collected: 08/06/22 0510    Specimen: Blood Updated: 08/06/22 0550     Phosphorus 4.6 mg/dL     Calcium, Ionized [[id number]]  (Abnormal) Collected: 08/06/22 0510    Specimen: Blood Updated: 08/06/22 0534     Ionized Calcium 0.95 mmol/L     Creatinine, Urine, Random - Urine, Clean Catch [[id number]] Collected: 08/05/22 1917    Specimen: Urine, Clean Catch Updated: 08/06/22 0041     Creatinine, Urine 82.1 mg/dL     Narrative:      Reference intervals for random urine have not been established.  Clinical usage is dependent upon physician's interpretation in combination with other laboratory tests.       C3 Complement [[id number]]  (Normal) Collected: 08/05/22 1852    Specimen: Blood Updated: 08/06/22 0033     C3 Complement 92.0 mg/dl     C4 Complement [[id number]]  (Normal) Collected: 08/05/22 1852    Specimen: Blood Updated: 08/06/22 0033     C4 Complement 25.0 mg/dl     POC Glucose Once [[id number]]  (Abnormal) Collected: 08/06/22 0006    Specimen: Blood Updated: 08/06/22 0009     Glucose 188 mg/dL      Comment: Serial Number: [id number]:  [id number]       Anaerobic Culture -  Swab, Liver [[id number]] Collected: 08/05/22 1755    Specimen: Swab from Liver Updated: 08/05/22 2101    Eosinophil Smear - Urine, Urine, Clean Catch [[id number]]  (Normal) Collected: 08/05/22 1917    Specimen: Urine, Clean Catch Updated: 08/05/22 2047     Eosinophil Smear 0 % EOS/100 Cells     Phosphorus [[id number]]  (Normal) Collected: 08/05/22 1852    Specimen: Blood Updated: 08/05/22 2023     Phosphorus 3.8 mg/dL      Comment: Result checked        Magnesium [[id number]]  (Normal) Collected: 08/05/22 1852    Specimen: Blood Updated: 08/05/22 2006     Magnesium 1.8 mg/dL      Comment: Result checked        Sodium, Urine, Random - Urine, Clean Catch [[id number]] Collected: 08/05/22 1917    Specimen: Urine, Clean Catch Updated: 08/05/22 2000     Sodium, Urine 61 mmol/L     Narrative:      Reference intervals for random urine have not been established.  Clinical usage is dependent upon physician's interpretation in combination with other laboratory tests.       Protein, Urine, Random - Urine, Clean Catch [[id number]] Collected: 08/05/22 1917    Specimen: Urine, Clean Catch Updated: 08/05/22 2000     Total Protein, Urine 102.0 mg/dL     Narrative:      Reference intervals for random urine have not been established.  Clinical usage is dependent upon physician's interpretation in combination with other laboratory tests.       Urinalysis, Microscopic Only - Urine, Clean Catch [[id number]]  (Abnormal) Collected: 08/05/22 1917    Specimen: Urine, Clean Catch Updated: 08/05/22 1953     RBC, UA 3-5 /HPF      WBC, UA 13-20 /HPF      Bacteria, UA 2+ /HPF      Squamous Epithelial Cells, UA 3-6 /HPF      Hyaline Casts, UA 0-2 /LPF      Granular Casts, UA 13-20 /LPF      Methodology Manual Light Microscopy    Troponin [[id number]]  (Abnormal) Collected: 08/05/22 1852    Specimen: Blood Updated: 08/05/22 1933     Troponin T 0.045 ng/mL     Narrative:      Troponin T Reference Range:  <= 0.03 ng/mL-   Negative for AMI  >0.03 ng/mL-      Abnormal for myocardial necrosis.  Clinicians would have to utilize clinical acumen, EKG, Troponin and serial changes to determine if it is an Acute Myocardial Infarction or myocardial injury due to an underlying chronic condition.       Results may be falsely decreased if patient taking Biotin.      Lactic Acid, Plasma [[id number]]  (Abnormal) Collected: 08/05/22 1852    Specimen: Blood Updated: 08/05/22 1933     Lactate 3.7 mmol/L     Urinalysis With Microscopic If Indicated (No Culture) - Urine, Clean Catch [[id number]]  (Abnormal) Collected: 08/05/22 1917    Specimen: Urine, Clean Catch Updated: 08/05/22 1927     Color, UA Dark Yellow     Comment: Result checked         Appearance, UA Turbid     Comment: Result checked         pH, UA <=5.0     Specific Gravity, UA 1.035     Glucose, UA Negative     Ketones, UA Negative     Bilirubin, UA Small (1+)     Comment: Confirmation testing is unavailable.  A serum bilirubin is recommended for further assessment.        Blood, UA Large (3+)     Protein,  mg/dL (2+)     Leuk Esterase, UA Small (1+)     Nitrite, UA Negative     Urobilinogen, UA 1.0 E.U./dL    BNP [[id number]]  (Abnormal) Collected: 08/05/22 1852    Specimen: Blood Updated: 08/05/22 1927     proBNP 18,410.0 pg/mL     Narrative:      Among patients with dyspnea, NT-proBNP is highly sensitive for the detection of acute congestive heart failure. In addition NT-proBNP of <300 pg/ml effectively rules out acute congestive heart failure with 99% negative predictive value.    Results may be falsely decreased if patient taking Biotin.      ANCA Panel [[id number]] Collected: 08/05/22 1852    Specimen: Blood Updated: 08/05/22 1905    GWENDOLYN + PE [[id number]] Collected: 08/05/22 1852    Specimen: Blood Updated: 08/05/22 1905    Complement C1q, Quantitative [[id number]] Collected: 08/05/22 1852    Specimen: Blood Updated: 08/05/22 1905    KAREN by IFA, Reflex 9-biomarkers profile [[id number]] Collected: 08/05/22 1852     Specimen: Blood Updated: 08/05/22 1905    POC Glucose Once [[id number]]  (Abnormal) Collected: 08/05/22 1653    Specimen: Blood Updated: 08/05/22 1654     Glucose 149 mg/dL      Comment: Serial Number: [id number]:  [id number]             Imaging Results (Last 24 Hours)     Procedure Component Value Units Date/Time    US Liver [[id number]] Collected: 08/06/22 1337     Updated: 08/06/22 1351    Narrative:      US LIVER-     Date of Exam: 8/6/2022 12:40 PM     Indication: acute anemia s/p liver biopsy; A41.9-Sepsis, unspecified  organism; R65.21-Severe sepsis with septic shock; N17.9-Acute kidney  failure, unspecified.     Comparison: CT abdomen and pelvis and liver ultrasound from August 5, 2022     Technique: Right upper quadrant ultrasound     FINDINGS:     The liver measures 17.2 cm and contains several hyperechoic lesions  measuring up to 10.8 cm in the right hepatic lobe. These appear  increased in size from recent prior ultrasound, previously measuring up  to 5.1 cm. The hepatic vasculature appears within normal limits. The  common bile duct is normal in caliber at 4.1 mm. No intrahepatic biliary  ductal dilatation is identified.       Impression:      Multiple hyperechoic lesions within liver, which appear increased in  size from prior ultrasound. Changes could be secondary to interval liver  biopsy. Recommend correlation with pathology results.     Electronically Signed By-Mervin Martell MD On:8/6/2022 1:46 PM  This report was finalized on [id number] by  Mervin Martell MD.    XR Chest 1 View [[id number]] Collected: 08/06/22 1252     Updated: 08/06/22 1301    Narrative:      EXAM: XR CHEST 1 VW-     DATE OF EXAM: 8/6/2022 12:31 PM     INDICATION: hypoxic respiratory failure; A41.9-Sepsis, unspecified  organism; R65.21-Severe sepsis with septic shock; N17.9-Acute kidney  failure, unspecified.       COMPARISONS: Chest x-ray earlier today at 8:33 AM      FINDINGS:     There is new left lower lobe  retrocardiac airspace disease. Unchanged  right lower lobe opacity.     A right-sided PICC line is now present with tip at the cavoatrial  junction..       Impression:         New left lower lobe disease. With the provided history this is  concerning for developing pneumonia, possibly aspiration given its  location.     Unchanged right lower lobe atelectasis versus pneumonia.     Right-sided PICC line in good position.     No pneumothorax.     Electronically Signed ByLouisa Thapa On:8/6/2022 12:53 PM  This report was finalized on [id number] by  Ankur Thapa, .    XR Chest 1 View [[id number]] Collected: 08/06/22 0907     Updated: 08/06/22 0911    Narrative:      EXAM: XR CHEST 1 VW-     DATE OF EXAM: 8/6/2022 8:51 AM     INDICATION: central line dislodged, check position; A41.9-Sepsis,  unspecified organism; R65.21-Severe sepsis with septic shock;  N17.9-Acute kidney failure, unspecified.       COMPARISONS: August 5      FINDINGS:     Right-sided central line tip is in the right lower neck above the  clavicle.     No pneumothorax. Mild right basilar atelectasis versus pneumonia. The  stent is unchanged.       Impression:         Right sided central line tip in the lower neck in the location of the  lower internal jugular vein, just above the clavicle.     No pneumothorax. Mild right basilar atelectasis versus pneumonia.     Electronically Signed ByLouisa Thapa On:8/6/2022 9:08 AM  This report was finalized on [id number] by  Ankur Thapa, .    XR Abdomen KUB [[id number]] Collected: 08/06/22 0646     Updated: 08/06/22 0648    Narrative:      EXAMINATION: XR ABDOMEN KUB    DATE: 8/6/2022 6:22 AM  SLOT:  60    INDICATION: Abdominal pain     COMPARISON: None available.     TECHNIQUE: Supine and upright radiographs of the abdomen.     FINDINGS/    Impression:         Body habitus and a generalized paucity of bowel gas mildly limits evaluation. No significantly dilated gas-filled loops of small bowel are seen to suggest  an obstruction. No free peritoneal air.  No suspicious calcifications detected.  No acute osseous   pathology.                  Electronically signed by:  Nj Rosario    8/6/2022 4:47 AM    CT Needle Biopsy Liver [[id number]] Collected: 08/05/22 1823    Specimen: Tissue Updated: 08/05/22 1828    Narrative:      DATE OF EXAM:  8/5/2022 5:07 PM     PROCEDURE:  CT NEEDLE BIOPSY LIVER-     INDICATIONS:  Possible liver abscess versus mass; A41.9-Sepsis, unspecified organism;  R65.21-Severe sepsis with septic shock; N17.9-Acute kidney failure,  unspecified     COMPARISON:  No Comparisons Available     FLUOROSCOPIC TIME:  CT fluoroscopy time 5.84 seconds     PHYSICIAN MONITORED CONSCIOUS SEDATION TIME:  Fentanyl only     CONTRAST MEDIA:  None due to acute renal failure     TECHNIQUE:   The patient's medications were reviewed prior to the procedure. The  procedure, risks and alternatives were discussed and informed written  consent was obtained. Maximal sterile barrier technique was utilized  throughout the procedure. The patient was placed in the supine position  in the CT suite. Preliminary noncontrast images were obtained. The areas  in question particularly in the right lobe are not clearly identified.  Aspiration was performed of the areas yielding only blood. Ultrasound  was then utilized to confirm placement of the needle and the area in  question. Following confirmation, core biopsies were obtained and sent  for both histology and for cultures. Gelfoam pledgets were injected  through the needle and the needle was removed. The patient was returned  to the intensive care area.          Impression:         1. The areas identified on the patient's contrast-enhanced CT and recent  ultrasound are not clearly identified on the noncontrast scan. Despite  targeting the area when compared with CT, no purulent fluid could be  aspirated. Ultrasound was utilized and to confirm that the area in  question was being sampled.  Core specimens were then obtained of this  area. Given the absence of any purulent fluid and the nonvisualization  on the noncontrast scan this is very unlikely to represent an  intrahepatic abscess. These findings were discussed with Dr. Orozco by  telephone immediately following the biopsy.     Electronically Signed By-Nahum Ross MD On:8/5/2022 6:26 PM  This report was finalized on [id number] by  Nahum Ross MD.             ASSESSMENT AND PLAN:    See assessment & plan with Dr Ospina     I discussed the patient's findings and my recommendations with the patient.  GRACIELA Cowan  08/06/22  15:14 EDT    Time:

## 2022-08-06 NOTE — PLAN OF CARE
Goal Outcome Evaluation:           Progress: no change  Outcome Evaluation: See previous nursing notes regarding patient hemodynamic status and medication changes. At this time, patient is on vaso and bicarb gtts. Shiley placed, pending CXR for placement confirmation before starting CRRT. Pt received 2 unit PRBC and 2 unit FFP. General surgery and GI consulted. Tmax 103, placed on cooling blanket.

## 2022-08-06 NOTE — CONSULTS
GENERAL SURGERY CONSULTATION NOTE    Consult requested by: [facility] ICU    Patient Care Team:  Maribel Graf MD as PCP - General (Family Medicine)  AVA Cavanaugh MD as Consulting Physician (Cardiology)    Reason for consult: Abdominal pain and anemia of acute blood loss    Subjective     Patient is a 47 y.o. female presented to the hospital yesterday with fevers, chills, body aches and then developed vomiting and diarrhea on the day of admission.  When she arrived at the ER she was found to have a fever of 103.1, heart rate of 146 and was hypotensive.  Her hypotension improved after vasopressor administration.  A CT scan of the abdomen and pelvis was obtained and demonstrated multiple ill-defined lesions throughout her liver concerning for possible hepatic abscesses.  Her laboratory values were significant for a UTI with MARIANNE, and coagulopathy with an INR of 2.5.  The patient was then seen by infectious disease who recommended biopsy/drainage of these areas in the liver to look for a source of infection.  She was given 1 unit of FFP and taken to interventional radiology and subsequently underwent aspiration and biopsy of these sites.  This morning, the patient was noted to have profound hypotension requiring 3 pressors and an acute drop in her hemoglobin from 13 yesterday to 5.9 today.  General surgery was consulted as the patient had abdominal pain and bleeding.     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   • Melanoma (HCC)    • Near syncope 2017   • Pulmonary hypertension (HCC)    • Urinary tract infection     chronic hematuria, seen urology     Past Surgical History:   Procedure Laterality Date   •  SECTION  13 and 14   • SKIN CANCER EXCISION     • TUBAL ABDOMINAL LIGATION       Family History   Problem Relation Age of Onset   • Kidney disease Mother    • Kidney  disease Father    • Cancer Father         lung   • Diabetes Brother    • Heart disease Brother         CHF     Social History     Tobacco Use   • Smoking status: Never Smoker   • Smokeless tobacco: Never Used   Substance Use Topics   • Alcohol use: Yes     Comment: caffeine 1 cup qd   • Drug use: No     Medications Prior to Admission   Medication Sig Dispense Refill Last Dose   • escitalopram (LEXAPRO) 10 MG tablet TAKE 1 TABLET BY MOUTH EVERY DAY 90 tablet 2 Past Week at Unknown time   • lisinopril (PRINIVIL,ZESTRIL) 5 MG tablet TAKE 1 TABLET BY MOUTH EVERY DAY 90 tablet 2 Past Week at Unknown time     Allergies:  Cephalexin and Hydrocodone-acetaminophen    Objective     Vital Signs  Temp:  [98.1 °F (36.7 °C)-101.8 °F (38.8 °C)] 100.9 °F (38.3 °C)  Heart Rate:  [] 115  Resp:  [15-28] 28  BP: ()/(38-92) 62/38  Arterial Line BP: (126)/(80) 126/80    Physical Exam  Vitals reviewed.   Constitutional:       General: She is in acute distress.      Appearance: She is obese. She is ill-appearing. She is not toxic-appearing.   HENT:      Head: Normocephalic and atraumatic.   Cardiovascular:      Rate and Rhythm: Regular rhythm. Tachycardia present.   Pulmonary:      Effort: Respiratory distress present.      Comments: On 3 L nasal cannula  Abdominal:      General: There is no distension.      Palpations: Abdomen is soft.      Tenderness: There is abdominal tenderness in the right upper quadrant and epigastric area. There is guarding. There is no rebound.   Musculoskeletal:         General: Swelling present. Normal range of motion.   Skin:     General: Skin is warm and dry.      Coloration: Skin is not jaundiced.   Neurological:      General: No focal deficit present.      Mental Status: She is alert. She is disoriented.   Psychiatric:         Mood and Affect: Mood normal.         Behavior: Behavior normal.         Thought Content: Thought content normal.         Judgment: Judgment normal.         Results Review:    Lab Results (last 24 hours)     Procedure Component Value Units Date/Time    Protime-INR [[id number]] Collected: 08/06/22 1110    Specimen: Blood Updated: 08/06/22 1112    aPTT [[id number]] Collected: 08/06/22 1110    Specimen: Blood Updated: 08/06/22 1112    STAT Lactic Acid, Reflex [[id number]]  (Abnormal) Collected: 08/06/22 1033    Specimen: Blood Updated: 08/06/22 1105     Lactate 6.4 mmol/L     Body Fluid Culture - Body Fluid, Liver [[id number]] Collected: 08/05/22 1755    Specimen: Body Fluid from Liver Updated: 08/06/22 1054     Body Fluid Culture No growth     Gram Stain Few (2+) WBCs per low power field      No organisms seen    CBC Auto Differential [[id number]]  (Abnormal) Collected: 08/06/22 1033    Specimen: Blood Updated: 08/06/22 1049     WBC 14.80 10*3/mm3      RBC 2.13 10*6/mm3      Hemoglobin 5.9 g/dL      Hematocrit 18.3 %      MCV 85.9 fL      MCH 27.9 pg      MCHC 32.4 g/dL      RDW 14.6 %      RDW-SD 43.3 fl      MPV 9.6 fL      Platelets 95 10*3/mm3      nRBC 0.0 /100 WBC     Lipase [[id number]]  (Abnormal) Collected: 08/06/22 1013    Specimen: Blood Updated: 08/06/22 1048     Lipase 4 U/L     Amylase [[id number]]  (Normal) Collected: 08/06/22 1013    Specimen: Blood Updated: 08/06/22 1048     Amylase 39 U/L     Comprehensive Metabolic Panel [[id number]]  (Abnormal) Collected: 08/06/22 1013    Specimen: Blood Updated: 08/06/22 1047     Glucose 543 mg/dL      BUN 40 mg/dL      Creatinine 3.25 mg/dL      Sodium 139 mmol/L      Potassium 2.5 mmol/L      Chloride 96 mmol/L      CO2 29.0 mmol/L      Calcium 5.1 mg/dL      Total Protein 3.5 g/dL      Albumin 1.90 g/dL      ALT (SGPT) 109 U/L      AST (SGOT) 194 U/L      Alkaline Phosphatase 72 U/L      Total Bilirubin 3.1 mg/dL      Globulin 1.6 gm/dL      A/G Ratio 1.2 g/dL      BUN/Creatinine Ratio 12.3     Anion Gap 14.0 mmol/L      eGFR 17.2 mL/min/1.73      Comment: National Kidney Foundation and American Society of Nephrology (ASN) Task Force  recommended calculation based on the Chronic Kidney Disease Epidemiology Collaboration (CKD-EPI) equation refit without adjustment for race.       Narrative:      GFR Normal >60  Chronic Kidney Disease <60  Kidney Failure <15      CBC & Differential [[id number]]  (Abnormal) Collected: 08/06/22 1033    Specimen: Blood Updated: 08/06/22 1043    Narrative:      The following orders were created for panel order CBC & Differential.  Procedure                               Abnormality         Status                     ---------                               -----------         ------                     CBC Auto Differential[[id number]]        Abnormal            Preliminary result         Scan Slide[[id number]]                                       In process                   Please view results for these tests on the individual orders.    Scan Slide [[id number]] Collected: 08/06/22 1033    Specimen: Blood Updated: 08/06/22 1043    Phosphorus [[id number]]  (Normal) Collected: 08/06/22 1013    Specimen: Blood Updated: 08/06/22 1040     Phosphorus 3.9 mg/dL     Magnesium [[id number]]  (Normal) Collected: 08/06/22 1013    Specimen: Blood Updated: 08/06/22 1040     Magnesium 1.6 mg/dL     Calcium, Ionized [[id number]]  (Abnormal) Collected: 08/06/22 1013    Specimen: Blood Updated: 08/06/22 1030     Ionized Calcium 0.72 mmol/L     Blood Culture - Blood, Blood, Central Line [[id number]]  (Normal) Collected: 08/05/22 0902    Specimen: Blood, Central Line Updated: 08/06/22 0918     Blood Culture No growth at 24 hours    Blood Culture - Blood, Blood, Central Line [[id number]]  (Normal) Collected: 08/05/22 0902    Specimen: Blood, Central Line Updated: 08/06/22 0918     Blood Culture No growth at 24 hours    Lactic Acid, Plasma [[id number]]  (Abnormal) Collected: 08/06/22 0804    Specimen: Blood Updated: 08/06/22 0832     Lactate 8.7 mmol/L     Clostridioides difficile Toxin - Stool, Per Rectum [[id number]]  (Normal) Collected: 08/06/22  0703    Specimen: Stool from Per Rectum Updated: 08/06/22 0758    Narrative:      The following orders were created for panel order Clostridioides difficile Toxin - Stool, Per Rectum.  Procedure                               Abnormality         Status                     ---------                               -----------         ------                     Clostridioides difficile...[[id number]]  Normal              Final result                 Please view results for these tests on the individual orders.    Clostridioides difficile EIA - Stool, Per Rectum [[id number]]  (Normal) Collected: 08/06/22 0703    Specimen: Stool from Per Rectum Updated: 08/06/22 0758     C Diff GDH / Toxin Negative    Manual Differential [[id number]]  (Abnormal) Collected: 08/06/22 0510    Specimen: Blood Updated: 08/06/22 0643     Neutrophil % 71.0 %      Lymphocyte % 0.0 %      Monocyte % 1.0 %      Bands %  28.0 %      Neutrophils Absolute 16.14 10*3/mm3      Lymphocytes Absolute 0.00 10*3/mm3      Monocytes Absolute 0.16 10*3/mm3      RBC Morphology Normal     Vacuolated Neutrophils Mod/2+     Large Platelets Slight/1+    CBC & Differential [[id number]]  (Abnormal) Collected: 08/06/22 0510    Specimen: Blood Updated: 08/06/22 0643    Narrative:      The following orders were created for panel order CBC & Differential.  Procedure                               Abnormality         Status                     ---------                               -----------         ------                     CBC Auto Differential[[id number]]        Abnormal            Final result               Scan Slide[[id number]]                                       Final result                 Please view results for these tests on the individual orders.    Scan Slide [[id number]] Collected: 08/06/22 0510    Specimen: Blood Updated: 08/06/22 0643     Scan Slide --     Comment: See Manual Differential Results       CBC Auto Differential [[id number]]  (Abnormal) Collected:  08/06/22 0510    Specimen: Blood Updated: 08/06/22 0643     WBC 16.30 10*3/mm3      RBC 3.64 10*6/mm3      Hemoglobin 10.3 g/dL      Comment: Result checked         Hematocrit 31.1 %      MCV 85.5 fL      MCH 28.3 pg      MCHC 33.2 g/dL      RDW 14.4 %      RDW-SD 42.9 fl      MPV 9.1 fL      Platelets 120 10*3/mm3     Narrative:      The previously reported component NRBC is no longer being reported. Previous result was 0.0 /100 WBC (Reference Range: 0.0-0.2 /100 WBC) on 8/6/2022 at 0527 EDT.    Comprehensive Metabolic Panel [[id number]]  (Abnormal) Collected: 08/06/22 0510    Specimen: Blood Updated: 08/06/22 0550     Glucose 218 mg/dL      BUN 46 mg/dL      Creatinine 4.06 mg/dL      Sodium 138 mmol/L      Potassium 3.4 mmol/L      Chloride 96 mmol/L      CO2 24.0 mmol/L      Calcium 7.2 mg/dL      Total Protein 4.7 g/dL      Albumin 2.50 g/dL      ALT (SGPT) 98 U/L      AST (SGOT) 177 U/L      Alkaline Phosphatase 75 U/L      Total Bilirubin 4.5 mg/dL      Globulin 2.2 gm/dL      A/G Ratio 1.1 g/dL      BUN/Creatinine Ratio 11.3     Anion Gap 18.0 mmol/L      eGFR 13.2 mL/min/1.73      Comment: <15 Indicative of kidney failure       Narrative:      GFR Normal >60  Chronic Kidney Disease <60  Kidney Failure <15      Lipid Panel [[id number]]  (Abnormal) Collected: 08/06/22 0510    Specimen: Blood Updated: 08/06/22 0550     Total Cholesterol 77 mg/dL      Triglycerides 192 mg/dL      HDL Cholesterol 19 mg/dL      LDL Cholesterol  27 mg/dL      VLDL Cholesterol 31 mg/dL      LDL/HDL Ratio 1.03    Narrative:      Cholesterol Reference Ranges  (U.S. Department of Health and Human Services ATP III Classifications)    Desirable          <200 mg/dL  Borderline High    200-239 mg/dL  High Risk          >240 mg/dL      Triglyceride Reference Ranges  (U.S. Department of Health and Human Services ATP III Classifications)    Normal           <150 mg/dL  Borderline High  150-199 mg/dL  High             200-499 mg/dL  Very High         >500 mg/dL    HDL Reference Ranges  (U.S. Department of Health and Human Services ATP III Classifications)    Low     <40 mg/dl (major risk factor for CHD)  High    >60 mg/dl ('negative' risk factor for CHD)        LDL Reference Ranges  (U.S. Department of Health and Human Services ATP III Classifications)    Optimal          <100 mg/dL  Near Optimal     100-129 mg/dL  Borderline High  130-159 mg/dL  High             160-189 mg/dL  Very High        >189 mg/dL    Magnesium [[id number]]  (Normal) Collected: 08/06/22 0510    Specimen: Blood Updated: 08/06/22 0550     Magnesium 2.1 mg/dL     Phosphorus [[id number]]  (Abnormal) Collected: 08/06/22 0510    Specimen: Blood Updated: 08/06/22 0550     Phosphorus 4.6 mg/dL     Calcium, Ionized [[id number]]  (Abnormal) Collected: 08/06/22 0510    Specimen: Blood Updated: 08/06/22 0534     Ionized Calcium 0.95 mmol/L     Creatinine, Urine, Random - Urine, Clean Catch [[id number]] Collected: 08/05/22 1917    Specimen: Urine, Clean Catch Updated: 08/06/22 0041     Creatinine, Urine 82.1 mg/dL     Narrative:      Reference intervals for random urine have not been established.  Clinical usage is dependent upon physician's interpretation in combination with other laboratory tests.       C3 Complement [[id number]]  (Normal) Collected: 08/05/22 1852    Specimen: Blood Updated: 08/06/22 0033     C3 Complement 92.0 mg/dl     C4 Complement [[id number]]  (Normal) Collected: 08/05/22 1852    Specimen: Blood Updated: 08/06/22 0033     C4 Complement 25.0 mg/dl     POC Glucose Once [[id number]]  (Abnormal) Collected: 08/06/22 0006    Specimen: Blood Updated: 08/06/22 0009     Glucose 188 mg/dL      Comment: Serial Number: [id number]:  [id number]       Anaerobic Culture - Swab, Liver [[id number]] Collected: 08/05/22 1755    Specimen: Swab from Liver Updated: 08/05/22 2101    Eosinophil Smear - Urine, Urine, Clean Catch [[id number]]  (Normal) Collected: 08/05/22 1917    Specimen: Urine, Clean  Catch Updated: 08/05/22 2047     Eosinophil Smear 0 % EOS/100 Cells     Phosphorus [[id number]]  (Normal) Collected: 08/05/22 1852    Specimen: Blood Updated: 08/05/22 2023     Phosphorus 3.8 mg/dL      Comment: Result checked        Magnesium [[id number]]  (Normal) Collected: 08/05/22 1852    Specimen: Blood Updated: 08/05/22 2006     Magnesium 1.8 mg/dL      Comment: Result checked        Sodium, Urine, Random - Urine, Clean Catch [[id number]] Collected: 08/05/22 1917    Specimen: Urine, Clean Catch Updated: 08/05/22 2000     Sodium, Urine 61 mmol/L     Narrative:      Reference intervals for random urine have not been established.  Clinical usage is dependent upon physician's interpretation in combination with other laboratory tests.       Protein, Urine, Random - Urine, Clean Catch [[id number]] Collected: 08/05/22 1917    Specimen: Urine, Clean Catch Updated: 08/05/22 2000     Total Protein, Urine 102.0 mg/dL     Narrative:      Reference intervals for random urine have not been established.  Clinical usage is dependent upon physician's interpretation in combination with other laboratory tests.       Urinalysis, Microscopic Only - Urine, Clean Catch [[id number]]  (Abnormal) Collected: 08/05/22 1917    Specimen: Urine, Clean Catch Updated: 08/05/22 1953     RBC, UA 3-5 /HPF      WBC, UA 13-20 /HPF      Bacteria, UA 2+ /HPF      Squamous Epithelial Cells, UA 3-6 /HPF      Hyaline Casts, UA 0-2 /LPF      Granular Casts, UA 13-20 /LPF      Methodology Manual Light Microscopy    Troponin [[id number]]  (Abnormal) Collected: 08/05/22 1852    Specimen: Blood Updated: 08/05/22 1933     Troponin T 0.045 ng/mL     Narrative:      Troponin T Reference Range:  <= 0.03 ng/mL-   Negative for AMI  >0.03 ng/mL-     Abnormal for myocardial necrosis.  Clinicians would have to utilize clinical acumen, EKG, Troponin and serial changes to determine if it is an Acute Myocardial Infarction or myocardial injury due to an underlying chronic  condition.       Results may be falsely decreased if patient taking Biotin.      Lactic Acid, Plasma [[id number]]  (Abnormal) Collected: 08/05/22 1852    Specimen: Blood Updated: 08/05/22 1933     Lactate 3.7 mmol/L     Urinalysis With Microscopic If Indicated (No Culture) - Urine, Clean Catch [[id number]]  (Abnormal) Collected: 08/05/22 1917    Specimen: Urine, Clean Catch Updated: 08/05/22 1927     Color, UA Dark Yellow     Comment: Result checked         Appearance, UA Turbid     Comment: Result checked         pH, UA <=5.0     Specific Gravity, UA 1.035     Glucose, UA Negative     Ketones, UA Negative     Bilirubin, UA Small (1+)     Comment: Confirmation testing is unavailable.  A serum bilirubin is recommended for further assessment.        Blood, UA Large (3+)     Protein,  mg/dL (2+)     Leuk Esterase, UA Small (1+)     Nitrite, UA Negative     Urobilinogen, UA 1.0 E.U./dL    BNP [[id number]]  (Abnormal) Collected: 08/05/22 1852    Specimen: Blood Updated: 08/05/22 1927     proBNP 18,410.0 pg/mL     Narrative:      Among patients with dyspnea, NT-proBNP is highly sensitive for the detection of acute congestive heart failure. In addition NT-proBNP of <300 pg/ml effectively rules out acute congestive heart failure with 99% negative predictive value.    Results may be falsely decreased if patient taking Biotin.      ANCA Panel [[id number]] Collected: 08/05/22 1852    Specimen: Blood Updated: 08/05/22 1905    GWENDOLYN + PE [[id number]] Collected: 08/05/22 1852    Specimen: Blood Updated: 08/05/22 1905    Complement C1q, Quantitative [[id number]] Collected: 08/05/22 1852    Specimen: Blood Updated: 08/05/22 1905    KAREN by IFA, Reflex 9-biomarkers profile [[id number]] Collected: 08/05/22 1852    Specimen: Blood Updated: 08/05/22 1905    POC Glucose Once [[id number]]  (Abnormal) Collected: 08/05/22 1653    Specimen: Blood Updated: 08/05/22 1654     Glucose 149 mg/dL      Comment: Serial Number: [id number]:   [id number]       Manual Differential [[id number]]  (Abnormal) Collected: 08/05/22 1400    Specimen: Blood Updated: 08/05/22 1511     Neutrophil % 59.0 %      Lymphocyte % 0.0 %      Monocyte % 5.0 %      Bands %  29.0 %      Metamyelocyte % 3.0 %      Myelocyte % 4.0 %      Neutrophils Absolute 17.86 10*3/mm3      Lymphocytes Absolute 0.00 10*3/mm3      Monocytes Absolute 1.02 10*3/mm3      RBC Morphology Normal     Vacuolated Neutrophils Mod/2+     Large Platelets Slight/1+    CBC & Differential [[id number]]  (Abnormal) Collected: 08/05/22 1400    Specimen: Blood Updated: 08/05/22 1511    Narrative:      The following orders were created for panel order CBC & Differential.  Procedure                               Abnormality         Status                     ---------                               -----------         ------                     CBC Auto Differential[[id number]]        Abnormal            Final result               Scan Slide[[id number]]                                       Final result                 Please view results for these tests on the individual orders.    Scan Slide [[id number]] Collected: 08/05/22 1400    Specimen: Blood Updated: 08/05/22 1511     Scan Slide --     Comment: See Manual Differential Results       CBC Auto Differential [[id number]]  (Abnormal) Collected: 08/05/22 1400    Specimen: Blood Updated: 08/05/22 1511     WBC 20.30 10*3/mm3      RBC 4.77 10*6/mm3      Hemoglobin 13.7 g/dL      Hematocrit 40.8 %      MCV 85.4 fL      MCH 28.8 pg      MCHC 33.7 g/dL      RDW 14.4 %      RDW-SD 43.3 fl      MPV 8.6 fL      Platelets 188 10*3/mm3     Narrative:      The previously reported component NRBC is no longer being reported. Previous result was 0.0 /100 WBC (Reference Range: 0.0-0.2 /100 WBC) on 8/5/2022 at 1413 EDT.    Comprehensive Metabolic Panel [[id number]]  (Abnormal) Collected: 08/05/22 1400    Specimen: Blood Updated: 08/05/22 1444     Glucose 165 mg/dL      BUN 38 mg/dL       Creatinine 4.03 mg/dL      Sodium 133 mmol/L      Potassium 3.7 mmol/L      Comment: Slight hemolysis detected by analyzer. Results may be affected.        Chloride 99 mmol/L      CO2 19.0 mmol/L      Calcium 7.5 mg/dL      Total Protein 5.5 g/dL      Albumin 2.90 g/dL      ALT (SGPT) 27 U/L      AST (SGOT) 45 U/L      Alkaline Phosphatase 64 U/L      Total Bilirubin 4.6 mg/dL      Globulin 2.6 gm/dL      A/G Ratio 1.1 g/dL      BUN/Creatinine Ratio 9.4     Anion Gap 15.0 mmol/L      eGFR 13.3 mL/min/1.73      Comment: <15 Indicative of kidney failure       Narrative:      GFR Normal >60  Chronic Kidney Disease <60  Kidney Failure <15      Troponin [[id number]]  (Abnormal) Collected: 08/05/22 1400    Specimen: Blood Updated: 08/05/22 1442     Troponin T 0.032 ng/mL     Narrative:      Troponin T Reference Range:  <= 0.03 ng/mL-   Negative for AMI  >0.03 ng/mL-     Abnormal for myocardial necrosis.  Clinicians would have to utilize clinical acumen, EKG, Troponin and serial changes to determine if it is an Acute Myocardial Infarction or myocardial injury due to an underlying chronic condition.       Results may be falsely decreased if patient taking Biotin.      Lactic Acid, Plasma [[id number]]  (Abnormal) Collected: 08/05/22 1400    Specimen: Blood Updated: 08/05/22 1442     Lactate 2.9 mmol/L     Phosphorus [[id number]]  (Normal) Collected: 08/05/22 1400    Specimen: Blood Updated: 08/05/22 1430     Phosphorus 2.9 mg/dL     Magnesium [[id number]]  (Abnormal) Collected: 08/05/22 1400    Specimen: Blood Updated: 08/05/22 1430     Magnesium 1.2 mg/dL     Protime-INR [[id number]]  (Abnormal) Collected: 08/05/22 1400    Specimen: Blood Updated: 08/05/22 1425     Protime 24.2 Seconds      INR 2.47    Gastrointestinal Panel, PCR - Stool, Per Rectum [[id number]]  (Abnormal) Collected: 08/05/22 1057    Specimen: Stool from Per Rectum Updated: 08/05/22 1238     Campylobacter Not Detected     Plesiomonas shigelloides Not Detected      Salmonella Not Detected     Vibrio Not Detected     Vibrio cholerae Not Detected     Yersinia enterocolitica Not Detected     Enteroaggregative E. coli (EAEC) Not Detected     Enteropathogenic E. coli (EPEC) Detected     Enterotoxigenic E. coli (ETEC) lt/st Not Detected     Shiga-like toxin-producing E. coli (STEC) stx1/stx2 Not Detected     Shigella/Enteroinvasive E. coli (EIEC) Not Detected     Cryptosporidium Not Detected     Cyclospora cayetanensis Not Detected     Entamoeba histolytica Not Detected     Giardia lamblia Not Detected     Adenovirus F40/41 Not Detected     Astrovirus Not Detected     Norovirus GI/GII Not Detected     Rotavirus A Not Detected     Sapovirus (I, II, IV or V) Not Detected    S. Pneumo Ag Urine or CSF - Urine, Urine, Clean Catch [[id number]]  (Normal) Collected: 08/05/22 1154    Specimen: Urine, Clean Catch Updated: 08/05/22 1234     Strep Pneumo Ag Negative    Legionella Antigen, Urine - Urine, Urine, Clean Catch [[id number]]  (Normal) Collected: 08/05/22 1154    Specimen: Urine, Clean Catch Updated: 08/05/22 1234     LEGIONELLA ANTIGEN, URINE Negative    MRSA Screen, PCR (Inpatient) - Swab, Nares [[id number]]  (Normal) Collected: 08/05/22 1057    Specimen: Swab from Nares Updated: 08/05/22 1222     MRSA PCR No MRSA Detected    Narrative:      The negative predictive value of this diagnostic test is high and should only be used to consider de-escalating anti-MRSA therapy. A positive result may indicate colonization with MRSA and must be correlated clinically.    Pregnancy, Urine - Urine, Clean Catch [[id number]]  (Normal) Collected: 08/05/22 1154    Specimen: Urine, Clean Catch Updated: 08/05/22 1201     HCG, Urine QL Negative    Lactic Acid, Plasma [[id number]]  (Abnormal) Collected: 08/05/22 1056    Specimen: Blood Updated: 08/05/22 1124     Lactate 3.2 mmol/L         CT Abdomen Pelvis With Contrast    Result Date: 8/5/2022  1. Multiple ill-defined low-density lesions are scattered  within the liver parenchyma. These do not represent the appearance of typical cysts. In the context of history of septic shock, hepatic phlegmon or evolving hepatic abscesses could be considered. 2. Minimal stranding is seen at the level of the pancreatic tail which is nonspecific. This could represent normal variant for this patient. Very mild pancreatitis could have a similar appearance, although the remainder the pancreas itself appears unremarkable. Correlate with laboratory findings. 3. There is very mild interstitial thickening in both lungs and faint stranding within the anterior superior mediastinum, which may raise the possibility of mild early edema. 4. There is long segment thickening of the mid to lower thoracic esophagus with small esophageal hiatal hernia. Correlate for esophagitis symptoms. Endoscopic correlation may prove helpful. 5. Mild dependent atelectasis in both lungs. 6. No pulmonary embolism is seen.    Electronically Signed By-Franca Gallegos MD On:8/5/2022 8:58 AM This report was finalized on [id number] by  Franca Gallegos MD.    US Liver    Result Date: 8/5/2022    IMPRESSION: 1. Echogenic foci within the right and left hepatic lobe without internal hypervascularity, superimposed upon background hepatic hypoechoic parenchyma. The echogenic foci do not have a typical appearance for fluid collections or abscesses. Other etiologies such as hemangiomas, areas of fatty sparing upon background steatosis, focal fibrosis/scarring, could be considered. These may be further and better characterize utilizing MRI without and with contrast liver protocol. 2. No abnormal biliary dilation is seen.  Electronically Signed By-Franca Gallegos MD On:8/5/2022 1:17 PM This report was finalized on [id number] by  Franca Gallegos MD.    XR Chest 1 View    Result Date: 8/6/2022   Right sided central line tip in the lower neck in the location of the lower internal jugular vein, just above the clavicle.  No  pneumothorax. Mild right basilar atelectasis versus pneumonia.  Electronically Signed By-Ankur Thapa On:8/6/2022 9:08 AM This report was finalized on [id number] by  Ankur Thapa, .    XR Chest 1 View    Result Date: 8/5/2022  1.     Tip of the right internal jugular central venous catheter terminates in the right atrium. No visible pneumothorax. 2.     Low lung volumes without evidence of acute cardiopulmonary abnormality.  Electronically Signed By-Hui Raman MD On:8/5/2022 9:34 AM This report was finalized on [id number] by  Hiu Raman MD.    CT Angiogram Chest Pulmonary Embolism    Result Date: 8/5/2022  1. Multiple ill-defined low-density lesions are scattered within the liver parenchyma. These do not represent the appearance of typical cysts. In the context of history of septic shock, hepatic phlegmon or evolving hepatic abscesses could be considered. 2. Minimal stranding is seen at the level of the pancreatic tail which is nonspecific. This could represent normal variant for this patient. Very mild pancreatitis could have a similar appearance, although the remainder the pancreas itself appears unremarkable. Correlate with laboratory findings. 3. There is very mild interstitial thickening in both lungs and faint stranding within the anterior superior mediastinum, which may raise the possibility of mild early edema. 4. There is long segment thickening of the mid to lower thoracic esophagus with small esophageal hiatal hernia. Correlate for esophagitis symptoms. Endoscopic correlation may prove helpful. 5. Mild dependent atelectasis in both lungs. 6. No pulmonary embolism is seen.    Electronically Signed By-Franca Gallegos MD On:8/5/2022 8:58 AM This report was finalized on [id number] by  Franca Gallegos MD.    CT Needle Biopsy Liver    Result Date: 8/5/2022   1. The areas identified on the patient's contrast-enhanced CT and recent ultrasound are not clearly identified on the noncontrast scan.  Despite targeting the area when compared with CT, no purulent fluid could be aspirated. Ultrasound was utilized and to confirm that the area in question was being sampled. Core specimens were then obtained of this area. Given the absence of any purulent fluid and the nonvisualization on the noncontrast scan this is very unlikely to represent an intrahepatic abscess. These findings were discussed with Dr. Orozco by telephone immediately following the biopsy.  Electronically Signed By-Nahum Ross MD On:8/5/2022 6:26 PM This report was finalized on [id number] by  Nahum Ross MD.    US Renal Bilateral    Result Date: 8/5/2022  Nonobstructed hyperechoic kidneys compatible with medical renal disease  Electronically Signed By-Dave Cunningham On:8/5/2022 1:15 PM This report was finalized on [id number] by  Dave Cunningham, .    XR Abdomen KUB    Result Date: 8/6/2022   Body habitus and a generalized paucity of bowel gas mildly limits evaluation. No significantly dilated gas-filled loops of small bowel are seen to suggest an obstruction. No free peritoneal air.  No suspicious calcifications detected.  No acute osseous pathology.     Electronically signed by:  Nj Rosario  8/6/2022 4:47 AM        I reviewed the patient's new imaging results and agree with the interpretation.  I reviewed the patient's other test results and agree with the interpretation    Assessment & Plan     Principal Problem:    Septic shock (HCC)  Active Problems:    Pulmonary hypertension, unspecified (HCC)    Anxiety    Vitamin D deficiency    Essential hypertension    Suspected liver abscess    Diarrhea    Acute kidney injury (HCC)    Metabolic acidosis  Anemia of acute blood loss      Recommend transfusion of blood products and rapid correction of medical coagulopathy.  The patient has not had an INR since the initial INR which was demonstrated at 2.5.  Discussed at bedside with Dr. Moreno and Susan Borges NP that patient needs INR checked, and  blood products including packed red blood cells, FFP, and possibly platelets.  Patient would benefit from arterial line placement.  Had 1 last night, but was dislodged.  Once patient has been resuscitated with regards to her bleeding, she will benefit from obtaining imaging such as a CT scan of the abdomen and pelvis to look for evidence of bleeding, although this is likely from her puncture site from her biopsy yesterday.  Discussed with Dr. Ross who also recommends further imaging.  He states that he discussed the bleeding was with the patient prior to his procedure, the patient was aware that bleeding was a substantial risk going forward.  At this time, the patient is felt to be too unstable to go down for CT scan of the abdomen and pelvis.  Hopefully with the rapid administration of blood products patient will begin to stabilize and can have further imaging.  My hope would be that in correcting her medical coagulopathy the bleeding will slow down and/or stop altogether.  Discussed with patient at bedside that given the area where her liver was biopsied, surgical control of this bleeding will be difficult to visualize as it is on the posterior lateral aspect of her liver.  Given her hemodynamic instability and coagulopathy, the patient would not benefit from surgery at this point anyway.  She would likely not survive an operation.  Again, patient needs resuscitation with packed red blood cells and correction of her coagulopathy with blood products  Patient is critically ill. She is high risk of death from exsanguination if coagulopathy is not addressed.    I discussed the patients findings and my recommendations with .     Nahum Oconnor MD  08/06/22  11:23 EDT

## 2022-08-06 NOTE — NURSING NOTE
Dawn Day notified of multiple blood product needs and patient's continued hypotension and tachycardia. Verbal order received to run blood products together and at higher rate.

## 2022-08-06 NOTE — CONSULTS
INITIAL CONSULT NOTE      Patient Name: Raul Gardner  : 1975  MRN: [id number]  Primary Care Physician: Maribel Graf MD  Date of admission: 2022    Patient Care Team:  Maribel Graf MD as PCP - General (Family Medicine)  AVA Cavanaugh MD as Consulting Physician (Cardiology)        Reason for Consult:       MAIRANNE  Subjective   History of Present Illness:   Chief Complaint:   Chief Complaint   Patient presents with   • Syncope     HISTORY:  Raul Gardner is a 47 y.o. female with past medical history of melanoma, hypertension, borderline diabetes, DVT, chronic kidney disease. who presents with fever of 103, nausea vomiting diarrhea.  Found to be in septic shock and found to have liver lesion patient has gone for aspiration of liver abscess.  Renal ultrasound consistent with some chronic kidney disease baseline creatinine is not known.  Aspiration done yesterday did not show any fluid or abscess biopsy was sent for the further evaluation.  Since yesterday urine output is on minimal side patient received significant IV fluid although creatinine has not increased.  Volume status seems okay blood pressure still on the softer side lactic acid level is still on the high side on multiple antibiotics at this time.  Patient also received contrast yesterday chest x-ray done earlier today did not show any overt edema but did show some central congestion.  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 Otherwise pertinent FHx was reviewed and unremarkable.     Social History:  reports that she has never smoked. She has never used smokeless tobacco. She reports current alcohol use. She reports that she does not use drugs.    Medications:  Prior to Admission medications    Medication Sig Start Date End Date Taking? Authorizing Provider   escitalopram (LEXAPRO) 10 MG tablet TAKE 1 TABLET BY MOUTH EVERY DAY 12/3/21  Yes Maribel Graf MD   lisinopril (PRINIVIL,ZESTRIL) 5 MG tablet TAKE 1 TABLET BY MOUTH EVERY DAY 12/3/21  Yes Maribel Graf MD   metoprolol succinate XL (TOPROL-XL) 25 MG 24 hr tablet TAKE 1 TABLET BY MOUTH EVERY DAY 12/6/21 8/5/22 Yes Hugh Lancaster DO   naproxen (NAPROSYN) 500 MG tablet TAKE 1 TABLET BY MOUTH TWICE A DAY WITH MEALS 5/12/21 8/5/22  Maribel Graf MD     Scheduled Meds:albumin human, 12.5 g, Intravenous, Q4H  escitalopram, 10 mg, Oral, Daily  hydrocortisone sodium succinate, 100 mg, Intravenous, Q6H  insulin lispro, 0-7 Units, Subcutaneous, Q6H  pantoprazole, 40 mg, Intravenous, Daily  piperacillin-tazobactam, 4.5 g, Intravenous, Q8H  potassium chloride, 10 mEq, Intravenous, Once  sodium chloride, 10 mL, Intravenous, Q12H  sodium chloride 0.9% - IBW for BMI > 30, 30 mL/kg (Ideal), Intravenous, Once      Continuous Infusions:dexmedetomidine, 0.2-1.5 mcg/kg/hr, Last Rate: 0.5 mcg/kg/hr (08/06/22 0834)  norepinephrine, 0.02-0.3 mcg/kg/min, Last Rate: 0.3 mcg/kg/min (08/06/22 0737)  sodium bicarbonate drip (greater than 75 mEq/bag), 150 mEq, Last Rate: 150 mEq (08/06/22 0235)  vasopressin, 0.03 Units/min, Last Rate: 0.03 Units/min (08/06/22 9035)      PRN Meds:•  acetaminophen **OR** acetaminophen  •  dextrose  •  dextrose  •  glucagon (human recombinant)  •  nitroglycerin  •  ondansetron **OR** ondansetron  •  simethicone  •  sodium chloride  •   sodium chloride  Allergies:    Allergies   Allergen Reactions   • Cephalexin Rash   • Hydrocodone-Acetaminophen Rash       Objective   Exam:     Vital Signs  Temp:  [98.1 °F (36.7 °C)-101.8 °F (38.8 °C)] 101.7 °F (38.7 °C)  Heart Rate:  [] 97  Resp:  [15-28] 24  BP: ()/(33-92) 75/45  Arterial Line BP: (126)/(80) 126/80  SpO2:  [95 %-100 %] 99 %  on   ;   Device (Oxygen Therapy): room air  Body mass index is 41.6 kg/m².  EXAM     General: Young white  female in no acute distress.    Head:      Normocephalic and atraumatic.    Eyes:      PERRL/EOM intact, conjunctivae and sclerae clear without nystagmus.    Neck:      No masses, thyromegaly,  trachea central   Lungs:    Clear bilaterally to auscultation.    Heart:      Regular rate and rhythm, no murmur no gallop  Abd:        Significant tenderness with guarding with some rigidity, bowel sounds positive, no shifting dullness.  Msk:        No deformity or scoliosis noted of thoracic or lumbar spine.    Pulses:   Pulses normal in all 4 extremities.    Extremities:        No cyanosis or clubbing--no significant edema.    Neuro:    No focal deficits.   alert oriented x3  Skin:       Intact without lesions or rashes.    Psych:    Alert and cooperative; normal mood and affect; normal attention span       Results Review:  I have personally reviewed most recent Data :  [facility]  CBC    Results from last 7 days   Lab Units 08/06/22  0510 08/05/22  1400 08/05/22  0815 08/05/22  0811   WBC 10*3/mm3 16.30* 20.30*  --  15.90*   HEMOGLOBIN g/dL 10.3* 13.7  --  14.8   HEMOGLOBIN, POC g/dL  --   --  12.0  --    PLATELETS 10*3/mm3 120* 188  --  241     CMP   Results from last 7 days   Lab Units 08/06/22 0510 08/05/22  1400 08/05/22  0811   SODIUM mmol/L 138 133* 132*   POTASSIUM mmol/L 3.4* 3.7 3.9   CHLORIDE mmol/L 96* 99 94*   CO2 mmol/L 24.0 19.0* 21.0*   BUN mg/dL 46* 38* 32*   CREATININE mg/dL 4.06* 4.03* 3.82*   GLUCOSE mg/dL 218* 165* 141*    ALBUMIN g/dL 2.50* 2.90* 3.30*   BILIRUBIN mg/dL 4.5* 4.6* 4.3*   ALK PHOS U/L 75 64 64   AST (SGOT) U/L 177* 45* 41*   ALT (SGPT) U/L 98* 27 30   AMYLASE U/L  --   --  81   LIPASE U/L  --   --  12*     ABG    Results from last 7 days   Lab Units 08/05/22  0815   PH, ARTERIAL pH units 7.345*   PCO2, ARTERIAL mm Hg 28.3*   PO2 ART mm Hg 221.4*   O2 SATURATION ART % 99.8*   BASE EXCESS ART mmol/L -8.9*     CT Abdomen Pelvis With Contrast    Result Date: 8/5/2022  1. Multiple ill-defined low-density lesions are scattered within the liver parenchyma. These do not represent the appearance of typical cysts. In the context of history of septic shock, hepatic phlegmon or evolving hepatic abscesses could be considered. 2. Minimal stranding is seen at the level of the pancreatic tail which is nonspecific. This could represent normal variant for this patient. Very mild pancreatitis could have a similar appearance, although the remainder the pancreas itself appears unremarkable. Correlate with laboratory findings. 3. There is very mild interstitial thickening in both lungs and faint stranding within the anterior superior mediastinum, which may raise the possibility of mild early edema. 4. There is long segment thickening of the mid to lower thoracic esophagus with small esophageal hiatal hernia. Correlate for esophagitis symptoms. Endoscopic correlation may prove helpful. 5. Mild dependent atelectasis in both lungs. 6. No pulmonary embolism is seen.    Electronically Signed By-Franca Gallegos MD On:8/5/2022 8:58 AM This report was finalized on [id number] by  Franca Gallegos MD.    US Liver    Result Date: 8/5/2022    IMPRESSION: 1. Echogenic foci within the right and left hepatic lobe without internal hypervascularity, superimposed upon background hepatic hypoechoic parenchyma. The echogenic foci do not have a typical appearance for fluid collections or abscesses. Other etiologies such as hemangiomas, areas of fatty sparing  upon background steatosis, focal fibrosis/scarring, could be considered. These may be further and better characterize utilizing MRI without and with contrast liver protocol. 2. No abnormal biliary dilation is seen.  Electronically Signed By-Franca Gallegos MD On:8/5/2022 1:17 PM This report was finalized on [id number] by  Franca Gallegos MD.    XR Chest 1 View    Result Date: 8/5/2022  1.     Tip of the right internal jugular central venous catheter terminates in the right atrium. No visible pneumothorax. 2.     Low lung volumes without evidence of acute cardiopulmonary abnormality.  Electronically Signed By-Hui Raman MD On:8/5/2022 9:34 AM This report was finalized on [id number] by  Hui Raman MD.    CT Angiogram Chest Pulmonary Embolism    Result Date: 8/5/2022  1. Multiple ill-defined low-density lesions are scattered within the liver parenchyma. These do not represent the appearance of typical cysts. In the context of history of septic shock, hepatic phlegmon or evolving hepatic abscesses could be considered. 2. Minimal stranding is seen at the level of the pancreatic tail which is nonspecific. This could represent normal variant for this patient. Very mild pancreatitis could have a similar appearance, although the remainder the pancreas itself appears unremarkable. Correlate with laboratory findings. 3. There is very mild interstitial thickening in both lungs and faint stranding within the anterior superior mediastinum, which may raise the possibility of mild early edema. 4. There is long segment thickening of the mid to lower thoracic esophagus with small esophageal hiatal hernia. Correlate for esophagitis symptoms. Endoscopic correlation may prove helpful. 5. Mild dependent atelectasis in both lungs. 6. No pulmonary embolism is seen.    Electronically Signed By-Franca Gallegos MD On:8/5/2022 8:58 AM This report was finalized on [id number] by  Franca Gallegos MD.    CT Needle Biopsy  Liver    Result Date: 8/5/2022   1. The areas identified on the patient's contrast-enhanced CT and recent ultrasound are not clearly identified on the noncontrast scan. Despite targeting the area when compared with CT, no purulent fluid could be aspirated. Ultrasound was utilized and to confirm that the area in question was being sampled. Core specimens were then obtained of this area. Given the absence of any purulent fluid and the nonvisualization on the noncontrast scan this is very unlikely to represent an intrahepatic abscess. These findings were discussed with Dr. Orozco by telephone immediately following the biopsy.  Electronically Signed By-Nahum Ross MD On:8/5/2022 6:26 PM This report was finalized on [id number] by  Nahum Ross MD.    US Renal Bilateral    Result Date: 8/5/2022  Nonobstructed hyperechoic kidneys compatible with medical renal disease  Electronically Signed By-Dave Cunningham On:8/5/2022 1:15 PM This report was finalized on [id number] by  Dave Cunningham, .    XR Abdomen KUB    Result Date: 8/6/2022   Body habitus and a generalized paucity of bowel gas mildly limits evaluation. No significantly dilated gas-filled loops of small bowel are seen to suggest an obstruction. No free peritoneal air.  No suspicious calcifications detected.  No acute osseous pathology.     Electronically signed by:  Nj Rosario  8/6/2022 4:47 AM      Results for orders placed during the hospital encounter of 08/05/22    Adult Transthoracic Echo Complete w/ Color, Spectral and Contrast if Necessary Per Protocol    Interpretation Summary  · Left ventricular wall thickness is consistent with mild concentric hypertrophy.  · Estimated left ventricular EF = 75% Left ventricular systolic function is hyperdynamic (EF > 70%).  · Left ventricular diastolic function is consistent with (grade I) impaired relaxation.        Assessment & Plan   Assessment and Plan:         Septic shock (HCC)    Pulmonary hypertension,  unspecified (HCC)    Anxiety    Vitamin D deficiency    Essential hypertension    Suspected liver abscess    Diarrhea    Acute kidney injury (HCC)    Metabolic acidosis    ASSESSMENT:  • Acute kidney injury likely ATN secondary to sepsis and hemodynamic instability on pressors at this time  • Multiple liver lesion unlikely to be abscess  • Chronic kidney disease as per renal ultrasound with some increased echogenicity   • Significant lactic acidosis with increased anion gap   • Some evidence of volume overload   • History of hypertension   •         Plan:     • Patient in septic shock with MARIANNE continue ABX   • At this time likely ATN because of the multiple injury injuries likely contrast nephropathy in the presence of sepsis  • Continue bicarbonate based fluid  • Follow-up with repeat labs later today  • We will get dialysis catheter placed start CRRT if needed  • Worsening lactic acidosis most likely renal function will not get better at this time continue pressors  • Discussed with ICU nurse practitioner in detail  • Increased BNP continue IV fluid at 100 cc an hour we will hold IV fluid if any evidence of the overt volume overload  • ECHOCARDIOGRAM showed diastolic dysfunctions grade 1 EF normal  • Serologies sent yesterday including ANCA KAREN protein electrophoresis  • Patient is already on bicarbonate based fluid we will follow-up with pH      Note started  by Vikram Acosta MD,   [facility] kidney consultant  [phone]

## 2022-08-07 LAB
ALBUMIN SERPL-MCNC: 2.9 G/DL (ref 3.5–5.2)
ALBUMIN SERPL-MCNC: 3.2 G/DL (ref 3.5–5.2)
ALBUMIN/GLOB SERPL: 1.6 G/DL
ALP SERPL-CCNC: 112 U/L (ref 39–117)
ALPHA-FETOPROTEIN: 3 NG/ML (ref 0–8.3)
ALT SERPL W P-5'-P-CCNC: 352 U/L (ref 1–33)
ANION GAP SERPL CALCULATED.3IONS-SCNC: 9 MMOL/L (ref 5–15)
ANION GAP SERPL CALCULATED.3IONS-SCNC: 9 MMOL/L (ref 5–15)
ANISOCYTOSIS BLD QL: ABNORMAL
AST SERPL-CCNC: 522 U/L (ref 1–32)
BASOPHILS # BLD AUTO: 0.1 10*3/MM3 (ref 0–0.2)
BASOPHILS NFR BLD AUTO: 0.5 % (ref 0–1.5)
BH BB BLOOD EXPIRATION DATE: NORMAL
BH BB BLOOD TYPE BARCODE: 5100
BH BB BLOOD TYPE BARCODE: 5100
BH BB BLOOD TYPE BARCODE: 7300
BH BB BLOOD TYPE BARCODE: 9500
BH BB DISPENSE STATUS: NORMAL
BH BB PRODUCT CODE: NORMAL
BH BB UNIT NUMBER: NORMAL
BILIRUB SERPL-MCNC: 4.7 MG/DL (ref 0–1.2)
BUN SERPL-MCNC: 25 MG/DL (ref 6–20)
BUN SERPL-MCNC: 37 MG/DL (ref 6–20)
BUN/CREAT SERPL: 12.2 (ref 7–25)
BUN/CREAT SERPL: 12.7 (ref 7–25)
CA-I SERPL ISE-MCNC: 1 MMOL/L (ref 1.2–1.3)
CA-I SERPL ISE-MCNC: 1.05 MMOL/L (ref 1.2–1.3)
CA-I SERPL ISE-MCNC: 1.13 MMOL/L (ref 1.2–1.3)
CALCIUM SPEC-SCNC: 7.2 MG/DL (ref 8.6–10.5)
CALCIUM SPEC-SCNC: 7.7 MG/DL (ref 8.6–10.5)
CERULOPLASMIN SERPL-MCNC: 16 MG/DL (ref 19–39)
CHLORIDE SERPL-SCNC: 101 MMOL/L (ref 98–107)
CHLORIDE SERPL-SCNC: 99 MMOL/L (ref 98–107)
CO2 SERPL-SCNC: 29 MMOL/L (ref 22–29)
CO2 SERPL-SCNC: 30 MMOL/L (ref 22–29)
CREAT SERPL-MCNC: 2.05 MG/DL (ref 0.57–1)
CREAT SERPL-MCNC: 2.92 MG/DL (ref 0.57–1)
CROSSMATCH INTERPRETATION: NORMAL
CROSSMATCH INTERPRETATION: NORMAL
D-LACTATE SERPL-SCNC: 1.2 MMOL/L (ref 0.5–2)
DEPRECATED RDW RBC AUTO: 42.9 FL (ref 37–54)
DEPRECATED RDW RBC AUTO: 43.3 FL (ref 37–54)
DEPRECATED RDW RBC AUTO: 43.8 FL (ref 37–54)
DEPRECATED RDW RBC AUTO: 44.6 FL (ref 37–54)
DEPRECATED RDW RBC AUTO: 45.5 FL (ref 37–54)
DEPRECATED RDW RBC AUTO: 45.9 FL (ref 37–54)
DEPRECATED RDW RBC AUTO: 47.3 FL (ref 37–54)
EGFRCR SERPLBLD CKD-EPI 2021: 19.6 ML/MIN/1.73
EGFRCR SERPLBLD CKD-EPI 2021: 30 ML/MIN/1.73
EOSINOPHIL # BLD AUTO: 0.1 10*3/MM3 (ref 0–0.4)
EOSINOPHIL # BLD MANUAL: 0.12 10*3/MM3 (ref 0–0.4)
EOSINOPHIL # BLD MANUAL: 0.23 10*3/MM3 (ref 0–0.4)
EOSINOPHIL # BLD MANUAL: 0.27 10*3/MM3 (ref 0–0.4)
EOSINOPHIL # BLD MANUAL: 0.28 10*3/MM3 (ref 0–0.4)
EOSINOPHIL NFR BLD AUTO: 0.5 % (ref 0.3–6.2)
EOSINOPHIL NFR BLD MANUAL: 1 % (ref 0.3–6.2)
EOSINOPHIL NFR BLD MANUAL: 2 % (ref 0.3–6.2)
ERYTHROCYTE [DISTWIDTH] IN BLOOD BY AUTOMATED COUNT: 14.7 % (ref 12.3–15.4)
ERYTHROCYTE [DISTWIDTH] IN BLOOD BY AUTOMATED COUNT: 14.7 % (ref 12.3–15.4)
ERYTHROCYTE [DISTWIDTH] IN BLOOD BY AUTOMATED COUNT: 15.1 % (ref 12.3–15.4)
ERYTHROCYTE [DISTWIDTH] IN BLOOD BY AUTOMATED COUNT: 15.3 % (ref 12.3–15.4)
ERYTHROCYTE [DISTWIDTH] IN BLOOD BY AUTOMATED COUNT: 15.8 % (ref 12.3–15.4)
ERYTHROCYTE [DISTWIDTH] IN BLOOD BY AUTOMATED COUNT: 16 % (ref 12.3–15.4)
ERYTHROCYTE [DISTWIDTH] IN BLOOD BY AUTOMATED COUNT: 16.4 % (ref 12.3–15.4)
GGT SERPL-CCNC: 55 U/L (ref 5–36)
GLOBULIN UR ELPH-MCNC: 1.8 GM/DL
GLUCOSE BLDC GLUCOMTR-MCNC: 102 MG/DL (ref 70–105)
GLUCOSE BLDC GLUCOMTR-MCNC: 131 MG/DL (ref 70–105)
GLUCOSE BLDC GLUCOMTR-MCNC: 139 MG/DL (ref 70–105)
GLUCOSE BLDC GLUCOMTR-MCNC: 99 MG/DL (ref 70–105)
GLUCOSE SERPL-MCNC: 112 MG/DL (ref 65–99)
GLUCOSE SERPL-MCNC: 124 MG/DL (ref 65–99)
HCT VFR BLD AUTO: 20 % (ref 34–46.6)
HCT VFR BLD AUTO: 20.1 % (ref 34–46.6)
HCT VFR BLD AUTO: 21.5 % (ref 34–46.6)
HCT VFR BLD AUTO: 22.1 % (ref 34–46.6)
HCT VFR BLD AUTO: 22.4 % (ref 34–46.6)
HCT VFR BLD AUTO: 22.4 % (ref 34–46.6)
HCT VFR BLD AUTO: 22.8 % (ref 34–46.6)
HGB BLD-MCNC: 6.7 G/DL (ref 12–15.9)
HGB BLD-MCNC: 6.7 G/DL (ref 12–15.9)
HGB BLD-MCNC: 7.2 G/DL (ref 12–15.9)
HGB BLD-MCNC: 7.5 G/DL (ref 12–15.9)
HGB BLD-MCNC: 7.6 G/DL (ref 12–15.9)
INR PPP: 1.09 (ref 0.93–1.1)
LYMPHOCYTES # BLD AUTO: 0.6 10*3/MM3 (ref 0.7–3.1)
LYMPHOCYTES # BLD MANUAL: 0.49 10*3/MM3 (ref 0.7–3.1)
LYMPHOCYTES # BLD MANUAL: 0.67 10*3/MM3 (ref 0.7–3.1)
LYMPHOCYTES # BLD MANUAL: 0.7 10*3/MM3 (ref 0.7–3.1)
LYMPHOCYTES # BLD MANUAL: 1.39 10*3/MM3 (ref 0.7–3.1)
LYMPHOCYTES # BLD MANUAL: 1.53 10*3/MM3 (ref 0.7–3.1)
LYMPHOCYTES # BLD MANUAL: 2.45 10*3/MM3 (ref 0.7–3.1)
LYMPHOCYTES NFR BLD AUTO: 5.7 % (ref 19.6–45.3)
LYMPHOCYTES NFR BLD MANUAL: 10 % (ref 5–12)
LYMPHOCYTES NFR BLD MANUAL: 2 % (ref 5–12)
LYMPHOCYTES NFR BLD MANUAL: 3 % (ref 5–12)
LYMPHOCYTES NFR BLD MANUAL: 4 % (ref 5–12)
LYMPHOCYTES NFR BLD MANUAL: 5 % (ref 5–12)
LYMPHOCYTES NFR BLD MANUAL: 5 % (ref 5–12)
MAGNESIUM SERPL-MCNC: 2.3 MG/DL (ref 1.6–2.6)
MAGNESIUM SERPL-MCNC: 2.3 MG/DL (ref 1.6–2.6)
MAGNESIUM SERPL-MCNC: 2.4 MG/DL (ref 1.6–2.6)
MCH RBC QN AUTO: 27 PG (ref 26.6–33)
MCH RBC QN AUTO: 27.3 PG (ref 26.6–33)
MCH RBC QN AUTO: 27.5 PG (ref 26.6–33)
MCH RBC QN AUTO: 27.6 PG (ref 26.6–33)
MCH RBC QN AUTO: 27.9 PG (ref 26.6–33)
MCH RBC QN AUTO: 28 PG (ref 26.6–33)
MCH RBC QN AUTO: 28.1 PG (ref 26.6–33)
MCHC RBC AUTO-ENTMCNC: 32.7 G/DL (ref 31.5–35.7)
MCHC RBC AUTO-ENTMCNC: 33.2 G/DL (ref 31.5–35.7)
MCHC RBC AUTO-ENTMCNC: 33.5 G/DL (ref 31.5–35.7)
MCHC RBC AUTO-ENTMCNC: 33.7 G/DL (ref 31.5–35.7)
MCHC RBC AUTO-ENTMCNC: 33.8 G/DL (ref 31.5–35.7)
MCV RBC AUTO: 81.5 FL (ref 79–97)
MCV RBC AUTO: 81.6 FL (ref 79–97)
MCV RBC AUTO: 82.4 FL (ref 79–97)
MCV RBC AUTO: 82.8 FL (ref 79–97)
MCV RBC AUTO: 82.8 FL (ref 79–97)
MCV RBC AUTO: 83.2 FL (ref 79–97)
MCV RBC AUTO: 83.2 FL (ref 79–97)
METAMYELOCYTES NFR BLD MANUAL: 1 % (ref 0–0)
MONOCYTES # BLD AUTO: 0.7 10*3/MM3 (ref 0.1–0.9)
MONOCYTES # BLD: 0.23 10*3/MM3 (ref 0.1–0.9)
MONOCYTES # BLD: 0.37 10*3/MM3 (ref 0.1–0.9)
MONOCYTES # BLD: 0.46 10*3/MM3 (ref 0.1–0.9)
MONOCYTES # BLD: 0.67 10*3/MM3 (ref 0.1–0.9)
MONOCYTES # BLD: 0.7 10*3/MM3 (ref 0.1–0.9)
MONOCYTES # BLD: 1.44 10*3/MM3 (ref 0.1–0.9)
MONOCYTES NFR BLD AUTO: 5.9 % (ref 5–12)
MYELOCYTES NFR BLD MANUAL: 1 % (ref 0–0)
NEUTROPHILS # BLD AUTO: 10.44 10*3/MM3 (ref 1.7–7)
NEUTROPHILS # BLD AUTO: 10.51 10*3/MM3 (ref 1.7–7)
NEUTROPHILS # BLD AUTO: 11.22 10*3/MM3 (ref 1.7–7)
NEUTROPHILS # BLD AUTO: 11.26 10*3/MM3 (ref 1.7–7)
NEUTROPHILS # BLD AUTO: 11.7 10*3/MM3 (ref 1.7–7)
NEUTROPHILS # BLD AUTO: 9.63 10*3/MM3 (ref 1.7–7)
NEUTROPHILS NFR BLD AUTO: 87.4 % (ref 42.7–76)
NEUTROPHILS NFR BLD AUTO: 9.8 10*3/MM3 (ref 1.7–7)
NEUTROPHILS NFR BLD MANUAL: 65 % (ref 42.7–76)
NEUTROPHILS NFR BLD MANUAL: 68 % (ref 42.7–76)
NEUTROPHILS NFR BLD MANUAL: 69 % (ref 42.7–76)
NEUTROPHILS NFR BLD MANUAL: 73 % (ref 42.7–76)
NEUTROPHILS NFR BLD MANUAL: 88 % (ref 42.7–76)
NEUTROPHILS NFR BLD MANUAL: 91 % (ref 42.7–76)
NEUTS BAND NFR BLD MANUAL: 1 % (ref 0–5)
NEUTS BAND NFR BLD MANUAL: 14 % (ref 0–5)
NEUTS BAND NFR BLD MANUAL: 22 % (ref 0–5)
NEUTS BAND NFR BLD MANUAL: 8 % (ref 0–5)
NEUTS BAND NFR BLD MANUAL: 8 % (ref 0–5)
NEUTS VAC BLD QL SMEAR: ABNORMAL
NRBC BLD AUTO-RTO: 0 /100 WBC (ref 0–0.2)
PATHOLOGY REVIEW: YES
PHOSPHATE SERPL-MCNC: 3.8 MG/DL (ref 2.5–4.5)
PHOSPHATE SERPL-MCNC: 4.5 MG/DL (ref 2.5–4.5)
PLAT MORPH BLD: NORMAL
PLATELET # BLD AUTO: 24 10*3/MM3 (ref 140–450)
PLATELET # BLD AUTO: 25 10*3/MM3 (ref 140–450)
PLATELET # BLD AUTO: 46 10*3/MM3 (ref 140–450)
PLATELET # BLD AUTO: 50 10*3/MM3 (ref 140–450)
PLATELET # BLD AUTO: 57 10*3/MM3 (ref 140–450)
PLATELET # BLD AUTO: 57 10*3/MM3 (ref 140–450)
PLATELET # BLD AUTO: 65 10*3/MM3 (ref 140–450)
PLATELET # BLD AUTO: 68 10*3/MM3 (ref 140–450)
PMV BLD AUTO: 8.2 FL (ref 6–12)
PMV BLD AUTO: 8.6 FL (ref 6–12)
PMV BLD AUTO: 8.7 FL (ref 6–12)
PMV BLD AUTO: 8.8 FL (ref 6–12)
PMV BLD AUTO: 9.1 FL (ref 6–12)
PMV BLD AUTO: 9.2 FL (ref 6–12)
PMV BLD AUTO: 9.9 FL (ref 6–12)
POTASSIUM SERPL-SCNC: 3.7 MMOL/L (ref 3.5–5.2)
POTASSIUM SERPL-SCNC: 3.7 MMOL/L (ref 3.5–5.2)
PROT SERPL-MCNC: 4.7 G/DL (ref 6–8.5)
PROTHROMBIN TIME: 11.2 SECONDS (ref 9.6–11.7)
QT INTERVAL: 286 MS
QT INTERVAL: 429 MS
RBC # BLD AUTO: 2.4 10*6/MM3 (ref 3.77–5.28)
RBC # BLD AUTO: 2.41 10*6/MM3 (ref 3.77–5.28)
RBC # BLD AUTO: 2.59 10*6/MM3 (ref 3.77–5.28)
RBC # BLD AUTO: 2.67 10*6/MM3 (ref 3.77–5.28)
RBC # BLD AUTO: 2.74 10*6/MM3 (ref 3.77–5.28)
RBC # BLD AUTO: 2.74 10*6/MM3 (ref 3.77–5.28)
RBC # BLD AUTO: 2.76 10*6/MM3 (ref 3.77–5.28)
RBC MORPH BLD: NORMAL
SCAN SLIDE: NORMAL
SMALL PLATELETS BLD QL SMEAR: ABNORMAL
SODIUM SERPL-SCNC: 138 MMOL/L (ref 136–145)
SODIUM SERPL-SCNC: 139 MMOL/L (ref 136–145)
TOXIC GRANULATION: ABNORMAL
UNIT  ABO: NORMAL
UNIT  RH: NORMAL
VANCOMYCIN SERPL-MCNC: 17.4 MCG/ML (ref 5–40)
VANCOMYCIN SERPL-MCNC: 18.8 MCG/ML (ref 5–40)
VARIANT LYMPHS NFR BLD MANUAL: 11 % (ref 19.6–45.3)
VARIANT LYMPHS NFR BLD MANUAL: 12 % (ref 19.6–45.3)
VARIANT LYMPHS NFR BLD MANUAL: 17 % (ref 19.6–45.3)
VARIANT LYMPHS NFR BLD MANUAL: 4 % (ref 19.6–45.3)
VARIANT LYMPHS NFR BLD MANUAL: 5 % (ref 19.6–45.3)
VARIANT LYMPHS NFR BLD MANUAL: 6 % (ref 19.6–45.3)
WBC MORPH BLD: NORMAL
WBC NRBC COR # BLD: 11.2 10*3/MM3 (ref 3.4–10.8)
WBC NRBC COR # BLD: 11.6 10*3/MM3 (ref 3.4–10.8)
WBC NRBC COR # BLD: 11.6 10*3/MM3 (ref 3.4–10.8)
WBC NRBC COR # BLD: 12.2 10*3/MM3 (ref 3.4–10.8)
WBC NRBC COR # BLD: 13.3 10*3/MM3 (ref 3.4–10.8)
WBC NRBC COR # BLD: 13.9 10*3/MM3 (ref 3.4–10.8)
WBC NRBC COR # BLD: 14.4 10*3/MM3 (ref 3.4–10.8)

## 2022-08-07 PROCEDURE — 85025 COMPLETE CBC W/AUTO DIFF WBC: CPT

## 2022-08-07 PROCEDURE — 83605 ASSAY OF LACTIC ACID: CPT | Performed by: INTERNAL MEDICINE

## 2022-08-07 PROCEDURE — 25010000002 HYDROCORTISONE SODIUM SUCCINATE 100 MG RECONSTITUTED SOLUTION: Performed by: NURSE PRACTITIONER

## 2022-08-07 PROCEDURE — 25010000002 CALCIUM GLUCONATE PER 10 ML: Performed by: NURSE PRACTITIONER

## 2022-08-07 PROCEDURE — P9016 RBC LEUKOCYTES REDUCED: HCPCS

## 2022-08-07 PROCEDURE — 86900 BLOOD TYPING SEROLOGIC ABO: CPT

## 2022-08-07 PROCEDURE — P9100 PATHOGEN TEST FOR PLATELETS: HCPCS

## 2022-08-07 PROCEDURE — 25010000002 FENTANYL CITRATE (PF) 50 MCG/ML SOLUTION

## 2022-08-07 PROCEDURE — 85610 PROTHROMBIN TIME: CPT | Performed by: NURSE PRACTITIONER

## 2022-08-07 PROCEDURE — P9012 CRYOPRECIPITATE EACH UNIT: HCPCS

## 2022-08-07 PROCEDURE — 25010000002 MEROPENEM PER 100 MG: Performed by: INTERNAL MEDICINE

## 2022-08-07 PROCEDURE — P9035 PLATELET PHERES LEUKOREDUCED: HCPCS

## 2022-08-07 PROCEDURE — 82330 ASSAY OF CALCIUM: CPT | Performed by: INTERNAL MEDICINE

## 2022-08-07 PROCEDURE — 99232 SBSQ HOSP IP/OBS MODERATE 35: CPT | Performed by: NURSE PRACTITIONER

## 2022-08-07 PROCEDURE — 85007 BL SMEAR W/DIFF WBC COUNT: CPT | Performed by: NURSE PRACTITIONER

## 2022-08-07 PROCEDURE — 80053 COMPREHEN METABOLIC PANEL: CPT | Performed by: NURSE PRACTITIONER

## 2022-08-07 PROCEDURE — 82962 GLUCOSE BLOOD TEST: CPT

## 2022-08-07 PROCEDURE — 85007 BL SMEAR W/DIFF WBC COUNT: CPT

## 2022-08-07 PROCEDURE — 84100 ASSAY OF PHOSPHORUS: CPT | Performed by: INTERNAL MEDICINE

## 2022-08-07 PROCEDURE — 85049 AUTOMATED PLATELET COUNT: CPT | Performed by: INTERNAL MEDICINE

## 2022-08-07 PROCEDURE — 5A1D90Z PERFORMANCE OF URINARY FILTRATION, CONTINUOUS, GREATER THAN 18 HOURS PER DAY: ICD-10-PCS | Performed by: INTERNAL MEDICINE

## 2022-08-07 PROCEDURE — 80202 ASSAY OF VANCOMYCIN: CPT | Performed by: INTERNAL MEDICINE

## 2022-08-07 PROCEDURE — 63710000001 INSULIN LISPRO (HUMAN) PER 5 UNITS: Performed by: NURSE PRACTITIONER

## 2022-08-07 PROCEDURE — 25010000002 MICAFUNGIN SODIUM 100 MG RECONSTITUTED SOLUTION 1 EACH VIAL: Performed by: INTERNAL MEDICINE

## 2022-08-07 PROCEDURE — 84100 ASSAY OF PHOSPHORUS: CPT | Performed by: NURSE PRACTITIONER

## 2022-08-07 PROCEDURE — 83735 ASSAY OF MAGNESIUM: CPT | Performed by: INTERNAL MEDICINE

## 2022-08-07 PROCEDURE — 25010000002 VANCOMYCIN HCL 1.25 G RECONSTITUTED SOLUTION 1 EACH VIAL: Performed by: INTERNAL MEDICINE

## 2022-08-07 PROCEDURE — 99232 SBSQ HOSP IP/OBS MODERATE 35: CPT | Performed by: SURGERY

## 2022-08-07 PROCEDURE — 85025 COMPLETE CBC W/AUTO DIFF WBC: CPT | Performed by: NURSE PRACTITIONER

## 2022-08-07 PROCEDURE — 25010000002 MORPHINE PER 10 MG: Performed by: NURSE PRACTITIONER

## 2022-08-07 PROCEDURE — 86927 PLASMA FRESH FROZEN: CPT

## 2022-08-07 RX ORDER — FENTANYL CITRATE 50 UG/ML
25 INJECTION, SOLUTION INTRAMUSCULAR; INTRAVENOUS
Status: DISCONTINUED | OUTPATIENT
Start: 2022-08-07 | End: 2022-08-12

## 2022-08-07 RX ORDER — OXYCODONE HYDROCHLORIDE 5 MG/1
10 TABLET ORAL EVERY 4 HOURS PRN
Status: DISPENSED | OUTPATIENT
Start: 2022-08-07 | End: 2022-08-14

## 2022-08-07 RX ORDER — MORPHINE SULFATE 2 MG/ML
2 INJECTION, SOLUTION INTRAMUSCULAR; INTRAVENOUS
Status: DISCONTINUED | OUTPATIENT
Start: 2022-08-07 | End: 2022-08-10

## 2022-08-07 RX ADMIN — CALCIUM CHLORIDE, MAGNESIUM CHLORIDE, SODIUM CHLORIDE, SODIUM BICARBONATE, POTASSIUM CHLORIDE AND SODIUM PHOSPHATE DIBASIC DIHYDRATE 2000 ML/HR: 3.68; 3.05; 6.34; 3.09; .314; .187 INJECTION INTRAVENOUS at 16:26

## 2022-08-07 RX ADMIN — VASOPRESSIN 0.03 UNITS/MIN: 0.2 INJECTION INTRAVENOUS at 03:38

## 2022-08-07 RX ADMIN — OXYCODONE 10 MG: 5 TABLET ORAL at 10:38

## 2022-08-07 RX ADMIN — CALCIUM CHLORIDE, MAGNESIUM CHLORIDE, SODIUM CHLORIDE, SODIUM BICARBONATE, POTASSIUM CHLORIDE AND SODIUM PHOSPHATE DIBASIC DIHYDRATE 1000 ML/HR: 3.68; 3.05; 6.34; 3.09; .314; .187 INJECTION INTRAVENOUS at 16:26

## 2022-08-07 RX ADMIN — CALCIUM GLUCONATE 3 G: 98 INJECTION, SOLUTION INTRAVENOUS at 09:38

## 2022-08-07 RX ADMIN — ESCITALOPRAM OXALATE 10 MG: 10 TABLET ORAL at 08:41

## 2022-08-07 RX ADMIN — MICAFUNGIN 100 MG: 20 INJECTION, POWDER, LYOPHILIZED, FOR SOLUTION INTRAVENOUS at 13:01

## 2022-08-07 RX ADMIN — MEROPENEM 1 G: 1 INJECTION, POWDER, FOR SOLUTION INTRAVENOUS at 08:40

## 2022-08-07 RX ADMIN — CALCIUM GLUCONATE 3 G: 98 INJECTION, SOLUTION INTRAVENOUS at 19:51

## 2022-08-07 RX ADMIN — CALCIUM CHLORIDE, MAGNESIUM CHLORIDE, SODIUM CHLORIDE, SODIUM BICARBONATE, POTASSIUM CHLORIDE AND SODIUM PHOSPHATE DIBASIC DIHYDRATE 2000 ML/HR: 3.68; 3.05; 6.34; 3.09; .314; .187 INJECTION INTRAVENOUS at 05:59

## 2022-08-07 RX ADMIN — CALCIUM CHLORIDE, MAGNESIUM CHLORIDE, SODIUM CHLORIDE, SODIUM BICARBONATE, POTASSIUM CHLORIDE AND SODIUM PHOSPHATE DIBASIC DIHYDRATE 2000 ML/HR: 3.68; 3.05; 6.34; 3.09; .314; .187 INJECTION INTRAVENOUS at 02:50

## 2022-08-07 RX ADMIN — SODIUM BICARBONATE 150 MEQ: 84 INJECTION, SOLUTION INTRAVENOUS at 01:48

## 2022-08-07 RX ADMIN — CALCIUM CHLORIDE, MAGNESIUM CHLORIDE, SODIUM CHLORIDE, SODIUM BICARBONATE, POTASSIUM CHLORIDE AND SODIUM PHOSPHATE DIBASIC DIHYDRATE 2000 ML/HR: 3.68; 3.05; 6.34; 3.09; .314; .187 INJECTION INTRAVENOUS at 14:07

## 2022-08-07 RX ADMIN — MORPHINE SULFATE 2 MG: 2 INJECTION, SOLUTION INTRAMUSCULAR; INTRAVENOUS at 15:18

## 2022-08-07 RX ADMIN — CALCIUM CHLORIDE, MAGNESIUM CHLORIDE, SODIUM CHLORIDE, SODIUM BICARBONATE, POTASSIUM CHLORIDE AND SODIUM PHOSPHATE DIBASIC DIHYDRATE 1500 ML/HR: 3.68; 3.05; 6.34; 3.09; .314; .187 INJECTION INTRAVENOUS at 21:35

## 2022-08-07 RX ADMIN — CALCIUM CHLORIDE, MAGNESIUM CHLORIDE, SODIUM CHLORIDE, SODIUM BICARBONATE, POTASSIUM CHLORIDE AND SODIUM PHOSPHATE DIBASIC DIHYDRATE 2000 ML/HR: 3.68; 3.05; 6.34; 3.09; .314; .187 INJECTION INTRAVENOUS at 18:56

## 2022-08-07 RX ADMIN — MEROPENEM 1 G: 1 INJECTION, POWDER, FOR SOLUTION INTRAVENOUS at 23:36

## 2022-08-07 RX ADMIN — PANTOPRAZOLE SODIUM 40 MG: 40 INJECTION, POWDER, FOR SOLUTION INTRAVENOUS at 08:41

## 2022-08-07 RX ADMIN — CALCIUM CHLORIDE, MAGNESIUM CHLORIDE, SODIUM CHLORIDE, SODIUM BICARBONATE, POTASSIUM CHLORIDE AND SODIUM PHOSPHATE DIBASIC DIHYDRATE 1500 ML/HR: 3.68; 3.05; 6.34; 3.09; .314; .187 INJECTION INTRAVENOUS at 18:03

## 2022-08-07 RX ADMIN — VANCOMYCIN HYDROCHLORIDE 1250 MG: 1.25 INJECTION, POWDER, LYOPHILIZED, FOR SOLUTION INTRAVENOUS at 08:40

## 2022-08-07 RX ADMIN — OXYCODONE 10 MG: 5 TABLET ORAL at 18:03

## 2022-08-07 RX ADMIN — CALCIUM CHLORIDE, MAGNESIUM CHLORIDE, SODIUM CHLORIDE, SODIUM BICARBONATE, POTASSIUM CHLORIDE AND SODIUM PHOSPHATE DIBASIC DIHYDRATE 1500 ML/HR: 3.68; 3.05; 6.34; 3.09; .314; .187 INJECTION INTRAVENOUS at 14:39

## 2022-08-07 RX ADMIN — CALCIUM CHLORIDE, MAGNESIUM CHLORIDE, SODIUM CHLORIDE, SODIUM BICARBONATE, POTASSIUM CHLORIDE AND SODIUM PHOSPHATE DIBASIC DIHYDRATE 1000 ML/HR: 3.68; 3.05; 6.34; 3.09; .314; .187 INJECTION INTRAVENOUS at 11:26

## 2022-08-07 RX ADMIN — HYDROCORTISONE SODIUM SUCCINATE 100 MG: 100 INJECTION, POWDER, FOR SOLUTION INTRAMUSCULAR; INTRAVENOUS at 11:34

## 2022-08-07 RX ADMIN — HYDROCORTISONE SODIUM SUCCINATE 100 MG: 100 INJECTION, POWDER, FOR SOLUTION INTRAMUSCULAR; INTRAVENOUS at 06:09

## 2022-08-07 RX ADMIN — PANTOPRAZOLE SODIUM 40 MG: 40 INJECTION, POWDER, FOR SOLUTION INTRAVENOUS at 20:11

## 2022-08-07 RX ADMIN — CALCIUM CHLORIDE, MAGNESIUM CHLORIDE, SODIUM CHLORIDE, SODIUM BICARBONATE, POTASSIUM CHLORIDE AND SODIUM PHOSPHATE DIBASIC DIHYDRATE 2000 ML/HR: 3.68; 3.05; 6.34; 3.09; .314; .187 INJECTION INTRAVENOUS at 11:26

## 2022-08-07 RX ADMIN — Medication 10 ML: at 08:41

## 2022-08-07 RX ADMIN — CALCIUM CHLORIDE, MAGNESIUM CHLORIDE, SODIUM CHLORIDE, SODIUM BICARBONATE, POTASSIUM CHLORIDE AND SODIUM PHOSPHATE DIBASIC DIHYDRATE 1500 ML/HR: 3.68; 3.05; 6.34; 3.09; .314; .187 INJECTION INTRAVENOUS at 08:40

## 2022-08-07 RX ADMIN — SODIUM BICARBONATE 150 MEQ: 84 INJECTION, SOLUTION INTRAVENOUS at 09:38

## 2022-08-07 RX ADMIN — Medication 10 ML: at 20:12

## 2022-08-07 RX ADMIN — CALCIUM CHLORIDE, MAGNESIUM CHLORIDE, SODIUM CHLORIDE, SODIUM BICARBONATE, POTASSIUM CHLORIDE AND SODIUM PHOSPHATE DIBASIC DIHYDRATE 1000 ML/HR: 3.68; 3.05; 6.34; 3.09; .314; .187 INJECTION INTRAVENOUS at 05:59

## 2022-08-07 RX ADMIN — Medication 10 ML: at 20:11

## 2022-08-07 RX ADMIN — CALCIUM CHLORIDE, MAGNESIUM CHLORIDE, SODIUM CHLORIDE, SODIUM BICARBONATE, POTASSIUM CHLORIDE AND SODIUM PHOSPHATE DIBASIC DIHYDRATE 2000 ML/HR: 3.68; 3.05; 6.34; 3.09; .314; .187 INJECTION INTRAVENOUS at 08:40

## 2022-08-07 RX ADMIN — MORPHINE SULFATE 2 MG: 2 INJECTION, SOLUTION INTRAMUSCULAR; INTRAVENOUS at 11:34

## 2022-08-07 RX ADMIN — CALCIUM CHLORIDE, MAGNESIUM CHLORIDE, SODIUM CHLORIDE, SODIUM BICARBONATE, POTASSIUM CHLORIDE AND SODIUM PHOSPHATE DIBASIC DIHYDRATE 1500 ML/HR: 3.68; 3.05; 6.34; 3.09; .314; .187 INJECTION INTRAVENOUS at 11:26

## 2022-08-07 RX ADMIN — CALCIUM CHLORIDE, MAGNESIUM CHLORIDE, SODIUM CHLORIDE, SODIUM BICARBONATE, POTASSIUM CHLORIDE AND SODIUM PHOSPHATE DIBASIC DIHYDRATE 1000 ML/HR: 3.68; 3.05; 6.34; 3.09; .314; .187 INJECTION INTRAVENOUS at 21:36

## 2022-08-07 RX ADMIN — MEROPENEM 1 G: 1 INJECTION, POWDER, FOR SOLUTION INTRAVENOUS at 16:09

## 2022-08-07 RX ADMIN — CALCIUM CHLORIDE, MAGNESIUM CHLORIDE, SODIUM CHLORIDE, SODIUM BICARBONATE, POTASSIUM CHLORIDE AND SODIUM PHOSPHATE DIBASIC DIHYDRATE 2000 ML/HR: 3.68; 3.05; 6.34; 3.09; .314; .187 INJECTION INTRAVENOUS at 21:34

## 2022-08-07 RX ADMIN — INSULIN LISPRO 2 UNITS: 100 INJECTION, SOLUTION INTRAVENOUS; SUBCUTANEOUS at 00:21

## 2022-08-07 RX ADMIN — HYDROCORTISONE SODIUM SUCCINATE 100 MG: 100 INJECTION, POWDER, FOR SOLUTION INTRAMUSCULAR; INTRAVENOUS at 00:21

## 2022-08-07 RX ADMIN — HYDROCORTISONE SODIUM SUCCINATE 100 MG: 100 INJECTION, POWDER, FOR SOLUTION INTRAMUSCULAR; INTRAVENOUS at 18:03

## 2022-08-07 RX ADMIN — FENTANYL CITRATE 25 MCG: 50 INJECTION, SOLUTION INTRAMUSCULAR; INTRAVENOUS at 07:49

## 2022-08-07 RX ADMIN — VANCOMYCIN HYDROCHLORIDE 1250 MG: 1.25 INJECTION, POWDER, LYOPHILIZED, FOR SOLUTION INTRAVENOUS at 21:07

## 2022-08-07 RX ADMIN — MORPHINE SULFATE 2 MG: 2 INJECTION, SOLUTION INTRAMUSCULAR; INTRAVENOUS at 21:23

## 2022-08-07 RX ADMIN — CALCIUM CHLORIDE, MAGNESIUM CHLORIDE, SODIUM CHLORIDE, SODIUM BICARBONATE, POTASSIUM CHLORIDE AND SODIUM PHOSPHATE DIBASIC DIHYDRATE 1500 ML/HR: 3.68; 3.05; 6.34; 3.09; .314; .187 INJECTION INTRAVENOUS at 03:41

## 2022-08-07 NOTE — PROGRESS NOTES
PROGRESS NOTE      Patient Name: Raul Gardner  : 1975  MRN: [id number]  Primary Care Physician: Maribel Graf MD  Date of admission: 2022    Patient Care Team:  Maribel Graf MD as PCP - General (Family Medicine)  AVA Cavanaugh MD as Consulting Physician (Cardiology)        Subjective   Subjective:     Patient is slightly better than yesterday but still lethargic sick looking  Review of systems:  Middle-age female complaining of body aches abdominal pain abdominal distention      Allergies:    Allergies   Allergen Reactions   • Cephalexin Rash   • Hydrocodone-Acetaminophen Rash       Objective   Exam:     Vital Signs  Temp:  [97.2 °F (36.2 °C)-102.4 °F (39.1 °C)] 97.9 °F (36.6 °C)  Heart Rate:  [] 93  Resp:  [14-24] 18  BP: ()/(66-94) 150/94  Arterial Line BP: ()/() 99/89  SpO2:  [90 %-98 %] 92 %  on  Flow (L/min):  [3-5] 5;   Device (Oxygen Therapy): nasal cannula  Body mass index is 41.6 kg/m².    General: Middle-age  female i very lethargic  Head:      Normocephalic and atraumatic.    Eyes:      PERRL/EOM intact, conjunctiva and sclera clear with out nystagmus.    Neck:      No masses, thyromegaly,  trachea central with normal respiratory effort   Lungs:    Clear bilaterally to auscultation.    Heart:      Regular rate and rhythm, no murmur no gallop  Abd:        Diffusely tender with decreased bowel sounds  Pulses:   Pulses palpable  Extr:        No cyanosis or clubbing--no edema.    Neuro:    No focal deficits.   alert oriented x3  Skin:       Intact without lesions or rashes.    Psych:    Alert and cooperative; normal mood and affect; .      Results Review:  I have personally reviewed most recent Data :  CBC    Results from last 7 days   Lab Units 22  1513 22  1204 22  0855 22  0600 22  0252 22  2154 22  1550 22  1033   WBC 10*3/mm3  --  12.20* 11.60* 11.60* 11.20* 11.10* 12.40* 14.80*   HEMOGLOBIN g/dL  --  6.7*  7.2* 7.5* 6.7* 7.3* 7.3* 5.9*   PLATELETS 10*3/mm3 68* 46* 24* 25* 50* 55* 67* 95*     CMP   Results from last 7 days   Lab Units 08/07/22  0600 08/06/22  2154 08/06/22  1302 08/06/22  1013 08/06/22  0510 08/05/22  1400 08/05/22  0811   SODIUM mmol/L 138 141 136 139 138 133* 132*   POTASSIUM mmol/L 3.7 3.4* 3.5 2.5* 3.4* 3.7 3.9   CHLORIDE mmol/L 99 98 96* 96* 96* 99 94*   CO2 mmol/L 30.0* 29.0 16.0* 29.0 24.0 19.0* 21.0*   BUN mg/dL 37* 55* 53* 40* 46* 38* 32*   CREATININE mg/dL 2.92* 4.25* 4.58* 3.25* 4.06* 4.03* 3.82*   GLUCOSE mg/dL 124* 162* 264* 543* 218* 165* 141*   ALBUMIN g/dL 2.90* 3.30* 2.70* 1.90* 2.50* 2.90* 3.30*   BILIRUBIN mg/dL 4.7*  --  4.6* 3.1* 4.5* 4.6* 4.3*   ALK PHOS U/L 112  --  89 72 75 64 64   AST (SGOT) U/L 522*  --  393* 194* 177* 45* 41*   ALT (SGPT) U/L 352*  --  222* 109* 98* 27 30   AMYLASE U/L  --   --   --  39  --   --  81   LIPASE U/L  --   --   --  4*  --   --  12*     ABG    Results from last 7 days   Lab Units 08/05/22  0815   PH, ARTERIAL pH units 7.345*   PCO2, ARTERIAL mm Hg 28.3*   PO2 ART mm Hg 221.4*   O2 SATURATION ART % 99.8*   BASE EXCESS ART mmol/L -8.9*     CT Abdomen Pelvis Without Contrast    Result Date: 8/6/2022  Impression: 1. Moderate volume hemoperitoneum is new from prior and partially accumulates in the infrahepatic space. This suggests potential hepatic source of hemorrhage, likely from recent biopsy. 2. Bilateral small layering pleural effusions and streaky bibasilar atelectasis. Electronically signed by:  Bob Jackson M.D.  8/6/2022 9:03 PM    US Liver    Result Date: 8/6/2022  Multiple hyperechoic lesions within liver, which appear increased in size from prior ultrasound. Changes could be secondary to interval liver biopsy. Recommend correlation with pathology results.  Electronically Signed By-Mervin Martell MD On:8/6/2022 1:46 PM This report was finalized on [id number] by  Mervin Martell MD.    XR Chest 1 View    Result Date: 8/6/2022  Right IJ  catheter with tip at cavoatrial junction. No pneumothorax identified.  Electronically Signed By-Mervin Martell MD On:8/6/2022 6:27 PM This report was finalized on [id number] by  Mervin Martell MD.    XR Chest 1 View    Result Date: 8/6/2022   New left lower lobe disease. With the provided history this is concerning for developing pneumonia, possibly aspiration given its location.  Unchanged right lower lobe atelectasis versus pneumonia.  Right-sided PICC line in good position.  No pneumothorax.  Electronically Signed By-Ankur Thapa On:8/6/2022 12:53 PM This report was finalized on [id number] by  Ankur Thapa, .    XR Chest 1 View    Result Date: 8/6/2022   Right sided central line tip in the lower neck in the location of the lower internal jugular vein, just above the clavicle.  No pneumothorax. Mild right basilar atelectasis versus pneumonia.  Electronically Signed ByLouisa Thapa On:8/6/2022 9:08 AM This report was finalized on [id number] by  Ankur Thapa, .    CT Needle Biopsy Liver    Result Date: 8/5/2022   1. The areas identified on the patient's contrast-enhanced CT and recent ultrasound are not clearly identified on the noncontrast scan. Despite targeting the area when compared with CT, no purulent fluid could be aspirated. Ultrasound was utilized and to confirm that the area in question was being sampled. Core specimens were then obtained of this area. Given the absence of any purulent fluid and the nonvisualization on the noncontrast scan this is very unlikely to represent an intrahepatic abscess. These findings were discussed with Dr. Orozco by telephone immediately following the biopsy.  Electronically Signed By-Nahum Ross MD On:8/5/2022 6:26 PM This report was finalized on [id number] by  Nahum Ross MD.    XR Abdomen KUB    Result Date: 8/6/2022   Body habitus and a generalized paucity of bowel gas mildly limits evaluation. No significantly dilated gas-filled loops of small bowel are  seen to suggest an obstruction. No free peritoneal air.  No suspicious calcifications detected.  No acute osseous pathology.     Electronically signed by:  Nj Rosario  8/6/2022 4:47 AM      Results for orders placed during the hospital encounter of 08/05/22    Adult Transthoracic Echo Complete w/ Color, Spectral and Contrast if Necessary Per Protocol    Interpretation Summary  · Left ventricular wall thickness is consistent with mild concentric hypertrophy.  · Estimated left ventricular EF = 75% Left ventricular systolic function is hyperdynamic (EF > 70%).  · Left ventricular diastolic function is consistent with (grade I) impaired relaxation.    Scheduled Meds:escitalopram, 10 mg, Oral, Daily  hydrocortisone sodium succinate, 100 mg, Intravenous, Q6H  insulin lispro, 0-7 Units, Subcutaneous, Q6H  lidocaine PF 1%, 10 mL, Infiltration, Once  meropenem, 1 g, Intravenous, Q8H  micafungin (MYCAMINE) IV, 100 mg, Intravenous, Q24H  pantoprazole, 40 mg, Intravenous, Q12H  sodium chloride, 10 mL, Intravenous, Q12H  sodium chloride, 10 mL, Intravenous, Q12H      Continuous Infusions:DOPamine, 2-20 mcg/kg/min, Last Rate: Stopped (08/06/22 1420)  norepinephrine, 0.02-0.3 mcg/kg/min, Last Rate: Stopped (08/06/22 1540)  Pharmacy to dose vancomycin,   phenylephrine, 0.5-3 mcg/kg/min, Last Rate: Stopped (08/06/22 1614)  Phoxillum BK4/2.5, 2,000 mL/hr, Last Rate: 2,000 mL/hr (08/07/22 1407)  Phoxillum BK4/2.5, 1,500 mL/hr, Last Rate: 1,500 mL/hr (08/07/22 1439)  Phoxillum BK4/2.5, 1,000 mL/hr, Last Rate: 1,000 mL/hr (08/07/22 1126)  sodium bicarbonate drip (greater than 75 mEq/bag), 150 mEq, Last Rate: 150 mEq (08/07/22 0938)  vasopressin, 0.03 Units/min, Last Rate: Stopped (08/07/22 1308)      PRN Meds:•  acetaminophen **OR** acetaminophen  •  calcium gluconate **OR** calcium gluconate  •  dextrose  •  dextrose  •  fentaNYL citrate (PF)  •  glucagon (human recombinant)  •  heparin (porcine)  •  HYDROmorphone  •  magnesium  sulfate  •  Morphine  •  nitroglycerin  •  ondansetron **OR** ondansetron  •  oxyCODONE  •  Pharmacy to dose vancomycin  •  potassium chloride  •  simethicone  •  sodium chloride  •  sodium chloride  •  sodium chloride  •  sodium chloride  •  sodium phosphate IVPB  •  Vancomycin Pharmacy Intermittent/Pulse Dosing    Assessment & Plan   Assessment and Plan:         Septic shock (HCC)    Pulmonary hypertension, unspecified (HCC)    Anxiety    Vitamin D deficiency    Essential hypertension    Suspected liver abscess    Diarrhea    Acute kidney injury (HCC)    Metabolic acidosis    ASSESSMENT:  · Acute kidney injury likely ATN secondary to sepsis and hemodynamic instability on pressors at this time  · Multiple liver lesion unlikely to be abscess  · Chronic kidney disease as per renal ultrasound with some increased echogenicity   · Significant lactic acidosis with increased anion gap   · Some evidence of volume overload   · History of hypertension   ·             Plan:      · Patient in septic shock with MARIANNE continue ABX   · CRRT was started yesterday for progressive acidemia with an anuria  · Significant lactic acidosis which is slowly improving after initiation of CRRT  · Significant drop in the hemoglobin since yesterday patient do have bleeding in liver area after biopsy   · Repeat CAT scan was done yesterday at this time likely ATN because of the multiple injury injuries likely contrast nephropathy in the presence of sepsis  · Okay to discontinue bicarbonate based fluid at this time y  · We will get dialysis catheter placed start CRRT if needed  · Discussed with the ICU staff in detail including intensivist  · Increased BNP continue IV fluid at 100 cc an hour we will hold IV fluid if any evidence of the overt volume overload  · ECHOCARDIOGRAM showed diastolic dysfunctions grade 1 EF normal  · Serologies sent yesterday including ANCA KAREN protein electrophoresis  · Patient is already on bicarbonate based fluid we will  follow-up with pH    •           Electronically signed by Vikram Acosta MD,   [facility] kidney consultant  [phone]

## 2022-08-07 NOTE — PROGRESS NOTES
Infectious Diseases Progress Note      LOS: 2 days   Patient Care Team:  Maribel Graf MD as PCP - General (Family Medicine)  AVA Cavanaugh MD as Consulting Physician (Cardiology)    Chief Complaint: Fever and weakness and diarrhea    Subjective     The patient had fever up to 102.4 during the last 24 hours but has been afebrile since this morning.  She is off the cooling blanket.  Patient appears to doing  To better today-currently on 5 L of oxygen by nasal cannula and her main complaint is abdominal pain.  She is down to 1 pressor and her RN states that she will be coming off that shortly.  Currently on CRRT.      Sandoval catheter  Review of Systems:   Review of Systems   Constitutional: Positive for chills, fatigue and fever.   HENT: Negative.    Eyes: Negative.    Respiratory: Negative.    Cardiovascular: Negative.    Gastrointestinal: Positive for abdominal pain, diarrhea and nausea.   Endocrine: Negative.    Genitourinary: Negative.    Musculoskeletal: Negative.    Skin: Negative.    Neurological: Negative.    Psychiatric/Behavioral: Negative.    All other systems reviewed and are negative.       Objective     Vital Signs  Temp:  [97.2 °F (36.2 °C)-102.4 °F (39.1 °C)] 97.9 °F (36.6 °C)  Heart Rate:  [] 93  Resp:  [14-24] 18  BP: ()/(66-94) 150/94  Arterial Line BP: ()/() 99/89    Physical Exam:  Physical Exam  Vitals and nursing note reviewed.   Constitutional:       Appearance: She is well-developed. She is ill-appearing.   HENT:      Head: Normocephalic and atraumatic.   Eyes:      Pupils: Pupils are equal, round, and reactive to light.   Cardiovascular:      Rate and Rhythm: Normal rate and regular rhythm.      Heart sounds: Normal heart sounds.   Pulmonary:      Effort: Pulmonary effort is normal. No respiratory distress.      Breath sounds: Normal breath sounds. No wheezing or rales.   Abdominal:      General: Bowel sounds are normal. There is no distension.      Palpations:  Abdomen is soft. There is no mass.      Tenderness: There is abdominal tenderness. There is no guarding or rebound.      Comments: Mild tenderness in the right upper quadrant but there was no rebound or guarding.     Genitourinary:     Comments: Sandoval cath  Musculoskeletal:         General: No deformity. Normal range of motion.      Cervical back: Normal range of motion and neck supple.   Skin:     General: Skin is warm.      Findings: No erythema or rash.   Neurological:      Mental Status: She is alert and oriented to person, place, and time.      Cranial Nerves: No cranial nerve deficit.          Results Review:    I have reviewed all clinical data, test, lab, and imaging results.     Radiology  CT Abdomen Pelvis Without Contrast    Result Date: 8/6/2022  CT OF THE ABDOMEN AND PELVIS WITHOUT CONTRAST Clinical indication: Septic shock. Comparison: 8/5/2022. Procedure: Noncontrast CT images of the abdomen and pelvis were obtained.  CT dose lowering techniques were used, to include: automated exposure control, adjustment for patient size, and or use of iterative reconstruction. Findings: Lung Bases: Small bilateral layering pleural effusions are present. Streaky airspace densities are seen in both lung bases. Liver and biliary system: Ill-defined hypodense region is seen in the right hepatic lobe posteriorly. This is less conspicuous without IV contrast than on the prior. There is new, heterogeneous and hyperdense fluid in the perihepatic and infrahepatic space. Gallbladder is unremarkable. Pancreas: The pancreas is unremarkable. Spleen: The spleen is normal. Adrenals: The adrenal glands are within normal limits. Kidneys: The kidneys are symmetric in size and without hydronephrosis. Gastrointestinal: The stomach and scattered loops of small and large bowel have a nonobstructive pattern. No focal mucosal lesion or inflammatory changes are seen. The appendix is normal in the right lower quadrant. Mesentery and  retroperitoneum: No mesenteric or retroperitoneal adenopathy. Scattered, moderate volume mixed attenuation peritoneal fluid is new from prior. No free air. Pelvis: The urinary bladder is decompressed by Sandoval catheter. Rectum is normal. No free fluid. No deep pelvic or inguinal adenopathy. Reproductive: No acute abnormality. Body wall: Normal. Bones: No acute osseous abnormality.     Impression: 1. Moderate volume hemoperitoneum is new from prior and partially accumulates in the infrahepatic space. This suggests potential hepatic source of hemorrhage, likely from recent biopsy. 2. Bilateral small layering pleural effusions and streaky bibasilar atelectasis. Electronically signed by:  Bob Jackson M.D.  8/6/2022 9:03 PM    XR Chest 1 View    Result Date: 8/6/2022  XR CHEST 1 VW-  Date of Exam: 8/6/2022 6:01 PM  Indication: line placement; A41.9-Sepsis, unspecified organism; R65.21-Severe sepsis with septic shock; N17.9-Acute kidney failure, unspecified.  Comparison Exams: Chest radiograph from earlier today  Technique: Single AP chest radiograph  FINDINGS: A right internal jugular catheter has its tip at the cavoatrial junction. No pneumothorax is identified. Otherwise, no significant change from recent prior exam.      Right IJ catheter with tip at cavoatrial junction. No pneumothorax identified.  Electronically Signed By-Mervin Martell MD On:8/6/2022 6:27 PM This report was finalized on [id number] by  Mervin Martell MD.      Cardiology    Laboratory    Results from last 7 days   Lab Units 08/07/22  1513 08/07/22  1204 08/07/22  0855 08/07/22  0600 08/07/22  0252 08/06/22  2154 08/06/22  1550 08/06/22  1033   WBC 10*3/mm3  --  12.20* 11.60* 11.60* 11.20* 11.10* 12.40* 14.80*   HEMOGLOBIN g/dL  --  6.7* 7.2* 7.5* 6.7* 7.3* 7.3* 5.9*   HEMATOCRIT %  --  20.1* 21.5* 22.1* 20.0* 21.4* 22.3* 18.3*   PLATELETS 10*3/mm3 68* 46* 24* 25* 50* 55* 67* 95*     Results from last 7 days   Lab Units 08/07/22  0600  08/06/22  2154 08/06/22  1302 08/06/22  1013 08/06/22  0510 08/05/22  1400 08/05/22  0811   SODIUM mmol/L 138 141 136 139 138 133* 132*   POTASSIUM mmol/L 3.7 3.4* 3.5 2.5* 3.4* 3.7 3.9   CHLORIDE mmol/L 99 98 96* 96* 96* 99 94*   CO2 mmol/L 30.0* 29.0 16.0* 29.0 24.0 19.0* 21.0*   BUN mg/dL 37* 55* 53* 40* 46* 38* 32*   CREATININE mg/dL 2.92* 4.25* 4.58* 3.25* 4.06* 4.03* 3.82*   GLUCOSE mg/dL 124* 162* 264* 543* 218* 165* 141*   ALBUMIN g/dL 2.90* 3.30* 2.70* 1.90* 2.50* 2.90* 3.30*   BILIRUBIN mg/dL 4.7*  --  4.6* 3.1* 4.5* 4.6* 4.3*   ALK PHOS U/L 112  --  89 72 75 64 64   AST (SGOT) U/L 522*  --  393* 194* 177* 45* 41*   ALT (SGPT) U/L 352*  --  222* 109* 98* 27 30   AMYLASE U/L  --   --   --  39  --   --  81   LIPASE U/L  --   --   --  4*  --   --  12*   CALCIUM mg/dL 7.2* 7.2* 6.9* 5.1* 7.2* 7.5* 8.3*     Results from last 7 days   Lab Units 08/05/22  0811   CK TOTAL U/L 327*             Microbiology   Microbiology Results (last 10 days)     Procedure Component Value - Date/Time    Clostridioides difficile Toxin - Stool, Per Rectum [[id number]]  (Normal) Collected: 08/06/22 0703    Lab Status: Final result Specimen: Stool from Per Rectum Updated: 08/06/22 9201    Narrative:      The following orders were created for panel order Clostridioides difficile Toxin - Stool, Per Rectum.  Procedure                               Abnormality         Status                     ---------                               -----------         ------                     Clostridioides difficile...[[id number]]  Normal              Final result                 Please view results for these tests on the individual orders.    Clostridioides difficile EIA - Stool, Per Rectum [[id number]]  (Normal) Collected: 08/06/22 0703    Lab Status: Final result Specimen: Stool from Per Rectum Updated: 08/06/22 0758     C Diff GDH / Toxin Negative    Eosinophil Smear - Urine, Urine, Clean Catch [[id number]]  (Normal) Collected: 08/05/22 1917    Lab  Status: Final result Specimen: Urine, Clean Catch Updated: 08/05/22 2047     Eosinophil Smear 0 % EOS/100 Cells     Body Fluid Culture - Body Fluid, Liver [[id number]] Collected: 08/05/22 1755    Lab Status: Preliminary result Specimen: Body Fluid from Liver Updated: 08/07/22 0929     Body Fluid Culture No growth at 2 days     Gram Stain Few (2+) WBCs per low power field      No organisms seen    S. Pneumo Ag Urine or CSF - Urine, Urine, Clean Catch [[id number]]  (Normal) Collected: 08/05/22 1154    Lab Status: Final result Specimen: Urine, Clean Catch Updated: 08/05/22 1234     Strep Pneumo Ag Negative    Legionella Antigen, Urine - Urine, Urine, Clean Catch [[id number]]  (Normal) Collected: 08/05/22 1154    Lab Status: Final result Specimen: Urine, Clean Catch Updated: 08/05/22 1234     LEGIONELLA ANTIGEN, URINE Negative    MRSA Screen, PCR (Inpatient) - Swab, Nares [[id number]]  (Normal) Collected: 08/05/22 1057    Lab Status: Final result Specimen: Swab from Nares Updated: 08/05/22 1222     MRSA PCR No MRSA Detected    Narrative:      The negative predictive value of this diagnostic test is high and should only be used to consider de-escalating anti-MRSA therapy. A positive result may indicate colonization with MRSA and must be correlated clinically.    Gastrointestinal Panel, PCR - Stool, Per Rectum [[id number]]  (Abnormal) Collected: 08/05/22 1057    Lab Status: Final result Specimen: Stool from Per Rectum Updated: 08/05/22 1238     Campylobacter Not Detected     Plesiomonas shigelloides Not Detected     Salmonella Not Detected     Vibrio Not Detected     Vibrio cholerae Not Detected     Yersinia enterocolitica Not Detected     Enteroaggregative E. coli (EAEC) Not Detected     Enteropathogenic E. coli (EPEC) Detected     Enterotoxigenic E. coli (ETEC) lt/st Not Detected     Shiga-like toxin-producing E. coli (STEC) stx1/stx2 Not Detected     Shigella/Enteroinvasive E. coli (EIEC) Not Detected     Cryptosporidium  Not Detected     Cyclospora cayetanensis Not Detected     Entamoeba histolytica Not Detected     Giardia lamblia Not Detected     Adenovirus F40/41 Not Detected     Astrovirus Not Detected     Norovirus GI/GII Not Detected     Rotavirus A Not Detected     Sapovirus (I, II, IV or V) Not Detected    Urine Culture - Urine, Urine, Catheter [[id number]]  (Normal) Collected: 08/05/22 1012    Lab Status: Final result Specimen: Urine, Catheter Updated: 08/06/22 1409     Urine Culture No growth    Blood Culture - Blood, Blood, Central Line [[id number]]  (Normal) Collected: 08/05/22 0902    Lab Status: Preliminary result Specimen: Blood, Central Line Updated: 08/07/22 0915     Blood Culture No growth at 2 days    Blood Culture - Blood, Blood, Central Line [[id number]]  (Normal) Collected: 08/05/22 0902    Lab Status: Preliminary result Specimen: Blood, Central Line Updated: 08/07/22 0915     Blood Culture No growth at 2 days    Respiratory Panel PCR w/COVID-19(SARS-CoV-2) CELSA/JONATHAN/DOMINIQUE/PAD/COR/MAD/LISA In-House, NP Swab in UTM/VTM, 3-4 HR TAT - Swab, Nasopharynx [[id number]]  (Normal) Collected: 08/05/22 0815    Lab Status: Final result Specimen: Swab from Nasopharynx Updated: 08/05/22 0909     ADENOVIRUS, PCR Not Detected     Coronavirus 229E Not Detected     Coronavirus HKU1 Not Detected     Coronavirus NL63 Not Detected     Coronavirus OC43 Not Detected     COVID19 Not Detected     Human Metapneumovirus Not Detected     Human Rhinovirus/Enterovirus Not Detected     Influenza A PCR Not Detected     Influenza B PCR Not Detected     Parainfluenza Virus 1 Not Detected     Parainfluenza Virus 2 Not Detected     Parainfluenza Virus 3 Not Detected     Parainfluenza Virus 4 Not Detected     RSV, PCR Not Detected     Bordetella pertussis pcr Not Detected     Bordetella parapertussis PCR Not Detected     Chlamydophila pneumoniae PCR Not Detected     Mycoplasma pneumo by PCR Not Detected    Narrative:      In the setting of a positive  respiratory panel with a viral infection PLUS a negative procalcitonin without other underlying concern for bacterial infection, consider observing off antibiotics or discontinuation of antibiotics and continue supportive care. If the respiratory panel is positive for atypical bacterial infection (Bordetella pertussis, Chlamydophila pneumoniae, or Mycoplasma pneumoniae), consider antibiotic de-escalation to target atypical bacterial infection.          Medication Review:       Schedule Meds  escitalopram, 10 mg, Oral, Daily  hydrocortisone sodium succinate, 100 mg, Intravenous, Q6H  insulin lispro, 0-7 Units, Subcutaneous, Q6H  lidocaine PF 1%, 10 mL, Infiltration, Once  meropenem, 1 g, Intravenous, Q8H  micafungin (MYCAMINE) IV, 100 mg, Intravenous, Q24H  pantoprazole, 40 mg, Intravenous, Q12H  sodium chloride, 10 mL, Intravenous, Q12H  sodium chloride, 10 mL, Intravenous, Q12H        Infusion Meds  DOPamine, 2-20 mcg/kg/min, Last Rate: Stopped (08/06/22 1420)  norepinephrine, 0.02-0.3 mcg/kg/min, Last Rate: Stopped (08/06/22 1540)  Pharmacy to dose vancomycin,   phenylephrine, 0.5-3 mcg/kg/min, Last Rate: Stopped (08/06/22 1614)  Phoxillum BK4/2.5, 2,000 mL/hr, Last Rate: 2,000 mL/hr (08/07/22 1407)  Phoxillum BK4/2.5, 1,500 mL/hr, Last Rate: 1,500 mL/hr (08/07/22 1439)  Phoxillum BK4/2.5, 1,000 mL/hr, Last Rate: 1,000 mL/hr (08/07/22 1126)  sodium bicarbonate drip (greater than 75 mEq/bag), 150 mEq, Last Rate: 150 mEq (08/07/22 0938)  vasopressin, 0.03 Units/min, Last Rate: Stopped (08/07/22 1308)        PRN Meds  •  acetaminophen **OR** acetaminophen  •  calcium gluconate **OR** calcium gluconate  •  dextrose  •  dextrose  •  fentaNYL citrate (PF)  •  glucagon (human recombinant)  •  heparin (porcine)  •  HYDROmorphone  •  magnesium sulfate  •  Morphine  •  nitroglycerin  •  ondansetron **OR** ondansetron  •  oxyCODONE  •  Pharmacy to dose vancomycin  •  potassium chloride  •  simethicone  •  sodium chloride  •   sodium chloride  •  sodium chloride  •  sodium chloride  •  sodium phosphate IVPB  •  Vancomycin Pharmacy Intermittent/Pulse Dosing        Assessment & Plan       Antimicrobial Therapy   1.  IV meropenem        2.  IV vancomycin        3.  IV micafungin        4.        5.               Assessment     Severe septic shock.  The presentation was concerning for hypovolemic shock and currently might have intra-abdominal hemorrhage.  The patient is currently on 4 vasopressors including vasopressin, norepinephrine, Tobias-Synephrine and dopamine on and off  -Patient is now down to just vasopressin and this is supposed to be stopped shortly    Very abnormal lesion in the liver initially suspected to be liver abscess.  S/p IR aspirating the lesion/obtaining biopsy.  There was no purulence to suspect abscess.  Patient received 1 unit of fresh frozen plasma before the procedure  -Liver fluid cultures are negative so far  -CT the abdomen pelvis showed hemoperitoneum -likely due to hemorrhage    Acute kidney injury secondary to above and possibly prerenal  -Patient is currently on CRRT    Positive stool for enteropathogenic E. coli.  Usually self-limiting disease but patient probably has severe infection resulted in severe diarrhea and dehydration    Elevated INR.  Patient received fresh frozen plasma yesterday.  Probably secondary to sepsis    Reactive leukocytosis secondary to above  -Trending down  -Patient had diarrhea but C. difficile screen is negative      Plan    Continue vancomycin IV and ask pharmacy to follow and dose  Continue IV meropenem 1 g every 8 hours  Continue micafungin 100 mg IV daily  We will likely de-escalate tomorrow if patient continues to improve  Waiting on liver fluid cultures  Continue supportive care and pressors  A.m. labs  Case discussed with ROBERT Pollock, GRACIELA  08/07/22  15:47 EDT    Note is dictated utilizing voice recognition software/Dragon

## 2022-08-07 NOTE — ATTESTATION SEPSIS FOCUSED EXAM
SEPTIC SHOCK FOCUSED EXAM ATTESTATION    I attest that I have reassessed tissue perfusion after the fluid bolus given.    GRACIELA Mohr      Late Entry  Electronically signed by GRACIELA Mohr, 08/07/22, 7:57 PM EDT.

## 2022-08-07 NOTE — PROGRESS NOTES
GENERAL SURGERY PROGRESS NOTE    8/7/2022   LOS: 2 days   Patient Care Team:  Maribel Graf MD as PCP - General (Family Medicine)  AVA Cavanaugh MD as Consulting Physician (Cardiology)    Chief Complaint: Anemia of acute blood loss with hemorrhagic shock    Subjective   HPI: Patient is a 47 y.o. female presented to the hospital yesterday with fevers, chills, body aches and then developed vomiting and diarrhea on the day of admission.  When she arrived at the ER she was found to have a fever of 103.1, heart rate of 146 and was hypotensive.  Her hypotension improved after vasopressor administration.  A CT scan of the abdomen and pelvis was obtained and demonstrated multiple ill-defined lesions throughout her liver concerning for possible hepatic abscesses.  Her laboratory values were significant for a UTI with MARIANNE, and coagulopathy with an INR of 2.5.  The patient was then seen by infectious disease who recommended biopsy/drainage of these areas in the liver to look for a source of infection.  She was given 1 unit of FFP and taken to interventional radiology and subsequently underwent aspiration and biopsy of these sites.  This morning, the patient was noted to have profound hypotension requiring 3 pressors and an acute drop in her hemoglobin from 13 yesterday to 5.9 today.  General surgery was consulted as the patient had abdominal pain and bleeding.     Interval History:   Patient reports abdominal pain.  Off pressors.  On CRRT    Objective     Vital Signs  Temp:  [97.2 °F (36.2 °C)-102.6 °F (39.2 °C)] 98.3 °F (36.8 °C)  Heart Rate:  [] 98  Resp:  [15-26] 18  BP: ()/(66-94) 128/84  Arterial Line BP: ()/() 99/89    Physical Exam  Vitals reviewed.   Constitutional:       Appearance: She is well-developed.   HENT:      Head: Normocephalic and atraumatic.   Eyes:      Pupils: Pupils are equal, round, and reactive to light.   Cardiovascular:      Rate and Rhythm: Normal rate and regular  rhythm.   Pulmonary:      Effort: Pulmonary effort is normal.      Breath sounds: Normal breath sounds.   Abdominal:      General: There is no distension.      Palpations: Abdomen is soft.      Tenderness: There is generalized abdominal tenderness. There is guarding. There is no rebound.      Hernia: No hernia is present.   Musculoskeletal:         General: Normal range of motion.      Cervical back: Normal range of motion.   Lymphadenopathy:      Cervical: No cervical adenopathy.   Skin:     General: Skin is warm and dry.      Findings: No rash.   Neurological:      Mental Status: She is alert and oriented to person, place, and time.          Results Review:    Lab Results (last 24 hours)     Procedure Component Value Units Date/Time    AFP Tumor Marker [[id number]]  (Normal) Collected: 08/06/22 1641    Specimen: Blood Updated: 08/07/22 1253     ALPHA-FETOPROTEIN 3 ng/mL     Narrative:      Alpha Fetoprotein Tumor Marker Reference Range:    0.0-8.3 ng/mL    Note: Normal values apply only to males and nonpregnant females. These results are not interpretable for pregnant females.    Results may be falsely decreased if patient taking Biotin.      Ceruloplasmin [[id number]]  (Abnormal) Collected: 08/06/22 1641    Specimen: Blood Updated: 08/07/22 1250     Ceruloplasmin 16 mg/dL     Manual Differential [[id number]]  (Abnormal) Collected: 08/07/22 1204    Specimen: Blood Updated: 08/07/22 1246     Neutrophil % 91.0 %      Lymphocyte % 4.0 %      Monocyte % 3.0 %      Bands %  1.0 %      Metamyelocyte % 1.0 %      Neutrophils Absolute 11.22 10*3/mm3      Lymphocytes Absolute 0.49 10*3/mm3      Monocytes Absolute 0.37 10*3/mm3      RBC Morphology Normal     Toxic Granulation Slight/1+     Platelet Estimate Decreased    CBC & Differential [[id number]]  (Abnormal) Collected: 08/07/22 1204    Specimen: Blood Updated: 08/07/22 1246    Narrative:      The following orders were created for panel order CBC & Differential.  Procedure                                Abnormality         Status                     ---------                               -----------         ------                     CBC Auto Differential[[id number]]        Abnormal            Final result               Scan Slide[[id number]]                                       Final result                 Please view results for these tests on the individual orders.    Scan Slide [[id number]] Collected: 08/07/22 1204    Specimen: Blood Updated: 08/07/22 1246     Scan Slide --     Comment: See Manual Differential Results       CBC Auto Differential [[id number]]  (Abnormal) Collected: 08/07/22 1204    Specimen: Blood Updated: 08/07/22 1246     WBC 12.20 10*3/mm3      RBC 2.41 10*6/mm3      Hemoglobin 6.7 g/dL      Hematocrit 20.1 %      MCV 83.2 fL      MCH 27.6 pg      MCHC 33.2 g/dL      RDW 15.3 %      RDW-SD 44.6 fl      MPV 9.2 fL      Platelets 46 10*3/mm3     Narrative:      The previously reported component NRBC is no longer being reported. Previous result was 0.0 /100 WBC (Reference Range: 0.0-0.2 /100 WBC) on 8/7/2022 at 1218 EDT.    Gamma GT [[id number]]  (Abnormal) Collected: 08/06/22 1302    Specimen: Blood Updated: 08/07/22 1245     GGT 55 U/L     POC Glucose Once [[id number]]  (Abnormal) Collected: 08/07/22 1131    Specimen: Blood Updated: 08/07/22 1132     Glucose 131 mg/dL      Comment: Serial Number: [id number]:  [id number]       Path Consult Reflex [[id number]] Collected: 08/07/22 0855    Specimen: Blood Updated: 08/07/22 0936     Pathology Review Yes    Manual Differential [[id number]]  (Abnormal) Collected: 08/07/22 0855    Specimen: Blood Updated: 08/07/22 0936     Neutrophil % 68.0 %      Lymphocyte % 6.0 %      Monocyte % 2.0 %      Eosinophil % 2.0 %      Bands %  22.0 %      Neutrophils Absolute 10.44 10*3/mm3      Lymphocytes Absolute 0.70 10*3/mm3      Monocytes Absolute 0.23 10*3/mm3      Eosinophils Absolute 0.23 10*3/mm3      Anisocytosis Slight/1+      Toxic Granulation Slight/1+     Vacuolated Neutrophils Slight/1+     Platelet Estimate Decreased    CBC & Differential [[id number]]  (Abnormal) Collected: 08/07/22 0855    Specimen: Blood Updated: 08/07/22 0936    Narrative:      The following orders were created for panel order CBC & Differential.  Procedure                               Abnormality         Status                     ---------                               -----------         ------                     CBC Auto Differential[[id number]]        Abnormal            Final result               Scan Slide[[id number]]                                       Final result                 Please view results for these tests on the individual orders.    Scan Slide [[id number]] Collected: 08/07/22 0855    Specimen: Blood Updated: 08/07/22 0936     Scan Slide --     Comment: See Manual Differential Results       CBC Auto Differential [[id number]]  (Abnormal) Collected: 08/07/22 0855    Specimen: Blood Updated: 08/07/22 0936     WBC 11.60 10*3/mm3      RBC 2.59 10*6/mm3      Hemoglobin 7.2 g/dL      Hematocrit 21.5 %      MCV 82.8 fL      MCH 27.9 pg      MCHC 33.7 g/dL      RDW 15.1 %      RDW-SD 43.8 fl      MPV 9.1 fL      Platelets 24 10*3/mm3     Narrative:      The previously reported component NRBC is no longer being reported. Previous result was 0.1 /100 WBC (Reference Range: 0.0-0.2 /100 WBC) on 8/7/2022 at 0908 EDT.    Body Fluid Culture - Body Fluid, Liver [[id number]] Collected: 08/05/22 1755    Specimen: Body Fluid from Liver Updated: 08/07/22 0929     Body Fluid Culture No growth at 2 days     Gram Stain Few (2+) WBCs per low power field      No organisms seen    Blood Culture - Blood, Blood, Central Line [[id number]]  (Normal) Collected: 08/05/22 0902    Specimen: Blood, Central Line Updated: 08/07/22 0915     Blood Culture No growth at 2 days    Blood Culture - Blood, Blood, Central Line [[id number]]  (Normal) Collected: 08/05/22 0902    Specimen: Blood,  Central Line Updated: 08/07/22 0915     Blood Culture No growth at 2 days    Manual Differential [[id number]]  (Abnormal) Collected: 08/07/22 0600    Specimen: Blood Updated: 08/07/22 0658     Neutrophil % 69.0 %      Lymphocyte % 12.0 %      Monocyte % 4.0 %      Eosinophil % 1.0 %      Bands %  14.0 %      Neutrophils Absolute 9.63 10*3/mm3      Lymphocytes Absolute 1.39 10*3/mm3      Monocytes Absolute 0.46 10*3/mm3      Eosinophils Absolute 0.12 10*3/mm3      RBC Morphology Normal     Vacuolated Neutrophils Slight/1+     Platelet Estimate Decreased    CBC & Differential [[id number]]  (Abnormal) Collected: 08/07/22 0600    Specimen: Blood Updated: 08/07/22 0658    Narrative:      The following orders were created for panel order CBC & Differential.  Procedure                               Abnormality         Status                     ---------                               -----------         ------                     CBC Auto Differential[[id number]]        Abnormal            Final result               Scan Slide[[id number]]                                       Final result                 Please view results for these tests on the individual orders.    Scan Slide [[id number]] Collected: 08/07/22 0600    Specimen: Blood Updated: 08/07/22 0658     Scan Slide --     Comment: See Manual Differential Results       CBC Auto Differential [[id number]]  (Abnormal) Collected: 08/07/22 0600    Specimen: Blood Updated: 08/07/22 0657     WBC 11.60 10*3/mm3      RBC 2.67 10*6/mm3      Hemoglobin 7.5 g/dL      Hematocrit 22.1 %      MCV 82.8 fL      MCH 28.0 pg      MCHC 33.8 g/dL      RDW 14.7 %      RDW-SD 42.9 fl      MPV 8.8 fL      Platelets 25 10*3/mm3     Narrative:      The previously reported component NRBC is no longer being reported. Previous result was 0.1 /100 WBC (Reference Range: 0.0-0.2 /100 WBC) on 8/7/2022 at 0622 EDT.    Phosphorus [[id number]]  (Normal) Collected: 08/07/22 0600    Specimen: Blood Updated:  08/07/22 0647     Phosphorus 4.5 mg/dL     Comprehensive Metabolic Panel [[id number]]  (Abnormal) Collected: 08/07/22 0600    Specimen: Blood Updated: 08/07/22 0643     Glucose 124 mg/dL      BUN 37 mg/dL      Creatinine 2.92 mg/dL      Sodium 138 mmol/L      Potassium 3.7 mmol/L      Chloride 99 mmol/L      CO2 30.0 mmol/L      Calcium 7.2 mg/dL      Total Protein 4.7 g/dL      Albumin 2.90 g/dL      ALT (SGPT) 352 U/L      AST (SGOT) 522 U/L      Alkaline Phosphatase 112 U/L      Total Bilirubin 4.7 mg/dL      Globulin 1.8 gm/dL      A/G Ratio 1.6 g/dL      BUN/Creatinine Ratio 12.7     Anion Gap 9.0 mmol/L      eGFR 19.6 mL/min/1.73      Comment: National Kidney Foundation and American Society of Nephrology (ASN) Task Force recommended calculation based on the Chronic Kidney Disease Epidemiology Collaboration (CKD-EPI) equation refit without adjustment for race.       Narrative:      GFR Normal >60  Chronic Kidney Disease <60  Kidney Failure <15      Vancomycin, Random [[id number]]  (Normal) Collected: 08/07/22 0600    Specimen: Blood Updated: 08/07/22 0640     Vancomycin Random 18.80 mcg/mL     Narrative:      Therapeutic Ranges for Vancomycin    Vancomycin Random   5.0-40.0 mcg/mL  Vancomycin Trough   5.0-20.0 mcg/mL  Vancomycin Peak     20.0-40.0 mcg/mL    Magnesium [[id number]]  (Normal) Collected: 08/07/22 0600    Specimen: Blood Updated: 08/07/22 0640     Magnesium 2.3 mg/dL     Lactic Acid, Plasma [[id number]]  (Normal) Collected: 08/07/22 0600    Specimen: Blood Updated: 08/07/22 0639     Lactate 1.2 mmol/L     Calcium, Ionized [[id number]]  (Abnormal) Collected: 08/07/22 0600    Specimen: Blood Updated: 08/07/22 0635     Ionized Calcium 1.00 mmol/L     POC Glucose Once [[id number]]  (Abnormal) Collected: 08/07/22 0607    Specimen: Blood Updated: 08/07/22 0608     Glucose 139 mg/dL      Comment: Serial Number: [id number]:  [id number]       CBC & Differential [[id number]]  (Abnormal) Collected: 08/07/22  0252    Specimen: Blood Updated: 08/07/22 0308    Narrative:      The following orders were created for panel order CBC & Differential.  Procedure                               Abnormality         Status                     ---------                               -----------         ------                     CBC Auto Differential[[id number]]        Abnormal            Final result                 Please view results for these tests on the individual orders.    CBC Auto Differential [[id number]]  (Abnormal) Collected: 08/07/22 0252    Specimen: Blood Updated: 08/07/22 0308     WBC 11.20 10*3/mm3      RBC 2.40 10*6/mm3      Hemoglobin 6.7 g/dL      Hematocrit 20.0 %      MCV 83.2 fL      MCH 28.1 pg      MCHC 33.8 g/dL      RDW 14.7 %      RDW-SD 43.3 fl      MPV 9.9 fL      Platelets 50 10*3/mm3      Neutrophil % 87.4 %      Lymphocyte % 5.7 %      Monocyte % 5.9 %      Eosinophil % 0.5 %      Basophil % 0.5 %      Neutrophils, Absolute 9.80 10*3/mm3      Lymphocytes, Absolute 0.60 10*3/mm3      Monocytes, Absolute 0.70 10*3/mm3      Eosinophils, Absolute 0.10 10*3/mm3      Basophils, Absolute 0.10 10*3/mm3      nRBC 0.0 /100 WBC     Magnesium [[id number]]  (Normal) Collected: 08/06/22 2154    Specimen: Blood Updated: 08/06/22 2224     Magnesium 2.2 mg/dL     Renal Function Panel [[id number]]  (Abnormal) Collected: 08/06/22 2154    Specimen: Blood Updated: 08/06/22 2224     Glucose 162 mg/dL      BUN 55 mg/dL      Creatinine 4.25 mg/dL      Sodium 141 mmol/L      Potassium 3.4 mmol/L      Chloride 98 mmol/L      CO2 29.0 mmol/L      Calcium 7.2 mg/dL      Albumin 3.30 g/dL      Phosphorus 4.8 mg/dL      Anion Gap 14.0 mmol/L      BUN/Creatinine Ratio 12.9     eGFR 12.5 mL/min/1.73      Comment: <15 Indicative of kidney failure       Narrative:      GFR Normal >60  Chronic Kidney Disease <60  Kidney Failure <15      STAT Lactic Acid, Reflex [[id number]]  (Normal) Collected: 08/06/22 2154    Specimen: Blood Updated:  08/06/22 2220     Lactate 1.6 mmol/L     Calcium, Ionized [[id number]]  (Abnormal) Collected: 08/06/22 2154    Specimen: Blood Updated: 08/06/22 2214     Ionized Calcium 0.95 mmol/L     CBC & Differential [[id number]]  (Abnormal) Collected: 08/06/22 2154    Specimen: Blood Updated: 08/06/22 2205    Narrative:      The following orders were created for panel order CBC & Differential.  Procedure                               Abnormality         Status                     ---------                               -----------         ------                     CBC Auto Differential[[id number]]        Abnormal            Final result                 Please view results for these tests on the individual orders.    CBC Auto Differential [[id number]]  (Abnormal) Collected: 08/06/22 2154    Specimen: Blood Updated: 08/06/22 2205     WBC 11.10 10*3/mm3      RBC 2.58 10*6/mm3      Hemoglobin 7.3 g/dL      Hematocrit 21.4 %      MCV 82.9 fL      MCH 28.3 pg      MCHC 34.2 g/dL      RDW 14.4 %      RDW-SD 42.0 fl      MPV 9.1 fL      Platelets 55 10*3/mm3      Neutrophil % 86.9 %      Lymphocyte % 4.9 %      Monocyte % 6.2 %      Eosinophil % 0.8 %      Basophil % 1.2 %      Neutrophils, Absolute 9.70 10*3/mm3      Lymphocytes, Absolute 0.50 10*3/mm3      Monocytes, Absolute 0.70 10*3/mm3      Eosinophils, Absolute 0.10 10*3/mm3      Basophils, Absolute 0.10 10*3/mm3      nRBC 0.0 /100 WBC     STAT Lactic Acid, Reflex [[id number]]  (Abnormal) Collected: 08/06/22 1641    Specimen: Blood Updated: 08/06/22 1711     Lactate 4.2 mmol/L     Hepatitis Panel, Acute [[id number]]  (Normal) Collected: 08/06/22 1550    Specimen: Blood Updated: 08/06/22 1654     Hepatitis B Surface Ag Non-Reactive     Hep A IgM Non-Reactive     Hep B C IgM Non-Reactive     Hepatitis C Ab Non-Reactive    Narrative:      Results may be falsely decreased if patient taking Biotin.     EBV Antibody VCA, IgG [[id number]] Collected: 08/06/22 1641    Specimen: Blood Updated:  08/06/22 1648    Anti-microsomal Antibody [[id number]] Collected: 08/06/22 1641    Specimen: Blood Updated: 08/06/22 1648    Mitochondrial Antibodies, M2 [[id number]] Collected: 08/06/22 1641    Specimen: Blood Updated: 08/06/22 1648    KAREN [[id number]] Collected: 08/06/22 1641    Specimen: Blood Updated: 08/06/22 1648    Vadim-Barr Virus VCA Antibody Panel [[id number]] Collected: 08/06/22 1641    Specimen: Blood Updated: 08/06/22 1647    CMV DNA, Quantitative, PCR [[id number]] Collected: 08/06/22 1641    Specimen: Blood Updated: 08/06/22 1647    CMV IgG IgM [[id number]] Collected: 08/06/22 1641    Specimen: Blood Updated: 08/06/22 1647    Vadim-Barr Virus Early Antigen Antibody, IgG [[id number]] Collected: 08/06/22 1641    Specimen: Blood Updated: 08/06/22 1647    Anti-Smooth Muscle Antibody Titer [[id number]] Collected: 08/06/22 1641    Specimen: Blood Updated: 08/06/22 1647    POC Glucose Once [[id number]]  (Abnormal) Collected: 08/06/22 1643    Specimen: Blood Updated: 08/06/22 1644     Glucose 141 mg/dL      Comment: Serial Number: [id number]:  [id number]       CBC & Differential [[id number]]  (Abnormal) Collected: 08/06/22 1550    Specimen: Blood Updated: 08/06/22 1635    Narrative:      The following orders were created for panel order CBC & Differential.  Procedure                               Abnormality         Status                     ---------                               -----------         ------                     CBC Auto Differential[[id number]]        Abnormal            Final result                 Please view results for these tests on the individual orders.    CBC Auto Differential [[id number]]  (Abnormal) Collected: 08/06/22 1550    Specimen: Blood Updated: 08/06/22 1635     WBC 12.40 10*3/mm3      RBC 2.60 10*6/mm3      Hemoglobin 7.3 g/dL      Hematocrit 22.3 %      Comment: Result checked         MCV 85.8 fL      MCH 28.1 pg      MCHC 32.7 g/dL      RDW 14.4 %      RDW-SD 42.9 fl      MPV  9.4 fL      Platelets 67 10*3/mm3      Neutrophil % 90.7 %      Lymphocyte % 3.6 %      Monocyte % 4.0 %      Eosinophil % 1.1 %      Basophil % 0.6 %      Neutrophils, Absolute 11.20 10*3/mm3      Lymphocytes, Absolute 0.50 10*3/mm3      Monocytes, Absolute 0.50 10*3/mm3      Eosinophils, Absolute 0.10 10*3/mm3      Basophils, Absolute 0.10 10*3/mm3      nRBC 0.0 /100 WBC     Calcium, Ionized [[id number]]  (Abnormal) Collected: 08/06/22 1550    Specimen: Blood Updated: 08/06/22 1607     Ionized Calcium 0.93 mmol/L     STAT Lactic Acid, Reflex [[id number]]  (Abnormal) Collected: 08/06/22 1447    Specimen: Blood Updated: 08/06/22 1516     Lactate 7.4 mmol/L     Urine Culture - Urine, Urine, Catheter [[id number]]  (Normal) Collected: 08/05/22 1012    Specimen: Urine, Catheter Updated: 08/06/22 1409     Urine Culture No growth    Comprehensive Metabolic Panel [[id number]]  (Abnormal) Collected: 08/06/22 1302    Specimen: Blood Updated: 08/06/22 1336     Glucose 264 mg/dL      BUN 53 mg/dL      Creatinine 4.58 mg/dL      Sodium 136 mmol/L      Potassium 3.5 mmol/L      Comment: Result checked  Slight hemolysis detected by analyzer. Results may be affected.        Chloride 96 mmol/L      CO2 16.0 mmol/L      Calcium 6.9 mg/dL      Comment: Result checked          Total Protein 4.8 g/dL      Albumin 2.70 g/dL      ALT (SGPT) 222 U/L      AST (SGOT) 393 U/L      Alkaline Phosphatase 89 U/L      Total Bilirubin 4.6 mg/dL      Globulin 2.1 gm/dL      A/G Ratio 1.3 g/dL      BUN/Creatinine Ratio 11.6     Anion Gap 24.0 mmol/L      eGFR 11.4 mL/min/1.73      Comment: <15 Indicative of kidney failure       Narrative:      GFR Normal >60  Chronic Kidney Disease <60  Kidney Failure <15          Imaging Results (Last 24 Hours)     Procedure Component Value Units Date/Time    CT Abdomen Pelvis Without Contrast [[id number]] Collected: 08/06/22 2256     Updated: 08/06/22 2304    Narrative:      CT OF THE ABDOMEN AND PELVIS WITHOUT  CONTRAST    Clinical indication: Septic shock.    Comparison: 8/5/2022.    Procedure: Noncontrast CT images of the abdomen and pelvis were obtained.  CT dose lowering techniques were used, to include: automated exposure control, adjustment for patient size, and or use of iterative reconstruction.    Findings:     Lung Bases: Small bilateral layering pleural effusions are present. Streaky airspace densities are seen in both lung bases.    Liver and biliary system: Ill-defined hypodense region is seen in the right hepatic lobe posteriorly. This is less conspicuous without IV contrast than on the prior. There is new, heterogeneous and hyperdense fluid in the perihepatic and infrahepatic   space. Gallbladder is unremarkable.    Pancreas: The pancreas is unremarkable.     Spleen: The spleen is normal.    Adrenals: The adrenal glands are within normal limits.     Kidneys: The kidneys are symmetric in size and without hydronephrosis.    Gastrointestinal: The stomach and scattered loops of small and large bowel have a nonobstructive pattern. No focal mucosal lesion or inflammatory changes are seen. The appendix is normal in the right lower quadrant.     Mesentery and retroperitoneum: No mesenteric or retroperitoneal adenopathy. Scattered, moderate volume mixed attenuation peritoneal fluid is new from prior. No free air.     Pelvis: The urinary bladder is decompressed by Sandoval catheter. Rectum is normal. No free fluid. No deep pelvic or inguinal adenopathy.     Reproductive: No acute abnormality.    Body wall: Normal.    Bones: No acute osseous abnormality.      Impression:      Impression:   1. Moderate volume hemoperitoneum is new from prior and partially accumulates in the infrahepatic space. This suggests potential hepatic source of hemorrhage, likely from recent biopsy.  2. Bilateral small layering pleural effusions and streaky bibasilar atelectasis.    Electronically signed by:  Bob Jackson M.D.    8/6/2022 9:03 PM     XR Chest 1 View [[id number]] Collected: 08/06/22 1826     Updated: 08/06/22 1830    Narrative:      XR CHEST 1 VW-     Date of Exam: 8/6/2022 6:01 PM     Indication: line placement; A41.9-Sepsis, unspecified organism;  R65.21-Severe sepsis with septic shock; N17.9-Acute kidney failure,  unspecified.     Comparison Exams: Chest radiograph from earlier today     Technique: Single AP chest radiograph     FINDINGS:  A right internal jugular catheter has its tip at the cavoatrial  junction. No pneumothorax is identified. Otherwise, no significant  change from recent prior exam.       Impression:      Right IJ catheter with tip at cavoatrial junction. No pneumothorax  identified.     Electronically Signed By-Mervin Martell MD On:8/6/2022 6:27 PM  This report was finalized on [id number] by  Mervin Martell MD.    US Liver [[id number]] Collected: 08/06/22 1337     Updated: 08/06/22 1351    Narrative:      US LIVER-     Date of Exam: 8/6/2022 12:40 PM     Indication: acute anemia s/p liver biopsy; A41.9-Sepsis, unspecified  organism; R65.21-Severe sepsis with septic shock; N17.9-Acute kidney  failure, unspecified.     Comparison: CT abdomen and pelvis and liver ultrasound from August 5, 2022     Technique: Right upper quadrant ultrasound     FINDINGS:     The liver measures 17.2 cm and contains several hyperechoic lesions  measuring up to 10.8 cm in the right hepatic lobe. These appear  increased in size from recent prior ultrasound, previously measuring up  to 5.1 cm. The hepatic vasculature appears within normal limits. The  common bile duct is normal in caliber at 4.1 mm. No intrahepatic biliary  ductal dilatation is identified.       Impression:      Multiple hyperechoic lesions within liver, which appear increased in  size from prior ultrasound. Changes could be secondary to interval liver  biopsy. Recommend correlation with pathology results.     Electronically Signed By-Mervin Martell MD On:8/6/2022 1:46  PM  This report was finalized on [id number] by  Mervin Martell MD.    XR Chest 1 View [[id number]] Collected: 08/06/22 1252     Updated: 08/06/22 1301    Narrative:      EXAM: XR CHEST 1 VW-     DATE OF EXAM: 8/6/2022 12:31 PM     INDICATION: hypoxic respiratory failure; A41.9-Sepsis, unspecified  organism; R65.21-Severe sepsis with septic shock; N17.9-Acute kidney  failure, unspecified.       COMPARISONS: Chest x-ray earlier today at 8:33 AM      FINDINGS:     There is new left lower lobe retrocardiac airspace disease. Unchanged  right lower lobe opacity.     A right-sided PICC line is now present with tip at the cavoatrial  junction..       Impression:         New left lower lobe disease. With the provided history this is  concerning for developing pneumonia, possibly aspiration given its  location.     Unchanged right lower lobe atelectasis versus pneumonia.     Right-sided PICC line in good position.     No pneumothorax.     Electronically Signed By-Ankur Thapa On:8/6/2022 12:53 PM  This report was finalized on [id number] by  Ankur Thapa, .           I have reviewed the above results and noted them below    Medication Review:    Current Facility-Administered Medications:   •  acetaminophen (TYLENOL) tablet 650 mg, 650 mg, Oral, Q4H PRN, 650 mg at 08/05/22 2147 **OR** acetaminophen (TYLENOL) suppository 650 mg, 650 mg, Rectal, Q4H PRN, Connor Rod APRN  •  calcium gluconate 1g/50ml 0.675% NaCl IV SOLN, 1 g, Intravenous, Once PRN **OR** calcium gluconate 2-0.675 GM/100ML NACL IVPB, 2 g, Intravenous, Once PRN, Vikram Acosta MD  •  dextrose (D50W) (25 g/50 mL) IV injection 25 g, 25 g, Intravenous, Q15 Min PRN, Connor Rod APRN  •  dextrose (GLUTOSE) oral gel 15 g, 15 g, Oral, Q15 Min PRN, Connor Rod APRSALLY  •  DOPamine 400 mg in 250 mL D5W infusion, 2-20 mcg/kg/min, Intravenous, Titrated, Shy Moreno MD, Stopped at 08/06/22 1420  •  escitalopram (LEXAPRO) tablet 10 mg, 10 mg, Oral,  Daily, Shaye Alan APRN, 10 mg at 08/07/22 0841  •  fentaNYL citrate (PF) (SUBLIMAZE) injection 25 mcg, 25 mcg, Intravenous, Q2H PRN, Shaye Alan APRN, 25 mcg at 08/07/22 0749  •  glucagon (human recombinant) (GLUCAGEN DIAGNOSTIC) 1 mg in sterile water (preservative free) 1 mL injection, 1 mg, Intramuscular, Q15 Min PRN, Connor Rod APRN  •  heparin (porcine) injection 1,000-2,000 Units, 1,000-2,000 Units, Intravenous, PRN, Vikram Acosta MD  •  hydrocortisone sodium succinate (Solu-CORTEF) injection 100 mg, 100 mg, Intravenous, Q6H, Connor Rod APRN, 100 mg at 08/07/22 1134  •  HYDROmorphone (DILAUDID) injection 1 mg, 1 mg, Intravenous, Q1H PRN, Day, Susan, APRN  •  insulin lispro (ADMELOG) injection 0-7 Units, 0-7 Units, Subcutaneous, Q6H, Day, Susan, APRN, 2 Units at 08/07/22 0021  •  lidocaine PF 1% (XYLOCAINE) injection 10 mL, 10 mL, Infiltration, Once, Day, Dawn, APRN  •  magnesium sulfate 2g/50 mL (PREMIX) infusion, 2 g, Intravenous, PRN, Vikram Acosta MD  •  meropenem (MERREM) 1 g in sodium chloride 0.9 % 100 mL IVPB, 1 g, Intravenous, Q8H, Shy Moreno MD, 1 g at 08/07/22 0840  •  micafungin sodium (MYCAMINE) 100 mg in sodium chloride 0.9 % 100 mL IVPB, 100 mg, Intravenous, Q24H, Sagar Orozco MD, 100 mg at 08/06/22 1420  •  morphine injection 2 mg, 2 mg, Intravenous, Q2H PRN, Day, Susan, APRN, 2 mg at 08/07/22 1134  •  nitroglycerin (NITROSTAT) SL tablet 0.4 mg, 0.4 mg, Sublingual, Q5 Min PRN, Connor Rod APRN  •  norepinephrine (LEVOPHED) 16 mg in 250 mL NS infusion, 0.02-0.3 mcg/kg/min, Intravenous, Titrated, Connor Rod APRN, Stopped at 08/06/22 1540  •  ondansetron (ZOFRAN) tablet 4 mg, 4 mg, Oral, Q6H PRN **OR** ondansetron (ZOFRAN) injection 4 mg, 4 mg, Intravenous, Q6H PRN, Connor Rod APRN, 4 mg at 08/06/22 2042  •  oxyCODONE (ROXICODONE) immediate release tablet 10 mg, 10 mg, Oral, Q4H PRN, Susan Borges APRN, 10 mg at 08/07/22 1038  •  pantoprazole  (PROTONIX) injection 40 mg, 40 mg, Intravenous, Q12H, Day, Susan, APRN, 40 mg at 08/07/22 0841  •  Pharmacy to dose vancomycin, , Does not apply, Continuous PRN, Sagar Orozco MD  •  phenylephrine (CHELA-SYNEPHRINE) 100 mg in 250 mL NS infusion, 0.5-3 mcg/kg/min, Intravenous, Titrated, Day, Susan APRN, Stopped at 08/06/22 1614  •  Phoxillum BK4/2.5 dialysis solution, 2,000 mL/hr, CRRT, Continuous, Vikram Acosta MD, Last Rate: 2,000 mL/hr at 08/07/22 1126, 2,000 mL/hr at 08/07/22 1126  •  Phoxillum BK4/2.5 dialysis solution, 1,500 mL/hr, CRRT, Continuous, Vikram Acosta MD, Last Rate: 1,500 mL/hr at 08/07/22 1126, 1,500 mL/hr at 08/07/22 1126  •  Phoxillum BK4/2.5 dialysis solution, 1,000 mL/hr, CRRT, Continuous, Vikram Acosta MD, Last Rate: 1,000 mL/hr at 08/07/22 1126, 1,000 mL/hr at 08/07/22 1126  •  potassium chloride 20 mEq in 50 mL IVPB, 20 mEq, Intravenous, PRN, Vikram Acosta MD  •  simethicone (MYLICON) chewable tablet 80 mg, 80 mg, Oral, 4x Daily PRN, Shaye Alan APRN, 80 mg at 08/06/22 0128  •  sodium bicarbonate 8.4 % 150 mEq in dextrose (D5W) 5 % 1,000 mL infusion (greater than 75 mEq), 150 mEq, Intravenous, Continuous, Connor Rod APRN, Last Rate: 125 mL/hr at 08/07/22 0938, 150 mEq at 08/07/22 0938  •  sodium chloride 0.9 % flush 10 mL, 10 mL, Intravenous, PRN, Leonard Francis MD  •  sodium chloride 0.9 % flush 10 mL, 10 mL, Intravenous, Q12H, Connor Rod APRN, 10 mL at 08/07/22 0841  •  sodium chloride 0.9 % flush 10 mL, 10 mL, Intravenous, PRN, Connor Rod, APRN  •  sodium chloride 0.9 % flush 10 mL, 10 mL, Intravenous, PRN, Day, Susan, APRN  •  sodium chloride 0.9 % flush 10 mL, 10 mL, Intravenous, Q12H, Day, Susan, APRN, 10 mL at 08/07/22 0841  •  sodium chloride 0.9 % flush 20 mL, 20 mL, Intravenous, PRN, Day, Susan, APRN  •  sodium phosphates 10 mmol in sodium chloride 0.9 % 100 mL IVPB, 10 mmol, Intravenous, PRN, Vikram Acosta MD  •  Vancomycin Pharmacy  Intermittent/Pulse Dosing, , Does not apply, PRN, Sagar Orozco MD  •  Vasopressin (VASOSTRICT) 0.2 UNIT/ML solution, 0.03 Units/min, Intravenous, Continuous, Leonard Francis MD, Last Rate: 9 mL/hr at 08/07/22 0338, 0.03 Units/min at 08/07/22 0338    Assessment & Plan     Principal Problem:    Septic shock (HCC)  Active Problems:    Pulmonary hypertension, unspecified (HCC)    Anxiety    Vitamin D deficiency    Essential hypertension    Suspected liver abscess    Diarrhea    Acute kidney injury (HCC)    Metabolic acidosis    No plans for acute surgical intervention.  Suspect bleeding has slowed down significantly and will stop with correction of coagulopathy  Continue aggressive resuscitation with blood products and correction of coagulopathy  Could consider laparoscopic washout once the patient has stabilized given the large amount of hemoperitoneum seen on CT scan.  Patient remains critically ill    Plan for disposition: Continue aggressive resuscitation with blood products and correction of coagulopathy.    Nahum Oconnor MD  08/07/22  12:56 EDT

## 2022-08-07 NOTE — NURSING NOTE
Blood bank called to see if 2nd unit of platelets or plasma was ready, blood bank stated patient did not meet an indication.

## 2022-08-07 NOTE — PROGRESS NOTES
"Pharmacy Antimicrobial Dosing Service    Subjective:  Raul Gardner is a 47 y.o.female admitted with sepsis. Pharmacy has been consulted to dose Vancomycin for possible sepsis.      Assessment/Plan    1. Day #2 Vancomycin: Pulse dosing d/t HD status. Random level this AM was 18.8 mcg/mL. Will dose with vancomycin 1250 mg (15 mg/kg AdjBW) today. Will obtain random at 2000 since patient is on CRRT. Plan to redose with level < 20 mcg/mL.    2. Day #2 Meropenem: 1 gm IV q8h for CRRT    3. Day #2 Micafungin: 100 mg IV q24h.    Will continue to monitor drug levels, renal function, culture and sensitivities, and patient clinical status.       Objective:  Relevant clinical data and objective history reviewed:  165.1 cm (65\")   113 kg (250 lb)   Ideal body weight: 57 kg (125 lb 10.6 oz)  Adjusted ideal body weight: 79.6 kg (175 lb 6.4 oz)  Body mass index is 41.6 kg/m².    Results from last 7 days   Lab Units 08/07/22  0600   VANCOMYCIN RM mcg/mL 18.80     Results from last 7 days   Lab Units 08/07/22  0600 08/06/22  2154 08/06/22  1302   CREATININE mg/dL 2.92* 4.25* 4.58*     Estimated Creatinine Clearance: 30.5 mL/min (A) (by C-G formula based on SCr of 2.92 mg/dL (H)).  I/O last 3 completed shifts:  In: 77777 [I.V.:9213; Blood:1260]  Out: 965 [Urine:950; Other:15]    Results from last 7 days   Lab Units 08/07/22  0600 08/07/22  0252 08/06/22  2154   WBC 10*3/mm3 11.60* 11.20* 11.10*     Temperature    08/07/22 0300 08/07/22 0344 08/07/22 0542   Temp: 97.4 °F (36.3 °C) 97.5 °F (36.4 °C) 97.6 °F (36.4 °C)     Baseline culture/source/susceptibility:  Microbiology Results (last 10 days)       Procedure Component Value - Date/Time    Clostridioides difficile Toxin - Stool, Per Rectum [[id number]]  (Normal) Collected: 08/06/22 0703    Lab Status: Final result Specimen: Stool from Per Rectum Updated: 08/06/22 0239    Narrative:      The following orders were created for panel order Clostridioides difficile Toxin - Stool, Per " Rectum.  Procedure                               Abnormality         Status                     ---------                               -----------         ------                     Clostridioides difficile...[[id number]]  Normal              Final result                 Please view results for these tests on the individual orders.    Clostridioides difficile EIA - Stool, Per Rectum [[id number]]  (Normal) Collected: 08/06/22 0703    Lab Status: Final result Specimen: Stool from Per Rectum Updated: 08/06/22 0758     C Diff GDH / Toxin Negative    Eosinophil Smear - Urine, Urine, Clean Catch [[id number]]  (Normal) Collected: 08/05/22 1917    Lab Status: Final result Specimen: Urine, Clean Catch Updated: 08/05/22 2047     Eosinophil Smear 0 % EOS/100 Cells     Body Fluid Culture - Body Fluid, Liver [[id number]] Collected: 08/05/22 1755    Lab Status: Preliminary result Specimen: Body Fluid from Liver Updated: 08/06/22 1054     Body Fluid Culture No growth     Gram Stain Few (2+) WBCs per low power field      No organisms seen    S. Pneumo Ag Urine or CSF - Urine, Urine, Clean Catch [[id number]]  (Normal) Collected: 08/05/22 1154    Lab Status: Final result Specimen: Urine, Clean Catch Updated: 08/05/22 1234     Strep Pneumo Ag Negative    Legionella Antigen, Urine - Urine, Urine, Clean Catch [[id number]]  (Normal) Collected: 08/05/22 1154    Lab Status: Final result Specimen: Urine, Clean Catch Updated: 08/05/22 1234     LEGIONELLA ANTIGEN, URINE Negative    MRSA Screen, PCR (Inpatient) - Swab, Nares [[id number]]  (Normal) Collected: 08/05/22 1057    Lab Status: Final result Specimen: Swab from Nares Updated: 08/05/22 1222     MRSA PCR No MRSA Detected    Narrative:      The negative predictive value of this diagnostic test is high and should only be used to consider de-escalating anti-MRSA therapy. A positive result may indicate colonization with MRSA and must be correlated clinically.    Gastrointestinal Panel, PCR -  Stool, Per Rectum [[id number]]  (Abnormal) Collected: 08/05/22 1057    Lab Status: Final result Specimen: Stool from Per Rectum Updated: 08/05/22 1238     Campylobacter Not Detected     Plesiomonas shigelloides Not Detected     Salmonella Not Detected     Vibrio Not Detected     Vibrio cholerae Not Detected     Yersinia enterocolitica Not Detected     Enteroaggregative E. coli (EAEC) Not Detected     Enteropathogenic E. coli (EPEC) Detected     Enterotoxigenic E. coli (ETEC) lt/st Not Detected     Shiga-like toxin-producing E. coli (STEC) stx1/stx2 Not Detected     Shigella/Enteroinvasive E. coli (EIEC) Not Detected     Cryptosporidium Not Detected     Cyclospora cayetanensis Not Detected     Entamoeba histolytica Not Detected     Giardia lamblia Not Detected     Adenovirus F40/41 Not Detected     Astrovirus Not Detected     Norovirus GI/GII Not Detected     Rotavirus A Not Detected     Sapovirus (I, II, IV or V) Not Detected    Urine Culture - Urine, Urine, Catheter [[id number]]  (Normal) Collected: 08/05/22 1012    Lab Status: Final result Specimen: Urine, Catheter Updated: 08/06/22 1409     Urine Culture No growth    Blood Culture - Blood, Blood, Central Line [[id number]]  (Normal) Collected: 08/05/22 0902    Lab Status: Preliminary result Specimen: Blood, Central Line Updated: 08/06/22 0918     Blood Culture No growth at 24 hours    Blood Culture - Blood, Blood, Central Line [[id number]]  (Normal) Collected: 08/05/22 0902    Lab Status: Preliminary result Specimen: Blood, Central Line Updated: 08/06/22 0918     Blood Culture No growth at 24 hours    Respiratory Panel PCR w/COVID-19(SARS-CoV-2) CELSA/JONATHAN/DOMINIQUE/PAD/COR/MAD/LISA In-House, NP Swab in UTM/VTM, 3-4 HR TAT - Swab, Nasopharynx [[id number]]  (Normal) Collected: 08/05/22 0815    Lab Status: Final result Specimen: Swab from Nasopharynx Updated: 08/05/22 0909     ADENOVIRUS, PCR Not Detected     Coronavirus 229E Not Detected     Coronavirus HKU1 Not Detected      Coronavirus NL63 Not Detected     Coronavirus OC43 Not Detected     COVID19 Not Detected     Human Metapneumovirus Not Detected     Human Rhinovirus/Enterovirus Not Detected     Influenza A PCR Not Detected     Influenza B PCR Not Detected     Parainfluenza Virus 1 Not Detected     Parainfluenza Virus 2 Not Detected     Parainfluenza Virus 3 Not Detected     Parainfluenza Virus 4 Not Detected     RSV, PCR Not Detected     Bordetella pertussis pcr Not Detected     Bordetella parapertussis PCR Not Detected     Chlamydophila pneumoniae PCR Not Detected     Mycoplasma pneumo by PCR Not Detected    Narrative:      In the setting of a positive respiratory panel with a viral infection PLUS a negative procalcitonin without other underlying concern for bacterial infection, consider observing off antibiotics or discontinuation of antibiotics and continue supportive care. If the respiratory panel is positive for atypical bacterial infection (Bordetella pertussis, Chlamydophila pneumoniae, or Mycoplasma pneumoniae), consider antibiotic de-escalation to target atypical bacterial infection.            Anti-Infectives (From admission, onward)      Ordered     Dose/Rate Route Frequency Start Stop    08/07/22 0722  meropenem (MERREM) 1 g in sodium chloride 0.9 % 100 mL IVPB        Ordering Provider: Shy Moreno MD    1 g  over 3 Hours Intravenous Every 8 Hours 08/07/22 0815 08/13/22 0814    08/07/22 0722  Vancomycin HCl 1,250 mg in sodium chloride 0.9 % 250 mL IVPB        Ordering Provider: Shy Moreno MD    15 mg/kg × 79.4 kg (Adjusted) Intravenous Once 08/07/22 0815      08/06/22 1125  meropenem (MERREM) 1 g in sodium chloride 0.9 % 100 mL IVPB        Ordering Provider: Sagar Orozco MD    1 g  over 30 Minutes Intravenous Once 08/06/22 1500 08/06/22 1634    08/06/22 1125  micafungin sodium (MYCAMINE) 100 mg in sodium chloride 0.9 % 100 mL IVPB        Ordering Provider: Sagar Orozco MD    100 mg  over 60 Minutes  Intravenous Every 24 Hours 08/06/22 1300 08/13/22 1259    08/06/22 1128  vancomycin 1500 mg/500 mL 0.9% NS IVPB ([facility])        Ordering Provider: Sagar Orozco MD    20 mg/kg × 79.4 kg (Adjusted) Intravenous Once 08/06/22 1300 08/06/22 1601    08/06/22 1130  Vancomycin Pharmacy Intermittent/Pulse Dosing        Ordering Provider: Sagar Orozco MD     Does not apply As Needed 08/06/22 1129 08/13/22 1128    08/06/22 1125  Pharmacy to dose vancomycin        Ordering Provider: Sagar Orozco MD     Does not apply Continuous PRN 08/06/22 1124 08/13/22 1123    08/05/22 0916  vancomycin 1500 mg/500 mL 0.9% NS IVPB ([facility])        Ordering Provider: Leonard Francis MD    20 mg/kg × 79.4 kg (Adjusted) Intravenous Once 08/05/22 0918 08/05/22 0958    08/05/22 0809  piperacillin-tazobactam (ZOSYN) IVPB 4.5 g in 100 mL NS ([facility])        Ordering Provider: Leonard Francis MD    4.5 g  over 30 Minutes Intravenous Once 08/05/22 0811 08/05/22 1011            Nati Ortez, PharmD  08/07/22 07:31 EDT

## 2022-08-07 NOTE — PROGRESS NOTES
ICU Daily Progress Note        Septic shock (HCC)    Pulmonary hypertension, unspecified (HCC)    Anxiety    Vitamin D deficiency    Essential hypertension    Suspected liver abscess    Diarrhea    Acute kidney injury (HCC)    Metabolic acidosis      Assessment & Plan   Sepsis with septic shock  UTI  MARIANNE  Coagulopathy probably from DIC  Leukocytosis  Acute anemia with hemoperitoneum after liver biopsy  Anion gap metabolic acidosis  Lactic acidosis  Electrolyte imbalance with hypokalemia and hypocalcemia and hyponatremia and hypomagnesemia  Abnormal CT scan of the liver with possible hepatic abscess: IR attempted drainage but there was no fluid collection 8/5/2022 so CT-guided biopsy was obtained  Mild atelectasis with early noncardiogenic edema probably early ARDS  Esophagitis  Mild pancreatic changes on the CT scan but the lipase is not elevated  History of melanoma resected in 1999 without signs of spread or recurrence  Mild exercise-induced pulmonary hypertension for which she has been on metoprolol but she is out of it for 1 month  Hyperglycemia  Elevated liver enzymes probably from shock liver        Plan:  Patient is critically ill   Oxygenating well on 5 L per nasal cannula  Hemodynamic support: Vasopressin and stress dose hydrocortisone  Blood transfusion: Platelets are dropping, will continue with RBC transfusion and platelet transfusion as needed  Followed by nephrology: CRRT   Liver enzymes are increasing due to shock liver   insulin sliding scale  Antibiotics: Consult ID, last fever 8/6/2022  DVT: SCD/GI prophylaxis  Check daily labs and correct electrolytes as needed    Discussed with the patient and her  in details         LOS: 2 days         Vital signs for last 24 hours:  Vitals:    08/07/22 0344 08/07/22 0400 08/07/22 0430 08/07/22 0542   BP: 99/69 108/66 103/66 120/77   Pulse: 91 89 87 89   Resp: 15   15   Temp: 97.5 °F (36.4 °C)   97.6 °F (36.4 °C)   TempSrc: Bladder   Bladder   SpO2: 94%  95%  96%   Weight:       Height:           Intake/Output last 3 shifts:  I/O last 3 completed shifts:  In: 44049 [I.V.:9213; Blood:1260]  Out: 965 [Urine:950; Other:15]  Intake/Output this shift:  No intake/output data recorded.           Radiology  Imaging Results (Last 24 Hours)     Procedure Component Value Units Date/Time    CT Abdomen Pelvis Without Contrast [[id number]] Collected: 08/06/22 2256     Updated: 08/06/22 2304    Narrative:      CT OF THE ABDOMEN AND PELVIS WITHOUT CONTRAST    Clinical indication: Septic shock.    Comparison: 8/5/2022.    Procedure: Noncontrast CT images of the abdomen and pelvis were obtained.  CT dose lowering techniques were used, to include: automated exposure control, adjustment for patient size, and or use of iterative reconstruction.    Findings:     Lung Bases: Small bilateral layering pleural effusions are present. Streaky airspace densities are seen in both lung bases.    Liver and biliary system: Ill-defined hypodense region is seen in the right hepatic lobe posteriorly. This is less conspicuous without IV contrast than on the prior. There is new, heterogeneous and hyperdense fluid in the perihepatic and infrahepatic   space. Gallbladder is unremarkable.    Pancreas: The pancreas is unremarkable.     Spleen: The spleen is normal.    Adrenals: The adrenal glands are within normal limits.     Kidneys: The kidneys are symmetric in size and without hydronephrosis.    Gastrointestinal: The stomach and scattered loops of small and large bowel have a nonobstructive pattern. No focal mucosal lesion or inflammatory changes are seen. The appendix is normal in the right lower quadrant.     Mesentery and retroperitoneum: No mesenteric or retroperitoneal adenopathy. Scattered, moderate volume mixed attenuation peritoneal fluid is new from prior. No free air.     Pelvis: The urinary bladder is decompressed by Sandoval catheter. Rectum is normal. No free fluid. No deep pelvic or inguinal  adenopathy.     Reproductive: No acute abnormality.    Body wall: Normal.    Bones: No acute osseous abnormality.      Impression:      Impression:   1. Moderate volume hemoperitoneum is new from prior and partially accumulates in the infrahepatic space. This suggests potential hepatic source of hemorrhage, likely from recent biopsy.  2. Bilateral small layering pleural effusions and streaky bibasilar atelectasis.    Electronically signed by:  Bob Jackson M.D.    8/6/2022 9:03 PM    XR Chest 1 View [[id number]] Collected: 08/06/22 1826     Updated: 08/06/22 1830    Narrative:      XR CHEST 1 VW-     Date of Exam: 8/6/2022 6:01 PM     Indication: line placement; A41.9-Sepsis, unspecified organism;  R65.21-Severe sepsis with septic shock; N17.9-Acute kidney failure,  unspecified.     Comparison Exams: Chest radiograph from earlier today     Technique: Single AP chest radiograph     FINDINGS:  A right internal jugular catheter has its tip at the cavoatrial  junction. No pneumothorax is identified. Otherwise, no significant  change from recent prior exam.       Impression:      Right IJ catheter with tip at cavoatrial junction. No pneumothorax  identified.     Electronically Signed By-Mervin Martell MD On:8/6/2022 6:27 PM  This report was finalized on [id number] by  Mervin Martell MD.    US Liver [[id number]] Collected: 08/06/22 1337     Updated: 08/06/22 1351    Narrative:      US LIVER-     Date of Exam: 8/6/2022 12:40 PM     Indication: acute anemia s/p liver biopsy; A41.9-Sepsis, unspecified  organism; R65.21-Severe sepsis with septic shock; N17.9-Acute kidney  failure, unspecified.     Comparison: CT abdomen and pelvis and liver ultrasound from August 5, 2022     Technique: Right upper quadrant ultrasound     FINDINGS:     The liver measures 17.2 cm and contains several hyperechoic lesions  measuring up to 10.8 cm in the right hepatic lobe. These appear  increased in size from recent prior ultrasound,  previously measuring up  to 5.1 cm. The hepatic vasculature appears within normal limits. The  common bile duct is normal in caliber at 4.1 mm. No intrahepatic biliary  ductal dilatation is identified.       Impression:      Multiple hyperechoic lesions within liver, which appear increased in  size from prior ultrasound. Changes could be secondary to interval liver  biopsy. Recommend correlation with pathology results.     Electronically Signed By-Mervin Martell MD On:8/6/2022 1:46 PM  This report was finalized on [id number] by  Mervin Martell MD.    XR Chest 1 View [[id number]] Collected: 08/06/22 1252     Updated: 08/06/22 1301    Narrative:      EXAM: XR CHEST 1 VW-     DATE OF EXAM: 8/6/2022 12:31 PM     INDICATION: hypoxic respiratory failure; A41.9-Sepsis, unspecified  organism; R65.21-Severe sepsis with septic shock; N17.9-Acute kidney  failure, unspecified.       COMPARISONS: Chest x-ray earlier today at 8:33 AM      FINDINGS:     There is new left lower lobe retrocardiac airspace disease. Unchanged  right lower lobe opacity.     A right-sided PICC line is now present with tip at the cavoatrial  junction..       Impression:         New left lower lobe disease. With the provided history this is  concerning for developing pneumonia, possibly aspiration given its  location.     Unchanged right lower lobe atelectasis versus pneumonia.     Right-sided PICC line in good position.     No pneumothorax.     Electronically Signed By-Ankur Thapa On:8/6/2022 12:53 PM  This report was finalized on [id number] by  Ankur Thapa, .    XR Chest 1 View [[id number]] Collected: 08/06/22 0907     Updated: 08/06/22 0911    Narrative:      EXAM: XR CHEST 1 VW-     DATE OF EXAM: 8/6/2022 8:51 AM     INDICATION: central line dislodged, check position; A41.9-Sepsis,  unspecified organism; R65.21-Severe sepsis with septic shock;  N17.9-Acute kidney failure, unspecified.       COMPARISONS: August 5      FINDINGS:      Right-sided central line tip is in the right lower neck above the  clavicle.     No pneumothorax. Mild right basilar atelectasis versus pneumonia. The  stent is unchanged.       Impression:         Right sided central line tip in the lower neck in the location of the  lower internal jugular vein, just above the clavicle.     No pneumothorax. Mild right basilar atelectasis versus pneumonia.     Electronically Signed By-Ankur Thapa On:8/6/2022 9:08 AM  This report was finalized on [id number] by  Ankur Thapa, .          Labs:  Results from last 7 days   Lab Units 08/07/22  0600   WBC 10*3/mm3 11.60*   HEMOGLOBIN g/dL 7.5*   HEMATOCRIT % 22.1*   PLATELETS 10*3/mm3 25*     Results from last 7 days   Lab Units 08/07/22  0600   SODIUM mmol/L 138   POTASSIUM mmol/L 3.7   CHLORIDE mmol/L 99   CO2 mmol/L 30.0*   BUN mg/dL 37*   CREATININE mg/dL 2.92*   CALCIUM mg/dL 7.2*   BILIRUBIN mg/dL 4.7*   ALK PHOS U/L 112   ALT (SGPT) U/L 352*   AST (SGOT) U/L 522*   GLUCOSE mg/dL 124*     Results from last 7 days   Lab Units 08/05/22  0815   PH, ARTERIAL pH units 7.345*   PO2 ART mm Hg 221.4*   PCO2, ARTERIAL mm Hg 28.3*   HCO3 ART mmol/L 15.5*     Results from last 7 days   Lab Units 08/07/22  0600 08/06/22  2154 08/06/22  1302   ALBUMIN g/dL 2.90* 3.30* 2.70*     Results from last 7 days   Lab Units 08/05/22  1852 08/05/22  1400 08/05/22  1056 08/05/22  0811   CK TOTAL U/L  --   --   --  327*   TROPONIN T ng/mL 0.045* 0.032* 0.026 <0.010         Results from last 7 days   Lab Units 08/07/22  0600   MAGNESIUM mg/dL 2.3     Results from last 7 days   Lab Units 08/06/22  1110 08/05/22  1400 08/05/22  0911 08/05/22  0811   INR  2.07* 2.47* 2.47* 2.16*   APTT seconds 51.9*  --  48.8* 43.1*     Results from last 7 days   Lab Units 08/05/22  0811   TSH uIU/mL 1.570           Meds:   SCHEDULE  calcium gluconate, 3 g, Intravenous, Once  escitalopram, 10 mg, Oral, Daily  hydrocortisone sodium succinate, 100 mg, Intravenous, Q6H  insulin  lispro, 0-7 Units, Subcutaneous, Q6H  lidocaine PF 1%, 10 mL, Infiltration, Once  meropenem, 1 g, Intravenous, Q8H  micafungin (MYCAMINE) IV, 100 mg, Intravenous, Q24H  pantoprazole, 40 mg, Intravenous, Q12H  sodium chloride, 10 mL, Intravenous, Q12H  sodium chloride, 10 mL, Intravenous, Q12H      Infusions  dexmedetomidine, 0.2-1.5 mcg/kg/hr, Last Rate: Stopped (08/06/22 1000)  DOPamine, 2-20 mcg/kg/min, Last Rate: Stopped (08/06/22 1420)  norepinephrine, 0.02-0.3 mcg/kg/min, Last Rate: Stopped (08/06/22 1540)  Pharmacy to dose vancomycin,   phenylephrine, 0.5-3 mcg/kg/min, Last Rate: Stopped (08/06/22 1614)  Phoxillum BK4/2.5, 2,000 mL/hr, Last Rate: 2,000 mL/hr (08/07/22 0840)  Phoxillum BK4/2.5, 1,500 mL/hr, Last Rate: 1,500 mL/hr (08/07/22 0840)  Phoxillum BK4/2.5, 1,000 mL/hr, Last Rate: 1,000 mL/hr (08/07/22 0559)  sodium bicarbonate drip (greater than 75 mEq/bag), 150 mEq, Last Rate: 150 mEq (08/07/22 0148)  vasopressin, 0.03 Units/min, Last Rate: 0.03 Units/min (08/07/22 0338)      PRNs  •  acetaminophen **OR** acetaminophen  •  calcium gluconate **OR** calcium gluconate  •  dextrose  •  dextrose  •  fentaNYL citrate (PF)  •  glucagon (human recombinant)  •  heparin (porcine)  •  HYDROmorphone  •  magnesium sulfate  •  nitroglycerin  •  ondansetron **OR** ondansetron  •  Pharmacy to dose vancomycin  •  potassium chloride  •  simethicone  •  sodium chloride  •  sodium chloride  •  sodium chloride  •  sodium chloride  •  sodium phosphate IVPB  •  Vancomycin Pharmacy Intermittent/Pulse Dosing    Physical Exam:  Physical Exam  General Appearance:  Alert and answering questions appropriately  HEENT:  Normocephalic, without obvious abnormality, Conjunctiva/corneas clear,.   Nares normal, no drainage     Neck:  Supple, symmetrical, trachea midline. No JVD.  Lungs /Chest wall: Mild bilateral basal rhonchi, respirations unlabored, symmetrical wall movement.     Heart:  Regular rate and rhythm, S1 S2 normal  Abdomen:  Soft, mild tenderness epigastric and right upper quadrant, no masses, no organomegaly.  Bowel sounds positive  Extremities: No edema, no clubbing or cyanosis  Neuro exam: Patient moving 4 extremities with no focal deficit by observation  Skin: No rash  ROS  Review of Systems  Constitutional: Positive for chills, fever and malaise/fatigue.   HENT: Negative.    Eyes: Negative.    Cardiovascular: Negative.    Respiratory: Positive for mild cough and shortness of breath.    Skin: Negative.    Musculoskeletal: Negative.    Gastrointestinal: Positive for mild abdominal pain  Genitourinary: Negative.    Neurological: Negative.    Psychiatric/Behavioral: Negative.    Critical Care Time greater than: 45 Minutes      Much of this encounter note is an electronic transcription/translation of spoken language to printed text using Dragon Software which might include inadvertent errors in transcription.

## 2022-08-07 NOTE — PLAN OF CARE
Goal Outcome Evaluation:      Patient remains on CRRT. 20ml/hr fluid removal ordered, patient is tolerating and off pressors. Bicarb gtt was discontinued by provider. Remains on 5L nasal canula, attempted to wean oxygen requirement this afternoon, patient oxygen saturations dropped to 87%. Patient states overall she is feeling a little better today. Complains of RUQ pain. Able to manage effectively per patient with prn medications as ordered.

## 2022-08-07 NOTE — PROGRESS NOTES
" LOS: 2 days   Patient Care Team:  Maribel Graf MD as PCP - General (Family Medicine)  AVA Cavanaugh MD as Consulting Physician (Cardiology)      Subjective   \"OK\"     Interval History:   CT A/P with oral contrast showed hemoperitoneum potentially from liver biopsy yesterday.  Creatinine is improved and down to 2.92.  Total bilirubin is 4.7, alk phos 112, , ALT is 352 all worse from yesterday.  White blood cell count is improved 11.6, hemoglobin is stable at 7.5, platelets are decreased to 25 from 67 yesterday.  Drowsy, resting comfortably. Just received pain medication.   Currently on CRRT. Getting calcium gluconate, bicarb.  Is on low-dose vasopressin.  Has received 1 unit of platelets, cryo, and packed red blood cells today.    She has just been started on clear liquids and tolerating thus far.    ROS:   Abdominal pain specifically right upper quadrant  No chest pain, shortness of breath, or cough.      Medication Review:     Current Facility-Administered Medications:   •  acetaminophen (TYLENOL) tablet 650 mg, 650 mg, Oral, Q4H PRN, 650 mg at 08/05/22 2147 **OR** acetaminophen (TYLENOL) suppository 650 mg, 650 mg, Rectal, Q4H PRN, Connor Rod APRN  •  calcium gluconate 1g/50ml 0.675% NaCl IV SOLN, 1 g, Intravenous, Once PRN **OR** calcium gluconate 2-0.675 GM/100ML NACL IVPB, 2 g, Intravenous, Once PRN, Vikram Acosta MD  •  calcium gluconate 3 g in sodium chloride 0.9 % 100 mL IVPB, 3 g, Intravenous, Once, Day, Susan, APRN  •  dexmedetomidine (PRECEDEX) 400 mcg in 100 mL NS infusion, 0.2-1.5 mcg/kg/hr, Intravenous, Titrated, Day, Susan, APRN, Stopped at 08/06/22 1000  •  dextrose (D50W) (25 g/50 mL) IV injection 25 g, 25 g, Intravenous, Q15 Min PRN, Connor Rod APRN  •  dextrose (GLUTOSE) oral gel 15 g, 15 g, Oral, Q15 Min PRN, Love, Connor E, APRN  •  DOPamine 400 mg in 250 mL D5W infusion, 2-20 mcg/kg/min, Intravenous, Titrated, Shy Moreno MD, Stopped at 08/06/22 1420  •  " escitalopram (LEXAPRO) tablet 10 mg, 10 mg, Oral, Daily, Shaye Alan, GRACIELA, 10 mg at 08/07/22 0841  •  fentaNYL citrate (PF) (SUBLIMAZE) injection 25 mcg, 25 mcg, Intravenous, Q2H PRN, Shaye Alan APRN, 25 mcg at 08/07/22 0749  •  glucagon (human recombinant) (GLUCAGEN DIAGNOSTIC) 1 mg in sterile water (preservative free) 1 mL injection, 1 mg, Intramuscular, Q15 Min PRN, Connor Rod APRN  •  heparin (porcine) injection 1,000-2,000 Units, 1,000-2,000 Units, Intravenous, PRN, Vikram Acosta MD  •  hydrocortisone sodium succinate (Solu-CORTEF) injection 100 mg, 100 mg, Intravenous, Q6H, LoveConnor, APRN, 100 mg at 08/07/22 0609  •  HYDROmorphone (DILAUDID) injection 1 mg, 1 mg, Intravenous, Q1H PRN, Day, Susan, APRN  •  insulin lispro (ADMELOG) injection 0-7 Units, 0-7 Units, Subcutaneous, Q6H, Day, Dawn, APRN, 2 Units at 08/07/22 0021  •  lidocaine PF 1% (XYLOCAINE) injection 10 mL, 10 mL, Infiltration, Once, Day, Dawn, APRN  •  magnesium sulfate 2g/50 mL (PREMIX) infusion, 2 g, Intravenous, PRN, Vikram Acosta MD  •  meropenem (MERREM) 1 g in sodium chloride 0.9 % 100 mL IVPB, 1 g, Intravenous, Q8H, Shy Moreno MD, 1 g at 08/07/22 0840  •  micafungin sodium (MYCAMINE) 100 mg in sodium chloride 0.9 % 100 mL IVPB, 100 mg, Intravenous, Q24H, Sagar Orozco MD, 100 mg at 08/06/22 1420  •  nitroglycerin (NITROSTAT) SL tablet 0.4 mg, 0.4 mg, Sublingual, Q5 Min PRN, Connor Rod APRSALLY  •  norepinephrine (LEVOPHED) 16 mg in 250 mL NS infusion, 0.02-0.3 mcg/kg/min, Intravenous, Titrated, Connor Rod APRN, Stopped at 08/06/22 1540  •  ondansetron (ZOFRAN) tablet 4 mg, 4 mg, Oral, Q6H PRN **OR** ondansetron (ZOFRAN) injection 4 mg, 4 mg, Intravenous, Q6H PRN, Connor Rod APRN, 4 mg at 08/06/22 2042  •  pantoprazole (PROTONIX) injection 40 mg, 40 mg, Intravenous, Q12H, Susan Borges APRN, 40 mg at 08/07/22 0841  •  Pharmacy to dose vancomycin, , Does not apply, Continuous PRN, Ernesto,  MD Sagar  •  phenylephrine (CHELA-SYNEPHRINE) 100 mg in 250 mL NS infusion, 0.5-3 mcg/kg/min, Intravenous, Titrated, Susan Borges APRN, Stopped at 08/06/22 1614  •  Phoxillum BK4/2.5 dialysis solution, 2,000 mL/hr, CRRT, Continuous, Vikram Acosta MD, Last Rate: 2,000 mL/hr at 08/07/22 0840, 2,000 mL/hr at 08/07/22 0840  •  Phoxillum BK4/2.5 dialysis solution, 1,500 mL/hr, CRRT, Continuous, Vikram Acosta MD, Last Rate: 1,500 mL/hr at 08/07/22 0840, 1,500 mL/hr at 08/07/22 0840  •  Phoxillum BK4/2.5 dialysis solution, 1,000 mL/hr, CRRT, Continuous, Vikram Acosta MD, Last Rate: 1,000 mL/hr at 08/07/22 0559, 1,000 mL/hr at 08/07/22 0559  •  potassium chloride 20 mEq in 50 mL IVPB, 20 mEq, Intravenous, PRN, Vikram Acosta MD  •  simethicone (MYLICON) chewable tablet 80 mg, 80 mg, Oral, 4x Daily PRN, Shaye Alan APRN, 80 mg at 08/06/22 0128  •  sodium bicarbonate 8.4 % 150 mEq in dextrose (D5W) 5 % 1,000 mL infusion (greater than 75 mEq), 150 mEq, Intravenous, Continuous, Connor Rod APRN, Last Rate: 125 mL/hr at 08/07/22 0148, 150 mEq at 08/07/22 0148  •  sodium chloride 0.9 % flush 10 mL, 10 mL, Intravenous, PRN, Leonard Francis MD  •  sodium chloride 0.9 % flush 10 mL, 10 mL, Intravenous, Q12H, Connor Rod APRN, 10 mL at 08/07/22 0841  •  sodium chloride 0.9 % flush 10 mL, 10 mL, Intravenous, PRN, Connor Rod APRN  •  sodium chloride 0.9 % flush 10 mL, 10 mL, Intravenous, PRN, Day, Susan, APRN  •  sodium chloride 0.9 % flush 10 mL, 10 mL, Intravenous, Q12H, Day, Susan, APRN, 10 mL at 08/07/22 0841  •  sodium chloride 0.9 % flush 20 mL, 20 mL, Intravenous, PRN, Day, Susan, APRN  •  sodium phosphates 10 mmol in sodium chloride 0.9 % 100 mL IVPB, 10 mmol, Intravenous, PRN, Vikram Acosta MD  •  Vancomycin Pharmacy Intermittent/Pulse Dosing, , Does not apply, PRN, Sagar Orozco MD  •  Vasopressin (VASOSTRICT) 0.2 UNIT/ML solution, 0.03 Units/min, Intravenous, Continuous, Leonard Francis,  MD, Last Rate: 9 mL/hr at 08/07/22 0338, 0.03 Units/min at 08/07/22 0338      Objective Resting comfortably in hospital bed.  Nurse at bedside, Blanca.    Vital Signs  Temp:  [87.3 °F (30.7 °C)-102.6 °F (39.2 °C)] 97.6 °F (36.4 °C)  Heart Rate:  [] 89  Resp:  [15-30] 15  BP: ()/(23-88) 120/77  Arterial Line BP: ()/() 99/89  Physical Exam: Shiley catheter noted in right neck  General Appearance:    Awake and alert, in no acute distress   Head:    Normocephalic, without obvious abnormality   Eyes:          Conjunctivae normal, anicteric sclerae   Ears:    Hearing intact   Throat:   No oral lesions, no thrush, oral mucosa moist   Neck:   No adenopathy, supple, no JVD   Lungs:     respirations regular, even and unlabored       Abdomen:     Normal bowel sounds, soft, non-tender, no rebound or guarding, non-distended, no hepatosplenomegaly   Rectal:     Deferred   Extremities:   No edema, no cyanosis, no redness   Skin:   No bleeding, bruising or rash, no jaundice   Neurologic:   Cranial nerves 2 - 12 grossly intact, no asterixis, sensation   intact        Results Review:    CBC    Results from last 7 days   Lab Units 08/07/22  0600 08/07/22  0252 08/06/22  2154 08/06/22  1550 08/06/22  1033 08/06/22  0510 08/05/22  1400   WBC 10*3/mm3 11.60* 11.20* 11.10* 12.40* 14.80* 16.30* 20.30*   HEMOGLOBIN g/dL 7.5* 6.7* 7.3* 7.3* 5.9* 10.3* 13.7   PLATELETS 10*3/mm3 25* 50* 55* 67* 95* 120* 188     CMP   Results from last 7 days   Lab Units 08/07/22  0600 08/06/22  2154 08/06/22  1302 08/06/22  1013 08/06/22  0510 08/05/22  1852 08/05/22  1400 08/05/22  0811   SODIUM mmol/L 138 141 136 139 138  --  133* 132*   POTASSIUM mmol/L 3.7 3.4* 3.5 2.5* 3.4*  --  3.7 3.9   CHLORIDE mmol/L 99 98 96* 96* 96*  --  99 94*   CO2 mmol/L 30.0* 29.0 16.0* 29.0 24.0  --  19.0* 21.0*   BUN mg/dL 37* 55* 53* 40* 46*  --  38* 32*   CREATININE mg/dL 2.92* 4.25* 4.58* 3.25* 4.06*  --  4.03* 3.82*   GLUCOSE mg/dL 124* 162* 264*  543* 218*  --  165* 141*   ALBUMIN g/dL 2.90* 3.30* 2.70* 1.90* 2.50*  --  2.90* 3.30*   BILIRUBIN mg/dL 4.7*  --  4.6* 3.1* 4.5*  --  4.6* 4.3*   ALK PHOS U/L 112  --  89 72 75  --  64 64   AST (SGOT) U/L 522*  --  393* 194* 177*  --  45* 41*   ALT (SGPT) U/L 352*  --  222* 109* 98*  --  27 30   MAGNESIUM mg/dL 2.3 2.2  --  1.6 2.1 1.8 1.2*  --    PHOSPHORUS mg/dL 4.5 4.8*  --  3.9 4.6* 3.8 2.9  --    AMYLASE U/L  --   --   --  39  --   --   --  81   LIPASE U/L  --   --   --  4*  --   --   --  12*     Cr Clearance Estimated Creatinine Clearance: 30.5 mL/min (A) (by C-G formula based on SCr of 2.92 mg/dL (H)).  Coag   Results from last 7 days   Lab Units 08/06/22  1110 08/05/22  1400 08/05/22  0911 08/05/22  0811   INR  2.07* 2.47* 2.47* 2.16*   APTT seconds 51.9*  --  48.8* 43.1*     HbA1C No results found for: HGBA1C  Blood Glucose   Glucose   Date/Time Value Ref Range Status   08/07/2022 0607 139 (H) 70 - 105 mg/dL Final     Comment:     Serial Number: [id number]:  [id number]   08/06/2022 1643 141 (H) 70 - 105 mg/dL Final     Comment:     Serial Number: [id number]:  [id number]   08/06/2022 0006 188 (H) 70 - 105 mg/dL Final     Comment:     Serial Number: [id number]:  [id number]   08/05/2022 1653 149 (H) 70 - 105 mg/dL Final     Comment:     Serial Number: [id number]:  [id number]   08/05/2022 1100 117 (H) 70 - 105 mg/dL Final     Comment:     Serial Number: [id number]:  [id number]   08/05/2022 0815 122 (H) 74 - 100 mg/dL Final   08/05/2022 0815 122 (H) 74 - 100 mg/dL Final     Comment:     Serial Number: [id number]:  [id number]     Infection   Results from last 7 days   Lab Units 08/05/22  1755 08/05/22  1012 08/05/22  0902 08/05/22  0811   BLOODCX   --   --  No growth at 24 hours  No growth at 24 hours  --    BODYFLDCX  No growth  --   --   --    URINECX   --  No growth  --   --    PROCALCITONIN ng/mL  --   --   --  99.59*     UA    Results from last 7 days   Lab Units 08/05/22  1477  08/05/22  1012   NITRITE UA  Negative Negative   WBC UA /HPF 13-20* 6-12*   BACTERIA UA /HPF 2+* 2+*   SQUAM EPITHEL UA /HPF 3-6* 3-6*   URINECX   --  No growth     Radiology(recent) CT Abdomen Pelvis Without Contrast    Result Date: 8/6/2022  Impression: 1. Moderate volume hemoperitoneum is new from prior and partially accumulates in the infrahepatic space. This suggests potential hepatic source of hemorrhage, likely from recent biopsy. 2. Bilateral small layering pleural effusions and streaky bibasilar atelectasis. Electronically signed by:  Bob Jackson M.D.  8/6/2022 9:03 PM    US Liver    Result Date: 8/6/2022  Multiple hyperechoic lesions within liver, which appear increased in size from prior ultrasound. Changes could be secondary to interval liver biopsy. Recommend correlation with pathology results.  Electronically Signed By-Mervin Martell MD On:8/6/2022 1:46 PM This report was finalized on [id number] by  Mervin Martell MD.    US Liver    Result Date: 8/5/2022    IMPRESSION: 1. Echogenic foci within the right and left hepatic lobe without internal hypervascularity, superimposed upon background hepatic hypoechoic parenchyma. The echogenic foci do not have a typical appearance for fluid collections or abscesses. Other etiologies such as hemangiomas, areas of fatty sparing upon background steatosis, focal fibrosis/scarring, could be considered. These may be further and better characterize utilizing MRI without and with contrast liver protocol. 2. No abnormal biliary dilation is seen.  Electronically Signed By-Franca Gallegos MD On:8/5/2022 1:17 PM This report was finalized on [id number] by  Franca Gallegos MD.    XR Chest 1 View    Result Date: 8/6/2022  Right IJ catheter with tip at cavoatrial junction. No pneumothorax identified.  Electronically Signed By-Mervin Martell MD On:8/6/2022 6:27 PM This report was finalized on [id number] by  Mervin Martell MD.    XR Chest 1 View    Result Date:  8/6/2022   New left lower lobe disease. With the provided history this is concerning for developing pneumonia, possibly aspiration given its location.  Unchanged right lower lobe atelectasis versus pneumonia.  Right-sided PICC line in good position.  No pneumothorax.  Electronically Signed By-Ankur Thapa On:8/6/2022 12:53 PM This report was finalized on [id number] by  Ankur Thapa, .    XR Chest 1 View    Result Date: 8/6/2022   Right sided central line tip in the lower neck in the location of the lower internal jugular vein, just above the clavicle.  No pneumothorax. Mild right basilar atelectasis versus pneumonia.  Electronically Signed By-Ankur Thapa On:8/6/2022 9:08 AM This report was finalized on [id number] by  nAkur Thapa, .    XR Chest 1 View    Result Date: 8/5/2022  1.     Tip of the right internal jugular central venous catheter terminates in the right atrium. No visible pneumothorax. 2.     Low lung volumes without evidence of acute cardiopulmonary abnormality.  Electronically Signed By-Hui Raman MD On:8/5/2022 9:34 AM This report was finalized on [id number] by  Hui Raman MD.    CT Needle Biopsy Liver    Result Date: 8/5/2022   1. The areas identified on the patient's contrast-enhanced CT and recent ultrasound are not clearly identified on the noncontrast scan. Despite targeting the area when compared with CT, no purulent fluid could be aspirated. Ultrasound was utilized and to confirm that the area in question was being sampled. Core specimens were then obtained of this area. Given the absence of any purulent fluid and the nonvisualization on the noncontrast scan this is very unlikely to represent an intrahepatic abscess. These findings were discussed with Dr. Orozco by telephone immediately following the biopsy.  Electronically Signed By-Nahum Ross MD On:8/5/2022 6:26 PM This report was finalized on [id number] by  Nahum Ross MD.    US Renal Bilateral    Result Date:  8/5/2022  Nonobstructed hyperechoic kidneys compatible with medical renal disease  Electronically Signed By-Dave Cunningham On:8/5/2022 1:15 PM This report was finalized on [id number] by  Dave Cunningham, .    XR Abdomen KUB    Result Date: 8/6/2022   Body habitus and a generalized paucity of bowel gas mildly limits evaluation. No significantly dilated gas-filled loops of small bowel are seen to suggest an obstruction. No free peritoneal air.  No suspicious calcifications detected.  No acute osseous pathology.     Electronically signed by:  Nj Rosario  8/6/2022 4:47 AM        Assessment & Plan   Elevated liver enzymes consider multifactorial  Acute anemia  Hypotension  Sepsis unknown origin at this time  Abnormal CT showing hemoperitoneum  Acute kidney injury  Enteropathic E. coli  Leukocytosis with bandemia  Thrombocytopenia    PLAN:  LFTs continue to increase.  Could be related to requiring multiple pressors yesterday.  Patient did have liver biopsied that did not show any common bile duct dilation or gallstones.  Pending CMV, EBV and HSV serologies.  Hepatitis panel is negative.  Continue to monitor H&H and transfuse as needed.    Janeth Oquendo, GRACIELA  08/07/22  08:56 EDT

## 2022-08-07 NOTE — PROGRESS NOTES
Notified  of CT results. Stated to him that currently patient was stable. Hemoglobin holding around 7.3. Only on Vaso at this time. Dr. Ross recommended no intervention at this time and to monitor serial H&H and correct abnormal coags as needed. CBC q2h order placed to monitor hemoglobin and platelets since patient will be starting on CRRT.     Electronically signed by GRACIELA Choe, 08/07/22, 5:53 AM EDT.

## 2022-08-08 ENCOUNTER — INPATIENT HOSPITAL (OUTPATIENT)
Dept: URBAN - METROPOLITAN AREA HOSPITAL 84 | Facility: HOSPITAL | Age: 47
End: 2022-08-08

## 2022-08-08 ENCOUNTER — APPOINTMENT (OUTPATIENT)
Dept: GENERAL RADIOLOGY | Facility: HOSPITAL | Age: 47
End: 2022-08-08

## 2022-08-08 DIAGNOSIS — K66.1 HEMOPERITONEUM: ICD-10-CM

## 2022-08-08 DIAGNOSIS — D64.9 ANEMIA, UNSPECIFIED: ICD-10-CM

## 2022-08-08 DIAGNOSIS — R74.01 ELEVATION OF LEVELS OF LIVER TRANSAMINASE LEVELS: ICD-10-CM

## 2022-08-08 DIAGNOSIS — A41.9 SEPSIS, UNSPECIFIED ORGANISM: ICD-10-CM

## 2022-08-08 DIAGNOSIS — R93.2 ABNORMAL FINDINGS ON DIAGNOSTIC IMAGING OF LIVER AND BILIARY: ICD-10-CM

## 2022-08-08 LAB
ALBUMIN SERPL ELPH-MCNC: 2.4 G/DL (ref 2.9–4.4)
ALBUMIN SERPL-MCNC: 3 G/DL (ref 3.5–5.2)
ALBUMIN SERPL-MCNC: 3.2 G/DL (ref 3.5–5.2)
ALBUMIN SERPL-MCNC: 3.3 G/DL (ref 3.5–5.2)
ALBUMIN/GLOB SERPL: 1 {RATIO} (ref 0.7–1.7)
ALBUMIN/GLOB SERPL: 1.4 G/DL
ALP SERPL-CCNC: 133 U/L (ref 39–117)
ALPHA1 GLOB SERPL ELPH-MCNC: 0.3 G/DL (ref 0–0.4)
ALPHA2 GLOB SERPL ELPH-MCNC: 0.8 G/DL (ref 0.4–1)
ALT SERPL W P-5'-P-CCNC: 259 U/L (ref 1–33)
ANA SER QL IF: NEGATIVE
ANA SER QL: NEGATIVE
ANION GAP SERPL CALCULATED.3IONS-SCNC: 6 MMOL/L (ref 5–15)
ANION GAP SERPL CALCULATED.3IONS-SCNC: 7 MMOL/L (ref 5–15)
ANION GAP SERPL CALCULATED.3IONS-SCNC: 8 MMOL/L (ref 5–15)
ANISOCYTOSIS BLD QL: ABNORMAL
AST SERPL-CCNC: 233 U/L (ref 1–32)
B-GLOBULIN SERPL ELPH-MCNC: 0.6 G/DL (ref 0.7–1.3)
BACTERIA FLD CULT: NORMAL
BH BB BLOOD EXPIRATION DATE: NORMAL
BH BB BLOOD TYPE BARCODE: 6200
BH BB BLOOD TYPE BARCODE: 7300
BH BB BLOOD TYPE BARCODE: 9500
BH BB BLOOD TYPE BARCODE: 9500
BH BB DISPENSE STATUS: NORMAL
BH BB PRODUCT CODE: NORMAL
BH BB UNIT NUMBER: NORMAL
BILIRUB SERPL-MCNC: 3.8 MG/DL (ref 0–1.2)
BUN SERPL-MCNC: 16 MG/DL (ref 6–20)
BUN SERPL-MCNC: 19 MG/DL (ref 6–20)
BUN SERPL-MCNC: 21 MG/DL (ref 6–20)
BUN/CREAT SERPL: 11.9 (ref 7–25)
BUN/CREAT SERPL: 11.9 (ref 7–25)
BUN/CREAT SERPL: 12.4 (ref 7–25)
CA-I SERPL ISE-MCNC: 1.1 MMOL/L (ref 1.2–1.3)
CALCIUM SPEC-SCNC: 7.6 MG/DL (ref 8.6–10.5)
CALCIUM SPEC-SCNC: 7.6 MG/DL (ref 8.6–10.5)
CALCIUM SPEC-SCNC: 8.1 MG/DL (ref 8.6–10.5)
CHLORIDE SERPL-SCNC: 104 MMOL/L (ref 98–107)
CHLORIDE SERPL-SCNC: 104 MMOL/L (ref 98–107)
CHLORIDE SERPL-SCNC: 105 MMOL/L (ref 98–107)
CO2 SERPL-SCNC: 28 MMOL/L (ref 22–29)
CREAT SERPL-MCNC: 1.35 MG/DL (ref 0.57–1)
CREAT SERPL-MCNC: 1.53 MG/DL (ref 0.57–1)
CREAT SERPL-MCNC: 1.77 MG/DL (ref 0.57–1)
CROSSMATCH INTERPRETATION: NORMAL
CROSSMATCH INTERPRETATION: NORMAL
DEPRECATED RDW RBC AUTO: 47.3 FL (ref 37–54)
DEPRECATED RDW RBC AUTO: 48.1 FL (ref 37–54)
DEPRECATED RDW RBC AUTO: 49 FL (ref 37–54)
EBV EARLY AG: NEGATIVE
EBV NUCLEAR AG: POSITIVE
EBV VCA IGG: ABNORMAL
EBV VCA IGM: NEGATIVE
EGFRCR SERPLBLD CKD-EPI 2021: 35.8 ML/MIN/1.73
EGFRCR SERPLBLD CKD-EPI 2021: 42.6 ML/MIN/1.73
EGFRCR SERPLBLD CKD-EPI 2021: 49.5 ML/MIN/1.73
EOSINOPHIL # BLD MANUAL: 0.14 10*3/MM3 (ref 0–0.4)
EOSINOPHIL NFR BLD MANUAL: 1 % (ref 0.3–6.2)
ERYTHROCYTE [DISTWIDTH] IN BLOOD BY AUTOMATED COUNT: 16.4 % (ref 12.3–15.4)
ERYTHROCYTE [DISTWIDTH] IN BLOOD BY AUTOMATED COUNT: 16.6 % (ref 12.3–15.4)
ERYTHROCYTE [DISTWIDTH] IN BLOOD BY AUTOMATED COUNT: 16.6 % (ref 12.3–15.4)
GAMMA GLOB SERPL ELPH-MCNC: 0.8 G/DL (ref 0.4–1.8)
GLOBULIN SER-MCNC: 2.5 G/DL (ref 2.2–3.9)
GLOBULIN UR ELPH-MCNC: 2.2 GM/DL
GLUCOSE BLDC GLUCOMTR-MCNC: 125 MG/DL (ref 70–105)
GLUCOSE BLDC GLUCOMTR-MCNC: 129 MG/DL (ref 70–105)
GLUCOSE BLDC GLUCOMTR-MCNC: 131 MG/DL (ref 70–105)
GLUCOSE BLDC GLUCOMTR-MCNC: 85 MG/DL (ref 70–105)
GLUCOSE BLDC GLUCOMTR-MCNC: 88 MG/DL (ref 70–105)
GLUCOSE SERPL-MCNC: 105 MG/DL (ref 65–99)
GLUCOSE SERPL-MCNC: 87 MG/DL (ref 65–99)
GLUCOSE SERPL-MCNC: 98 MG/DL (ref 65–99)
GRAM STN SPEC: NORMAL
GRAM STN SPEC: NORMAL
HCT VFR BLD AUTO: 21.8 % (ref 34–46.6)
HCT VFR BLD AUTO: 22.6 % (ref 34–46.6)
HCT VFR BLD AUTO: 22.8 % (ref 34–46.6)
HGB BLD-MCNC: 7.2 G/DL (ref 12–15.9)
HGB BLD-MCNC: 7.6 G/DL (ref 12–15.9)
HGB BLD-MCNC: 7.7 G/DL (ref 12–15.9)
HIV1+2 AB SER QL: NORMAL
IGA SERPL-MCNC: 175 MG/DL (ref 87–352)
IGG SERPL-MCNC: 812 MG/DL (ref 586–1602)
IGM SERPL-MCNC: 117 MG/DL (ref 26–217)
INTERPRETATION SERPL IEP-IMP: ABNORMAL
LAB AP CASE REPORT: NORMAL
LAB AP CASE REPORT: NORMAL
LABORATORY COMMENT REPORT: ABNORMAL
LABORATORY COMMENT REPORT: NORMAL
LYMPHOCYTES # BLD MANUAL: 1.57 10*3/MM3 (ref 0.7–3.1)
LYMPHOCYTES # BLD MANUAL: 2.82 10*3/MM3 (ref 0.7–3.1)
LYMPHOCYTES # BLD MANUAL: 3.24 10*3/MM3 (ref 0.7–3.1)
LYMPHOCYTES NFR BLD MANUAL: 3 % (ref 5–12)
LYMPHOCYTES NFR BLD MANUAL: 3 % (ref 5–12)
LYMPHOCYTES NFR BLD MANUAL: 7 % (ref 5–12)
M PROTEIN SERPL ELPH-MCNC: ABNORMAL G/DL
MAGNESIUM SERPL-MCNC: 2.5 MG/DL (ref 1.6–2.6)
MCH RBC QN AUTO: 27 PG (ref 26.6–33)
MCH RBC QN AUTO: 27.8 PG (ref 26.6–33)
MCH RBC QN AUTO: 27.8 PG (ref 26.6–33)
MCHC RBC AUTO-ENTMCNC: 32.8 G/DL (ref 31.5–35.7)
MCHC RBC AUTO-ENTMCNC: 33.5 G/DL (ref 31.5–35.7)
MCHC RBC AUTO-ENTMCNC: 33.6 G/DL (ref 31.5–35.7)
MCV RBC AUTO: 82.4 FL (ref 79–97)
MCV RBC AUTO: 82.7 FL (ref 79–97)
MCV RBC AUTO: 83 FL (ref 79–97)
METAMYELOCYTES NFR BLD MANUAL: 1 % (ref 0–0)
MONOCYTES # BLD: 0.42 10*3/MM3 (ref 0.1–0.9)
MONOCYTES # BLD: 0.42 10*3/MM3 (ref 0.1–0.9)
MONOCYTES # BLD: 1 10*3/MM3 (ref 0.1–0.9)
MYELOCYTES NFR BLD MANUAL: 3 % (ref 0–0)
NEUTROPHILS # BLD AUTO: 10.86 10*3/MM3 (ref 1.7–7)
NEUTROPHILS # BLD AUTO: 11.58 10*3/MM3 (ref 1.7–7)
NEUTROPHILS # BLD AUTO: 9.87 10*3/MM3 (ref 1.7–7)
NEUTROPHILS NFR BLD MANUAL: 56 % (ref 42.7–76)
NEUTROPHILS NFR BLD MANUAL: 75 % (ref 42.7–76)
NEUTROPHILS NFR BLD MANUAL: 77 % (ref 42.7–76)
NEUTS BAND NFR BLD MANUAL: 14 % (ref 0–5)
NEUTS BAND NFR BLD MANUAL: 2 % (ref 0–5)
NEUTS BAND NFR BLD MANUAL: 4 % (ref 0–5)
NEUTS VAC BLD QL SMEAR: ABNORMAL
NEUTS VAC BLD QL SMEAR: ABNORMAL
PATH REPORT.FINAL DX SPEC: NORMAL
PATH REPORT.FINAL DX SPEC: NORMAL
PATHOLOGY REVIEW: YES
PHOSPHATE SERPL-MCNC: 3.7 MG/DL (ref 2.5–4.5)
PHOSPHATE SERPL-MCNC: 4.3 MG/DL (ref 2.5–4.5)
PLATELET # BLD AUTO: 47 10*3/MM3 (ref 140–450)
PLATELET # BLD AUTO: 50 10*3/MM3 (ref 140–450)
PLATELET # BLD AUTO: 50 10*3/MM3 (ref 140–450)
PMV BLD AUTO: 8.5 FL (ref 6–12)
PMV BLD AUTO: 8.8 FL (ref 6–12)
PMV BLD AUTO: 8.8 FL (ref 6–12)
POIKILOCYTOSIS BLD QL SMEAR: ABNORMAL
POIKILOCYTOSIS BLD QL SMEAR: ABNORMAL
POTASSIUM SERPL-SCNC: 3.8 MMOL/L (ref 3.5–5.2)
POTASSIUM SERPL-SCNC: 4.1 MMOL/L (ref 3.5–5.2)
POTASSIUM SERPL-SCNC: 4.1 MMOL/L (ref 3.5–5.2)
PROT SERPL-MCNC: 4.9 G/DL (ref 6–8.5)
PROT SERPL-MCNC: 5.2 G/DL (ref 6–8.5)
RBC # BLD AUTO: 2.65 10*6/MM3 (ref 3.77–5.28)
RBC # BLD AUTO: 2.73 10*6/MM3 (ref 3.77–5.28)
RBC # BLD AUTO: 2.76 10*6/MM3 (ref 3.77–5.28)
RBC MORPH BLD: NORMAL
SCAN SLIDE: NORMAL
SMALL PLATELETS BLD QL SMEAR: ABNORMAL
SODIUM SERPL-SCNC: 138 MMOL/L (ref 136–145)
SODIUM SERPL-SCNC: 140 MMOL/L (ref 136–145)
SODIUM SERPL-SCNC: 140 MMOL/L (ref 136–145)
TOXIC GRANULATION: ABNORMAL
UNIT  ABO: NORMAL
UNIT  RH: NORMAL
VANCOMYCIN SERPL-MCNC: 17.2 MCG/ML (ref 5–40)
VARIANT LYMPHS NFR BLD MANUAL: 11 % (ref 19.6–45.3)
VARIANT LYMPHS NFR BLD MANUAL: 20 % (ref 19.6–45.3)
VARIANT LYMPHS NFR BLD MANUAL: 23 % (ref 19.6–45.3)
WBC NRBC COR # BLD: 14.1 10*3/MM3 (ref 3.4–10.8)
WBC NRBC COR # BLD: 14.1 10*3/MM3 (ref 3.4–10.8)
WBC NRBC COR # BLD: 14.3 10*3/MM3 (ref 3.4–10.8)

## 2022-08-08 PROCEDURE — 25010000002 CEFTRIAXONE PER 250 MG: Performed by: INTERNAL MEDICINE

## 2022-08-08 PROCEDURE — 25010000002 MEROPENEM PER 100 MG: Performed by: INTERNAL MEDICINE

## 2022-08-08 PROCEDURE — 85025 COMPLETE CBC W/AUTO DIFF WBC: CPT | Performed by: NURSE PRACTITIONER

## 2022-08-08 PROCEDURE — 97162 PT EVAL MOD COMPLEX 30 MIN: CPT

## 2022-08-08 PROCEDURE — 82330 ASSAY OF CALCIUM: CPT | Performed by: INTERNAL MEDICINE

## 2022-08-08 PROCEDURE — 25010000002 CALCIUM GLUCONATE PER 10 ML: Performed by: NURSE PRACTITIONER

## 2022-08-08 PROCEDURE — 84100 ASSAY OF PHOSPHORUS: CPT | Performed by: NURSE PRACTITIONER

## 2022-08-08 PROCEDURE — 97166 OT EVAL MOD COMPLEX 45 MIN: CPT

## 2022-08-08 PROCEDURE — 80053 COMPREHEN METABOLIC PANEL: CPT | Performed by: NURSE PRACTITIONER

## 2022-08-08 PROCEDURE — 71045 X-RAY EXAM CHEST 1 VIEW: CPT

## 2022-08-08 PROCEDURE — 85007 BL SMEAR W/DIFF WBC COUNT: CPT | Performed by: NURSE PRACTITIONER

## 2022-08-08 PROCEDURE — G0432 EIA HIV-1/HIV-2 SCREEN: HCPCS | Performed by: INTERNAL MEDICINE

## 2022-08-08 PROCEDURE — 94799 UNLISTED PULMONARY SVC/PX: CPT

## 2022-08-08 PROCEDURE — 86789 WEST NILE VIRUS ANTIBODY: CPT | Performed by: INTERNAL MEDICINE

## 2022-08-08 PROCEDURE — 99231 SBSQ HOSP IP/OBS SF/LOW 25: CPT | Performed by: SURGERY

## 2022-08-08 PROCEDURE — 97116 GAIT TRAINING THERAPY: CPT

## 2022-08-08 PROCEDURE — 99232 SBSQ HOSP IP/OBS MODERATE 35: CPT | Performed by: NURSE PRACTITIONER

## 2022-08-08 PROCEDURE — 94761 N-INVAS EAR/PLS OXIMETRY MLT: CPT

## 2022-08-08 PROCEDURE — 94760 N-INVAS EAR/PLS OXIMETRY 1: CPT

## 2022-08-08 PROCEDURE — 80202 ASSAY OF VANCOMYCIN: CPT | Performed by: INTERNAL MEDICINE

## 2022-08-08 PROCEDURE — 86788 WEST NILE VIRUS AB IGM: CPT | Performed by: INTERNAL MEDICINE

## 2022-08-08 PROCEDURE — 82962 GLUCOSE BLOOD TEST: CPT

## 2022-08-08 PROCEDURE — 83735 ASSAY OF MAGNESIUM: CPT | Performed by: INTERNAL MEDICINE

## 2022-08-08 PROCEDURE — 25010000002 HYDROCORTISONE SODIUM SUCCINATE 100 MG RECONSTITUTED SOLUTION: Performed by: NURSE PRACTITIONER

## 2022-08-08 RX ORDER — DEXTROSE MONOHYDRATE 100 MG/ML
15 INJECTION, SOLUTION INTRAVENOUS CONTINUOUS
Status: DISCONTINUED | OUTPATIENT
Start: 2022-08-08 | End: 2022-08-11

## 2022-08-08 RX ORDER — PANTOPRAZOLE SODIUM 40 MG/1
40 TABLET, DELAYED RELEASE ORAL
Status: DISCONTINUED | OUTPATIENT
Start: 2022-08-09 | End: 2022-08-17 | Stop reason: HOSPADM

## 2022-08-08 RX ADMIN — CEFTRIAXONE 2 G: 2 INJECTION, POWDER, FOR SOLUTION INTRAMUSCULAR; INTRAVENOUS at 13:15

## 2022-08-08 RX ADMIN — CALCIUM CHLORIDE, MAGNESIUM CHLORIDE, SODIUM CHLORIDE, SODIUM BICARBONATE, POTASSIUM CHLORIDE AND SODIUM PHOSPHATE DIBASIC DIHYDRATE 1000 ML/HR: 3.68; 3.05; 6.34; 3.09; .314; .187 INJECTION INTRAVENOUS at 08:19

## 2022-08-08 RX ADMIN — Medication 10 ML: at 08:02

## 2022-08-08 RX ADMIN — CALCIUM CHLORIDE, MAGNESIUM CHLORIDE, SODIUM CHLORIDE, SODIUM BICARBONATE, POTASSIUM CHLORIDE AND SODIUM PHOSPHATE DIBASIC DIHYDRATE 1500 ML/HR: 3.68; 3.05; 6.34; 3.09; .314; .187 INJECTION INTRAVENOUS at 01:50

## 2022-08-08 RX ADMIN — HYDROCORTISONE SODIUM SUCCINATE 50 MG: 100 INJECTION, POWDER, FOR SOLUTION INTRAMUSCULAR; INTRAVENOUS at 05:15

## 2022-08-08 RX ADMIN — MEROPENEM 1 G: 1 INJECTION, POWDER, FOR SOLUTION INTRAVENOUS at 08:01

## 2022-08-08 RX ADMIN — PANTOPRAZOLE SODIUM 40 MG: 40 INJECTION, POWDER, FOR SOLUTION INTRAVENOUS at 08:01

## 2022-08-08 RX ADMIN — Medication 10 ML: at 20:08

## 2022-08-08 RX ADMIN — CALCIUM CHLORIDE, MAGNESIUM CHLORIDE, SODIUM CHLORIDE, SODIUM BICARBONATE, POTASSIUM CHLORIDE AND SODIUM PHOSPHATE DIBASIC DIHYDRATE 2000 ML/HR: 3.68; 3.05; 6.34; 3.09; .314; .187 INJECTION INTRAVENOUS at 08:19

## 2022-08-08 RX ADMIN — CALCIUM CHLORIDE, MAGNESIUM CHLORIDE, SODIUM CHLORIDE, SODIUM BICARBONATE, POTASSIUM CHLORIDE AND SODIUM PHOSPHATE DIBASIC DIHYDRATE 1500 ML/HR: 3.68; 3.05; 6.34; 3.09; .314; .187 INJECTION INTRAVENOUS at 08:30

## 2022-08-08 RX ADMIN — CALCIUM CHLORIDE, MAGNESIUM CHLORIDE, SODIUM CHLORIDE, SODIUM BICARBONATE, POTASSIUM CHLORIDE AND SODIUM PHOSPHATE DIBASIC DIHYDRATE 1500 ML/HR: 3.68; 3.05; 6.34; 3.09; .314; .187 INJECTION INTRAVENOUS at 05:45

## 2022-08-08 RX ADMIN — CALCIUM CHLORIDE, MAGNESIUM CHLORIDE, SODIUM CHLORIDE, SODIUM BICARBONATE, POTASSIUM CHLORIDE AND SODIUM PHOSPHATE DIBASIC DIHYDRATE 1000 ML/HR: 3.68; 3.05; 6.34; 3.09; .314; .187 INJECTION INTRAVENOUS at 03:12

## 2022-08-08 RX ADMIN — CALCIUM GLUCONATE 3 G: 98 INJECTION, SOLUTION INTRAVENOUS at 10:37

## 2022-08-08 RX ADMIN — NYSTATIN 500000 UNITS: 100000 SUSPENSION ORAL at 20:08

## 2022-08-08 RX ADMIN — DEXTROSE MONOHYDRATE 25 ML/HR: 100 INJECTION, SOLUTION INTRAVENOUS at 08:01

## 2022-08-08 RX ADMIN — HYDROCORTISONE SODIUM SUCCINATE 50 MG: 100 INJECTION, POWDER, FOR SOLUTION INTRAMUSCULAR; INTRAVENOUS at 17:43

## 2022-08-08 RX ADMIN — ESCITALOPRAM OXALATE 10 MG: 10 TABLET ORAL at 08:01

## 2022-08-08 RX ADMIN — CALCIUM CHLORIDE, MAGNESIUM CHLORIDE, SODIUM CHLORIDE, SODIUM BICARBONATE, POTASSIUM CHLORIDE AND SODIUM PHOSPHATE DIBASIC DIHYDRATE 2000 ML/HR: 3.68; 3.05; 6.34; 3.09; .314; .187 INJECTION INTRAVENOUS at 03:12

## 2022-08-08 NOTE — PROGRESS NOTES
" LOS: 3 days   Patient Care Team:  Maribel Graf MD as PCP - General (Family Medicine)  AVA Cavanaugh MD as Consulting Physician (Cardiology)      Subjective \"I am better\"    Interval History:     Subjective: Denies significant abdominal pain.  No nausea or vomiting and tolerating clear liquids.  Bowel movement yesterday.      ROS:   No chest pain, shortness of breath, or cough.        Medication Review:     Current Facility-Administered Medications:   •  acetaminophen (TYLENOL) tablet 650 mg, 650 mg, Oral, Q4H PRN, 650 mg at 08/05/22 2147 **OR** acetaminophen (TYLENOL) suppository 650 mg, 650 mg, Rectal, Q4H PRN, Connor Rod APRN  •  calcium gluconate 1g/50ml 0.675% NaCl IV SOLN, 1 g, Intravenous, Once PRN **OR** calcium gluconate 2-0.675 GM/100ML NACL IVPB, 2 g, Intravenous, Once PRN, Vikram Acosta MD  •  calcium gluconate 3 g in sodium chloride 0.9 % 100 mL IVPB, 3 g, Intravenous, Once, Day, Susan, APRSALLY  •  dextrose (D50W) (25 g/50 mL) IV injection 25 g, 25 g, Intravenous, Q15 Min PRN, Connor Rod APRN  •  dextrose (GLUTOSE) oral gel 15 g, 15 g, Oral, Q15 Min PRN, Connor Rod APRN  •  dextrose 10 % infusion, 25 mL/hr, Intravenous, Continuous, Day, Susan, GRACIELA, Last Rate: 25 mL/hr at 08/08/22 0801, 25 mL/hr at 08/08/22 0801  •  escitalopram (LEXAPRO) tablet 10 mg, 10 mg, Oral, Daily, Shaye Alan APRN, 10 mg at 08/08/22 0801  •  fentaNYL citrate (PF) (SUBLIMAZE) injection 25 mcg, 25 mcg, Intravenous, Q2H PRN, Shaye Alan APRN, 25 mcg at 08/07/22 0749  •  glucagon (human recombinant) (GLUCAGEN DIAGNOSTIC) 1 mg in sterile water (preservative free) 1 mL injection, 1 mg, Intramuscular, Q15 Min PRN, Connor Rod APRN  •  heparin (porcine) injection 1,000-2,000 Units, 1,000-2,000 Units, Intravenous, PRN, Vikram Acosta MD  •  hydrocortisone sodium succinate (Solu-CORTEF) injection 50 mg, 50 mg, Intravenous, Q12H, Connor Rod APRN, 50 mg at 08/08/22 0515  •  " HYDROmorphone (DILAUDID) injection 1 mg, 1 mg, Intravenous, Q1H PRN, Day, Susan, APRN  •  insulin lispro (ADMELOG) injection 0-7 Units, 0-7 Units, Subcutaneous, Q6H, Day, Susan, APRN, 2 Units at 08/07/22 0021  •  magnesium sulfate 2g/50 mL (PREMIX) infusion, 2 g, Intravenous, PRN, Vikram Acosta MD  •  meropenem (MERREM) 1 g in sodium chloride 0.9 % 100 mL IVPB, 1 g, Intravenous, Q8H, Shy Moreno MD, 1 g at 08/08/22 0801  •  micafungin sodium (MYCAMINE) 100 mg in sodium chloride 0.9 % 100 mL IVPB, 100 mg, Intravenous, Q24H, Sagar Orozco MD, 100 mg at 08/07/22 1301  •  morphine injection 2 mg, 2 mg, Intravenous, Q2H PRN, Day, Susan, APRN, 2 mg at 08/07/22 2123  •  nitroglycerin (NITROSTAT) SL tablet 0.4 mg, 0.4 mg, Sublingual, Q5 Min PRN, Connor Rod APRSALLY  •  norepinephrine (LEVOPHED) 16 mg in 250 mL NS infusion, 0.02-0.3 mcg/kg/min, Intravenous, Titrated, Connor Rod APRN, Stopped at 08/06/22 1540  •  ondansetron (ZOFRAN) tablet 4 mg, 4 mg, Oral, Q6H PRN **OR** ondansetron (ZOFRAN) injection 4 mg, 4 mg, Intravenous, Q6H PRN, Connor Rod APRN, 4 mg at 08/06/22 2042  •  oxyCODONE (ROXICODONE) immediate release tablet 10 mg, 10 mg, Oral, Q4H PRN, Day, Susan, APRN, 10 mg at 08/07/22 1803  •  pantoprazole (PROTONIX) injection 40 mg, 40 mg, Intravenous, Q12H, Day, Susan, APRN, 40 mg at 08/08/22 0801  •  Pharmacy to dose vancomycin, , Does not apply, Continuous PRN, Sagar Orozco MD  •  Phoxillum BK4/2.5 dialysis solution, 2,000 mL/hr, CRRT, Continuous, Vikram Acosta MD, Last Rate: 2,000 mL/hr at 08/08/22 0819, 2,000 mL/hr at 08/08/22 0819  •  Phoxillum BK4/2.5 dialysis solution, 1,500 mL/hr, CRRT, Continuous, Vikram Acosta MD, Last Rate: 1,500 mL/hr at 08/08/22 0830, 1,500 mL/hr at 08/08/22 0830  •  Phoxillum BK4/2.5 dialysis solution, 1,000 mL/hr, CRRT, Continuous, Vikram Acosta MD, Last Rate: 1,000 mL/hr at 08/08/22 0819, 1,000 mL/hr at 08/08/22 0819  •  potassium chloride 20 mEq in 50 mL  IVPB, 20 mEq, Intravenous, PRN, Vikram Acosta MD  •  simethicone (MYLICON) chewable tablet 80 mg, 80 mg, Oral, 4x Daily PRN, Shaye Alan, APRN, 80 mg at 08/06/22 0128  •  sodium chloride 0.9 % flush 10 mL, 10 mL, Intravenous, PRN, Leonard Francis MD  •  sodium chloride 0.9 % flush 10 mL, 10 mL, Intravenous, Q12H, Love, Connor E, APRN, 10 mL at 08/08/22 0802  •  sodium chloride 0.9 % flush 10 mL, 10 mL, Intravenous, PRN, Love, Connor E, APRN  •  sodium chloride 0.9 % flush 10 mL, 10 mL, Intravenous, PRN, Day, Susan, APRN  •  sodium chloride 0.9 % flush 10 mL, 10 mL, Intravenous, Q12H, Day, Susan, APRN, 10 mL at 08/08/22 0802  •  sodium chloride 0.9 % flush 20 mL, 20 mL, Intravenous, PRN, Day, Susan, APRN  •  sodium phosphates 10 mmol in sodium chloride 0.9 % 100 mL IVPB, 10 mmol, Intravenous, PRN, Vikram Acosta MD  •  Vancomycin Pharmacy Intermittent/Pulse Dosing, , Does not apply, PRSALLY, Sagar Orozco MD      Objective Resting in bed, no acute distress, family and nurse in room    Vital Signs  Vitals:    08/08/22 0600 08/08/22 0700 08/08/22 0800 08/08/22 0900   BP: 135/79 142/85 149/97 154/100   BP Location: Left arm      Patient Position: Lying      Pulse: 89 91 92 88   Resp: 14      Temp: 97.2 °F (36.2 °C) 97.16 °F (36.2 °C) 97.7 °F (36.5 °C) 98.06 °F (36.7 °C)   TempSrc: Bladder      SpO2: 100% 98% 99% 99%   Weight:       Height:           Physical Exam:     General Appearance:    Awake and alert, in no acute distress, obese, on CRRT   Head:    Normocephalic, without obvious abnormality   Eyes:          Conjunctivae normal, anicteric sclera   Throat:   No oral lesions, no thrush, oral mucosa moist   Neck:   No adenopathy, supple, no JVD   Lungs:     respirations regular, even and unlabored   Abdomen:     Soft, non-tender, no obvious mass   Rectal:     Deferred   Extremities:   no cyanosis   Skin:   No bruising or rash, no jaundice, Sandoval catheter with dark yellow urine noted        Results Review:     CBC    Results from last 7 days   Lab Units 08/08/22  0812 08/08/22  0359 08/07/22  2327 08/07/22  2002 08/07/22  1615 08/07/22  1513 08/07/22  1204 08/07/22  0855   WBC 10*3/mm3 14.10* 14.30* 13.90* 13.30* 14.40*  --  12.20* 11.60*   HEMOGLOBIN g/dL 7.6* 7.2* 7.5* 7.5* 7.6*  --  6.7* 7.2*   PLATELETS 10*3/mm3 47* 50* 57* 57* 65* 68* 46* 24*     CMP   Results from last 7 days   Lab Units 08/08/22  0812 08/08/22  0359 08/07/22  2327 08/07/22  1615 08/07/22  0600 08/06/22  2154 08/06/22  1302 08/06/22  1013 08/06/22  0510 08/05/22  1852 08/05/22  1400 08/05/22  0811   SODIUM mmol/L 138 140 140 139 138 141 136 139 138  --  133* 132*   POTASSIUM mmol/L 4.1 4.1 3.8 3.7 3.7 3.4* 3.5 2.5* 3.4*  --  3.7 3.9   CHLORIDE mmol/L 104 105 104 101 99 98 96* 96* 96*  --  99 94*   CO2 mmol/L 28.0 28.0 28.0 29.0 30.0* 29.0 16.0* 29.0 24.0  --  19.0* 21.0*   BUN mg/dL 16 19 21* 25* 37* 55* 53* 40* 46*  --  38* 32*   CREATININE mg/dL 1.35* 1.53* 1.77* 2.05* 2.92* 4.25* 4.58* 3.25* 4.06*  --  4.03* 3.82*   GLUCOSE mg/dL 87 98 105* 112* 124* 162* 264* 543* 218*  --  165* 141*   ALBUMIN g/dL 3.20* 3.00* 3.30* 3.20* 2.90* 3.30* 2.70* 1.90* 2.50*  --  2.90* 3.30*   BILIRUBIN mg/dL  --  3.8*  --   --  4.7*  --  4.6* 3.1* 4.5*  --  4.6* 4.3*   ALK PHOS U/L  --  133*  --   --  112  --  89 72 75  --  64 64   AST (SGOT) U/L  --  233*  --   --  522*  --  393* 194* 177*  --  45* 41*   ALT (SGPT) U/L  --  259*  --   --  352*  --  222* 109* 98*  --  27 30   MAGNESIUM mg/dL 2.5  --  2.4 2.3 2.3 2.2  --  1.6 2.1   < > 1.2*  --    PHOSPHORUS mg/dL 3.7  --  4.3 3.8 4.5 4.8*  --  3.9 4.6*   < > 2.9  --    AMYLASE U/L  --   --   --   --   --   --   --  39  --   --   --  81   LIPASE U/L  --   --   --   --   --   --   --  4*  --   --   --  12*    < > = values in this interval not displayed.     Cr Clearance Estimated Creatinine Clearance: 74.6 mL/min (A) (by C-G formula based on SCr of 1.35 mg/dL (H)).  Coag   Results from last 7 days   Lab Units  08/07/22  1408 08/06/22  1110 08/05/22  1400 08/05/22  0911 08/05/22  0811   INR  1.09 2.07* 2.47* 2.47* 2.16*   APTT seconds  --  51.9*  --  48.8* 43.1*     HbA1C No results found for: HGBA1C  Blood Glucose   Glucose   Date/Time Value Ref Range Status   08/08/2022 0743 85 70 - 105 mg/dL Final     Comment:     Serial Number: [id number]:  [id number]   08/08/2022 0503 88 70 - 105 mg/dL Final     Comment:     Serial Number: [id number]:  [id number]   08/07/2022 2327 99 70 - 105 mg/dL Final     Comment:     Serial Number: [id number]:  [id number]   08/07/2022 1808 102 70 - 105 mg/dL Final     Comment:     Serial Number: [id number]:  [id number]   08/07/2022 1131 131 (H) 70 - 105 mg/dL Final     Comment:     Serial Number: [id number]:  [id number]   08/07/2022 0607 139 (H) 70 - 105 mg/dL Final     Comment:     Serial Number: [id number]:  [id number]   08/06/2022 1643 141 (H) 70 - 105 mg/dL Final     Comment:     Serial Number: [id number]:  [id number]   08/06/2022 0006 188 (H) 70 - 105 mg/dL Final     Comment:     Serial Number: [id number]:  [id number]     Infection   Results from last 7 days   Lab Units 08/05/22  1755 08/05/22  1012 08/05/22  0902 08/05/22  0811   BLOODCX   --   --  No growth at 2 days  No growth at 2 days  --    BODYFLDCX  No growth at 3 days  --   --   --    URINECX   --  No growth  --   --    PROCALCITONIN ng/mL  --   --   --  99.59*     UA    Results from last 7 days   Lab Units 08/05/22  1917 08/05/22  1012   NITRITE UA  Negative Negative   WBC UA /HPF 13-20* 6-12*   BACTERIA UA /HPF 2+* 2+*   SQUAM EPITHEL UA /HPF 3-6* 3-6*   URINECX   --  No growth     Radiology(recent) CT Abdomen Pelvis Without Contrast    Result Date: 8/6/2022  Impression: 1. Moderate volume hemoperitoneum is new from prior and partially accumulates in the infrahepatic space. This suggests potential hepatic source of hemorrhage, likely from recent biopsy. 2. Bilateral small layering  pleural effusions and streaky bibasilar atelectasis. Electronically signed by:  Bob Jackson M.D.  8/6/2022 9:03 PM    US Liver    Result Date: 8/6/2022  Multiple hyperechoic lesions within liver, which appear increased in size from prior ultrasound. Changes could be secondary to interval liver biopsy. Recommend correlation with pathology results.  Electronically Signed By-Mervin Martell MD On:8/6/2022 1:46 PM This report was finalized on [id number] by  Mervin Martell MD.    XR Chest 1 View    Result Date: 8/6/2022  Right IJ catheter with tip at cavoatrial junction. No pneumothorax identified.  Electronically Signed By-Mervin Martell MD On:8/6/2022 6:27 PM This report was finalized on [id number] by  Mervin Martell MD.    XR Chest 1 View    Result Date: 8/6/2022   New left lower lobe disease. With the provided history this is concerning for developing pneumonia, possibly aspiration given its location.  Unchanged right lower lobe atelectasis versus pneumonia.  Right-sided PICC line in good position.  No pneumothorax.  Electronically Signed By-Ankur Thapa On:8/6/2022 12:53 PM This report was finalized on [id number] by  Ankur Thapa, .         Assessment & Plan   Elevated liver enzymes   Acute anemia -normocytic  Hypotension -resolved, off pressors  Sepsis unknown origin at this time  Enteropathic E. coli and stool  Abnormal CT showing hemoperitoneum s/p liver biopsy  Acute kidney injury on CRRT  Enteropathic E. coli  Thrombocytopenia     PLAN:  LFTs improved overnight with elevation likely secondary to hemoperitoneum from liver biopsy and associated shock liver.  Off pressor support and remains on CRRT.  Hemoglobin stable without evidence of active GI luminal bleeding.  Acute hepatitis panel negative, AFP 3 ceruloplasmin 16 and full liver serology work-up pending including CMV, EBV and HSV serologies.  Liver biopsy pathology pending, with abscess felt to be less likely.  Continue supportive care we  will follow.  Okay for full liquid diet.  Discussed with bedside RN this morning on rounds.    Nephrology following and on CRRT.  Infectious disease following and currently on vancomycin, meropenem and micafungin.  They would likely de-escalate today if she continues to improve.  General surgery following without plan for acute surgical intervention.    Electronically signed by GRACIELA Vera, 08/08/22, 9:12 AM EDT.

## 2022-08-08 NOTE — CASE MANAGEMENT/SOCIAL WORK
Discharge Planning Assessment  [facility]     Patient Name: Raul Gardner  MRN: [id number]  Today's Date: 8/8/2022    Admit Date: 8/5/2022     Discharge Needs Assessment     Row Name 08/08/22 1630       Living Environment    People in Home spouse    Name(s) of People in Home Simran Gardner    Current Living Arrangements home    Primary Care Provided by self    Provides Primary Care For no one    Family Caregiver if Needed spouse    Family Caregiver Names Simran Gardner    Quality of Family Relationships helpful;supportive;involved    Able to Return to Prior Arrangements yes       Resource/Environmental Concerns    Resource/Environmental Concerns none    Transportation Concerns none       Transition Planning    Patient/Family Anticipates Transition to home with family    Patient/Family Anticipated Services at Transition none    Transportation Anticipated family or friend will provide       Discharge Needs Assessment    Readmission Within the Last 30 Days no previous admission in last 30 days    Equipment Currently Used at Home none    Concerns to be Addressed denies needs/concerns at this time    Anticipated Changes Related to Illness none    Equipment Needed After Discharge none    Discharge Facility/Level of Care Needs home with home health    Provided Post Acute Provider List? Yes    Post Acute Provider List Home Health;Outpatient Therapy    Provided Post Acute Provider Quality & Resource List? Yes    Post Acute Provider Quality and Resource List Home Health    Delivered To Patient    Method of Delivery In person    Patient's Choice of Community Agency(s) will review and advise if needed.               Discharge Plan     Row Name 08/08/22 1632       Plan    Plan DC Plan: Pending Clinical Course and PT/OT evaluations. Home health and OP Rehab lists provided and awaiting choices.    Provided Post Acute Provider List? Yes    Post Acute Provider List Home Health;Outpatient Therapy    Provided Post Acute Provider Quality &  Resource List? Yes    Post Acute Provider Quality and Resource List Home Health    Delivered To Patient    Method of Delivery In person    Plan Comments CM spoke with patient at bedside to discuss admission assessment and discharge planning. Patient confirms PCP and pharmacy. Patient confirms she is agreeable to enrolling in meds to bed program. CM enrolled patient and updated pharmacy in [place]. Patient denies any difficulty affording medications at this time. Patient denies any additional needs for services or DME at this time.CM provided Home Health and OP Rehab lists for potential discharge needs pending PT/OT evaluations. CM requested patient/family to select 2-3 choices from each list and report back to CM when possible. CM provided contact information and Medicare.gov website for any questions and guidance. Patient/Family verbalized understanding.CM will continue to follow for any additional needs that may develop and adjust discharge plan accordingly. DC Barriers: 5 liters NC, HD cath, IV abx/steroids, D10 gtt, and abnormal labs.              Expected Discharge Date and Time     Expected Discharge Date Expected Discharge Time    Aug 15, 2022          Demographic Summary     Row Name 08/08/22 1629       General Information    Admission Type inpatient    Arrived From emergency department;home    Referral Source admission list    Reason for Consult discharge planning    Preferred Language English       Contact Information    Permission Granted to Share Info With                Functional Status     Row Name 08/08/22 1630       Functional Status    Usual Activity Tolerance excellent    Current Activity Tolerance good       Functional Status, IADL    Medications independent    Meal Preparation independent    Housekeeping independent    Laundry independent    Shopping independent       Mental Status    General Appearance WDL WDL       Mental Status Summary    Recent Changes in Mental Status/Cognitive  Functioning no changes       Employment/    Employment Status employed full-time    Current or Previous Occupation other (see comments)  [employer]              Met with patient in room wearing PPE: mask,     Maintain distance greater than six feet and spent less than fifteen minutes in the room.      Sofia Sanches RN      Office Phone: [phone]  Office Cell:     [phone]

## 2022-08-08 NOTE — THERAPY EVALUATION
Patient Name: Raul Gardner  : 1975    MRN: [id number]                              Today's Date: 2022       Admit Date: 2022    Visit Dx:     ICD-10-CM ICD-9-CM   1. Acute kidney injury (HCC)  N17.9 584.9   2. 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   • Melanoma (HCC)    • Near syncope 2017   • Pulmonary hypertension (HCC)    • Urinary tract infection     chronic hematuria, seen urology     Past Surgical History:   Procedure Laterality Date   •  SECTION  13 and 14   • SKIN CANCER EXCISION     • TUBAL ABDOMINAL LIGATION        General Information     Row Name 22 1541          OT Time and Intention    Document Type evaluation  -LS     Mode of Treatment occupational therapy;co-treatment;physical therapy  -     Row Name 22 1541          General Information    Patient Profile Reviewed yes  -LS     Prior Level of Function independent:;ADL's;work;driving;all household mobility;community mobility;home management  -     Existing Precautions/Restrictions no known precautions/restrictions  -     Row Name 22 1541          Living Environment    People in Home spouse;child(sariah), dependent  -     Row Name 22 1541          Home Main Entrance    Number of Stairs, Main Entrance five  -LS     Row Name 22 1541          Stairs Within Home, Primary    Stairs, Within Home, Primary basement laundry  -     Row Name 22 1541          Cognition    Orientation Status (Cognition) oriented x 4  -LS     Row Name 22 1541          Safety Issues, Functional Mobility    Impairments  Affecting Function (Mobility) endurance/activity tolerance;strength;balance  -           User Key  (r) = Recorded By, (t) = Taken By, (c) = Cosigned By    Initials Name Provider Type    Dale Fernandez OT Occupational Therapist                 Mobility/ADL's     Row Name 08/08/22 1542          Bed Mobility    Bed Mobility supine-sit  -LS     Supine-Sit [place] (Bed Mobility) contact guard  -     Row Name 08/08/22 1542          Transfers    Sit-Stand [place] (Transfers) minimum assist (75% patient effort);2 person assist  -     Row Name 08/08/22 1542          Activities of Daily Living    BADL Assessment/Intervention lower body dressing  -     Row Name 08/08/22 1542          Lower Body Dressing Assessment/Training    [place] Level (Lower Body Dressing) don;socks;maximum assist (25% patient effort)  -     Position (Lower Body Dressing) edge of bed sitting  -           User Key  (r) = Recorded By, (t) = Taken By, (c) = Cosigned By    Initials Name Provider Type    LS Dale Perez OT Occupational Therapist               Obj/Interventions     Row Name 08/08/22 1543          Sensory Assessment (Somatosensory)    Sensory Assessment (Somatosensory) sensation intact  -     Row Name 08/08/22 1543          Range of Motion Comprehensive    General Range of Motion bilateral upper extremity ROM WFL;bilateral lower extremity ROM WFL  -     Row Name 08/08/22 1543          Strength Comprehensive (MMT)    Comment, General Manual Muscle Testing (MMT) Assessment BUEs grossly 4/5  -     Row Name 08/08/22 1543          Balance    Balance Assessment sitting static balance;sitting dynamic balance;standing static balance;standing dynamic balance  -LS     Static Sitting Balance independent  -LS     Dynamic Sitting Balance independent  -LS     Position, Sitting Balance sitting edge of bed  -     Static Standing Balance minimal assist  -     Dynamic Standing Balance minimal assist  -LS      Position/Device Used, Standing Balance --  2 hands held  -LS     Balance Interventions sitting;standing;sit to stand  -LS           User Key  (r) = Recorded By, (t) = Taken By, (c) = Cosigned By    Initials Name Provider Type    Dale Fernandez OT Occupational Therapist               Goals/Plan     Row Name 08/08/22 1550          Bed Mobility Goal 1 (OT)    Activity/Assistive Device (Bed Mobility Goal 1, OT) bed mobility activities, all  -LS     [place] Level/Cues Needed (Bed Mobility Goal 1, OT) independent  -LS     Time Frame (Bed Mobility Goal 1, OT) long term goal (LTG);2 weeks  -     Row Name 08/08/22 1553          Transfer Goal 1 (OT)    Activity/Assistive Device (Transfer Goal 1, OT) sit-to-stand/stand-to-sit;bed-to-chair/chair-to-bed;toilet  -LS     [place] Level/Cues Needed (Transfer Goal 1, OT) independent  -LS     Time Frame (Transfer Goal 1, OT) long term goal (LTG);2 weeks  -     Row Name 08/08/22 1553          Dressing Goal 1 (OT)    Activity/Device (Dressing Goal 1, OT) lower body dressing  -LS     [place]/Cues Needed (Dressing Goal 1, OT) standby assist  -LS     Time Frame (Dressing Goal 1, OT) long term goal (LTG);2 weeks  -     Row Name 08/08/22 1553          Therapy Assessment/Plan (OT)    Planned Therapy Interventions (OT) activity tolerance training;BADL retraining;functional balance retraining;occupation/activity based interventions;ROM/therapeutic exercise;strengthening exercise;transfer/mobility retraining;neuromuscular control/coordination retraining  -LS           User Key  (r) = Recorded By, (t) = Taken By, (c) = Cosigned By    Initials Name Provider Type    Dale Fernandez OT Occupational Therapist               Clinical Impression     Row Name 08/08/22 1541          Pain Assessment    Pretreatment Pain Rating 0/10 - no pain  -LS     Posttreatment Pain Rating 0/10 - no pain  -     Row Name 08/08/22 1540          Plan of Care Review    Plan of Care Reviewed With  patient;spouse  -     Outcome Evaluation 46 yo female admitted with fever, body aches, vomitting and diarrhea. Pt was hypotensive and admitted to ICU with d/o septic shock. Pt with MARIANNE and required CRRT. Pt is now improving and is on 3L O2. At baseline, patient lives in a 1 story home with her  and two elementary school-aged children. Her home has 5 steps to enter, and basement laundry. 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 Today, patient feels generally weak, not having been out of bed since admission. Her UEs and LEs are edematose, and she reports them to feel extremely heavy. Patient requiring max assist for lower body dressing, and she is anticipated to required max assist for all lower body self care at this time. She is CGA for bed mobility, then able to come to standing with Min Ax2, feeling wobbly upon standing. She ambulated to chair approx 5 feet from bed with Min Ax2, HHA. Walker now in pts room for future use. Patient will likely progress very well with function upon becoming medically stable, anticipate safe d/c home with family assistance.  -     Row Name 08/08/22 1545          Therapy Assessment/Plan (OT)    Rehab Potential (OT) good, to achieve stated therapy goals  -     Criteria for Skilled Therapeutic Interventions Met (OT) yes;skilled treatment is necessary  -     Therapy Frequency (OT) 3 times/wk  -     Predicted Duration of Therapy Intervention (OT) 2 weeks  -     Row Name 08/08/22 1545          Therapy Plan Review/Discharge Plan (OT)    Anticipated Discharge Disposition (OT) home with assist  -     Row Name 08/08/22 1544          Vital Signs    Pre Systolic BP Rehab 151  -LS     Pre Treatment Diastolic BP 94  -LS     Pre SpO2 (%) 96  -LS     O2 Delivery Pre Treatment nasal cannula  3L  -LS     Post SpO2 (%) 96  -LS     O2 Delivery Post Treatment nasal cannula  -LS     Pre Patient Position Supine  -LS     Intra Patient  Position Standing  -LS     Post Patient Position Sitting  -LS     Recovery Time VSS  -LS     Row Name 08/08/22 1545          Positioning and Restraints    Pre-Treatment Position in bed  -LS     Post Treatment Position chair  -LS     In Chair notified nsg;call light within reach;encouraged to call for assist;with family/caregiver  -           User Key  (r) = Recorded By, (t) = Taken By, (c) = Cosigned By    Initials Name Provider Type    Dale Fernandez OT Occupational Therapist               Outcome Measures     Row Name 08/08/22 1553          How much help from another is currently needed...    Putting on and taking off regular lower body clothing? 2  -LS     Bathing (including washing, rinsing, and drying) 2  -LS     Toileting (which includes using toilet bed pan or urinal) 2  -LS     Putting on and taking off regular upper body clothing 3  -LS     Taking care of personal grooming (such as brushing teeth) 4  -LS     Eating meals 4  -LS     AM-PAC 6 Clicks Score (OT) 17  -LS     Row Name 08/08/22 1553          Functional Assessment    Outcome Measure Options AM-PAC 6 Clicks Daily Activity (OT)  -           User Key  (r) = Recorded By, (t) = Taken By, (c) = Cosigned By    Initials Name Provider Type    Dale Fernandez OT Occupational Therapist                Occupational Therapy Education                 Title: PT OT SLP Therapies (Done)     Topic: Occupational Therapy (Done)     Point: ADL training (Done)     Description:   Instruct learner(s) on proper safety adaptation and remediation techniques during self care or transfers.   Instruct in proper use of assistive devices.              Learning Progress Summary           Patient Acceptance, E,TB, VU by LS at 8/8/2022 1554                   Point: Precautions (Done)     Description:   Instruct learner(s) on prescribed precautions during self-care and functional transfers.              Learning Progress Summary           Patient Acceptance, E,TB, VU by LS at  8/8/2022 1554                               User Key     Initials Effective Dates Name Provider Type Discipline     03/01/22 -  Dale Perez OT Occupational Therapist OT              OT Recommendation and Plan  Planned Therapy Interventions (OT): activity tolerance training, BADL retraining, functional balance retraining, occupation/activity based interventions, ROM/therapeutic exercise, strengthening exercise, transfer/mobility retraining, neuromuscular control/coordination retraining  Therapy Frequency (OT): 3 times/wk  Plan of Care Review  Plan of Care Reviewed With: patient, spouse  Outcome Evaluation: 46 yo female admitted with fever, body aches, vomitting and diarrhea. Pt was hypotensive and admitted to ICU with d/o septic shock. Pt with MARIANNE and required CRRT. Pt is now improving and is on 3L O2. At baseline, patient lives in a 1 story home with her  and two elementary school-aged children. Her home has 5 steps to enter, and basement laundry. 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 Today, patient feels generally weak, not having been out of bed since admission. Her UEs and LEs are edematose, and she reports them to feel extremely heavy. Patient requiring max assist for lower body dressing, and she is anticipated to required max assist for all lower body self care at this time. She is CGA for bed mobility, then able to come to standing with Min Ax2, feeling wobbly upon standing. She ambulated to chair approx 5 feet from bed with Min Ax2, HHA. Walker now in pts room for future use. Patient will likely progress very well with function upon becoming medically stable, anticipate safe d/c home with family assistance.     Time Calculation:    Time Calculation- OT     Row Name 08/08/22 1554             Time Calculation- OT    OT Start Time 1455  -      OT Stop Time 1525  -      OT Time Calculation (min) 30 min  -      OT Received On 08/08/22  -       OT - Next Appointment 08/10/22  -LS      OT Goal Re-Cert Due Date 08/22/22  -              Untimed Charges    OT Eval/Re-eval Minutes 30  -LS              Total Minutes    Untimed Charges Total Minutes 30  -LS       Total Minutes 30  -LS            User Key  (r) = Recorded By, (t) = Taken By, (c) = Cosigned By    Initials Name Provider Type    Nely Fernandez OT Occupational Therapist              Therapy Charges for Today     Code Description Service Date Service Provider Modifiers Qty    [id number]  OT EVAL MOD COMPLEXITY 4 8/8/2022 Nely Perez OT GO 1               NELY PEREZ OT  8/8/2022

## 2022-08-08 NOTE — PAYOR COMM NOTE
"CLINICAL UPDATE 08/08/2022:        Raul Middleton (47 y.o. Female) 1975  PENDING AUTH # [id number]          AUTHORIZATION PENDING:   PLEASE CALL OR FAX DETERMINATION TO CONTACT BELOW. THANK YOU.        Eveline Amor, RN MSN  /UR  [facility]  [phone] office  [phone] fax  [email or web address]    [facility]  NPI: [id number]  Tax: [phone]            Raul Middleton (47 y.o. Female)             Date of Birth   1975    Social Security Number       Address   [address] [place]    Home Phone   [phone]    MRN   [id number]       Baptism   None    Marital Status                               Admission Date   8/5/22    Admission Type   Emergency    Admitting Provider   hSy Moreno MD    Attending Provider   Shy Moreno MD    Department, Room/Bed   [facility] INTENSIVE CARE UNIT, 2305/1       Discharge Date       Discharge Disposition       Discharge Destination                               Attending Provider: Shy Moreno MD    Allergies: Cephalexin, Hydrocodone-acetaminophen    Isolation: None   Infection: None   Code Status: CPR   Advance Care Planning Activity    Ht: 165.1 cm (65\")   Wt: 139 kg (307 lb 1.6 oz)    Admission Cmt: None   Principal Problem: Septic shock (HCC) [A41.9,R65.21]                 Active Insurance as of 8/5/2022     Primary Coverage     Payor Plan Insurance Group Employer/Plan Group    KEY BENEFIT ADMINISTRATORS LBP KEY BENEFIT ADMINISTRATORS LBP [id number]     Payor Plan Address Payor Plan Phone Number Payor Plan Fax Number Effective Dates    [address] [phone]  1/1/2022 - None Entered    [facility] [id number]       Subscriber Name Subscriber Birth Date Member ID       RAUL MIDDLETON 1975 [id number]                 Emergency Contacts      (Rel.) Home Phone Work Phone Mobile Phone    CHUN MIDDLETON (Spouse) -- -- [phone]               Physician Progress " "Notes (last 24 hours)      January Mcbride, APRN at 08/08/22 0912     Attestation signed by Louis Alberto MD at 08/08/22 1053    I have reviewed this documentation and agree.  47-year-old woman admitted to the hospital with sepsis of unclear etiology.  She underwent CT-guided liver biopsy resulting in hemoperitoneum.  Hemoglobin fell from 14-7.  Liver enzymes were elevated with  .  Today liver enzymes are improved with  and .  She denies abdominal pain nausea or vomiting.  She is tolerating her diet.  On exam her abdomen is obese and nondistended with good bowel sounds.  She is nontender.  She appears to have shock liver related to hemoperitoneum.  Her liver enzymes are improving.  Hepatitis panel is negative.  I am recommending continuing supportive care.  Monitor renal function and maintain adequate hydration.  Full liquid diet.  Nothing to add at this time.  We will see as needed.  I saw the patient today with January Mcbride NP.  I have performed a complete history and physical examination and reviewed appropriate laboratory studies and radiographic exams.  I have completed the majority and substantive portion of the history and physical examination.  I completed the majority and substantive portion of the medical decision making.    Louis Alberto M.D.                     LOS: 3 days   Patient Care Team:  Maribel Graf MD as PCP - General (Family Medicine)  AVA Cavanaugh MD as Consulting Physician (Cardiology)      Subjective \"I am better\"    Interval History:     Subjective: Denies significant abdominal pain.  No nausea or vomiting and tolerating clear liquids.  Bowel movement yesterday.      ROS:   No chest pain, shortness of breath, or cough.        Medication Review:     Current Facility-Administered Medications:   •  acetaminophen (TYLENOL) tablet 650 mg, 650 mg, Oral, Q4H PRN, 650 mg at 08/05/22 9199 **OR** acetaminophen (TYLENOL) suppository 650 mg, 650 mg, " Rectal, Q4H PRN, Connor Rod APRN  •  calcium gluconate 1g/50ml 0.675% NaCl IV SOLN, 1 g, Intravenous, Once PRN **OR** calcium gluconate 2-0.675 GM/100ML NACL IVPB, 2 g, Intravenous, Once PRN, Vikram Acosta MD  •  calcium gluconate 3 g in sodium chloride 0.9 % 100 mL IVPB, 3 g, Intravenous, Once, Day, Susan, APRN  •  dextrose (D50W) (25 g/50 mL) IV injection 25 g, 25 g, Intravenous, Q15 Min PRN, Connor Rod APRN  •  dextrose (GLUTOSE) oral gel 15 g, 15 g, Oral, Q15 Min PRN, Connor Rod APRN  •  dextrose 10 % infusion, 25 mL/hr, Intravenous, Continuous, Day, Susan, APRSALLY, Last Rate: 25 mL/hr at 08/08/22 0801, 25 mL/hr at 08/08/22 0801  •  escitalopram (LEXAPRO) tablet 10 mg, 10 mg, Oral, Daily, Shaye Alan APRN, 10 mg at 08/08/22 0801  •  fentaNYL citrate (PF) (SUBLIMAZE) injection 25 mcg, 25 mcg, Intravenous, Q2H PRN, Shaye Alan APRN, 25 mcg at 08/07/22 0749  •  glucagon (human recombinant) (GLUCAGEN DIAGNOSTIC) 1 mg in sterile water (preservative free) 1 mL injection, 1 mg, Intramuscular, Q15 Min PRN, Connor Rod APRN  •  heparin (porcine) injection 1,000-2,000 Units, 1,000-2,000 Units, Intravenous, PRN, Vikram Acosta MD  •  hydrocortisone sodium succinate (Solu-CORTEF) injection 50 mg, 50 mg, Intravenous, Q12H, Connor Rod APRN, 50 mg at 08/08/22 0515  •  HYDROmorphone (DILAUDID) injection 1 mg, 1 mg, Intravenous, Q1H PRN, Day, Susan, APRN  •  insulin lispro (ADMELOG) injection 0-7 Units, 0-7 Units, Subcutaneous, Q6H, Day, Susan, APRN, 2 Units at 08/07/22 0021  •  magnesium sulfate 2g/50 mL (PREMIX) infusion, 2 g, Intravenous, PRN, Vikram Acosta MD  •  meropenem (MERREM) 1 g in sodium chloride 0.9 % 100 mL IVPB, 1 g, Intravenous, Q8H, Shy Moreno MD, 1 g at 08/08/22 0801  •  micafungin sodium (MYCAMINE) 100 mg in sodium chloride 0.9 % 100 mL IVPB, 100 mg, Intravenous, Q24H, Sagar Orozco MD, 100 mg at 08/07/22 1301  •  morphine injection 2 mg, 2 mg, Intravenous,  Q2H PRN, Day, Susan, APRN, 2 mg at 08/07/22 2123  •  nitroglycerin (NITROSTAT) SL tablet 0.4 mg, 0.4 mg, Sublingual, Q5 Min PRN, Connor Rod APRN  •  norepinephrine (LEVOPHED) 16 mg in 250 mL NS infusion, 0.02-0.3 mcg/kg/min, Intravenous, Titrated, Connor Rod APRN, Stopped at 08/06/22 1540  •  ondansetron (ZOFRAN) tablet 4 mg, 4 mg, Oral, Q6H PRN **OR** ondansetron (ZOFRAN) injection 4 mg, 4 mg, Intravenous, Q6H PRN, Connor Rod APRN, 4 mg at 08/06/22 2042  •  oxyCODONE (ROXICODONE) immediate release tablet 10 mg, 10 mg, Oral, Q4H PRN, Day, Susan, APRN, 10 mg at 08/07/22 1803  •  pantoprazole (PROTONIX) injection 40 mg, 40 mg, Intravenous, Q12H, Susan Borges, APRN, 40 mg at 08/08/22 0801  •  Pharmacy to dose vancomycin, , Does not apply, Continuous PRN, Sagar Orozco MD  •  Phoxillum BK4/2.5 dialysis solution, 2,000 mL/hr, CRRT, Continuous, Vikram Acosta MD, Last Rate: 2,000 mL/hr at 08/08/22 0819, 2,000 mL/hr at 08/08/22 0819  •  Phoxillum BK4/2.5 dialysis solution, 1,500 mL/hr, CRRT, Continuous, Vikram Acosta MD, Last Rate: 1,500 mL/hr at 08/08/22 0830, 1,500 mL/hr at 08/08/22 0830  •  Phoxillum BK4/2.5 dialysis solution, 1,000 mL/hr, CRRT, Continuous, Vikram Acosta MD, Last Rate: 1,000 mL/hr at 08/08/22 0819, 1,000 mL/hr at 08/08/22 0819  •  potassium chloride 20 mEq in 50 mL IVPB, 20 mEq, Intravenous, PRN, Vikram Acosta MD  •  simethicone (MYLICON) chewable tablet 80 mg, 80 mg, Oral, 4x Daily PRN, Shaye Alan, GRACIELA, 80 mg at 08/06/22 0128  •  sodium chloride 0.9 % flush 10 mL, 10 mL, Intravenous, PRN, Leonard Francis MD  •  sodium chloride 0.9 % flush 10 mL, 10 mL, Intravenous, Q12H, Connor Rod APRN, 10 mL at 08/08/22 0802  •  sodium chloride 0.9 % flush 10 mL, 10 mL, Intravenous, PRN, LoveConnor, APRN  •  sodium chloride 0.9 % flush 10 mL, 10 mL, Intravenous, PRN, Day, Suasn, APRN  •  sodium chloride 0.9 % flush 10 mL, 10 mL, Intravenous, Q12H, Day, Susan, APRN, 10 mL  at 08/08/22 0802  •  sodium chloride 0.9 % flush 20 mL, 20 mL, Intravenous, PRN, Susan Borges, GRACIELA  •  sodium phosphates 10 mmol in sodium chloride 0.9 % 100 mL IVPB, 10 mmol, Intravenous, PRN, Vikram Acosta MD  •  Vancomycin Pharmacy Intermittent/Pulse Dosing, , Does not apply, PRN, Sagar Orozco MD      Objective Resting in bed, no acute distress, family and nurse in room    Vital Signs  Vitals:    08/08/22 0600 08/08/22 0700 08/08/22 0800 08/08/22 0900   BP: 135/79 142/85 149/97 154/100   BP Location: Left arm      Patient Position: Lying      Pulse: 89 91 92 88   Resp: 14      Temp: 97.2 °F (36.2 °C) 97.16 °F (36.2 °C) 97.7 °F (36.5 °C) 98.06 °F (36.7 °C)   TempSrc: Bladder      SpO2: 100% 98% 99% 99%   Weight:       Height:           Physical Exam:     General Appearance:    Awake and alert, in no acute distress, obese, on CRRT   Head:    Normocephalic, without obvious abnormality   Eyes:          Conjunctivae normal, anicteric sclera   Throat:   No oral lesions, no thrush, oral mucosa moist   Neck:   No adenopathy, supple, no JVD   Lungs:     respirations regular, even and unlabored   Abdomen:     Soft, non-tender, no obvious mass   Rectal:     Deferred   Extremities:   no cyanosis   Skin:   No bruising or rash, no jaundice, Sandoval catheter with dark yellow urine noted        Results Review:    CBC    Results from last 7 days   Lab Units 08/08/22  0812 08/08/22  0359 08/07/22  2327 08/07/22  2002 08/07/22  1615 08/07/22  1513 08/07/22  1204 08/07/22  0855   WBC 10*3/mm3 14.10* 14.30* 13.90* 13.30* 14.40*  --  12.20* 11.60*   HEMOGLOBIN g/dL 7.6* 7.2* 7.5* 7.5* 7.6*  --  6.7* 7.2*   PLATELETS 10*3/mm3 47* 50* 57* 57* 65* 68* 46* 24*     CMP   Results from last 7 days   Lab Units 08/08/22  0812 08/08/22  0359 08/07/22  2327 08/07/22  1615 08/07/22  0600 08/06/22  2154 08/06/22  1302 08/06/22  1013 08/06/22  0510 08/05/22  1852 08/05/22  1400 08/05/22  0811   SODIUM mmol/L 138 140 140 139 138 141 136 139 138  --   133* 132*   POTASSIUM mmol/L 4.1 4.1 3.8 3.7 3.7 3.4* 3.5 2.5* 3.4*  --  3.7 3.9   CHLORIDE mmol/L 104 105 104 101 99 98 96* 96* 96*  --  99 94*   CO2 mmol/L 28.0 28.0 28.0 29.0 30.0* 29.0 16.0* 29.0 24.0  --  19.0* 21.0*   BUN mg/dL 16 19 21* 25* 37* 55* 53* 40* 46*  --  38* 32*   CREATININE mg/dL 1.35* 1.53* 1.77* 2.05* 2.92* 4.25* 4.58* 3.25* 4.06*  --  4.03* 3.82*   GLUCOSE mg/dL 87 98 105* 112* 124* 162* 264* 543* 218*  --  165* 141*   ALBUMIN g/dL 3.20* 3.00* 3.30* 3.20* 2.90* 3.30* 2.70* 1.90* 2.50*  --  2.90* 3.30*   BILIRUBIN mg/dL  --  3.8*  --   --  4.7*  --  4.6* 3.1* 4.5*  --  4.6* 4.3*   ALK PHOS U/L  --  133*  --   --  112  --  89 72 75  --  64 64   AST (SGOT) U/L  --  233*  --   --  522*  --  393* 194* 177*  --  45* 41*   ALT (SGPT) U/L  --  259*  --   --  352*  --  222* 109* 98*  --  27 30   MAGNESIUM mg/dL 2.5  --  2.4 2.3 2.3 2.2  --  1.6 2.1   < > 1.2*  --    PHOSPHORUS mg/dL 3.7  --  4.3 3.8 4.5 4.8*  --  3.9 4.6*   < > 2.9  --    AMYLASE U/L  --   --   --   --   --   --   --  39  --   --   --  81   LIPASE U/L  --   --   --   --   --   --   --  4*  --   --   --  12*    < > = values in this interval not displayed.     Cr Clearance Estimated Creatinine Clearance: 74.6 mL/min (A) (by C-G formula based on SCr of 1.35 mg/dL (H)).  Coag   Results from last 7 days   Lab Units 08/07/22  1408 08/06/22  1110 08/05/22  1400 08/05/22  0911 08/05/22  0811   INR  1.09 2.07* 2.47* 2.47* 2.16*   APTT seconds  --  51.9*  --  48.8* 43.1*     HbA1C No results found for: HGBA1C  Blood Glucose   Glucose   Date/Time Value Ref Range Status   08/08/2022 0743 85 70 - 105 mg/dL Final     Comment:     Serial Number: [id number]:  [id number]   08/08/2022 0503 88 70 - 105 mg/dL Final     Comment:     Serial Number: [id number]:  [id number]   08/07/2022 2327 99 70 - 105 mg/dL Final     Comment:     Serial Number: [id number]:  [id number]   08/07/2022 1808 102 70 - 105 mg/dL Final     Comment:     Serial  Number: [id number]:  [id number]   08/07/2022 1131 131 (H) 70 - 105 mg/dL Final     Comment:     Serial Number: [id number]:  [id number]   08/07/2022 0607 139 (H) 70 - 105 mg/dL Final     Comment:     Serial Number: [id number]:  [id number]   08/06/2022 1643 141 (H) 70 - 105 mg/dL Final     Comment:     Serial Number: [id number]:  [id number]   08/06/2022 0006 188 (H) 70 - 105 mg/dL Final     Comment:     Serial Number: [id number]:  [id number]     Infection   Results from last 7 days   Lab Units 08/05/22  1755 08/05/22  1012 08/05/22  0902 08/05/22  0811   BLOODCX   --   --  No growth at 2 days  No growth at 2 days  --    BODYFLDCX  No growth at 3 days  --   --   --    URINECX   --  No growth  --   --    PROCALCITONIN ng/mL  --   --   --  99.59*     UA    Results from last 7 days   Lab Units 08/05/22  1917 08/05/22  1012   NITRITE UA  Negative Negative   WBC UA /HPF 13-20* 6-12*   BACTERIA UA /HPF 2+* 2+*   SQUAM EPITHEL UA /HPF 3-6* 3-6*   URINECX   --  No growth     Radiology(recent) CT Abdomen Pelvis Without Contrast    Result Date: 8/6/2022  Impression: 1. Moderate volume hemoperitoneum is new from prior and partially accumulates in the infrahepatic space. This suggests potential hepatic source of hemorrhage, likely from recent biopsy. 2. Bilateral small layering pleural effusions and streaky bibasilar atelectasis. Electronically signed by:  Bob Jackson M.D.  8/6/2022 9:03 PM    US Liver    Result Date: 8/6/2022  Multiple hyperechoic lesions within liver, which appear increased in size from prior ultrasound. Changes could be secondary to interval liver biopsy. Recommend correlation with pathology results.  Electronically Signed By-Mervin Martell MD On:8/6/2022 1:46 PM This report was finalized on [id number] by  Mervin Martell MD.    XR Chest 1 View    Result Date: 8/6/2022  Right IJ catheter with tip at cavoatrial junction. No pneumothorax identified.  Electronically Signed  By-Mervin Martell MD On:2022 6:27 PM This report was finalized on [id number] by  Mervin Martell MD.    XR Chest 1 View    Result Date: 2022   New left lower lobe disease. With the provided history this is concerning for developing pneumonia, possibly aspiration given its location.  Unchanged right lower lobe atelectasis versus pneumonia.  Right-sided PICC line in good position.  No pneumothorax.  Electronically Signed ByLouisa Thapa On:2022 12:53 PM This report was finalized on [id number] by  Ankur Thapa, .         Assessment & Plan   Elevated liver enzymes   Acute anemia -normocytic  Hypotension -resolved, off pressors  Sepsis unknown origin at this time  Enteropathic E. coli and stool  Abnormal CT showing hemoperitoneum s/p liver biopsy  Acute kidney injury on CRRT  Enteropathic E. coli  Thrombocytopenia     PLAN:  LFTs improved overnight with elevation likely secondary to hemoperitoneum from liver biopsy and associated shock liver.  Off pressor support and remains on CRRT.  Hemoglobin stable without evidence of active GI luminal bleeding.  Acute hepatitis panel negative, AFP 3 ceruloplasmin 16 and full liver serology work-up pending including CMV, EBV and HSV serologies.  Liver biopsy pathology pending, with abscess felt to be less likely.  Continue supportive care we will follow.  Okay for full liquid diet.  Discussed with bedside RN this morning on rounds.    Nephrology following and on CRRT.  Infectious disease following and currently on vancomycin, meropenem and micafungin.  They would likely de-escalate today if she continues to improve.  General surgery following without plan for acute surgical intervention.    Electronically signed by GRACIELA Vera, 22, 9:12 AM EDT.           Electronically signed by Louis Alberto MD at 22 1059     Vikram Acosta MD at 22 0335              PROGRESS NOTE      Patient Name: Raul Gardner  : 1975  MRN:  [id number]  Primary Care Physician: Maribel Graf MD  Date of admission: 8/5/2022    Patient Care Team:  Maribel Graf MD as PCP - General (Family Medicine)  AVA Cavanaugh MD as Consulting Physician (Cardiology)        Subjective   Subjective:     Patient is slightly better than yesterday but still lethargic sick looking  Review of systems:  Middle-age female complaining of body aches abdominal pain abdominal distention      Allergies:    Allergies   Allergen Reactions   • Cephalexin Rash   • Hydrocodone-Acetaminophen Rash       Objective   Exam:     Vital Signs  Temp:  [97.2 °F (36.2 °C)-102.4 °F (39.1 °C)] 97.9 °F (36.6 °C)  Heart Rate:  [] 93  Resp:  [14-24] 18  BP: ()/(66-94) 150/94  Arterial Line BP: ()/() 99/89  SpO2:  [90 %-98 %] 92 %  on  Flow (L/min):  [3-5] 5;   Device (Oxygen Therapy): nasal cannula  Body mass index is 41.6 kg/m².    General: Middle-age  female i very lethargic  Head:      Normocephalic and atraumatic.    Eyes:      PERRL/EOM intact, conjunctiva and sclera clear with out nystagmus.    Neck:      No masses, thyromegaly,  trachea central with normal respiratory effort   Lungs:    Clear bilaterally to auscultation.    Heart:      Regular rate and rhythm, no murmur no gallop  Abd:        Diffusely tender with decreased bowel sounds  Pulses:   Pulses palpable  Extr:        No cyanosis or clubbing--no edema.    Neuro:    No focal deficits.   alert oriented x3  Skin:       Intact without lesions or rashes.    Psych:    Alert and cooperative; normal mood and affect; .      Results Review:  I have personally reviewed most recent Data :  CBC    Results from last 7 days   Lab Units 08/07/22  1513 08/07/22  1204 08/07/22  0855 08/07/22  0600 08/07/22  0252 08/06/22  2154 08/06/22  1550 08/06/22  1033   WBC 10*3/mm3  --  12.20* 11.60* 11.60* 11.20* 11.10* 12.40* 14.80*   HEMOGLOBIN g/dL  --  6.7* 7.2* 7.5* 6.7* 7.3* 7.3* 5.9*   PLATELETS 10*3/mm3 68* 46* 24* 25*  50* 55* 67* 95*     CMP   Results from last 7 days   Lab Units 08/07/22  0600 08/06/22  2154 08/06/22  1302 08/06/22  1013 08/06/22  0510 08/05/22  1400 08/05/22  0811   SODIUM mmol/L 138 141 136 139 138 133* 132*   POTASSIUM mmol/L 3.7 3.4* 3.5 2.5* 3.4* 3.7 3.9   CHLORIDE mmol/L 99 98 96* 96* 96* 99 94*   CO2 mmol/L 30.0* 29.0 16.0* 29.0 24.0 19.0* 21.0*   BUN mg/dL 37* 55* 53* 40* 46* 38* 32*   CREATININE mg/dL 2.92* 4.25* 4.58* 3.25* 4.06* 4.03* 3.82*   GLUCOSE mg/dL 124* 162* 264* 543* 218* 165* 141*   ALBUMIN g/dL 2.90* 3.30* 2.70* 1.90* 2.50* 2.90* 3.30*   BILIRUBIN mg/dL 4.7*  --  4.6* 3.1* 4.5* 4.6* 4.3*   ALK PHOS U/L 112  --  89 72 75 64 64   AST (SGOT) U/L 522*  --  393* 194* 177* 45* 41*   ALT (SGPT) U/L 352*  --  222* 109* 98* 27 30   AMYLASE U/L  --   --   --  39  --   --  81   LIPASE U/L  --   --   --  4*  --   --  12*     ABG    Results from last 7 days   Lab Units 08/05/22  0815   PH, ARTERIAL pH units 7.345*   PCO2, ARTERIAL mm Hg 28.3*   PO2 ART mm Hg 221.4*   O2 SATURATION ART % 99.8*   BASE EXCESS ART mmol/L -8.9*     CT Abdomen Pelvis Without Contrast    Result Date: 8/6/2022  Impression: 1. Moderate volume hemoperitoneum is new from prior and partially accumulates in the infrahepatic space. This suggests potential hepatic source of hemorrhage, likely from recent biopsy. 2. Bilateral small layering pleural effusions and streaky bibasilar atelectasis. Electronically signed by:  Bob Jackson M.D.  8/6/2022 9:03 PM    US Liver    Result Date: 8/6/2022  Multiple hyperechoic lesions within liver, which appear increased in size from prior ultrasound. Changes could be secondary to interval liver biopsy. Recommend correlation with pathology results.  Electronically Signed By-Mervin Martell MD On:8/6/2022 1:46 PM This report was finalized on [id number] by  Mervin Martell MD.    XR Chest 1 View    Result Date: 8/6/2022  Right IJ catheter with tip at cavoatrial junction. No pneumothorax  identified.  Electronically Signed By-Mervin Martell MD On:8/6/2022 6:27 PM This report was finalized on [id number] by  Mervin Martell MD.    XR Chest 1 View    Result Date: 8/6/2022   New left lower lobe disease. With the provided history this is concerning for developing pneumonia, possibly aspiration given its location.  Unchanged right lower lobe atelectasis versus pneumonia.  Right-sided PICC line in good position.  No pneumothorax.  Electronically Signed By-Ankur Thapa On:8/6/2022 12:53 PM This report was finalized on [id number] by  Ankur Thapa, .    XR Chest 1 View    Result Date: 8/6/2022   Right sided central line tip in the lower neck in the location of the lower internal jugular vein, just above the clavicle.  No pneumothorax. Mild right basilar atelectasis versus pneumonia.  Electronically Signed By-Ankur Thapa On:8/6/2022 9:08 AM This report was finalized on [id number] by  Ankur Thapa, .    CT Needle Biopsy Liver    Result Date: 8/5/2022   1. The areas identified on the patient's contrast-enhanced CT and recent ultrasound are not clearly identified on the noncontrast scan. Despite targeting the area when compared with CT, no purulent fluid could be aspirated. Ultrasound was utilized and to confirm that the area in question was being sampled. Core specimens were then obtained of this area. Given the absence of any purulent fluid and the nonvisualization on the noncontrast scan this is very unlikely to represent an intrahepatic abscess. These findings were discussed with Dr. Orozco by telephone immediately following the biopsy.  Electronically Signed By-Nahum Ross MD On:8/5/2022 6:26 PM This report was finalized on [id number] by  Nahum Ross MD.    XR Abdomen KUB    Result Date: 8/6/2022   Body habitus and a generalized paucity of bowel gas mildly limits evaluation. No significantly dilated gas-filled loops of small bowel are seen to suggest an obstruction. No free peritoneal air.  No  suspicious calcifications detected.  No acute osseous pathology.     Electronically signed by:  Nj Rosario  8/6/2022 4:47 AM      Results for orders placed during the hospital encounter of 08/05/22    Adult Transthoracic Echo Complete w/ Color, Spectral and Contrast if Necessary Per Protocol    Interpretation Summary  · Left ventricular wall thickness is consistent with mild concentric hypertrophy.  · Estimated left ventricular EF = 75% Left ventricular systolic function is hyperdynamic (EF > 70%).  · Left ventricular diastolic function is consistent with (grade I) impaired relaxation.    Scheduled Meds:escitalopram, 10 mg, Oral, Daily  hydrocortisone sodium succinate, 100 mg, Intravenous, Q6H  insulin lispro, 0-7 Units, Subcutaneous, Q6H  lidocaine PF 1%, 10 mL, Infiltration, Once  meropenem, 1 g, Intravenous, Q8H  micafungin (MYCAMINE) IV, 100 mg, Intravenous, Q24H  pantoprazole, 40 mg, Intravenous, Q12H  sodium chloride, 10 mL, Intravenous, Q12H  sodium chloride, 10 mL, Intravenous, Q12H      Continuous Infusions:DOPamine, 2-20 mcg/kg/min, Last Rate: Stopped (08/06/22 1420)  norepinephrine, 0.02-0.3 mcg/kg/min, Last Rate: Stopped (08/06/22 1540)  Pharmacy to dose vancomycin,   phenylephrine, 0.5-3 mcg/kg/min, Last Rate: Stopped (08/06/22 1614)  Phoxillum BK4/2.5, 2,000 mL/hr, Last Rate: 2,000 mL/hr (08/07/22 1407)  Phoxillum BK4/2.5, 1,500 mL/hr, Last Rate: 1,500 mL/hr (08/07/22 1439)  Phoxillum BK4/2.5, 1,000 mL/hr, Last Rate: 1,000 mL/hr (08/07/22 1126)  sodium bicarbonate drip (greater than 75 mEq/bag), 150 mEq, Last Rate: 150 mEq (08/07/22 0938)  vasopressin, 0.03 Units/min, Last Rate: Stopped (08/07/22 1308)      PRN Meds:•  acetaminophen **OR** acetaminophen  •  calcium gluconate **OR** calcium gluconate  •  dextrose  •  dextrose  •  fentaNYL citrate (PF)  •  glucagon (human recombinant)  •  heparin (porcine)  •  HYDROmorphone  •  magnesium sulfate  •  Morphine  •  nitroglycerin  •  ondansetron **OR**  ondansetron  •  oxyCODONE  •  Pharmacy to dose vancomycin  •  potassium chloride  •  simethicone  •  sodium chloride  •  sodium chloride  •  sodium chloride  •  sodium chloride  •  sodium phosphate IVPB  •  Vancomycin Pharmacy Intermittent/Pulse Dosing    Assessment & Plan   Assessment and Plan:         Septic shock (HCC)    Pulmonary hypertension, unspecified (HCC)    Anxiety    Vitamin D deficiency    Essential hypertension    Suspected liver abscess    Diarrhea    Acute kidney injury (HCC)    Metabolic acidosis    ASSESSMENT:  · Acute kidney injury likely ATN secondary to sepsis and hemodynamic instability on pressors at this time  · Multiple liver lesion unlikely to be abscess  · Chronic kidney disease as per renal ultrasound with some increased echogenicity   · Significant lactic acidosis with increased anion gap   · Some evidence of volume overload   · History of hypertension   ·             Plan:      · Patient in septic shock with MARIANNE continue ABX   · CRRT was started yesterday for progressive acidemia with an anuria  · Significant lactic acidosis which is slowly improving after initiation of CRRT  · Significant drop in the hemoglobin since yesterday patient do have bleeding in liver area after biopsy   · Repeat CAT scan was done yesterday at this time likely ATN because of the multiple injury injuries likely contrast nephropathy in the presence of sepsis  · Okay to discontinue bicarbonate based fluid at this time y  · We will get dialysis catheter placed start CRRT if needed  · Discussed with the ICU staff in detail including intensivist  · Increased BNP continue IV fluid at 100 cc an hour we will hold IV fluid if any evidence of the overt volume overload  · ECHOCARDIOGRAM showed diastolic dysfunctions grade 1 EF normal  · Serologies sent yesterday including ANCA KAREN protein electrophoresis  · Patient is already on bicarbonate based fluid we will follow-up with     •           Electronically signed by  Vikram Acosta MD,   [facility] kidney consultant  [phone]          Electronically signed by Vikram Acosta MD at 08/07/22 1602     Sagar Orozco MD at 08/07/22 8238          Infectious Diseases Progress Note      LOS: 2 days   Patient Care Team:  Maribel Graf MD as PCP - General (Family Medicine)  AVA Cavanaugh MD as Consulting Physician (Cardiology)    Chief Complaint: Fever and weakness and diarrhea    Subjective     The patient had fever up to 102.4 during the last 24 hours but has been afebrile since this morning.  She is off the cooling blanket.  Patient appears to doing  To better today-currently on 5 L of oxygen by nasal cannula and her main complaint is abdominal pain.  She is down to 1 pressor and her RN states that she will be coming off that shortly.  Currently on CRRT.      Sandoval catheter  Review of Systems:   Review of Systems   Constitutional: Positive for chills, fatigue and fever.   HENT: Negative.    Eyes: Negative.    Respiratory: Negative.    Cardiovascular: Negative.    Gastrointestinal: Positive for abdominal pain, diarrhea and nausea.   Endocrine: Negative.    Genitourinary: Negative.    Musculoskeletal: Negative.    Skin: Negative.    Neurological: Negative.    Psychiatric/Behavioral: Negative.    All other systems reviewed and are negative.       Objective     Vital Signs  Temp:  [97.2 °F (36.2 °C)-102.4 °F (39.1 °C)] 97.9 °F (36.6 °C)  Heart Rate:  [] 93  Resp:  [14-24] 18  BP: ()/(66-94) 150/94  Arterial Line BP: ()/() 99/89    Physical Exam:  Physical Exam  Vitals and nursing note reviewed.   Constitutional:       Appearance: She is well-developed. She is ill-appearing.   HENT:      Head: Normocephalic and atraumatic.   Eyes:      Pupils: Pupils are equal, round, and reactive to light.   Cardiovascular:      Rate and Rhythm: Normal rate and regular rhythm.      Heart sounds: Normal heart sounds.   Pulmonary:      Effort: Pulmonary effort is normal.  No respiratory distress.      Breath sounds: Normal breath sounds. No wheezing or rales.   Abdominal:      General: Bowel sounds are normal. There is no distension.      Palpations: Abdomen is soft. There is no mass.      Tenderness: There is abdominal tenderness. There is no guarding or rebound.      Comments: Mild tenderness in the right upper quadrant but there was no rebound or guarding.     Genitourinary:     Comments: Sandoval cath  Musculoskeletal:         General: No deformity. Normal range of motion.      Cervical back: Normal range of motion and neck supple.   Skin:     General: Skin is warm.      Findings: No erythema or rash.   Neurological:      Mental Status: She is alert and oriented to person, place, and time.      Cranial Nerves: No cranial nerve deficit.          Results Review:    I have reviewed all clinical data, test, lab, and imaging results.     Radiology  CT Abdomen Pelvis Without Contrast    Result Date: 8/6/2022  CT OF THE ABDOMEN AND PELVIS WITHOUT CONTRAST Clinical indication: Septic shock. Comparison: 8/5/2022. Procedure: Noncontrast CT images of the abdomen and pelvis were obtained.  CT dose lowering techniques were used, to include: automated exposure control, adjustment for patient size, and or use of iterative reconstruction. Findings: Lung Bases: Small bilateral layering pleural effusions are present. Streaky airspace densities are seen in both lung bases. Liver and biliary system: Ill-defined hypodense region is seen in the right hepatic lobe posteriorly. This is less conspicuous without IV contrast than on the prior. There is new, heterogeneous and hyperdense fluid in the perihepatic and infrahepatic space. Gallbladder is unremarkable. Pancreas: The pancreas is unremarkable. Spleen: The spleen is normal. Adrenals: The adrenal glands are within normal limits. Kidneys: The kidneys are symmetric in size and without hydronephrosis. Gastrointestinal: The stomach and scattered loops of  small and large bowel have a nonobstructive pattern. No focal mucosal lesion or inflammatory changes are seen. The appendix is normal in the right lower quadrant. Mesentery and retroperitoneum: No mesenteric or retroperitoneal adenopathy. Scattered, moderate volume mixed attenuation peritoneal fluid is new from prior. No free air. Pelvis: The urinary bladder is decompressed by Sandoval catheter. Rectum is normal. No free fluid. No deep pelvic or inguinal adenopathy. Reproductive: No acute abnormality. Body wall: Normal. Bones: No acute osseous abnormality.     Impression: 1. Moderate volume hemoperitoneum is new from prior and partially accumulates in the infrahepatic space. This suggests potential hepatic source of hemorrhage, likely from recent biopsy. 2. Bilateral small layering pleural effusions and streaky bibasilar atelectasis. Electronically signed by:  Bob Jackson M.D.  8/6/2022 9:03 PM    XR Chest 1 View    Result Date: 8/6/2022  XR CHEST 1 VW-  Date of Exam: 8/6/2022 6:01 PM  Indication: line placement; A41.9-Sepsis, unspecified organism; R65.21-Severe sepsis with septic shock; N17.9-Acute kidney failure, unspecified.  Comparison Exams: Chest radiograph from earlier today  Technique: Single AP chest radiograph  FINDINGS: A right internal jugular catheter has its tip at the cavoatrial junction. No pneumothorax is identified. Otherwise, no significant change from recent prior exam.      Right IJ catheter with tip at cavoatrial junction. No pneumothorax identified.  Electronically Signed By-Mervin Martell MD On:8/6/2022 6:27 PM This report was finalized on [id number] by  Mervin Martell MD.      Cardiology    Laboratory    Results from last 7 days   Lab Units 08/07/22  1513 08/07/22  1204 08/07/22  0855 08/07/22  0600 08/07/22  0252 08/06/22  2154 08/06/22  1550 08/06/22  1033   WBC 10*3/mm3  --  12.20* 11.60* 11.60* 11.20* 11.10* 12.40* 14.80*   HEMOGLOBIN g/dL  --  6.7* 7.2* 7.5* 6.7* 7.3* 7.3* 5.9*    HEMATOCRIT %  --  20.1* 21.5* 22.1* 20.0* 21.4* 22.3* 18.3*   PLATELETS 10*3/mm3 68* 46* 24* 25* 50* 55* 67* 95*     Results from last 7 days   Lab Units 08/07/22  0600 08/06/22  2154 08/06/22  1302 08/06/22  1013 08/06/22  0510 08/05/22  1400 08/05/22  0811   SODIUM mmol/L 138 141 136 139 138 133* 132*   POTASSIUM mmol/L 3.7 3.4* 3.5 2.5* 3.4* 3.7 3.9   CHLORIDE mmol/L 99 98 96* 96* 96* 99 94*   CO2 mmol/L 30.0* 29.0 16.0* 29.0 24.0 19.0* 21.0*   BUN mg/dL 37* 55* 53* 40* 46* 38* 32*   CREATININE mg/dL 2.92* 4.25* 4.58* 3.25* 4.06* 4.03* 3.82*   GLUCOSE mg/dL 124* 162* 264* 543* 218* 165* 141*   ALBUMIN g/dL 2.90* 3.30* 2.70* 1.90* 2.50* 2.90* 3.30*   BILIRUBIN mg/dL 4.7*  --  4.6* 3.1* 4.5* 4.6* 4.3*   ALK PHOS U/L 112  --  89 72 75 64 64   AST (SGOT) U/L 522*  --  393* 194* 177* 45* 41*   ALT (SGPT) U/L 352*  --  222* 109* 98* 27 30   AMYLASE U/L  --   --   --  39  --   --  81   LIPASE U/L  --   --   --  4*  --   --  12*   CALCIUM mg/dL 7.2* 7.2* 6.9* 5.1* 7.2* 7.5* 8.3*     Results from last 7 days   Lab Units 08/05/22  0811   CK TOTAL U/L 327*             Microbiology   Microbiology Results (last 10 days)     Procedure Component Value - Date/Time    Clostridioides difficile Toxin - Stool, Per Rectum [[id number]]  (Normal) Collected: 08/06/22 0703    Lab Status: Final result Specimen: Stool from Per Rectum Updated: 08/06/22 0758    Narrative:      The following orders were created for panel order Clostridioides difficile Toxin - Stool, Per Rectum.  Procedure                               Abnormality         Status                     ---------                               -----------         ------                     Clostridioides difficile...[[id number]]  Normal              Final result                 Please view results for these tests on the individual orders.    Clostridioides difficile EIA - Stool, Per Rectum [[id number]]  (Normal) Collected: 08/06/22 0703    Lab Status: Final result Specimen: Stool  from Per Rectum Updated: 08/06/22 0758     C Diff GDH / Toxin Negative    Eosinophil Smear - Urine, Urine, Clean Catch [[id number]]  (Normal) Collected: 08/05/22 1917    Lab Status: Final result Specimen: Urine, Clean Catch Updated: 08/05/22 2047     Eosinophil Smear 0 % EOS/100 Cells     Body Fluid Culture - Body Fluid, Liver [[id number]] Collected: 08/05/22 1755    Lab Status: Preliminary result Specimen: Body Fluid from Liver Updated: 08/07/22 0929     Body Fluid Culture No growth at 2 days     Gram Stain Few (2+) WBCs per low power field      No organisms seen    S. Pneumo Ag Urine or CSF - Urine, Urine, Clean Catch [[id number]]  (Normal) Collected: 08/05/22 1154    Lab Status: Final result Specimen: Urine, Clean Catch Updated: 08/05/22 1234     Strep Pneumo Ag Negative    Legionella Antigen, Urine - Urine, Urine, Clean Catch [[id number]]  (Normal) Collected: 08/05/22 1154    Lab Status: Final result Specimen: Urine, Clean Catch Updated: 08/05/22 1234     LEGIONELLA ANTIGEN, URINE Negative    MRSA Screen, PCR (Inpatient) - Swab, Nares [[id number]]  (Normal) Collected: 08/05/22 1057    Lab Status: Final result Specimen: Swab from Nares Updated: 08/05/22 1222     MRSA PCR No MRSA Detected    Narrative:      The negative predictive value of this diagnostic test is high and should only be used to consider de-escalating anti-MRSA therapy. A positive result may indicate colonization with MRSA and must be correlated clinically.    Gastrointestinal Panel, PCR - Stool, Per Rectum [[id number]]  (Abnormal) Collected: 08/05/22 1057    Lab Status: Final result Specimen: Stool from Per Rectum Updated: 08/05/22 1238     Campylobacter Not Detected     Plesiomonas shigelloides Not Detected     Salmonella Not Detected     Vibrio Not Detected     Vibrio cholerae Not Detected     Yersinia enterocolitica Not Detected     Enteroaggregative E. coli (EAEC) Not Detected     Enteropathogenic E. coli (EPEC) Detected     Enterotoxigenic E.  coli (ETEC) lt/st Not Detected     Shiga-like toxin-producing E. coli (STEC) stx1/stx2 Not Detected     Shigella/Enteroinvasive E. coli (EIEC) Not Detected     Cryptosporidium Not Detected     Cyclospora cayetanensis Not Detected     Entamoeba histolytica Not Detected     Giardia lamblia Not Detected     Adenovirus F40/41 Not Detected     Astrovirus Not Detected     Norovirus GI/GII Not Detected     Rotavirus A Not Detected     Sapovirus (I, II, IV or V) Not Detected    Urine Culture - Urine, Urine, Catheter [[id number]]  (Normal) Collected: 08/05/22 1012    Lab Status: Final result Specimen: Urine, Catheter Updated: 08/06/22 1409     Urine Culture No growth    Blood Culture - Blood, Blood, Central Line [[id number]]  (Normal) Collected: 08/05/22 0902    Lab Status: Preliminary result Specimen: Blood, Central Line Updated: 08/07/22 0915     Blood Culture No growth at 2 days    Blood Culture - Blood, Blood, Central Line [[id number]]  (Normal) Collected: 08/05/22 0902    Lab Status: Preliminary result Specimen: Blood, Central Line Updated: 08/07/22 0915     Blood Culture No growth at 2 days    Respiratory Panel PCR w/COVID-19(SARS-CoV-2) CELSA/JONATHAN/DOMINIQUE/PAD/COR/MAD/LISA In-House, NP Swab in UTM/VTM, 3-4 HR TAT - Swab, Nasopharynx [[id number]]  (Normal) Collected: 08/05/22 0815    Lab Status: Final result Specimen: Swab from Nasopharynx Updated: 08/05/22 0909     ADENOVIRUS, PCR Not Detected     Coronavirus 229E Not Detected     Coronavirus HKU1 Not Detected     Coronavirus NL63 Not Detected     Coronavirus OC43 Not Detected     COVID19 Not Detected     Human Metapneumovirus Not Detected     Human Rhinovirus/Enterovirus Not Detected     Influenza A PCR Not Detected     Influenza B PCR Not Detected     Parainfluenza Virus 1 Not Detected     Parainfluenza Virus 2 Not Detected     Parainfluenza Virus 3 Not Detected     Parainfluenza Virus 4 Not Detected     RSV, PCR Not Detected     Bordetella pertussis pcr Not Detected      Bordetella parapertussis PCR Not Detected     Chlamydophila pneumoniae PCR Not Detected     Mycoplasma pneumo by PCR Not Detected    Narrative:      In the setting of a positive respiratory panel with a viral infection PLUS a negative procalcitonin without other underlying concern for bacterial infection, consider observing off antibiotics or discontinuation of antibiotics and continue supportive care. If the respiratory panel is positive for atypical bacterial infection (Bordetella pertussis, Chlamydophila pneumoniae, or Mycoplasma pneumoniae), consider antibiotic de-escalation to target atypical bacterial infection.          Medication Review:       Schedule Meds  escitalopram, 10 mg, Oral, Daily  hydrocortisone sodium succinate, 100 mg, Intravenous, Q6H  insulin lispro, 0-7 Units, Subcutaneous, Q6H  lidocaine PF 1%, 10 mL, Infiltration, Once  meropenem, 1 g, Intravenous, Q8H  micafungin (MYCAMINE) IV, 100 mg, Intravenous, Q24H  pantoprazole, 40 mg, Intravenous, Q12H  sodium chloride, 10 mL, Intravenous, Q12H  sodium chloride, 10 mL, Intravenous, Q12H        Infusion Meds  DOPamine, 2-20 mcg/kg/min, Last Rate: Stopped (08/06/22 1420)  norepinephrine, 0.02-0.3 mcg/kg/min, Last Rate: Stopped (08/06/22 1540)  Pharmacy to dose vancomycin,   phenylephrine, 0.5-3 mcg/kg/min, Last Rate: Stopped (08/06/22 1614)  Phoxillum BK4/2.5, 2,000 mL/hr, Last Rate: 2,000 mL/hr (08/07/22 1407)  Phoxillum BK4/2.5, 1,500 mL/hr, Last Rate: 1,500 mL/hr (08/07/22 1439)  Phoxillum BK4/2.5, 1,000 mL/hr, Last Rate: 1,000 mL/hr (08/07/22 1126)  sodium bicarbonate drip (greater than 75 mEq/bag), 150 mEq, Last Rate: 150 mEq (08/07/22 0938)  vasopressin, 0.03 Units/min, Last Rate: Stopped (08/07/22 1308)        PRN Meds  •  acetaminophen **OR** acetaminophen  •  calcium gluconate **OR** calcium gluconate  •  dextrose  •  dextrose  •  fentaNYL citrate (PF)  •  glucagon (human recombinant)  •  heparin (porcine)  •  HYDROmorphone  •  magnesium  sulfate  •  Morphine  •  nitroglycerin  •  ondansetron **OR** ondansetron  •  oxyCODONE  •  Pharmacy to dose vancomycin  •  potassium chloride  •  simethicone  •  sodium chloride  •  sodium chloride  •  sodium chloride  •  sodium chloride  •  sodium phosphate IVPB  •  Vancomycin Pharmacy Intermittent/Pulse Dosing        Assessment & Plan       Antimicrobial Therapy   1.  IV meropenem        2.  IV vancomycin        3.  IV micafungin        4.        5.               Assessment     Severe septic shock.  The presentation was concerning for hypovolemic shock and currently might have intra-abdominal hemorrhage.  The patient is currently on 4 vasopressors including vasopressin, norepinephrine, Tobias-Synephrine and dopamine on and off  -Patient is now down to just vasopressin and this is supposed to be stopped shortly    Very abnormal lesion in the liver initially suspected to be liver abscess.  S/p IR aspirating the lesion/obtaining biopsy.  There was no purulence to suspect abscess.  Patient received 1 unit of fresh frozen plasma before the procedure  -Liver fluid cultures are negative so far  -CT the abdomen pelvis showed hemoperitoneum -likely due to hemorrhage    Acute kidney injury secondary to above and possibly prerenal  -Patient is currently on CRRT    Positive stool for enteropathogenic E. coli.  Usually self-limiting disease but patient probably has severe infection resulted in severe diarrhea and dehydration    Elevated INR.  Patient received fresh frozen plasma yesterday.  Probably secondary to sepsis    Reactive leukocytosis secondary to above  -Trending down  -Patient had diarrhea but C. difficile screen is negative      Plan    Continue vancomycin IV and ask pharmacy to follow and dose  Continue IV meropenem 1 g every 8 hours  Continue micafungin 100 mg IV daily  We will likely de-escalate tomorrow if patient continues to improve  Waiting on liver fluid cultures  Continue supportive care and pressors  A.m.  labs  Case discussed with RN        Mariya Pollock, APRN  08/07/22  15:47 EDT    Note is dictated utilizing voice recognition software/Dragon      Electronically signed by Sagar Orozco MD at 08/07/22 2004     Nahum Oconnor MD at 08/07/22 1256          GENERAL SURGERY PROGRESS NOTE    8/7/2022   LOS: 2 days   Patient Care Team:  Maribel Graf MD as PCP - General (Family Medicine)  AVA Cavanaugh MD as Consulting Physician (Cardiology)    Chief Complaint: Anemia of acute blood loss with hemorrhagic shock    Subjective   HPI: Patient is a 47 y.o. female presented to the hospital yesterday with fevers, chills, body aches and then developed vomiting and diarrhea on the day of admission.  When she arrived at the ER she was found to have a fever of 103.1, heart rate of 146 and was hypotensive.  Her hypotension improved after vasopressor administration.  A CT scan of the abdomen and pelvis was obtained and demonstrated multiple ill-defined lesions throughout her liver concerning for possible hepatic abscesses.  Her laboratory values were significant for a UTI with MARIANNE, and coagulopathy with an INR of 2.5.  The patient was then seen by infectious disease who recommended biopsy/drainage of these areas in the liver to look for a source of infection.  She was given 1 unit of FFP and taken to interventional radiology and subsequently underwent aspiration and biopsy of these sites.  This morning, the patient was noted to have profound hypotension requiring 3 pressors and an acute drop in her hemoglobin from 13 yesterday to 5.9 today.  General surgery was consulted as the patient had abdominal pain and bleeding.     Interval History:   Patient reports abdominal pain.  Off pressors.  On CRRT    Objective     Vital Signs  Temp:  [97.2 °F (36.2 °C)-102.6 °F (39.2 °C)] 98.3 °F (36.8 °C)  Heart Rate:  [] 98  Resp:  [15-26] 18  BP: ()/(66-94) 128/84  Arterial Line BP: ()/()  99/89    Physical Exam  Vitals reviewed.   Constitutional:       Appearance: She is well-developed.   HENT:      Head: Normocephalic and atraumatic.   Eyes:      Pupils: Pupils are equal, round, and reactive to light.   Cardiovascular:      Rate and Rhythm: Normal rate and regular rhythm.   Pulmonary:      Effort: Pulmonary effort is normal.      Breath sounds: Normal breath sounds.   Abdominal:      General: There is no distension.      Palpations: Abdomen is soft.      Tenderness: There is generalized abdominal tenderness. There is guarding. There is no rebound.      Hernia: No hernia is present.   Musculoskeletal:         General: Normal range of motion.      Cervical back: Normal range of motion.   Lymphadenopathy:      Cervical: No cervical adenopathy.   Skin:     General: Skin is warm and dry.      Findings: No rash.   Neurological:      Mental Status: She is alert and oriented to person, place, and time.          Results Review:    Lab Results (last 24 hours)     Procedure Component Value Units Date/Time    AFP Tumor Marker [[id number]]  (Normal) Collected: 08/06/22 1641    Specimen: Blood Updated: 08/07/22 1253     ALPHA-FETOPROTEIN 3 ng/mL     Narrative:      Alpha Fetoprotein Tumor Marker Reference Range:    0.0-8.3 ng/mL    Note: Normal values apply only to males and nonpregnant females. These results are not interpretable for pregnant females.    Results may be falsely decreased if patient taking Biotin.      Ceruloplasmin [[id number]]  (Abnormal) Collected: 08/06/22 1641    Specimen: Blood Updated: 08/07/22 1250     Ceruloplasmin 16 mg/dL     Manual Differential [[id number]]  (Abnormal) Collected: 08/07/22 1204    Specimen: Blood Updated: 08/07/22 1246     Neutrophil % 91.0 %      Lymphocyte % 4.0 %      Monocyte % 3.0 %      Bands %  1.0 %      Metamyelocyte % 1.0 %      Neutrophils Absolute 11.22 10*3/mm3      Lymphocytes Absolute 0.49 10*3/mm3      Monocytes Absolute 0.37 10*3/mm3      RBC Morphology  Normal     Toxic Granulation Slight/1+     Platelet Estimate Decreased    CBC & Differential [[id number]]  (Abnormal) Collected: 08/07/22 1204    Specimen: Blood Updated: 08/07/22 1246    Narrative:      The following orders were created for panel order CBC & Differential.  Procedure                               Abnormality         Status                     ---------                               -----------         ------                     CBC Auto Differential[[id number]]        Abnormal            Final result               Scan Slide[[id number]]                                       Final result                 Please view results for these tests on the individual orders.    Scan Slide [[id number]] Collected: 08/07/22 1204    Specimen: Blood Updated: 08/07/22 1246     Scan Slide --     Comment: See Manual Differential Results       CBC Auto Differential [[id number]]  (Abnormal) Collected: 08/07/22 1204    Specimen: Blood Updated: 08/07/22 1246     WBC 12.20 10*3/mm3      RBC 2.41 10*6/mm3      Hemoglobin 6.7 g/dL      Hematocrit 20.1 %      MCV 83.2 fL      MCH 27.6 pg      MCHC 33.2 g/dL      RDW 15.3 %      RDW-SD 44.6 fl      MPV 9.2 fL      Platelets 46 10*3/mm3     Narrative:      The previously reported component NRBC is no longer being reported. Previous result was 0.0 /100 WBC (Reference Range: 0.0-0.2 /100 WBC) on 8/7/2022 at 1218 EDT.    Gamma GT [[id number]]  (Abnormal) Collected: 08/06/22 1302    Specimen: Blood Updated: 08/07/22 1245     GGT 55 U/L     POC Glucose Once [[id number]]  (Abnormal) Collected: 08/07/22 1131    Specimen: Blood Updated: 08/07/22 1132     Glucose 131 mg/dL      Comment: Serial Number: [id number]:  [id number]       Path Consult Reflex [[id number]] Collected: 08/07/22 0855    Specimen: Blood Updated: 08/07/22 0936     Pathology Review Yes    Manual Differential [[id number]]  (Abnormal) Collected: 08/07/22 0855    Specimen: Blood Updated: 08/07/22 0936     Neutrophil % 68.0 %       Lymphocyte % 6.0 %      Monocyte % 2.0 %      Eosinophil % 2.0 %      Bands %  22.0 %      Neutrophils Absolute 10.44 10*3/mm3      Lymphocytes Absolute 0.70 10*3/mm3      Monocytes Absolute 0.23 10*3/mm3      Eosinophils Absolute 0.23 10*3/mm3      Anisocytosis Slight/1+     Toxic Granulation Slight/1+     Vacuolated Neutrophils Slight/1+     Platelet Estimate Decreased    CBC & Differential [[id number]]  (Abnormal) Collected: 08/07/22 0855    Specimen: Blood Updated: 08/07/22 0936    Narrative:      The following orders were created for panel order CBC & Differential.  Procedure                               Abnormality         Status                     ---------                               -----------         ------                     CBC Auto Differential[[id number]]        Abnormal            Final result               Scan Slide[[id number]]                                       Final result                 Please view results for these tests on the individual orders.    Scan Slide [[id number]] Collected: 08/07/22 0855    Specimen: Blood Updated: 08/07/22 0936     Scan Slide --     Comment: See Manual Differential Results       CBC Auto Differential [[id number]]  (Abnormal) Collected: 08/07/22 0855    Specimen: Blood Updated: 08/07/22 0936     WBC 11.60 10*3/mm3      RBC 2.59 10*6/mm3      Hemoglobin 7.2 g/dL      Hematocrit 21.5 %      MCV 82.8 fL      MCH 27.9 pg      MCHC 33.7 g/dL      RDW 15.1 %      RDW-SD 43.8 fl      MPV 9.1 fL      Platelets 24 10*3/mm3     Narrative:      The previously reported component NRBC is no longer being reported. Previous result was 0.1 /100 WBC (Reference Range: 0.0-0.2 /100 WBC) on 8/7/2022 at 0908 EDT.    Body Fluid Culture - Body Fluid, Liver [[id number]] Collected: 08/05/22 1755    Specimen: Body Fluid from Liver Updated: 08/07/22 0929     Body Fluid Culture No growth at 2 days     Gram Stain Few (2+) WBCs per low power field      No organisms seen    Blood Culture -  Blood, Blood, Central Line [[id number]]  (Normal) Collected: 08/05/22 0902    Specimen: Blood, Central Line Updated: 08/07/22 0915     Blood Culture No growth at 2 days    Blood Culture - Blood, Blood, Central Line [[id number]]  (Normal) Collected: 08/05/22 0902    Specimen: Blood, Central Line Updated: 08/07/22 0915     Blood Culture No growth at 2 days    Manual Differential [[id number]]  (Abnormal) Collected: 08/07/22 0600    Specimen: Blood Updated: 08/07/22 0658     Neutrophil % 69.0 %      Lymphocyte % 12.0 %      Monocyte % 4.0 %      Eosinophil % 1.0 %      Bands %  14.0 %      Neutrophils Absolute 9.63 10*3/mm3      Lymphocytes Absolute 1.39 10*3/mm3      Monocytes Absolute 0.46 10*3/mm3      Eosinophils Absolute 0.12 10*3/mm3      RBC Morphology Normal     Vacuolated Neutrophils Slight/1+     Platelet Estimate Decreased    CBC & Differential [[id number]]  (Abnormal) Collected: 08/07/22 0600    Specimen: Blood Updated: 08/07/22 0658    Narrative:      The following orders were created for panel order CBC & Differential.  Procedure                               Abnormality         Status                     ---------                               -----------         ------                     CBC Auto Differential[[id number]]        Abnormal            Final result               Scan Slide[[id number]]                                       Final result                 Please view results for these tests on the individual orders.    Scan Slide [[id number]] Collected: 08/07/22 0600    Specimen: Blood Updated: 08/07/22 0658     Scan Slide --     Comment: See Manual Differential Results       CBC Auto Differential [[id number]]  (Abnormal) Collected: 08/07/22 0600    Specimen: Blood Updated: 08/07/22 0657     WBC 11.60 10*3/mm3      RBC 2.67 10*6/mm3      Hemoglobin 7.5 g/dL      Hematocrit 22.1 %      MCV 82.8 fL      MCH 28.0 pg      MCHC 33.8 g/dL      RDW 14.7 %      RDW-SD 42.9 fl      MPV 8.8 fL      Platelets 25  10*3/mm3     Narrative:      The previously reported component NRBC is no longer being reported. Previous result was 0.1 /100 WBC (Reference Range: 0.0-0.2 /100 WBC) on 8/7/2022 at 0622 EDT.    Phosphorus [[id number]]  (Normal) Collected: 08/07/22 0600    Specimen: Blood Updated: 08/07/22 0647     Phosphorus 4.5 mg/dL     Comprehensive Metabolic Panel [[id number]]  (Abnormal) Collected: 08/07/22 0600    Specimen: Blood Updated: 08/07/22 0643     Glucose 124 mg/dL      BUN 37 mg/dL      Creatinine 2.92 mg/dL      Sodium 138 mmol/L      Potassium 3.7 mmol/L      Chloride 99 mmol/L      CO2 30.0 mmol/L      Calcium 7.2 mg/dL      Total Protein 4.7 g/dL      Albumin 2.90 g/dL      ALT (SGPT) 352 U/L      AST (SGOT) 522 U/L      Alkaline Phosphatase 112 U/L      Total Bilirubin 4.7 mg/dL      Globulin 1.8 gm/dL      A/G Ratio 1.6 g/dL      BUN/Creatinine Ratio 12.7     Anion Gap 9.0 mmol/L      eGFR 19.6 mL/min/1.73      Comment: National Kidney Foundation and American Society of Nephrology (ASN) Task Force recommended calculation based on the Chronic Kidney Disease Epidemiology Collaboration (CKD-EPI) equation refit without adjustment for race.       Narrative:      GFR Normal >60  Chronic Kidney Disease <60  Kidney Failure <15      Vancomycin, Random [[id number]]  (Normal) Collected: 08/07/22 0600    Specimen: Blood Updated: 08/07/22 0640     Vancomycin Random 18.80 mcg/mL     Narrative:      Therapeutic Ranges for Vancomycin    Vancomycin Random   5.0-40.0 mcg/mL  Vancomycin Trough   5.0-20.0 mcg/mL  Vancomycin Peak     20.0-40.0 mcg/mL    Magnesium [[id number]]  (Normal) Collected: 08/07/22 0600    Specimen: Blood Updated: 08/07/22 0640     Magnesium 2.3 mg/dL     Lactic Acid, Plasma [[id number]]  (Normal) Collected: 08/07/22 0600    Specimen: Blood Updated: 08/07/22 0639     Lactate 1.2 mmol/L     Calcium, Ionized [[id number]]  (Abnormal) Collected: 08/07/22 0600    Specimen: Blood Updated: 08/07/22 0635     Ionized  Calcium 1.00 mmol/L     POC Glucose Once [[id number]]  (Abnormal) Collected: 08/07/22 0607    Specimen: Blood Updated: 08/07/22 0608     Glucose 139 mg/dL      Comment: Serial Number: [id number]:  [id number]       CBC & Differential [[id number]]  (Abnormal) Collected: 08/07/22 0252    Specimen: Blood Updated: 08/07/22 0308    Narrative:      The following orders were created for panel order CBC & Differential.  Procedure                               Abnormality         Status                     ---------                               -----------         ------                     CBC Auto Differential[[id number]]        Abnormal            Final result                 Please view results for these tests on the individual orders.    CBC Auto Differential [[id number]]  (Abnormal) Collected: 08/07/22 0252    Specimen: Blood Updated: 08/07/22 0308     WBC 11.20 10*3/mm3      RBC 2.40 10*6/mm3      Hemoglobin 6.7 g/dL      Hematocrit 20.0 %      MCV 83.2 fL      MCH 28.1 pg      MCHC 33.8 g/dL      RDW 14.7 %      RDW-SD 43.3 fl      MPV 9.9 fL      Platelets 50 10*3/mm3      Neutrophil % 87.4 %      Lymphocyte % 5.7 %      Monocyte % 5.9 %      Eosinophil % 0.5 %      Basophil % 0.5 %      Neutrophils, Absolute 9.80 10*3/mm3      Lymphocytes, Absolute 0.60 10*3/mm3      Monocytes, Absolute 0.70 10*3/mm3      Eosinophils, Absolute 0.10 10*3/mm3      Basophils, Absolute 0.10 10*3/mm3      nRBC 0.0 /100 WBC     Magnesium [[id number]]  (Normal) Collected: 08/06/22 2154    Specimen: Blood Updated: 08/06/22 2224     Magnesium 2.2 mg/dL     Renal Function Panel [[id number]]  (Abnormal) Collected: 08/06/22 2154    Specimen: Blood Updated: 08/06/22 2224     Glucose 162 mg/dL      BUN 55 mg/dL      Creatinine 4.25 mg/dL      Sodium 141 mmol/L      Potassium 3.4 mmol/L      Chloride 98 mmol/L      CO2 29.0 mmol/L      Calcium 7.2 mg/dL      Albumin 3.30 g/dL      Phosphorus 4.8 mg/dL      Anion Gap 14.0 mmol/L       BUN/Creatinine Ratio 12.9     eGFR 12.5 mL/min/1.73      Comment: <15 Indicative of kidney failure       Narrative:      GFR Normal >60  Chronic Kidney Disease <60  Kidney Failure <15      STAT Lactic Acid, Reflex [[id number]]  (Normal) Collected: 08/06/22 2154    Specimen: Blood Updated: 08/06/22 2220     Lactate 1.6 mmol/L     Calcium, Ionized [[id number]]  (Abnormal) Collected: 08/06/22 2154    Specimen: Blood Updated: 08/06/22 2214     Ionized Calcium 0.95 mmol/L     CBC & Differential [[id number]]  (Abnormal) Collected: 08/06/22 2154    Specimen: Blood Updated: 08/06/22 2205    Narrative:      The following orders were created for panel order CBC & Differential.  Procedure                               Abnormality         Status                     ---------                               -----------         ------                     CBC Auto Differential[[id number]]        Abnormal            Final result                 Please view results for these tests on the individual orders.    CBC Auto Differential [[id number]]  (Abnormal) Collected: 08/06/22 2154    Specimen: Blood Updated: 08/06/22 2205     WBC 11.10 10*3/mm3      RBC 2.58 10*6/mm3      Hemoglobin 7.3 g/dL      Hematocrit 21.4 %      MCV 82.9 fL      MCH 28.3 pg      MCHC 34.2 g/dL      RDW 14.4 %      RDW-SD 42.0 fl      MPV 9.1 fL      Platelets 55 10*3/mm3      Neutrophil % 86.9 %      Lymphocyte % 4.9 %      Monocyte % 6.2 %      Eosinophil % 0.8 %      Basophil % 1.2 %      Neutrophils, Absolute 9.70 10*3/mm3      Lymphocytes, Absolute 0.50 10*3/mm3      Monocytes, Absolute 0.70 10*3/mm3      Eosinophils, Absolute 0.10 10*3/mm3      Basophils, Absolute 0.10 10*3/mm3      nRBC 0.0 /100 WBC     STAT Lactic Acid, Reflex [[id number]]  (Abnormal) Collected: 08/06/22 1641    Specimen: Blood Updated: 08/06/22 1711     Lactate 4.2 mmol/L     Hepatitis Panel, Acute [[id number]]  (Normal) Collected: 08/06/22 1550    Specimen: Blood Updated: 08/06/22 1654      Hepatitis B Surface Ag Non-Reactive     Hep A IgM Non-Reactive     Hep B C IgM Non-Reactive     Hepatitis C Ab Non-Reactive    Narrative:      Results may be falsely decreased if patient taking Biotin.     EBV Antibody VCA, IgG [[id number]] Collected: 08/06/22 1641    Specimen: Blood Updated: 08/06/22 1648    Anti-microsomal Antibody [[id number]] Collected: 08/06/22 1641    Specimen: Blood Updated: 08/06/22 1648    Mitochondrial Antibodies, M2 [[id number]] Collected: 08/06/22 1641    Specimen: Blood Updated: 08/06/22 1648    KAREN [[id number]] Collected: 08/06/22 1641    Specimen: Blood Updated: 08/06/22 1648    Vadim-Barr Virus VCA Antibody Panel [[id number]] Collected: 08/06/22 1641    Specimen: Blood Updated: 08/06/22 1647    CMV DNA, Quantitative, PCR [[id number]] Collected: 08/06/22 1641    Specimen: Blood Updated: 08/06/22 1647    CMV IgG IgM [[id number]] Collected: 08/06/22 1641    Specimen: Blood Updated: 08/06/22 1647    Vadim-Barr Virus Early Antigen Antibody, IgG [[id number]] Collected: 08/06/22 1641    Specimen: Blood Updated: 08/06/22 1647    Anti-Smooth Muscle Antibody Titer [[id number]] Collected: 08/06/22 1641    Specimen: Blood Updated: 08/06/22 1647    POC Glucose Once [[id number]]  (Abnormal) Collected: 08/06/22 1643    Specimen: Blood Updated: 08/06/22 1644     Glucose 141 mg/dL      Comment: Serial Number: [id number]:  [id number]       CBC & Differential [[id number]]  (Abnormal) Collected: 08/06/22 1550    Specimen: Blood Updated: 08/06/22 1635    Narrative:      The following orders were created for panel order CBC & Differential.  Procedure                               Abnormality         Status                     ---------                               -----------         ------                     CBC Auto Differential[[id number]]        Abnormal            Final result                 Please view results for these tests on the individual orders.    CBC Auto Differential [[id number]]   (Abnormal) Collected: 08/06/22 1550    Specimen: Blood Updated: 08/06/22 1635     WBC 12.40 10*3/mm3      RBC 2.60 10*6/mm3      Hemoglobin 7.3 g/dL      Hematocrit 22.3 %      Comment: Result checked         MCV 85.8 fL      MCH 28.1 pg      MCHC 32.7 g/dL      RDW 14.4 %      RDW-SD 42.9 fl      MPV 9.4 fL      Platelets 67 10*3/mm3      Neutrophil % 90.7 %      Lymphocyte % 3.6 %      Monocyte % 4.0 %      Eosinophil % 1.1 %      Basophil % 0.6 %      Neutrophils, Absolute 11.20 10*3/mm3      Lymphocytes, Absolute 0.50 10*3/mm3      Monocytes, Absolute 0.50 10*3/mm3      Eosinophils, Absolute 0.10 10*3/mm3      Basophils, Absolute 0.10 10*3/mm3      nRBC 0.0 /100 WBC     Calcium, Ionized [[id number]]  (Abnormal) Collected: 08/06/22 1550    Specimen: Blood Updated: 08/06/22 1607     Ionized Calcium 0.93 mmol/L     STAT Lactic Acid, Reflex [[id number]]  (Abnormal) Collected: 08/06/22 1447    Specimen: Blood Updated: 08/06/22 1516     Lactate 7.4 mmol/L     Urine Culture - Urine, Urine, Catheter [[id number]]  (Normal) Collected: 08/05/22 1012    Specimen: Urine, Catheter Updated: 08/06/22 1409     Urine Culture No growth    Comprehensive Metabolic Panel [[id number]]  (Abnormal) Collected: 08/06/22 1302    Specimen: Blood Updated: 08/06/22 1336     Glucose 264 mg/dL      BUN 53 mg/dL      Creatinine 4.58 mg/dL      Sodium 136 mmol/L      Potassium 3.5 mmol/L      Comment: Result checked  Slight hemolysis detected by analyzer. Results may be affected.        Chloride 96 mmol/L      CO2 16.0 mmol/L      Calcium 6.9 mg/dL      Comment: Result checked          Total Protein 4.8 g/dL      Albumin 2.70 g/dL      ALT (SGPT) 222 U/L      AST (SGOT) 393 U/L      Alkaline Phosphatase 89 U/L      Total Bilirubin 4.6 mg/dL      Globulin 2.1 gm/dL      A/G Ratio 1.3 g/dL      BUN/Creatinine Ratio 11.6     Anion Gap 24.0 mmol/L      eGFR 11.4 mL/min/1.73      Comment: <15 Indicative of kidney failure       Narrative:      GFR Normal  >60  Chronic Kidney Disease <60  Kidney Failure <15          Imaging Results (Last 24 Hours)     Procedure Component Value Units Date/Time    CT Abdomen Pelvis Without Contrast [[id number]] Collected: 08/06/22 2256     Updated: 08/06/22 2304    Narrative:      CT OF THE ABDOMEN AND PELVIS WITHOUT CONTRAST    Clinical indication: Septic shock.    Comparison: 8/5/2022.    Procedure: Noncontrast CT images of the abdomen and pelvis were obtained.  CT dose lowering techniques were used, to include: automated exposure control, adjustment for patient size, and or use of iterative reconstruction.    Findings:     Lung Bases: Small bilateral layering pleural effusions are present. Streaky airspace densities are seen in both lung bases.    Liver and biliary system: Ill-defined hypodense region is seen in the right hepatic lobe posteriorly. This is less conspicuous without IV contrast than on the prior. There is new, heterogeneous and hyperdense fluid in the perihepatic and infrahepatic   space. Gallbladder is unremarkable.    Pancreas: The pancreas is unremarkable.     Spleen: The spleen is normal.    Adrenals: The adrenal glands are within normal limits.     Kidneys: The kidneys are symmetric in size and without hydronephrosis.    Gastrointestinal: The stomach and scattered loops of small and large bowel have a nonobstructive pattern. No focal mucosal lesion or inflammatory changes are seen. The appendix is normal in the right lower quadrant.     Mesentery and retroperitoneum: No mesenteric or retroperitoneal adenopathy. Scattered, moderate volume mixed attenuation peritoneal fluid is new from prior. No free air.     Pelvis: The urinary bladder is decompressed by Sandoval catheter. Rectum is normal. No free fluid. No deep pelvic or inguinal adenopathy.     Reproductive: No acute abnormality.    Body wall: Normal.    Bones: No acute osseous abnormality.      Impression:      Impression:   1. Moderate volume hemoperitoneum is new  from prior and partially accumulates in the infrahepatic space. This suggests potential hepatic source of hemorrhage, likely from recent biopsy.  2. Bilateral small layering pleural effusions and streaky bibasilar atelectasis.    Electronically signed by:  Bob Jackson M.D.    8/6/2022 9:03 PM    XR Chest 1 View [[id number]] Collected: 08/06/22 1826     Updated: 08/06/22 1830    Narrative:      XR CHEST 1 VW-     Date of Exam: 8/6/2022 6:01 PM     Indication: line placement; A41.9-Sepsis, unspecified organism;  R65.21-Severe sepsis with septic shock; N17.9-Acute kidney failure,  unspecified.     Comparison Exams: Chest radiograph from earlier today     Technique: Single AP chest radiograph     FINDINGS:  A right internal jugular catheter has its tip at the cavoatrial  junction. No pneumothorax is identified. Otherwise, no significant  change from recent prior exam.       Impression:      Right IJ catheter with tip at cavoatrial junction. No pneumothorax  identified.     Electronically Signed By-Mervin Martell MD On:8/6/2022 6:27 PM  This report was finalized on [id number] by  Mervin Martell MD.    US Liver [[id number]] Collected: 08/06/22 1337     Updated: 08/06/22 1351    Narrative:      US LIVER-     Date of Exam: 8/6/2022 12:40 PM     Indication: acute anemia s/p liver biopsy; A41.9-Sepsis, unspecified  organism; R65.21-Severe sepsis with septic shock; N17.9-Acute kidney  failure, unspecified.     Comparison: CT abdomen and pelvis and liver ultrasound from August 5, 2022     Technique: Right upper quadrant ultrasound     FINDINGS:     The liver measures 17.2 cm and contains several hyperechoic lesions  measuring up to 10.8 cm in the right hepatic lobe. These appear  increased in size from recent prior ultrasound, previously measuring up  to 5.1 cm. The hepatic vasculature appears within normal limits. The  common bile duct is normal in caliber at 4.1 mm. No intrahepatic biliary  ductal dilatation is  identified.       Impression:      Multiple hyperechoic lesions within liver, which appear increased in  size from prior ultrasound. Changes could be secondary to interval liver  biopsy. Recommend correlation with pathology results.     Electronically Signed By-Mervin Martell MD On:8/6/2022 1:46 PM  This report was finalized on [id number] by  Mervin Martell MD.    XR Chest 1 View [[id number]] Collected: 08/06/22 1252     Updated: 08/06/22 1301    Narrative:      EXAM: XR CHEST 1 VW-     DATE OF EXAM: 8/6/2022 12:31 PM     INDICATION: hypoxic respiratory failure; A41.9-Sepsis, unspecified  organism; R65.21-Severe sepsis with septic shock; N17.9-Acute kidney  failure, unspecified.       COMPARISONS: Chest x-ray earlier today at 8:33 AM      FINDINGS:     There is new left lower lobe retrocardiac airspace disease. Unchanged  right lower lobe opacity.     A right-sided PICC line is now present with tip at the cavoatrial  junction..       Impression:         New left lower lobe disease. With the provided history this is  concerning for developing pneumonia, possibly aspiration given its  location.     Unchanged right lower lobe atelectasis versus pneumonia.     Right-sided PICC line in good position.     No pneumothorax.     Electronically Signed By-Ankur Thapa On:8/6/2022 12:53 PM  This report was finalized on [id number] by  Ankur Thapa, .           I have reviewed the above results and noted them below    Medication Review:    Current Facility-Administered Medications:   •  acetaminophen (TYLENOL) tablet 650 mg, 650 mg, Oral, Q4H PRN, 650 mg at 08/05/22 2147 **OR** acetaminophen (TYLENOL) suppository 650 mg, 650 mg, Rectal, Q4H PRN, Connor Rod APRN  •  calcium gluconate 1g/50ml 0.675% NaCl IV SOLN, 1 g, Intravenous, Once PRN **OR** calcium gluconate 2-0.675 GM/100ML NACL IVPB, 2 g, Intravenous, Once PRN, Vikram Acosta MD  •  dextrose (D50W) (25 g/50 mL) IV injection 25 g, 25 g, Intravenous, Q15 Min  PRN, Connor Rod APRN  •  dextrose (GLUTOSE) oral gel 15 g, 15 g, Oral, Q15 Min PRN, Connor Rod APRN  •  DOPamine 400 mg in 250 mL D5W infusion, 2-20 mcg/kg/min, Intravenous, Titrated, Shy Moreno MD, Stopped at 08/06/22 1420  •  escitalopram (LEXAPRO) tablet 10 mg, 10 mg, Oral, Daily, Shaye Alan APRN, 10 mg at 08/07/22 0841  •  fentaNYL citrate (PF) (SUBLIMAZE) injection 25 mcg, 25 mcg, Intravenous, Q2H PRN, Shaye Alan APRN, 25 mcg at 08/07/22 0749  •  glucagon (human recombinant) (GLUCAGEN DIAGNOSTIC) 1 mg in sterile water (preservative free) 1 mL injection, 1 mg, Intramuscular, Q15 Min PRN, Connor Rod APRN  •  heparin (porcine) injection 1,000-2,000 Units, 1,000-2,000 Units, Intravenous, PRN, Vikram Acosta MD  •  hydrocortisone sodium succinate (Solu-CORTEF) injection 100 mg, 100 mg, Intravenous, Q6H, Connor Rod APRN, 100 mg at 08/07/22 1134  •  HYDROmorphone (DILAUDID) injection 1 mg, 1 mg, Intravenous, Q1H PRN, Day, Dawn, APRN  •  insulin lispro (ADMELOG) injection 0-7 Units, 0-7 Units, Subcutaneous, Q6H, Day, Dawn, APRN, 2 Units at 08/07/22 0021  •  lidocaine PF 1% (XYLOCAINE) injection 10 mL, 10 mL, Infiltration, Once, Day, Dawn, APRN  •  magnesium sulfate 2g/50 mL (PREMIX) infusion, 2 g, Intravenous, PRN, Vikram Acosta MD  •  meropenem (MERREM) 1 g in sodium chloride 0.9 % 100 mL IVPB, 1 g, Intravenous, Q8H, Shy Moreno MD, 1 g at 08/07/22 0840  •  micafungin sodium (MYCAMINE) 100 mg in sodium chloride 0.9 % 100 mL IVPB, 100 mg, Intravenous, Q24H, Sagar Orozco MD, 100 mg at 08/06/22 1420  •  morphine injection 2 mg, 2 mg, Intravenous, Q2H PRN, Susan Borges APRN, 2 mg at 08/07/22 1134  •  nitroglycerin (NITROSTAT) SL tablet 0.4 mg, 0.4 mg, Sublingual, Q5 Min PRN, Connor Rod APRN  •  norepinephrine (LEVOPHED) 16 mg in 250 mL NS infusion, 0.02-0.3 mcg/kg/min, Intravenous, Titrated, Connor Rod APRN, Stopped at 08/06/22 1540  •  ondansetron  (ZOFRAN) tablet 4 mg, 4 mg, Oral, Q6H PRN **OR** ondansetron (ZOFRAN) injection 4 mg, 4 mg, Intravenous, Q6H PRN, Connor Rod APRN, 4 mg at 08/06/22 2042  •  oxyCODONE (ROXICODONE) immediate release tablet 10 mg, 10 mg, Oral, Q4H PRN, Day, Susan, APRN, 10 mg at 08/07/22 1038  •  pantoprazole (PROTONIX) injection 40 mg, 40 mg, Intravenous, Q12H, Day, Susan, APRN, 40 mg at 08/07/22 0841  •  Pharmacy to dose vancomycin, , Does not apply, Continuous PRN, Sagar Orozco MD  •  phenylephrine (CHELA-SYNEPHRINE) 100 mg in 250 mL NS infusion, 0.5-3 mcg/kg/min, Intravenous, Titrated, Day, Susan, APRN, Stopped at 08/06/22 1614  •  Phoxillum BK4/2.5 dialysis solution, 2,000 mL/hr, CRRT, Continuous, Vikram Acosta MD, Last Rate: 2,000 mL/hr at 08/07/22 1126, 2,000 mL/hr at 08/07/22 1126  •  Phoxillum BK4/2.5 dialysis solution, 1,500 mL/hr, CRRT, Continuous, Vikram Acosta MD, Last Rate: 1,500 mL/hr at 08/07/22 1126, 1,500 mL/hr at 08/07/22 1126  •  Phoxillum BK4/2.5 dialysis solution, 1,000 mL/hr, CRRT, Continuous, Vikram Acosta MD, Last Rate: 1,000 mL/hr at 08/07/22 1126, 1,000 mL/hr at 08/07/22 1126  •  potassium chloride 20 mEq in 50 mL IVPB, 20 mEq, Intravenous, PRN, Vikram Acosta MD  •  simethicone (MYLICON) chewable tablet 80 mg, 80 mg, Oral, 4x Daily PRN, Shaye Alan APRN, 80 mg at 08/06/22 0128  •  sodium bicarbonate 8.4 % 150 mEq in dextrose (D5W) 5 % 1,000 mL infusion (greater than 75 mEq), 150 mEq, Intravenous, Continuous, Connor Rod APRN, Last Rate: 125 mL/hr at 08/07/22 0938, 150 mEq at 08/07/22 0938  •  sodium chloride 0.9 % flush 10 mL, 10 mL, Intravenous, PRN, Leonard Francis MD  •  sodium chloride 0.9 % flush 10 mL, 10 mL, Intravenous, Q12H, Connor Rod APRN, 10 mL at 08/07/22 0841  •  sodium chloride 0.9 % flush 10 mL, 10 mL, Intravenous, PRN, Connor Rod APRN  •  sodium chloride 0.9 % flush 10 mL, 10 mL, Intravenous, PRN, Susan Borges APRN  •  sodium chloride 0.9 % flush 10  mL, 10 mL, Intravenous, Q12H, Day, Susan, APRN, 10 mL at 08/07/22 0841  •  sodium chloride 0.9 % flush 20 mL, 20 mL, Intravenous, PRN, Day, Susan, APRN  •  sodium phosphates 10 mmol in sodium chloride 0.9 % 100 mL IVPB, 10 mmol, Intravenous, PRN, Vikram Acosta MD  •  Vancomycin Pharmacy Intermittent/Pulse Dosing, , Does not apply, PRN, Sagar Orozco MD  •  Vasopressin (VASOSTRICT) 0.2 UNIT/ML solution, 0.03 Units/min, Intravenous, Continuous, Leonard Francis MD, Last Rate: 9 mL/hr at 08/07/22 0338, 0.03 Units/min at 08/07/22 0338    Assessment & Plan     Principal Problem:    Septic shock (HCC)  Active Problems:    Pulmonary hypertension, unspecified (HCC)    Anxiety    Vitamin D deficiency    Essential hypertension    Suspected liver abscess    Diarrhea    Acute kidney injury (HCC)    Metabolic acidosis    No plans for acute surgical intervention.  Suspect bleeding has slowed down significantly and will stop with correction of coagulopathy  Continue aggressive resuscitation with blood products and correction of coagulopathy  Could consider laparoscopic washout once the patient has stabilized given the large amount of hemoperitoneum seen on CT scan.  Patient remains critically ill    Plan for disposition: Continue aggressive resuscitation with blood products and correction of coagulopathy.    Nahum Oconnor MD  08/07/22  12:56 EDT        Electronically signed by Nahum Oconnor MD at 08/07/22 9484

## 2022-08-08 NOTE — PLAN OF CARE
Goal Outcome Evaluation:    Patient CRRT ended this am. Patient diet advanced to regular. Patient up in chair per PT/OT. VSS. Hgb stable. Patient currently with no complaints. Will continue to monitor and follow plan of care.

## 2022-08-08 NOTE — PROGRESS NOTES
PROGRESS NOTE      Patient Name: Raul Gardner  : 1975  MRN: [id number]  Primary Care Physician: Maribel Graf MD  Date of admission: 2022    Patient Care Team:  Maribel Graf MD as PCP - General (Family Medicine)  AVA Cavanaugh MD as Consulting Physician (Cardiology)        Subjective   Subjective:     Patient is slightly better than yesterday but still lethargic sick looking  Review of systems:  Middle-age female complaining of body aches abdominal pain abdominal distention      Allergies:    Allergies   Allergen Reactions   • Cephalexin Rash   • Hydrocodone-Acetaminophen Rash       Objective   Exam:     Vital Signs  Temp:  [97.16 °F (36.2 °C)-98.6 °F (37 °C)] 98.6 °F (37 °C)  Heart Rate:  [] 102  Resp:  [12-19] 17  BP: (118-158)/() 144/86  SpO2:  [90 %-100 %] 94 %  on  Flow (L/min):  [3-5] 5;   Device (Oxygen Therapy): room air  Body mass index is 51.1 kg/m².    General: Middle-age  female i very lethargic  Head:      Normocephalic and atraumatic.    Eyes:      PERRL/EOM intact, conjunctiva and sclera clear with out nystagmus.    Neck:      No masses, thyromegaly,  trachea central with normal respiratory effort   Lungs:    Clear bilaterally to auscultation.    Heart:      Regular rate and rhythm, no murmur no gallop  Abd:        Diffusely tender with decreased bowel sounds  Pulses:   Pulses palpable  Extr:        No cyanosis or clubbing--no edema.    Neuro:    No focal deficits.   alert oriented x3  Skin:       Intact without lesions or rashes.    Psych:    Alert and cooperative; normal mood and affect; .      Results Review:  I have personally reviewed most recent Data :  CBC    Results from last 7 days   Lab Units 22  1322 22  0812 22  0359 22  2327 22  2002 22  1615 22  1513 22  1204   WBC 10*3/mm3 14.10* 14.10* 14.30* 13.90* 13.30* 14.40*  --  12.20*   HEMOGLOBIN g/dL 7.7* 7.6* 7.2* 7.5* 7.5* 7.6*  --  6.7*   PLATELETS  10*3/mm3 50* 47* 50* 57* 57* 65* 68* 46*     CMP   Results from last 7 days   Lab Units 08/08/22  0812 08/08/22  0359 08/07/22  2327 08/07/22  1615 08/07/22  0600 08/06/22  2154 08/06/22  1302 08/06/22  1013 08/06/22  0510 08/05/22  1852 08/05/22  1400 08/05/22  0811   SODIUM mmol/L 138 140 140 139 138 141 136 139 138  --  133* 132*   POTASSIUM mmol/L 4.1 4.1 3.8 3.7 3.7 3.4* 3.5 2.5* 3.4*  --  3.7 3.9   CHLORIDE mmol/L 104 105 104 101 99 98 96* 96* 96*  --  99 94*   CO2 mmol/L 28.0 28.0 28.0 29.0 30.0* 29.0 16.0* 29.0 24.0  --  19.0* 21.0*   BUN mg/dL 16 19 21* 25* 37* 55* 53* 40* 46*  --  38* 32*   CREATININE mg/dL 1.35* 1.53* 1.77* 2.05* 2.92* 4.25* 4.58* 3.25* 4.06*  --  4.03* 3.82*   GLUCOSE mg/dL 87 98 105* 112* 124* 162* 264* 543* 218*  --  165* 141*   ALBUMIN g/dL 3.20* 3.00* 3.30* 3.20* 2.90* 3.30* 2.70* 1.90* 2.50*   < > 2.90* 3.30*   BILIRUBIN mg/dL  --  3.8*  --   --  4.7*  --  4.6* 3.1* 4.5*  --  4.6* 4.3*   ALK PHOS U/L  --  133*  --   --  112  --  89 72 75  --  64 64   AST (SGOT) U/L  --  233*  --   --  522*  --  393* 194* 177*  --  45* 41*   ALT (SGPT) U/L  --  259*  --   --  352*  --  222* 109* 98*  --  27 30   AMYLASE U/L  --   --   --   --   --   --   --  39  --   --   --  81   LIPASE U/L  --   --   --   --   --   --   --  4*  --   --   --  12*    < > = values in this interval not displayed.     ABG    Results from last 7 days   Lab Units 08/05/22  0815   PH, ARTERIAL pH units 7.345*   PCO2, ARTERIAL mm Hg 28.3*   PO2 ART mm Hg 221.4*   O2 SATURATION ART % 99.8*   BASE EXCESS ART mmol/L -8.9*     CT Abdomen Pelvis Without Contrast    Result Date: 8/6/2022  Impression: 1. Moderate volume hemoperitoneum is new from prior and partially accumulates in the infrahepatic space. This suggests potential hepatic source of hemorrhage, likely from recent biopsy. 2. Bilateral small layering pleural effusions and streaky bibasilar atelectasis. Electronically signed by:  Bob Jackson M.D.  8/6/2022 9:03  PM    XR Chest 1 View    Result Date: 8/8/2022  1. Stable right IJ central venous catheter and right upper extremity PICC line. 2. Persistent mild bibasilar airspace disease likely atelectasis. 3. No pneumothorax.  Electronically Signed By-Eleazar Narvaez MD On:8/8/2022 10:20 AM This report was finalized on [id number] by  Eleazar Narvaez MD.    XR Chest 1 View    Result Date: 8/6/2022  Right IJ catheter with tip at cavoatrial junction. No pneumothorax identified.  Electronically Signed By-Mervin Martell MD On:8/6/2022 6:27 PM This report was finalized on [id number] by  Mervin Martell MD.      Results for orders placed during the hospital encounter of 08/05/22    Adult Transthoracic Echo Complete w/ Color, Spectral and Contrast if Necessary Per Protocol    Interpretation Summary  · Left ventricular wall thickness is consistent with mild concentric hypertrophy.  · Estimated left ventricular EF = 75% Left ventricular systolic function is hyperdynamic (EF > 70%).  · Left ventricular diastolic function is consistent with (grade I) impaired relaxation.    Scheduled Meds:cefTRIAXone, 2 g, Intravenous, Q24H  escitalopram, 10 mg, Oral, Daily  hydrocortisone sodium succinate, 50 mg, Intravenous, Q12H  insulin lispro, 0-7 Units, Subcutaneous, Q6H  [START ON 8/9/2022] pantoprazole, 40 mg, Oral, Q AM  sodium chloride, 10 mL, Intravenous, Q12H  sodium chloride, 10 mL, Intravenous, Q12H      Continuous Infusions:dextrose, 25 mL/hr, Last Rate: 25 mL/hr (08/08/22 0801)      PRN Meds:•  acetaminophen **OR** acetaminophen  •  dextrose  •  dextrose  •  fentaNYL citrate (PF)  •  glucagon (human recombinant)  •  HYDROmorphone  •  Morphine  •  nitroglycerin  •  ondansetron **OR** ondansetron  •  oxyCODONE  •  simethicone  •  sodium chloride  •  sodium chloride  •  sodium chloride  •  sodium chloride    Assessment & Plan   Assessment and Plan:         Septic shock (HCC)    Pulmonary hypertension, unspecified (HCC)    Anxiety     Vitamin D deficiency    Essential hypertension    Suspected liver abscess    Diarrhea    Acute kidney injury (HCC)    Metabolic acidosis    ASSESSMENT:  · Acute kidney injury likely ATN secondary to sepsis and hemodynamic instability on pressors at this time  · Multiple liver lesion unlikely to be abscess  · Chronic kidney disease as per renal ultrasound with some increased echogenicity   · Significant lactic acidosis with increased anion gap   · Some evidence of volume overload   · History of hypertension   ·             Plan:      · Patient in septic shock with MARIANNE continue ABX   · Patient is on CRRT but hemodynamics have improved.  Urine output has improved in last 24-hour  · Metabolic acidosis and sepsis has improved significantly  · We will discontinue CRRT at this time consider intermittent hemodialysis  · Significant lactic acidosis which is slowly improving after initiation of CRRT  · Hemoglobin that dropped initially now stable.  · Repeat CAT scan was done yesterday at this time likely ATN because of the multiple injury injuries likely contrast nephropathy in the presence of sepsis  · Discussed with with ICU staff in detail about patient condition   · Intermittent hemodialysis if needed  · Follow-up with volume status electrolytes later today and tomorrow morning  · Serologies sent still pending including ANCA KAREN protein electrophoresis  · Okay to discontinue IV fluid as hemodynamics are improving    •           Electronically signed by Vikram Acosta MD,   [facility] kidney consultant  [phone]

## 2022-08-08 NOTE — PROGRESS NOTES
ICU Daily Progress Note        Septic shock (HCC)    Pulmonary hypertension, unspecified (HCC)    Anxiety    Vitamin D deficiency    Essential hypertension    Suspected liver abscess    Diarrhea    Acute kidney injury (HCC)    Metabolic acidosis      Assessment & Plan     Sepsis with septic shock  UTI  MARIANNE  Coagulopathy probably from DIC  Leukocytosis  Acute anemia with hemoperitoneum after liver biopsy  Anion gap metabolic acidosis  Lactic acidosis  Electrolyte imbalance with hypokalemia and hypocalcemia and hyponatremia and hypomagnesemia  Abnormal CT scan of the liver with possible hepatic abscess: IR attempted drainage but there was no fluid collection 8/5/2022 so CT-guided biopsy was obtained  Mild atelectasis with early noncardiogenic edema probably early ARDS  Esophagitis  Mild pancreatic changes on the CT scan but the lipase is not elevated  History of melanoma resected in 1999 without signs of spread or recurrence  Mild exercise-induced pulmonary hypertension for which she has been on metoprolol but she is out of it for 1 month  Hyperglycemia  Elevated liver enzymes probably from shock liver        Plan:    Check peripheral smear for schistocytes for possible hemolytic uremic syndrome  Check HIV test, West Nile virus antibodies  Oxygenating well on 5 L per nasal cannula  Hemodynamic support: Vasopressin and stress dose hydrocortisone  Blood transfusion: Platelets are dropping, will continue with RBC transfusion and platelet transfusion as needed  Followed by nephrology: CRRT   Liver enzymes are increasing due to shock liver   insulin sliding scale  Antibiotics: Consult ID, last fever 8/6/2022  DVT: SCD/GI prophylaxis  Check daily labs and correct electrolytes as needed    Discussed with the patient and her  in details         LOS: 3 days         Vital signs for last 24 hours:  Vitals:    08/08/22 0900 08/08/22 0945 08/08/22 1116 08/08/22 1117   BP: 154/100 (!) 149/102 144/86    BP Location:        Patient Position:       Pulse: 88 106 103 102   Resp:  17     Temp: 98.06 °F (36.7 °C) 98.2 °F (36.8 °C) 98.6 °F (37 °C) 98.6 °F (37 °C)   TempSrc:       SpO2: 99% 93% 96% 94%   Weight:       Height:           Intake/Output last 3 shifts:  I/O last 3 completed shifts:  In: 4380 [P.O.:50; I.V.:3210; Blood:1120]  Out: 3194 [Urine:894; Other:2300]  Intake/Output this shift:  I/O this shift:  In: 64 [I.V.:64]  Out: 115 [Urine:74; Other:41]           Radiology  Imaging Results (Last 24 Hours)     Procedure Component Value Units Date/Time    XR Chest 1 View [[id number]] Collected: 08/08/22 1019     Updated: 08/08/22 1022    Narrative:         DATE OF EXAM:   8/8/2022 9:58 AM     PROCEDURE:   XR CHEST 1 VW-     INDICATIONS:   hypoxic resp failure; N17.9-Acute kidney failure, unspecified;  A41.9-Sepsis, unspecified organism; R65.21-Severe sepsis with septic  shock     COMPARISON:  08/06/2022     TECHNIQUE:   Portable chest radiograph.     FINDINGS:    There is a right internal jugular central venous catheter with the tip  at the proximal right atrium. Right upper extremity PICC line tip is at  the cavoatrial junction. Stable mild cardiomegaly. Persistent bibasilar  airspace disease likely atelectasis. No pneumothorax.       Impression:      1. Stable right IJ central venous catheter and right upper extremity  PICC line.  2. Persistent mild bibasilar airspace disease likely atelectasis.  3. No pneumothorax.     Electronically Signed By-Eleazar Narvaez MD On:8/8/2022 10:20 AM  This report was finalized on [id number] by  Eleazar Narvaez MD.          Labs:  Results from last 7 days   Lab Units 08/08/22  0812   WBC 10*3/mm3 14.10*   HEMOGLOBIN g/dL 7.6*   HEMATOCRIT % 22.6*   PLATELETS 10*3/mm3 47*     Results from last 7 days   Lab Units 08/08/22  0812 08/08/22  0359   SODIUM mmol/L 138 140   POTASSIUM mmol/L 4.1 4.1   CHLORIDE mmol/L 104 105   CO2 mmol/L 28.0 28.0   BUN mg/dL 16 19   CREATININE mg/dL 1.35* 1.53*   CALCIUM mg/dL  7.6* 7.6*   BILIRUBIN mg/dL  --  3.8*   ALK PHOS U/L  --  133*   ALT (SGPT) U/L  --  259*   AST (SGOT) U/L  --  233*   GLUCOSE mg/dL 87 98     Results from last 7 days   Lab Units 08/05/22  0815   PH, ARTERIAL pH units 7.345*   PO2 ART mm Hg 221.4*   PCO2, ARTERIAL mm Hg 28.3*   HCO3 ART mmol/L 15.5*     Results from last 7 days   Lab Units 08/08/22  0812 08/08/22  0359 08/07/22  2327   ALBUMIN g/dL 3.20* 3.00* 3.30*     Results from last 7 days   Lab Units 08/05/22  1852 08/05/22  1400 08/05/22  1056 08/05/22  0811   CK TOTAL U/L  --   --   --  327*   TROPONIN T ng/mL 0.045* 0.032* 0.026 <0.010     Results from last 7 days   Lab Units 08/06/22  1641   KAREN  Negative     Results from last 7 days   Lab Units 08/08/22  0812   MAGNESIUM mg/dL 2.5     Results from last 7 days   Lab Units 08/07/22  1408 08/06/22  1110 08/05/22  1400 08/05/22  0911 08/05/22  0811   INR  1.09 2.07* 2.47* 2.47* 2.16*   APTT seconds  --  51.9*  --  48.8* 43.1*     Results from last 7 days   Lab Units 08/05/22  0811   TSH uIU/mL 1.570           Meds:   SCHEDULE  escitalopram, 10 mg, Oral, Daily  hydrocortisone sodium succinate, 50 mg, Intravenous, Q12H  insulin lispro, 0-7 Units, Subcutaneous, Q6H  meropenem, 1 g, Intravenous, Q8H  micafungin (MYCAMINE) IV, 100 mg, Intravenous, Q24H  pantoprazole, 40 mg, Intravenous, Q12H  sodium chloride, 10 mL, Intravenous, Q12H  sodium chloride, 10 mL, Intravenous, Q12H  vancomycin, 1,250 mg, Intravenous, Once      Infusions  dextrose, 25 mL/hr, Last Rate: 25 mL/hr (08/08/22 0801)  norepinephrine, 0.02-0.3 mcg/kg/min, Last Rate: Stopped (08/06/22 4770)  Pharmacy to dose vancomycin,       PRNs  •  acetaminophen **OR** acetaminophen  •  dextrose  •  dextrose  •  fentaNYL citrate (PF)  •  glucagon (human recombinant)  •  HYDROmorphone  •  Morphine  •  nitroglycerin  •  ondansetron **OR** ondansetron  •  oxyCODONE  •  Pharmacy to dose vancomycin  •  simethicone  •  sodium chloride  •  sodium chloride  •  sodium  chloride  •  sodium chloride  •  Vancomycin Pharmacy Intermittent/Pulse Dosing    Physical Exam:  Physical Exam  Pulmonary:      Breath sounds: Rhonchi present.       General Appearance:  Alert and answering questions appropriately  HEENT:  Normocephalic, without obvious abnormality, Conjunctiva/corneas clear,.   Nares normal, no drainage     Neck:  Supple, symmetrical, trachea midline. No JVD.  Lungs /Chest wall: Mild bilateral basal rhonchi, respirations unlabored, symmetrical wall movement.     Heart:  Regular rate and rhythm, S1 S2 normal  Abdomen: Soft, mild tenderness epigastric and right upper quadrant, no masses, no organomegaly.  Bowel sounds positive  Extremities: No edema, no clubbing or cyanosis  Neuro exam: Patient moving 4 extremities with no focal deficit by observation  Skin: No rash  ROS  Review of Systems  Constitutional: Positive for chills, fever and malaise/fatigue.   HENT: Negative.    Eyes: Negative.    Cardiovascular: Negative.    Respiratory: Positive for mild cough and shortness of breath.    Skin: Negative.    Musculoskeletal: Negative.    Gastrointestinal: Positive for mild abdominal pain  Genitourinary: Negative.    Neurological: Negative.    Psychiatric/Behavioral: Negative.    Critical Care Time greater than: 45 Minutes      Much of this encounter note is an electronic transcription/translation of spoken language to printed text using Dragon Software which might include inadvertent errors in transcription.

## 2022-08-08 NOTE — PLAN OF CARE
Goal Outcome Evaluation:  Plan of Care Reviewed With: patient, spouse           Outcome Evaluation: 48 yo female admitted with fever, body aches, vomitting and diarrhea. Pt was hypotensive and admitted to ICU with d/o septic shock. Pt with MARIANNE and required CRRT. Pt is now improving and is on 3L O2. At baseline, patient lives in a 1 story home with her  and two elementary school-aged children. Her home has 5 steps to enter, and basement laundry. 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 Today, patient feels generally weak, not having been out of bed since admission. Her UEs and LEs are edematose, and she reports them to feel extremely heavy. Patient requiring max assist for lower body dressing, and she is anticipated to required max assist for all lower body self care at this time. She is CGA for bed mobility, then able to come to standing with Min Ax2, feeling wobbly upon standing. She ambulated to chair approx 5 feet from bed with Min Ax2, HHA. Walker now in pts room for future use. Patient will likely progress very well with function upon becoming medically stable, anticipate safe d/c home with family assistance.

## 2022-08-08 NOTE — PROGRESS NOTES
GENERAL SURGERY PROGRESS NOTE    8/8/2022   LOS: 3 days   Patient Care Team:  Maribel Graf MD as PCP - General (Family Medicine)  AVA Cavanaugh MD as Consulting Physician (Cardiology)    Chief Complaint: Anemia of acute blood loss with hemorrhagic shock    Subjective   HPI: Patient is a 47 y.o. female presented to the hospital yesterday with fevers, chills, body aches and then developed vomiting and diarrhea on the day of admission.  When she arrived at the ER she was found to have a fever of 103.1, heart rate of 146 and was hypotensive.  Her hypotension improved after vasopressor administration.  A CT scan of the abdomen and pelvis was obtained and demonstrated multiple ill-defined lesions throughout her liver concerning for possible hepatic abscesses.  Her laboratory values were significant for a UTI with MARIANNE, and coagulopathy with an INR of 2.5.  The patient was then seen by infectious disease who recommended biopsy/drainage of these areas in the liver to look for a source of infection.  She was given 1 unit of FFP and taken to interventional radiology and subsequently underwent aspiration and biopsy of these sites.  This morning, the patient was noted to have profound hypotension requiring 3 pressors and an acute drop in her hemoglobin from 13 yesterday to 5.9 today.  General surgery was consulted as the patient had abdominal pain and bleeding.     Interval History:   Patient states she feels much better.  Off CRRT since this morning.  Off pressors since yesterday.    Objective     Vital Signs  Temp:  [97.16 °F (36.2 °C)-99 °F (37.2 °C)] 99 °F (37.2 °C)  Heart Rate:  [] 98  Resp:  [12-19] 17  BP: (118-158)/() 155/91    Physical Exam  Vitals reviewed.   Constitutional:       Appearance: She is well-developed.   HENT:      Head: Normocephalic and atraumatic.   Eyes:      Pupils: Pupils are equal, round, and reactive to light.   Cardiovascular:      Rate and Rhythm: Normal rate and regular  rhythm.   Pulmonary:      Effort: Pulmonary effort is normal.      Breath sounds: Normal breath sounds.   Abdominal:      General: There is no distension.      Palpations: Abdomen is soft.      Tenderness: There is generalized abdominal tenderness. There is no guarding or rebound.      Hernia: No hernia is present.      Comments: Abdominal tenderness decreasing overall   Musculoskeletal:         General: Normal range of motion.      Cervical back: Normal range of motion.   Lymphadenopathy:      Cervical: No cervical adenopathy.   Skin:     General: Skin is warm and dry.      Findings: No rash.   Neurological:      Mental Status: She is alert and oriented to person, place, and time.          Results Review:    Lab Results (last 24 hours)     Procedure Component Value Units Date/Time    HIV-1 & HIV-2 Antibodies [[id number]]  (Normal) Collected: 08/08/22 1322    Specimen: Blood Updated: 08/08/22 1613    Narrative:      The following orders were created for panel order HIV-1 & HIV-2 Antibodies.  Procedure                               Abnormality         Status                     ---------                               -----------         ------                     HIV-1 / O / 2 Ag / Antib...[[id number]]  Normal              Final result                 Please view results for these tests on the individual orders.    HIV-1 / O / 2 Ag / Antibody 4th Generation [[id number]]  (Normal) Collected: 08/08/22 1322    Specimen: Blood Updated: 08/08/22 1613     HIV-1/ HIV-2 Non-Reactive     Comment: A non-reactive test result does not preclude the possibility of exposure to HIV or infection with HIV. An antibody response to recent exposure may take several months to reach detectable levels.       Narrative:      The HIV antibody/antigen combo assay is a qualitative assay for HIV that includes the p24 antigen as well as antibodies to HIV types 1 and 2. This test is intended to be used as a screening assay in the diagnosis of HIV  infection in patients over the age of 2.    GWENDOLYN + PE [[id number]]  (Abnormal) Collected: 08/05/22 1852    Specimen: Blood Updated: 08/08/22 1409     IgG 812 mg/dL      IgA 175 mg/dL      IgM 117 mg/dL      Total Protein 4.9 g/dL      Albumin 2.4 g/dL      Alpha-1-Globulin 0.3 g/dL      Alpha-2-Globulin 0.8 g/dL      Beta Globulin 0.6 g/dL      Gamma Globulin 0.8 g/dL      M-Sharath Not Observed g/dL      Globulin 2.5 g/dL      A/G Ratio 1.0     Immunofixation Reflex, Serum Comment     Comment: No monoclonality detected.        Please note Comment     Comment: Protein electrophoresis scan will follow via computer, mail, or   delivery.       Narrative:      Performed at:  [address] [place], [place] [id number]  : Waqar Michaels PhD, Phone:  [phone]    Manual Differential [[id number]]  (Abnormal) Collected: 08/08/22 1322    Specimen: Blood Updated: 08/08/22 1357     Neutrophil % 56.0 %      Lymphocyte % 23.0 %      Monocyte % 3.0 %      Bands %  14.0 %      Metamyelocyte % 1.0 %      Myelocyte % 3.0 %      Neutrophils Absolute 9.87 10*3/mm3      Lymphocytes Absolute 3.24 10*3/mm3      Monocytes Absolute 0.42 10*3/mm3      Anisocytosis Slight/1+     Poikilocytes Slight/1+     Toxic Granulation Slight/1+     Vacuolated Neutrophils Slight/1+     Platelet Estimate Decreased    CBC & Differential [[id number]]  (Abnormal) Collected: 08/08/22 1322    Specimen: Blood Updated: 08/08/22 1357    Narrative:      The following orders were created for panel order CBC & Differential.  Procedure                               Abnormality         Status                     ---------                               -----------         ------                     CBC Auto Differential[[id number]]        Abnormal            Final result               Scan Slide[[id number]]                                       Final result                 Please view results for these tests on the individual orders.     Scan Slide [[id number]] Collected: 08/08/22 1322    Specimen: Blood Updated: 08/08/22 1357     Scan Slide --     Comment: See Manual Differential Results       Peripheral Blood Smear [[id number]] Collected: 08/08/22 1322    Specimen: Blood Updated: 08/08/22 1357     Pathology Review Yes    CBC Auto Differential [[id number]]  (Abnormal) Collected: 08/08/22 1322    Specimen: Blood Updated: 08/08/22 1357     WBC 14.10 10*3/mm3      RBC 2.76 10*6/mm3      Hemoglobin 7.7 g/dL      Hematocrit 22.8 %      MCV 82.7 fL      MCH 27.8 pg      MCHC 33.6 g/dL      RDW 16.6 %      RDW-SD 48.1 fl      MPV 8.5 fL      Platelets 50 10*3/mm3     West Nile Antibodies, IgG & IgM [[id number]] Collected: 08/08/22 1322    Specimen: Blood Updated: 08/08/22 1325    Vadim-Barr Virus VCA Antibody Panel [[id number]]  (Abnormal) Collected: 08/06/22 1641    Specimen: Blood Updated: 08/08/22 1307     EBV VCA IgM Negative     EBV VCA IgG Equivocal     EBV Nuclear Ag Positive     EBV Early Ag Negative    POC Glucose Once [[id number]]  (Abnormal) Collected: 08/08/22 1229    Specimen: Blood Updated: 08/08/22 1230     Glucose 125 mg/dL      Comment: Serial Number: [id number]:  [id number]       KAREN by IFA, Reflex 9-biomarkers profile [[id number]] Collected: 08/05/22 1852    Specimen: Blood Updated: 08/08/22 1210     KAREN Negative     Comment:                                      Negative   <1:80                                       Borderline  1:80                                       Positive   >1:80  ICAP nomenclature: AC-0  For more information about Hep-2 cell patterns use  ANApatterns.org, the official website for the International  Consensus on Antinuclear Antibody (KAREN) Patterns (ICAP).        Please note Comment     Comment: KAREN Multiplex methodology was designed to detect up to 11 antibodies  of the 100+ antibodies that may be detected by KAREN IFA methodology.       Narrative:      Performed at:  [address] [place] [address]  [place]  [id number]  : Waqar Michaels PhD, Phone:  [phone]    KAREN [[id number]]  (Normal) Collected: 08/06/22 1641    Specimen: Blood Updated: 08/08/22 1050     Antinuclear Antibodies (KAREN) Negative    Pathology Consultation [[id number]] Collected: 08/05/22 0811    Specimen: Blood, Venous Line Updated: 08/08/22 1045     Final Diagnosis --     Leukocytosis with left shifted granulocytes  No blasts identified       Case Report --     Surgical Pathology Report                         Case: [id number]                                  Authorizing Provider:  Leonard Francis MD       Collected:           08/05/2022 08:11 AM          [facility]       Received:            08/08/2022 08:18 AM                                 EMERGENCY DEPARTMENT                                                         Pathologist:           Mervin Shah MD                                                            Specimen:    Blood, Venous Line                                                                         Pathology Consultation [[id number]] Collected: 08/07/22 0855    Specimen: Blood, Venous Line Updated: 08/08/22 1044     Final Diagnosis --     Leukocytosis with left shifted granulocytes  Anemia  Thrombocytopenia  No blasts identified       Case Report --     Surgical Pathology Report                         Case: [id number]                                  Authorizing Provider:  Shaye Alan APRN  Collected:           08/07/2022 08:55 AM          Ordering Location[facility]       Received:            08/08/2022 08:18 AM                                 INTENSIVE CARE UNIT                                                          Pathologist:           Mervin Shah MD                                                            Specimen:    Blood, Venous Line                                                                         Tissue Pathology Exam [[id number]]  Collected: 08/05/22 1755    Specimen: Tissue from Liver Updated: 08/08/22 1011    Blood Culture - Blood, Blood, Central Line [[id number]]  (Normal) Collected: 08/05/22 0902    Specimen: Blood, Central Line Updated: 08/08/22 0917     Blood Culture No growth at 3 days    Blood Culture - Blood, Blood, Central Line [[id number]]  (Normal) Collected: 08/05/22 0902    Specimen: Blood, Central Line Updated: 08/08/22 0917     Blood Culture No growth at 3 days    Manual Differential [[id number]]  (Abnormal) Collected: 08/08/22 0812    Specimen: Blood Updated: 08/08/22 0858     Neutrophil % 75.0 %      Lymphocyte % 20.0 %      Monocyte % 3.0 %      Bands %  2.0 %      Neutrophils Absolute 10.86 10*3/mm3      Lymphocytes Absolute 2.82 10*3/mm3      Monocytes Absolute 0.42 10*3/mm3      Poikilocytes Slight/1+     Toxic Granulation Slight/1+     Platelet Estimate Decreased    CBC & Differential [[id number]]  (Abnormal) Collected: 08/08/22 0812    Specimen: Blood Updated: 08/08/22 0858    Narrative:      The following orders were created for panel order CBC & Differential.  Procedure                               Abnormality         Status                     ---------                               -----------         ------                     CBC Auto Differential[[id number]]        Abnormal            Final result               Scan Slide[[id number]]                                       Final result                 Please view results for these tests on the individual orders.    Scan Slide [[id number]] Collected: 08/08/22 0812    Specimen: Blood Updated: 08/08/22 0858     Scan Slide --     Comment: See Manual Differential Results       CBC Auto Differential [[id number]]  (Abnormal) Collected: 08/08/22 0812    Specimen: Blood Updated: 08/08/22 0858     WBC 14.10 10*3/mm3      RBC 2.73 10*6/mm3      Hemoglobin 7.6 g/dL      Hematocrit 22.6 %      MCV 83.0 fL      MCH 27.8 pg      MCHC 33.5 g/dL      RDW 16.6 %      RDW-SD 49.0 fl       MPV 8.8 fL      Platelets 47 10*3/mm3     Narrative:      The previously reported component NRBC is no longer being reported. Previous result was 0.1 /100 WBC (Reference Range: 0.0-0.2 /100 WBC) on 8/8/2022 at 0842 EDT.    Renal Function Panel [[id number]]  (Abnormal) Collected: 08/08/22 0812    Specimen: Blood Updated: 08/08/22 0852     Glucose 87 mg/dL      BUN 16 mg/dL      Creatinine 1.35 mg/dL      Sodium 138 mmol/L      Potassium 4.1 mmol/L      Chloride 104 mmol/L      CO2 28.0 mmol/L      Calcium 7.6 mg/dL      Albumin 3.20 g/dL      Phosphorus 3.7 mg/dL      Anion Gap 6.0 mmol/L      BUN/Creatinine Ratio 11.9     eGFR 49.5 mL/min/1.73      Comment: National Kidney Foundation and American Society of Nephrology (ASN) Task Force recommended calculation based on the Chronic Kidney Disease Epidemiology Collaboration (CKD-EPI) equation refit without adjustment for race.       Narrative:      GFR Normal >60  Chronic Kidney Disease <60  Kidney Failure <15      Vancomycin, Random [[id number]]  (Normal) Collected: 08/08/22 0812    Specimen: Blood Updated: 08/08/22 0852     Vancomycin Random 17.20 mcg/mL     Narrative:      Therapeutic Ranges for Vancomycin    Vancomycin Random   5.0-40.0 mcg/mL  Vancomycin Trough   5.0-20.0 mcg/mL  Vancomycin Peak     20.0-40.0 mcg/mL    Magnesium [[id number]]  (Normal) Collected: 08/08/22 0812    Specimen: Blood Updated: 08/08/22 0852     Magnesium 2.5 mg/dL     Body Fluid Culture - Body Fluid, Liver [[id number]] Collected: 08/05/22 1755    Specimen: Body Fluid from Liver Updated: 08/08/22 0841     Body Fluid Culture No growth at 3 days     Gram Stain Few (2+) WBCs per low power field      No organisms seen    Calcium, Ionized [[id number]]  (Abnormal) Collected: 08/08/22 0812    Specimen: Blood Updated: 08/08/22 0839     Ionized Calcium 1.10 mmol/L     POC Glucose Once [[id number]]  (Normal) Collected: 08/08/22 0743    Specimen: Blood Updated: 08/08/22 0744     Glucose 85 mg/dL       Comment: Serial Number: [id number]:  [id number]       Anaerobic Culture - Swab, Liver [[id number]]  (Normal) Collected: 08/05/22 1755    Specimen: Swab from Liver Updated: 08/08/22 0730     Anaerobic Culture No anaerobes isolated at 3 days    POC Glucose Once [[id number]]  (Normal) Collected: 08/08/22 0503    Specimen: Blood Updated: 08/08/22 0504     Glucose 88 mg/dL      Comment: Serial Number: [id number]:  [id number]       Manual Differential [[id number]]  (Abnormal) Collected: 08/08/22 0359    Specimen: Blood Updated: 08/08/22 0436     Neutrophil % 77.0 %      Lymphocyte % 11.0 %      Monocyte % 7.0 %      Eosinophil % 1.0 %      Bands %  4.0 %      Neutrophils Absolute 11.58 10*3/mm3      Lymphocytes Absolute 1.57 10*3/mm3      Monocytes Absolute 1.00 10*3/mm3      Eosinophils Absolute 0.14 10*3/mm3      RBC Morphology Normal     Toxic Granulation Slight/1+     Vacuolated Neutrophils Slight/1+     Platelet Estimate Decreased    CBC & Differential [[id number]]  (Abnormal) Collected: 08/08/22 0359    Specimen: Blood Updated: 08/08/22 0436    Narrative:      The following orders were created for panel order CBC & Differential.  Procedure                               Abnormality         Status                     ---------                               -----------         ------                     CBC Auto Differential[[id number]]        Abnormal            Final result               Scan Slide[[id number]]                                       Final result                 Please view results for these tests on the individual orders.    Scan Slide [[id number]] Collected: 08/08/22 0359    Specimen: Blood Updated: 08/08/22 0436     Scan Slide --     Comment: See Manual Differential Results       CBC Auto Differential [[id number]]  (Abnormal) Collected: 08/08/22 0359    Specimen: Blood Updated: 08/08/22 0436     WBC 14.30 10*3/mm3      RBC 2.65 10*6/mm3      Hemoglobin 7.2 g/dL      Hematocrit 21.8 %      MCV  82.4 fL      MCH 27.0 pg      MCHC 32.8 g/dL      RDW 16.4 %      RDW-SD 47.3 fl      MPV 8.8 fL      Platelets 50 10*3/mm3     Narrative:      The previously reported component NRBC is no longer being reported. Previous result was 0.0 /100 WBC (Reference Range: 0.0-0.2 /100 WBC) on 8/8/2022 at 0407 EDT.    Comprehensive Metabolic Panel [[id number]]  (Abnormal) Collected: 08/08/22 0359    Specimen: Blood Updated: 08/08/22 0421     Glucose 98 mg/dL      BUN 19 mg/dL      Creatinine 1.53 mg/dL      Sodium 140 mmol/L      Potassium 4.1 mmol/L      Chloride 105 mmol/L      CO2 28.0 mmol/L      Calcium 7.6 mg/dL      Total Protein 5.2 g/dL      Albumin 3.00 g/dL      ALT (SGPT) 259 U/L      AST (SGOT) 233 U/L      Alkaline Phosphatase 133 U/L      Total Bilirubin 3.8 mg/dL      Globulin 2.2 gm/dL      A/G Ratio 1.4 g/dL      BUN/Creatinine Ratio 12.4     Anion Gap 7.0 mmol/L      eGFR 42.6 mL/min/1.73      Comment: National Kidney Foundation and American Society of Nephrology (ASN) Task Force recommended calculation based on the Chronic Kidney Disease Epidemiology Collaboration (CKD-EPI) equation refit without adjustment for race.       Narrative:      GFR Normal >60  Chronic Kidney Disease <60  Kidney Failure <15      Renal Function Panel [[id number]]  (Abnormal) Collected: 08/07/22 2327    Specimen: Blood Updated: 08/08/22 0000     Glucose 105 mg/dL      BUN 21 mg/dL      Creatinine 1.77 mg/dL      Sodium 140 mmol/L      Potassium 3.8 mmol/L      Chloride 104 mmol/L      CO2 28.0 mmol/L      Calcium 8.1 mg/dL      Albumin 3.30 g/dL      Phosphorus 4.3 mg/dL      Anion Gap 8.0 mmol/L      BUN/Creatinine Ratio 11.9     eGFR 35.8 mL/min/1.73      Comment: National Kidney Foundation and American Society of Nephrology (ASN) Task Force recommended calculation based on the Chronic Kidney Disease Epidemiology Collaboration (CKD-EPI) equation refit without adjustment for race.       Narrative:      GFR Normal >60  Chronic Kidney  Disease <60  Kidney Failure <15      Manual Differential [[id number]]  (Abnormal) Collected: 08/07/22 2327    Specimen: Blood Updated: 08/07/22 2359     Neutrophil % 73.0 %      Lymphocyte % 11.0 %      Monocyte % 5.0 %      Eosinophil % 2.0 %      Bands %  8.0 %      Myelocyte % 1.0 %      Neutrophils Absolute 11.26 10*3/mm3      Lymphocytes Absolute 1.53 10*3/mm3      Monocytes Absolute 0.70 10*3/mm3      Eosinophils Absolute 0.28 10*3/mm3      RBC Morphology Normal     Toxic Granulation Slight/1+     Vacuolated Neutrophils Slight/1+     Platelet Estimate Decreased    CBC & Differential [[id number]]  (Abnormal) Collected: 08/07/22 2327    Specimen: Blood Updated: 08/07/22 2359    Narrative:      The following orders were created for panel order CBC & Differential.  Procedure                               Abnormality         Status                     ---------                               -----------         ------                     CBC Auto Differential[[id number]]        Abnormal            Final result               Scan Slide[[id number]]                                       Final result                 Please view results for these tests on the individual orders.    Scan Slide [[id number]] Collected: 08/07/22 2327    Specimen: Blood Updated: 08/07/22 2359     Scan Slide --     Comment: See Manual Differential Results       CBC Auto Differential [[id number]]  (Abnormal) Collected: 08/07/22 2327    Specimen: Blood Updated: 08/07/22 2359     WBC 13.90 10*3/mm3      RBC 2.76 10*6/mm3      Hemoglobin 7.5 g/dL      Hematocrit 22.8 %      MCV 82.4 fL      MCH 27.0 pg      MCHC 32.7 g/dL      RDW 16.4 %      RDW-SD 47.3 fl      MPV 8.7 fL      Platelets 57 10*3/mm3     Narrative:      The previously reported component NRBC is no longer being reported. Previous result was 0.0 /100 WBC (Reference Range: 0.0-0.2 /100 WBC) on 8/7/2022 at 2338 EDT.    Magnesium [[id number]]  (Normal) Collected: 08/07/22 2327    Specimen:  Blood Updated: 08/07/22 2352     Magnesium 2.4 mg/dL     Calcium, Ionized [[id number]]  (Abnormal) Collected: 08/07/22 2327    Specimen: Blood Updated: 08/07/22 2340     Ionized Calcium 1.13 mmol/L     POC Glucose Once [[id number]]  (Normal) Collected: 08/07/22 2327    Specimen: Blood Updated: 08/07/22 2328     Glucose 99 mg/dL      Comment: Serial Number: [id number]:  [id number]       Manual Differential [[id number]]  (Abnormal) Collected: 08/07/22 2002    Specimen: Blood Updated: 08/07/22 2051     Neutrophil % 88.0 %      Lymphocyte % 5.0 %      Monocyte % 5.0 %      Eosinophil % 2.0 %      Neutrophils Absolute 11.70 10*3/mm3      Lymphocytes Absolute 0.67 10*3/mm3      Monocytes Absolute 0.67 10*3/mm3      Eosinophils Absolute 0.27 10*3/mm3      RBC Morphology Normal     WBC Morphology Normal     Platelet Morphology Normal    CBC & Differential [[id number]]  (Abnormal) Collected: 08/07/22 2002    Specimen: Blood Updated: 08/07/22 2051    Narrative:      The following orders were created for panel order CBC & Differential.  Procedure                               Abnormality         Status                     ---------                               -----------         ------                     CBC Auto Differential[[id number]]        Abnormal            Final result               Scan Slide[[id number]]                                       Final result                 Please view results for these tests on the individual orders.    Scan Slide [[id number]] Collected: 08/07/22 2002    Specimen: Blood Updated: 08/07/22 2051     Scan Slide --     Comment: See Manual Differential Results       CBC Auto Differential [[id number]]  (Abnormal) Collected: 08/07/22 2002    Specimen: Blood Updated: 08/07/22 2051     WBC 13.30 10*3/mm3      RBC 2.74 10*6/mm3      Hemoglobin 7.5 g/dL      Hematocrit 22.4 %      MCV 81.6 fL      MCH 27.3 pg      MCHC 33.5 g/dL      RDW 16.0 %      RDW-SD 45.9 fl      MPV 8.6 fL      Platelets 57  10*3/mm3     Narrative:      The previously reported component NRBC is no longer being reported. Previous result was 0.0 /100 WBC (Reference Range: 0.0-0.2 /100 WBC) on 8/7/2022 at 2015 EDT.    Vancomycin, Random [[id number]]  (Normal) Collected: 08/07/22 2002    Specimen: Blood Updated: 08/07/22 2033     Vancomycin Random 17.40 mcg/mL     Narrative:      Therapeutic Ranges for Vancomycin    Vancomycin Random   5.0-40.0 mcg/mL  Vancomycin Trough   5.0-20.0 mcg/mL  Vancomycin Peak     20.0-40.0 mcg/mL    POC Glucose Once [[id number]]  (Normal) Collected: 08/07/22 1808    Specimen: Blood Updated: 08/07/22 1809     Glucose 102 mg/dL      Comment: Serial Number: [id number]:  [id number]       Manual Differential [[id number]]  (Abnormal) Collected: 08/07/22 1615    Specimen: Blood Updated: 08/07/22 1651     Neutrophil % 65.0 %      Lymphocyte % 17.0 %      Monocyte % 10.0 %      Bands %  8.0 %      Neutrophils Absolute 10.51 10*3/mm3      Lymphocytes Absolute 2.45 10*3/mm3      Monocytes Absolute 1.44 10*3/mm3      RBC Morphology Normal     Vacuolated Neutrophils Slight/1+     Platelet Estimate Decreased    CBC & Differential [[id number]]  (Abnormal) Collected: 08/07/22 1615    Specimen: Blood Updated: 08/07/22 1651    Narrative:      The following orders were created for panel order CBC & Differential.  Procedure                               Abnormality         Status                     ---------                               -----------         ------                     CBC Auto Differential[[id number]]        Abnormal            Final result               Scan Slide[[id number]]                                       Final result                 Please view results for these tests on the individual orders.    Scan Slide [[id number]] Collected: 08/07/22 1615    Specimen: Blood Updated: 08/07/22 1651     Scan Slide --     Comment: See Manual Differential Results       CBC Auto Differential [[id number]]  (Abnormal)  Collected: 08/07/22 1615    Specimen: Blood Updated: 08/07/22 1651     WBC 14.40 10*3/mm3      RBC 2.74 10*6/mm3      Hemoglobin 7.6 g/dL      Hematocrit 22.4 %      MCV 81.5 fL      MCH 27.5 pg      MCHC 33.8 g/dL      RDW 15.8 %      RDW-SD 45.5 fl      MPV 8.2 fL      Platelets 65 10*3/mm3     Narrative:      The previously reported component NRBC is no longer being reported. Previous result was 0.1 /100 WBC (Reference Range: 0.0-0.2 /100 WBC) on 8/7/2022 at 1633 EDT.    Renal Function Panel [[id number]]  (Abnormal) Collected: 08/07/22 1615    Specimen: Blood Updated: 08/07/22 1651     Glucose 112 mg/dL      BUN 25 mg/dL      Creatinine 2.05 mg/dL      Sodium 139 mmol/L      Potassium 3.7 mmol/L      Chloride 101 mmol/L      CO2 29.0 mmol/L      Calcium 7.7 mg/dL      Albumin 3.20 g/dL      Phosphorus 3.8 mg/dL      Anion Gap 9.0 mmol/L      BUN/Creatinine Ratio 12.2     eGFR 30.0 mL/min/1.73      Comment: National Kidney Foundation and American Society of Nephrology (ASN) Task Force recommended calculation based on the Chronic Kidney Disease Epidemiology Collaboration (CKD-EPI) equation refit without adjustment for race.       Narrative:      GFR Normal >60  Chronic Kidney Disease <60  Kidney Failure <15      Magnesium [[id number]]  (Normal) Collected: 08/07/22 1615    Specimen: Blood Updated: 08/07/22 1650     Magnesium 2.3 mg/dL     Calcium, Ionized [[id number]]  (Abnormal) Collected: 08/07/22 1615    Specimen: Blood Updated: 08/07/22 1639     Ionized Calcium 1.05 mmol/L         Imaging Results (Last 24 Hours)     Procedure Component Value Units Date/Time    XR Chest 1 View [[id number]] Collected: 08/08/22 1019     Updated: 08/08/22 1022    Narrative:         DATE OF EXAM:   8/8/2022 9:58 AM     PROCEDURE:   XR CHEST 1 VW-     INDICATIONS:   hypoxic resp failure; N17.9-Acute kidney failure, unspecified;  A41.9-Sepsis, unspecified organism; R65.21-Severe sepsis with septic  shock     COMPARISON:  08/06/2022      TECHNIQUE:   Portable chest radiograph.     FINDINGS:    There is a right internal jugular central venous catheter with the tip  at the proximal right atrium. Right upper extremity PICC line tip is at  the cavoatrial junction. Stable mild cardiomegaly. Persistent bibasilar  airspace disease likely atelectasis. No pneumothorax.       Impression:      1. Stable right IJ central venous catheter and right upper extremity  PICC line.  2. Persistent mild bibasilar airspace disease likely atelectasis.  3. No pneumothorax.     Electronically Signed By-Eleazar Narvaez MD On:8/8/2022 10:20 AM  This report was finalized on [id number] by  Eleazar Narvaez MD.           I have reviewed the above results and noted them below    Medication Review:    Current Facility-Administered Medications:   •  acetaminophen (TYLENOL) tablet 650 mg, 650 mg, Oral, Q4H PRN, 650 mg at 08/05/22 2147 **OR** acetaminophen (TYLENOL) suppository 650 mg, 650 mg, Rectal, Q4H PRN, Connor Rod APRN  •  cefTRIAXone (ROCEPHIN) 2 g in sodium chloride 0.9 % 100 mL IVPB, 2 g, Intravenous, Q24H, Sagar Orozco MD, Last Rate: 200 mL/hr at 08/08/22 1315, 2 g at 08/08/22 1315  •  dextrose (D50W) (25 g/50 mL) IV injection 25 g, 25 g, Intravenous, Q15 Min PRN, Connor Rod APRSALLY  •  dextrose (GLUTOSE) oral gel 15 g, 15 g, Oral, Q15 Min PRN, Connor Rod APRN  •  dextrose 10 % infusion, 25 mL/hr, Intravenous, Continuous, Day, GRACIELA Davis, Last Rate: 25 mL/hr at 08/08/22 0801, 25 mL/hr at 08/08/22 0801  •  escitalopram (LEXAPRO) tablet 10 mg, 10 mg, Oral, Daily, Shaye Alan APRN, 10 mg at 08/08/22 0801  •  fentaNYL citrate (PF) (SUBLIMAZE) injection 25 mcg, 25 mcg, Intravenous, Q2H PRN, Shaye Alan APRN, 25 mcg at 08/07/22 0749  •  glucagon (human recombinant) (GLUCAGEN DIAGNOSTIC) 1 mg in sterile water (preservative free) 1 mL injection, 1 mg, Intramuscular, Q15 Min PRN, Connor Rod APRN  •  hydrocortisone sodium succinate  (Solu-CORTEF) injection 50 mg, 50 mg, Intravenous, Q12H, Connor Rod APRN, 50 mg at 08/08/22 0515  •  HYDROmorphone (DILAUDID) injection 1 mg, 1 mg, Intravenous, Q1H PRN, Day, Susan, APRN  •  insulin lispro (ADMELOG) injection 0-7 Units, 0-7 Units, Subcutaneous, Q6H, Day, Susan, APRN, 2 Units at 08/07/22 0021  •  morphine injection 2 mg, 2 mg, Intravenous, Q2H PRN, Day, Susan, APRN, 2 mg at 08/07/22 2123  •  nitroglycerin (NITROSTAT) SL tablet 0.4 mg, 0.4 mg, Sublingual, Q5 Min PRN, Connor Rod APRN  •  ondansetron (ZOFRAN) tablet 4 mg, 4 mg, Oral, Q6H PRN **OR** ondansetron (ZOFRAN) injection 4 mg, 4 mg, Intravenous, Q6H PRN, Connor Rod APRN, 4 mg at 08/06/22 2042  •  oxyCODONE (ROXICODONE) immediate release tablet 10 mg, 10 mg, Oral, Q4H PRN, Day, Susan, APRN, 10 mg at 08/07/22 1803  •  [START ON 8/9/2022] pantoprazole (PROTONIX) EC tablet 40 mg, 40 mg, Oral, Q AM, Draw, Azmi, MD  •  simethicone (MYLICON) chewable tablet 80 mg, 80 mg, Oral, 4x Daily PRN, Shaye Alan, APRN, 80 mg at 08/06/22 0128  •  sodium chloride 0.9 % flush 10 mL, 10 mL, Intravenous, PRN, Leonard Francis MD  •  sodium chloride 0.9 % flush 10 mL, 10 mL, Intravenous, Q12H, Connor Rod APRN, 10 mL at 08/08/22 0802  •  sodium chloride 0.9 % flush 10 mL, 10 mL, Intravenous, PRN, Connor Rod APRN  •  sodium chloride 0.9 % flush 10 mL, 10 mL, Intravenous, PRN, Day, Susan, APRN  •  sodium chloride 0.9 % flush 10 mL, 10 mL, Intravenous, Q12H, Day, Susan, APRN, 10 mL at 08/08/22 0802  •  sodium chloride 0.9 % flush 20 mL, 20 mL, Intravenous, PRN, Day, Susan, APRN    Assessment & Plan     Principal Problem:    Septic shock (HCC)  Active Problems:    Pulmonary hypertension, unspecified (HCC)    Anxiety    Vitamin D deficiency    Essential hypertension    Suspected liver abscess    Diarrhea    Acute kidney injury (HCC)    Metabolic acidosis    No plans for acute surgical intervention.    Continue to monitor hemoglobin and  platelets.  Transfuse as needed.  Discussed with patient possibility of performing a laparoscopic washout.  We discussed the risk, benefits, and alternatives.  At present I do not think she will benefit from a laparoscopic washout, and we have agreed that we will allow the hemoperitoneum to resorb naturally.  Okay for diet as tolerated  Okay for activity as tolerated  Call with questions    Plan for disposition: Per intensive care unit    Nahum cOonnor MD  08/08/22  16:26 EDT

## 2022-08-08 NOTE — PLAN OF CARE
Goal Outcome Evaluation:  Plan of Care Reviewed With: patient        Progress: improving  Outcome Evaluation: CRRT tolerated well overnight - able to pull goal UF without hemodynamic issues.  All pressors remain off.   Pt producing 20-30cc urine per hour.     Hgb and Platelets remain low, but not actively dropping as was before. No transfusions overnight per APRN, as pt remains hemodynamically stable.   BUN and Cr continuing to trend down.     Pt continues to complain of intermittent abdominal pain in LUQ and RUQ - PRNs given and effective.     Pt resting comfortably. VSS.

## 2022-08-08 NOTE — PROGRESS NOTES
"Pharmacy Antimicrobial Dosing Service    Subjective:  Raul Gardner is a 47 y.o.female admitted with sepsis. Pharmacy has been consulted to dose Vancomycin for possible sepsis.      Assessment/Plan    1. Day #2 Vancomycin: Pulse dosing d/t HD status. Random level this PM was 17.4 mcg/mL. Will dose with vancomycin 1250 mg (15 mg/kg AdjBW) tonight. Will obtain random at 0800 on 8/8 since patient is on CRRT. Plan to redose with level < 20 mcg/mL.    2. Day #2 Meropenem: 1 gm IV q8h for CRRT    3. Day #2 Micafungin: 100 mg IV q24h.    Will continue to monitor drug levels, renal function, culture and sensitivities, and patient clinical status.       Objective:  Relevant clinical data and objective history reviewed:  165.1 cm (65\")   113 kg (250 lb)   Ideal body weight: 57 kg (125 lb 10.6 oz)  Adjusted ideal body weight: 79.6 kg (175 lb 6.4 oz)  Body mass index is 41.6 kg/m².    Results from last 7 days   Lab Units 08/07/22 2002 08/07/22  0600   VANCOMYCIN RM mcg/mL 17.40 18.80     Results from last 7 days   Lab Units 08/07/22  1615 08/07/22  0600 08/06/22  2154   CREATININE mg/dL 2.05* 2.92* 4.25*     Estimated Creatinine Clearance: 43.4 mL/min (A) (by C-G formula based on SCr of 2.05 mg/dL (H)).  I/O last 3 completed shifts:  In: 9427 [I.V.:7362; Blood:2065]  Out: 2368 [Urine:665; Other:1703]    Results from last 7 days   Lab Units 08/07/22 2002 08/07/22 1615 08/07/22  1204   WBC 10*3/mm3 13.30* 14.40* 12.20*     Temperature    08/07/22 1930 08/07/22 1945 08/07/22 2000   Temp: 98.1 °F (36.7 °C) 98.1 °F (36.7 °C) 98.1 °F (36.7 °C)     Baseline culture/source/susceptibility:  Microbiology Results (last 10 days)       Procedure Component Value - Date/Time    Clostridioides difficile Toxin - Stool, Per Rectum [[id number]]  (Normal) Collected: 08/06/22 0703    Lab Status: Final result Specimen: Stool from Per Rectum Updated: 08/06/22 3568    Narrative:      The following orders were created for panel order Clostridioides " difficile Toxin - Stool, Per Rectum.  Procedure                               Abnormality         Status                     ---------                               -----------         ------                     Clostridioides difficile...[[id number]]  Normal              Final result                 Please view results for these tests on the individual orders.    Clostridioides difficile EIA - Stool, Per Rectum [[id number]]  (Normal) Collected: 08/06/22 0703    Lab Status: Final result Specimen: Stool from Per Rectum Updated: 08/06/22 0758     C Diff GDH / Toxin Negative    Eosinophil Smear - Urine, Urine, Clean Catch [[id number]]  (Normal) Collected: 08/05/22 1917    Lab Status: Final result Specimen: Urine, Clean Catch Updated: 08/05/22 2047     Eosinophil Smear 0 % EOS/100 Cells     Body Fluid Culture - Body Fluid, Liver [[id number]] Collected: 08/05/22 1755    Lab Status: Preliminary result Specimen: Body Fluid from Liver Updated: 08/07/22 0929     Body Fluid Culture No growth at 2 days     Gram Stain Few (2+) WBCs per low power field      No organisms seen    S. Pneumo Ag Urine or CSF - Urine, Urine, Clean Catch [[id number]]  (Normal) Collected: 08/05/22 1154    Lab Status: Final result Specimen: Urine, Clean Catch Updated: 08/05/22 1234     Strep Pneumo Ag Negative    Legionella Antigen, Urine - Urine, Urine, Clean Catch [[id number]]  (Normal) Collected: 08/05/22 1154    Lab Status: Final result Specimen: Urine, Clean Catch Updated: 08/05/22 1234     LEGIONELLA ANTIGEN, URINE Negative    MRSA Screen, PCR (Inpatient) - Swab, Nares [[id number]]  (Normal) Collected: 08/05/22 1057    Lab Status: Final result Specimen: Swab from Nares Updated: 08/05/22 1222     MRSA PCR No MRSA Detected    Narrative:      The negative predictive value of this diagnostic test is high and should only be used to consider de-escalating anti-MRSA therapy. A positive result may indicate colonization with MRSA and must be correlated clinically.     Gastrointestinal Panel, PCR - Stool, Per Rectum [[id number]]  (Abnormal) Collected: 08/05/22 1057    Lab Status: Final result Specimen: Stool from Per Rectum Updated: 08/05/22 1238     Campylobacter Not Detected     Plesiomonas shigelloides Not Detected     Salmonella Not Detected     Vibrio Not Detected     Vibrio cholerae Not Detected     Yersinia enterocolitica Not Detected     Enteroaggregative E. coli (EAEC) Not Detected     Enteropathogenic E. coli (EPEC) Detected     Enterotoxigenic E. coli (ETEC) lt/st Not Detected     Shiga-like toxin-producing E. coli (STEC) stx1/stx2 Not Detected     Shigella/Enteroinvasive E. coli (EIEC) Not Detected     Cryptosporidium Not Detected     Cyclospora cayetanensis Not Detected     Entamoeba histolytica Not Detected     Giardia lamblia Not Detected     Adenovirus F40/41 Not Detected     Astrovirus Not Detected     Norovirus GI/GII Not Detected     Rotavirus A Not Detected     Sapovirus (I, II, IV or V) Not Detected    Urine Culture - Urine, Urine, Catheter [[id number]]  (Normal) Collected: 08/05/22 1012    Lab Status: Final result Specimen: Urine, Catheter Updated: 08/06/22 1409     Urine Culture No growth    Blood Culture - Blood, Blood, Central Line [[id number]]  (Normal) Collected: 08/05/22 0902    Lab Status: Preliminary result Specimen: Blood, Central Line Updated: 08/07/22 0915     Blood Culture No growth at 2 days    Blood Culture - Blood, Blood, Central Line [[id number]]  (Normal) Collected: 08/05/22 0902    Lab Status: Preliminary result Specimen: Blood, Central Line Updated: 08/07/22 0915     Blood Culture No growth at 2 days    Respiratory Panel PCR w/COVID-19(SARS-CoV-2) CELSA/JONATHAN/DOMINIQUE/PAD/COR/MAD/LISA In-House, NP Swab in UTM/VTM, 3-4 HR TAT - Swab, Nasopharynx [[id number]]  (Normal) Collected: 08/05/22 0815    Lab Status: Final result Specimen: Swab from Nasopharynx Updated: 08/05/22 0909     ADENOVIRUS, PCR Not Detected     Coronavirus 229E Not Detected      Coronavirus HKU1 Not Detected     Coronavirus NL63 Not Detected     Coronavirus OC43 Not Detected     COVID19 Not Detected     Human Metapneumovirus Not Detected     Human Rhinovirus/Enterovirus Not Detected     Influenza A PCR Not Detected     Influenza B PCR Not Detected     Parainfluenza Virus 1 Not Detected     Parainfluenza Virus 2 Not Detected     Parainfluenza Virus 3 Not Detected     Parainfluenza Virus 4 Not Detected     RSV, PCR Not Detected     Bordetella pertussis pcr Not Detected     Bordetella parapertussis PCR Not Detected     Chlamydophila pneumoniae PCR Not Detected     Mycoplasma pneumo by PCR Not Detected    Narrative:      In the setting of a positive respiratory panel with a viral infection PLUS a negative procalcitonin without other underlying concern for bacterial infection, consider observing off antibiotics or discontinuation of antibiotics and continue supportive care. If the respiratory panel is positive for atypical bacterial infection (Bordetella pertussis, Chlamydophila pneumoniae, or Mycoplasma pneumoniae), consider antibiotic de-escalation to target atypical bacterial infection.            Anti-Infectives (From admission, onward)      Ordered     Dose/Rate Route Frequency Start Stop    08/07/22 2041  Vancomycin HCl 1,250 mg in sodium chloride 0.9 % 250 mL IVPB        Ordering Provider: Shy Moreno MD    15 mg/kg × 79.4 kg (Adjusted) Intravenous Once 08/07/22 2130      08/07/22 0722  meropenem (MERREM) 1 g in sodium chloride 0.9 % 100 mL IVPB        Ordering Provider: Shy Moreno MD    1 g  over 3 Hours Intravenous Every 8 Hours 08/07/22 0815 08/13/22 0814    08/07/22 0722  Vancomycin HCl 1,250 mg in sodium chloride 0.9 % 250 mL IVPB        Ordering Provider: Shy Moreno MD    15 mg/kg × 79.4 kg (Adjusted) Intravenous Once 08/07/22 0815 08/07/22 0840    08/06/22 1125  meropenem (MERREM) 1 g in sodium chloride 0.9 % 100 mL IVPB        Ordering Provider: Sagar Orozco MD     1 g  over 30 Minutes Intravenous Once 08/06/22 1500 08/06/22 1634    08/06/22 1125  micafungin sodium (MYCAMINE) 100 mg in sodium chloride 0.9 % 100 mL IVPB        Ordering Provider: Sagar Orozco MD    100 mg  over 60 Minutes Intravenous Every 24 Hours 08/06/22 1300 08/13/22 1259    08/06/22 1128  vancomycin 1500 mg/500 mL 0.9% NS IVPB ([facility])        Ordering Provider: Sagar Orozco MD    20 mg/kg × 79.4 kg (Adjusted) Intravenous Once 08/06/22 1300 08/06/22 1601    08/06/22 1130  Vancomycin Pharmacy Intermittent/Pulse Dosing        Ordering Provider: Sagar Orozco MD     Does not apply As Needed 08/06/22 1129 08/13/22 1128    08/06/22 1125  Pharmacy to dose vancomycin        Ordering Provider: Sagar Orozco MD     Does not apply Continuous PRN 08/06/22 1124 08/13/22 1123    08/05/22 0916  vancomycin 1500 mg/500 mL 0.9% NS IVPB ([facility])        Ordering Provider: Leonard Francis MD    20 mg/kg × 79.4 kg (Adjusted) Intravenous Once 08/05/22 0918 08/05/22 0958    08/05/22 0809  piperacillin-tazobactam (ZOSYN) IVPB 4.5 g in 100 mL NS ([facility])        Ordering Provider: Leonard Francis MD    4.5 g  over 30 Minutes Intravenous Once 08/05/22 0811 08/05/22 1011            Cheryl Cerna RPH  08/07/22 20:42 EDT

## 2022-08-08 NOTE — PLAN OF CARE
Goal Outcome Evaluation:  Plan of Care Reviewed With: patient, spouse           Outcome Evaluation: 46 yo female admitted with fever, body aches, vomitting and diarrhea.  Pt was hypotensive and admitted to ICU with d/o septic shock. Pt with MARIANNE and required CRRT.  Pt is now improving and is on 3L O2.  Pt is from home with her spouse and 2 dependent children.  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  Pt feels generally weak and is edematous in her UE/LEs.  Pt transfers OOB to chair with min A x2.  Left RW in room for progressive mobiltiy as pts LEs weak in standing.  Anticiapte steady progress with goal for return home with spouse at d/c.

## 2022-08-08 NOTE — PROGRESS NOTES
Infectious Diseases Progress Note      LOS: 3 days   Patient Care Team:  Maribel Graf MD as PCP - General (Family Medicine)  AVA Cavanaugh MD as Consulting Physician (Cardiology)    Chief Complaint: Weakness    Subjective     The patient had no fever during the last 24 hours.  She remained hemodynamically stable off vasopressors since yesterday.  She is awake and alert.  She is currently on 2 L of oxygen via nasal cannula.  She has no more diarrhea.  She came off CRRT and currently making urine      Sandoval catheter  Review of Systems:   Review of Systems   Constitutional: Positive for fatigue.   HENT: Negative.    Eyes: Negative.    Respiratory: Negative.    Cardiovascular: Negative.    Endocrine: Negative.    Genitourinary: Negative.    Musculoskeletal: Negative.    Skin: Negative.    Neurological: Negative.    Psychiatric/Behavioral: Negative.    All other systems reviewed and are negative.       Objective     Vital Signs  Temp:  [97.16 °F (36.2 °C)-98.6 °F (37 °C)] 98.6 °F (37 °C)  Heart Rate:  [] 102  Resp:  [12-19] 17  BP: (118-158)/() 144/86    Physical Exam:  Physical Exam  Vitals and nursing note reviewed.   Constitutional:       Appearance: She is well-developed.   HENT:      Head: Normocephalic and atraumatic.   Eyes:      Pupils: Pupils are equal, round, and reactive to light.   Cardiovascular:      Rate and Rhythm: Normal rate and regular rhythm.      Heart sounds: Normal heart sounds.   Pulmonary:      Effort: Pulmonary effort is normal. No respiratory distress.      Breath sounds: Normal breath sounds. No wheezing or rales.   Abdominal:      General: Bowel sounds are normal. There is no distension.      Palpations: Abdomen is soft. There is no mass.      Tenderness: There is no abdominal tenderness. There is no guarding or rebound.   Musculoskeletal:         General: No deformity. Normal range of motion.      Cervical back: Normal range of motion and neck supple.   Skin:      General: Skin is warm.      Findings: No erythema or rash.   Neurological:      Mental Status: She is alert and oriented to person, place, and time.      Cranial Nerves: No cranial nerve deficit.          Results Review:    I have reviewed all clinical data, test, lab, and imaging results.     Radiology  XR Chest 1 View    Result Date: 8/8/2022   DATE OF EXAM: 8/8/2022 9:58 AM  PROCEDURE: XR CHEST 1 VW-  INDICATIONS: hypoxic resp failure; N17.9-Acute kidney failure, unspecified; A41.9-Sepsis, unspecified organism; R65.21-Severe sepsis with septic shock  COMPARISON: 08/06/2022  TECHNIQUE: Portable chest radiograph.  FINDINGS:  There is a right internal jugular central venous catheter with the tip at the proximal right atrium. Right upper extremity PICC line tip is at the cavoatrial junction. Stable mild cardiomegaly. Persistent bibasilar airspace disease likely atelectasis. No pneumothorax.      1. Stable right IJ central venous catheter and right upper extremity PICC line. 2. Persistent mild bibasilar airspace disease likely atelectasis. 3. No pneumothorax.  Electronically Signed By-Eleazar Narvaez MD On:8/8/2022 10:20 AM This report was finalized on [id number] by  Eleazar Narvaez MD.      Cardiology    Laboratory    Results from last 7 days   Lab Units 08/08/22  0812 08/08/22 0359 08/07/22 2327 08/07/22 2002 08/07/22  1615 08/07/22  1513 08/07/22  1204 08/07/22  0855   WBC 10*3/mm3 14.10* 14.30* 13.90* 13.30* 14.40*  --  12.20* 11.60*   HEMOGLOBIN g/dL 7.6* 7.2* 7.5* 7.5* 7.6*  --  6.7* 7.2*   HEMATOCRIT % 22.6* 21.8* 22.8* 22.4* 22.4*  --  20.1* 21.5*   PLATELETS 10*3/mm3 47* 50* 57* 57* 65* 68* 46* 24*     Results from last 7 days   Lab Units 08/08/22  0812 08/08/22 0359 08/07/22 2327 08/07/22  1615 08/07/22  0600 08/06/22  2154 08/06/22  1302 08/06/22  1013 08/06/22  0510 08/05/22  1400 08/05/22  0811   SODIUM mmol/L 138 140 140 139 138 141 136 139 138 133* 132*   POTASSIUM mmol/L 4.1 4.1 3.8 3.7 3.7 3.4* 3.5  2.5* 3.4* 3.7 3.9   CHLORIDE mmol/L 104 105 104 101 99 98 96* 96* 96* 99 94*   CO2 mmol/L 28.0 28.0 28.0 29.0 30.0* 29.0 16.0* 29.0 24.0 19.0* 21.0*   BUN mg/dL 16 19 21* 25* 37* 55* 53* 40* 46* 38* 32*   CREATININE mg/dL 1.35* 1.53* 1.77* 2.05* 2.92* 4.25* 4.58* 3.25* 4.06* 4.03* 3.82*   GLUCOSE mg/dL 87 98 105* 112* 124* 162* 264* 543* 218* 165* 141*   ALBUMIN g/dL 3.20* 3.00* 3.30* 3.20* 2.90* 3.30* 2.70* 1.90* 2.50* 2.90* 3.30*   BILIRUBIN mg/dL  --  3.8*  --   --  4.7*  --  4.6* 3.1* 4.5* 4.6* 4.3*   ALK PHOS U/L  --  133*  --   --  112  --  89 72 75 64 64   AST (SGOT) U/L  --  233*  --   --  522*  --  393* 194* 177* 45* 41*   ALT (SGPT) U/L  --  259*  --   --  352*  --  222* 109* 98* 27 30   AMYLASE U/L  --   --   --   --   --   --   --  39  --   --  81   LIPASE U/L  --   --   --   --   --   --   --  4*  --   --  12*   CALCIUM mg/dL 7.6* 7.6* 8.1* 7.7* 7.2* 7.2* 6.9* 5.1* 7.2* 7.5* 8.3*     Results from last 7 days   Lab Units 08/05/22  0811   CK TOTAL U/L 327*             Microbiology   Microbiology Results (last 10 days)     Procedure Component Value - Date/Time    Clostridioides difficile Toxin - Stool, Per Rectum [[id number]]  (Normal) Collected: 08/06/22 0703    Lab Status: Final result Specimen: Stool from Per Rectum Updated: 08/06/22 0758    Narrative:      The following orders were created for panel order Clostridioides difficile Toxin - Stool, Per Rectum.  Procedure                               Abnormality         Status                     ---------                               -----------         ------                     Clostridioides difficile...[[id number]]  Normal              Final result                 Please view results for these tests on the individual orders.    Clostridioides difficile EIA - Stool, Per Rectum [[id number]]  (Normal) Collected: 08/06/22 0703    Lab Status: Final result Specimen: Stool from Per Rectum Updated: 08/06/22 0758     C Diff GDH / Toxin Negative    Eosinophil  Smear - Urine, Urine, Clean Catch [[id number]]  (Normal) Collected: 08/05/22 1917    Lab Status: Final result Specimen: Urine, Clean Catch Updated: 08/05/22 2047     Eosinophil Smear 0 % EOS/100 Cells     Body Fluid Culture - Body Fluid, Liver [[id number]] Collected: 08/05/22 1755    Lab Status: Final result Specimen: Body Fluid from Liver Updated: 08/08/22 0841     Body Fluid Culture No growth at 3 days     Gram Stain Few (2+) WBCs per low power field      No organisms seen    Anaerobic Culture - Swab, Liver [[id number]]  (Normal) Collected: 08/05/22 1755    Lab Status: Preliminary result Specimen: Swab from Liver Updated: 08/08/22 0730     Anaerobic Culture No anaerobes isolated at 3 days    S. Pneumo Ag Urine or CSF - Urine, Urine, Clean Catch [[id number]]  (Normal) Collected: 08/05/22 1154    Lab Status: Final result Specimen: Urine, Clean Catch Updated: 08/05/22 1234     Strep Pneumo Ag Negative    Legionella Antigen, Urine - Urine, Urine, Clean Catch [[id number]]  (Normal) Collected: 08/05/22 1154    Lab Status: Final result Specimen: Urine, Clean Catch Updated: 08/05/22 1234     LEGIONELLA ANTIGEN, URINE Negative    MRSA Screen, PCR (Inpatient) - Swab, Nares [[id number]]  (Normal) Collected: 08/05/22 1057    Lab Status: Final result Specimen: Swab from Nares Updated: 08/05/22 1222     MRSA PCR No MRSA Detected    Narrative:      The negative predictive value of this diagnostic test is high and should only be used to consider de-escalating anti-MRSA therapy. A positive result may indicate colonization with MRSA and must be correlated clinically.    Gastrointestinal Panel, PCR - Stool, Per Rectum [[id number]]  (Abnormal) Collected: 08/05/22 1057    Lab Status: Final result Specimen: Stool from Per Rectum Updated: 08/05/22 1238     Campylobacter Not Detected     Plesiomonas shigelloides Not Detected     Salmonella Not Detected     Vibrio Not Detected     Vibrio cholerae Not Detected     Yersinia enterocolitica Not  Detected     Enteroaggregative E. coli (EAEC) Not Detected     Enteropathogenic E. coli (EPEC) Detected     Enterotoxigenic E. coli (ETEC) lt/st Not Detected     Shiga-like toxin-producing E. coli (STEC) stx1/stx2 Not Detected     Shigella/Enteroinvasive E. coli (EIEC) Not Detected     Cryptosporidium Not Detected     Cyclospora cayetanensis Not Detected     Entamoeba histolytica Not Detected     Giardia lamblia Not Detected     Adenovirus F40/41 Not Detected     Astrovirus Not Detected     Norovirus GI/GII Not Detected     Rotavirus A Not Detected     Sapovirus (I, II, IV or V) Not Detected    Urine Culture - Urine, Urine, Catheter [[id number]]  (Normal) Collected: 08/05/22 1012    Lab Status: Final result Specimen: Urine, Catheter Updated: 08/06/22 1409     Urine Culture No growth    Blood Culture - Blood, Blood, Central Line [[id number]]  (Normal) Collected: 08/05/22 0902    Lab Status: Preliminary result Specimen: Blood, Central Line Updated: 08/08/22 0917     Blood Culture No growth at 3 days    Blood Culture - Blood, Blood, Central Line [[id number]]  (Normal) Collected: 08/05/22 0902    Lab Status: Preliminary result Specimen: Blood, Central Line Updated: 08/08/22 0917     Blood Culture No growth at 3 days    Respiratory Panel PCR w/COVID-19(SARS-CoV-2) CELSA/JONATHAN/DOMINIQUE/PAD/COR/MAD/LISA In-House, NP Swab in UTM/VTM, 3-4 HR TAT - Swab, Nasopharynx [[id number]]  (Normal) Collected: 08/05/22 0815    Lab Status: Final result Specimen: Swab from Nasopharynx Updated: 08/05/22 0909     ADENOVIRUS, PCR Not Detected     Coronavirus 229E Not Detected     Coronavirus HKU1 Not Detected     Coronavirus NL63 Not Detected     Coronavirus OC43 Not Detected     COVID19 Not Detected     Human Metapneumovirus Not Detected     Human Rhinovirus/Enterovirus Not Detected     Influenza A PCR Not Detected     Influenza B PCR Not Detected     Parainfluenza Virus 1 Not Detected     Parainfluenza Virus 2 Not Detected     Parainfluenza Virus 3 Not  Detected     Parainfluenza Virus 4 Not Detected     RSV, PCR Not Detected     Bordetella pertussis pcr Not Detected     Bordetella parapertussis PCR Not Detected     Chlamydophila pneumoniae PCR Not Detected     Mycoplasma pneumo by PCR Not Detected    Narrative:      In the setting of a positive respiratory panel with a viral infection PLUS a negative procalcitonin without other underlying concern for bacterial infection, consider observing off antibiotics or discontinuation of antibiotics and continue supportive care. If the respiratory panel is positive for atypical bacterial infection (Bordetella pertussis, Chlamydophila pneumoniae, or Mycoplasma pneumoniae), consider antibiotic de-escalation to target atypical bacterial infection.          Medication Review:       Schedule Meds  cefTRIAXone, 2 g, Intravenous, Q24H  escitalopram, 10 mg, Oral, Daily  hydrocortisone sodium succinate, 50 mg, Intravenous, Q12H  insulin lispro, 0-7 Units, Subcutaneous, Q6H  [START ON 8/9/2022] pantoprazole, 40 mg, Oral, Q AM  sodium chloride, 10 mL, Intravenous, Q12H  sodium chloride, 10 mL, Intravenous, Q12H        Infusion Meds  dextrose, 25 mL/hr, Last Rate: 25 mL/hr (08/08/22 0801)        PRN Meds  •  acetaminophen **OR** acetaminophen  •  dextrose  •  dextrose  •  fentaNYL citrate (PF)  •  glucagon (human recombinant)  •  HYDROmorphone  •  Morphine  •  nitroglycerin  •  ondansetron **OR** ondansetron  •  oxyCODONE  •  simethicone  •  sodium chloride  •  sodium chloride  •  sodium chloride  •  sodium chloride        Assessment & Plan       Antimicrobial Therapy   1.  IV meropenem        2.  IV vancomycin        3.  IV micafungin        4.        5.               Assessment     Severe hypovolemic/septic shock present on admission and was initially on 2 pressors but condition worsened after liver biopsy and was concerning secondary to hemorrhagic shock.  Patient was on 4 vasopressors at one point.  Improved significantly and  currently off vasopressors    The patient was admitted for gastroenteritis caused by enteropathogenic E. coli however the patient was very systemically ill and was febrile for a few days.  Work-up was negative for infection except for E. coli and stool    Abnormal liver lesion noted on imaging studies.  It might be incidental findings.  S/p biopsy and there was no purulence to suspect infection.  Pathology report is still pending    Acute kidney injury.  Suspected to be prerenal.  Improving.  Currently off CRRT and making urine    Thrombocytopenia.  Probably secondary to sepsis    Reactive leukocytosis.  Mildly elevated may be now secondary to steroids        Plan    Discontinue IV vancomycin, meropenem and micafungin  Start patient on ceftriaxone 2 g IV daily  Continue supportive care  A.m. labs  Case was discussed with the patient and her  at bedside        Sagar Orozco MD  08/08/22  13:12 EDT    Note is dictated utilizing voice recognition software/Dragon

## 2022-08-08 NOTE — THERAPY EVALUATION
Patient Name: Raul Gardner  : 1975    MRN: [id number]                              Today's Date: 2022       Admit Date: 2022    Visit Dx:     ICD-10-CM ICD-9-CM   1. Acute kidney injury (HCC)  N17.9 584.9   2. 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   • Melanoma (HCC)    • Near syncope 2017   • Pulmonary hypertension (HCC)    • Urinary tract infection     chronic hematuria, seen urology     Past Surgical History:   Procedure Laterality Date   •  SECTION  13 and 14   • SKIN CANCER EXCISION     • TUBAL ABDOMINAL LIGATION        General Information     [facility] Name 22 1535          Physical Therapy Time and Intention    Document Type evaluation  -     Mode of Treatment physical therapy;co-treatment;occupational therapy  -[facility] Name 22 1535          General Information    Patient Profile Reviewed yes  -     Prior Level of Function independent:;driving;work  -     Existing Precautions/Restrictions no known precautions/restrictions  -     Barriers to Rehab none identified  -[facility] Name 22 1535          Living Environment    People in Home spouse;child(sariah), dependent  -[facility] Name 22 1535          Home Main Entrance    Number of Stairs, Main Entrance five  -[facility] Name 22 1535          Stairs Within Home, Primary    Stairs, Within Home, Primary basement laundry  -[facility] Name 22 1535          Cognition    Orientation Status (Cognition) oriented x 4  -[facility] Name 22 1535          Safety Issues, Functional Mobility    Impairments Affecting  Function (Mobility) endurance/activity tolerance;strength  -           User Key  (r) = Recorded By, (t) = Taken By, (c) = Cosigned By    Initials Name Provider Type    Tamanna Ellis, PT Physical Therapist               Mobility     Row Name 08/08/22 1536          Bed Mobility    Bed Mobility supine-sit  -     Supine-Sit [place] (Bed Mobility) contact guard  -     Row Name 08/08/22 1536          Sit-Stand Transfer    Sit-Stand [place] (Transfers) minimum assist (75% patient effort);2 person assist  -[facility] Name 08/08/22 1536          Gait/Stairs (Locomotion)    [place] Level (Gait) minimum assist (75% patient effort);2 person assist  -     Distance in Feet (Gait) 10' to chair, relied on HHA from 2 therapists.  left RW in room for next session  -     Comment, (Gait/Stairs) pt with LE weakness in standing and reports legs feel very heavy.  slow gait with short shuffled steps to chair  -           User Key  (r) = Recorded By, (t) = Taken By, (c) = Cosigned By    Initials Name Provider Type    Tamanna Ellis PT Physical Therapist               Obj/Interventions     [facility] Name 08/08/22 1537          Range of Motion Comprehensive    General Range of Motion bilateral upper extremity ROM WFL;bilateral lower extremity ROM WFL  -[facility] Name 08/08/22 1537          Strength Comprehensive (MMT)    Comment, General Manual Muscle Testing (MMT) Assessment gross LE strength 4/5, quick fatigue  -[facility] Name 08/08/22 1537          Balance    Balance Assessment sitting static balance;sitting dynamic balance;standing static balance;standing dynamic balance  -     Static Sitting Balance independent  -     Dynamic Sitting Balance independent  -     Static Standing Balance minimal assist  -     Dynamic Standing Balance minimal assist;2-person assist  -[facility] Name 08/08/22 1537          Sensory Assessment (Somatosensory)    Sensory Assessment (Somatosensory) sensation intact  -            User Key  (r) = Recorded By, (t) = Taken By, (c) = Cosigned By    Initials Name Provider Type     Tamanna Domingo, PT Physical Therapist               Goals/Plan     Row Name 08/08/22 1543          Bed Mobility Goal 1 (PT)    Activity/Assistive Device (Bed Mobility Goal 1, PT) bed mobility activities, all  -JH     [place] Level/Cues Needed (Bed Mobility Goal 1, PT) independent  -JH     Time Frame (Bed Mobility Goal 1, PT) 1 week  -[facility] Name 08/08/22 1543          Transfer Goal 1 (PT)    Activity/Assistive Device (Transfer Goal 1, PT) transfers, all  -JH     [place] Level/Cues Needed (Transfer Goal 1, PT) independent  -JH     Time Frame (Transfer Goal 1, PT) 1 week  -[facility] Name 08/08/22 1543          Gait Training Goal 1 (PT)    Activity/Assistive Device (Gait Training Goal 1, PT) gait (walking locomotion)  -     [place] Level (Gait Training Goal 1, PT) independent  -     Distance (Gait Training Goal 1, PT) 200'  -JH     Time Frame (Gait Training Goal 1, PT) 1 week  -[facility] Name 08/08/22 1543          Stairs Goal 1 (PT)    Activity/Assistive Device (Stairs Goal 1, PT) stairs, all skills  -     [place] Level/Cues Needed (Stairs Goal 1, PT) supervision required  -     Number of Stairs (Stairs Goal 1, PT) 5  -JH     Time Frame (Stairs Goal 1, PT) 1 week  -[facility] Name 08/08/22 1543          Therapy Assessment/Plan (PT)    Planned Therapy Interventions (PT) bed mobility training;gait training;transfer training;stair training;strengthening;patient/family education  -           User Key  (r) = Recorded By, (t) = Taken By, (c) = Cosigned By    Initials Name Provider Type    Tamanna Ellis, PT Physical Therapist               Clinical Impression     Row Name 08/08/22 1537          Pain    Pretreatment Pain Rating 0/10 - no pain  -     Posttreatment Pain Rating 0/10 - no pain  -[facility] Name 08/08/22 1537          Plan of Care Review    Plan of Care Reviewed With  patient;spouse  -     Outcome Evaluation 46 yo female admitted with fever, body aches, vomitting and diarrhea.  Pt was hypotensive and admitted to ICU with d/o septic shock. Pt with MARIANNE and required CRRT.  Pt is now improving and is on 3L O2.  Pt is from home with her spouse and 2 dependent children.  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  Pt feels generally weak and is edematous in her UE/LEs.  Pt transfers OOB to chair with min A x2.  Left RW in room for progressive mobiltiy as pts LEs weak in standing.  Anticiapte steady progress with goal for return home with spouse at d/c.  -     Row Name 08/08/22 1537          Therapy Assessment/Plan (PT)    Rehab Potential (PT) good, to achieve stated therapy goals  -     Criteria for Skilled Interventions Met (PT) yes;skilled treatment is necessary  -     Therapy Frequency (PT) 5 times/wk  -     Row Name 08/08/22 1537          Vital Signs    Pre Systolic BP Rehab 151  -     Pre Treatment Diastolic BP 94  -     Pre SpO2 (%) 96  -     O2 Delivery Pre Treatment nasal cannula  3L  -     O2 Delivery Intra Treatment nasal cannula  -     Post SpO2 (%) 96  -     O2 Delivery Post Treatment nasal cannula  -     Row Name 08/08/22 1537          Positioning and Restraints    Pre-Treatment Position in bed  -     Post Treatment Position chair  -     In Chair notified nsg;with family/caregiver;call light within reach;encouraged to call for assist  -           User Key  (r) = Recorded By, (t) = Taken By, (c) = Cosigned By    Initials Name Provider Type     Tamanna Domingo, DAKOTAH Physical Therapist               Outcome Measures    No documentation.                              Physical Therapy Education                 Title: PT OT SLP Therapies (Done)     Topic: Physical Therapy (Done)     Point: Mobility training (Done)     Learning Progress Summary           Patient Acceptance, E,TB, VU by  at 8/8/2022 3801                                User Key     Initials Effective Dates Name Provider Type Discipline     06/16/21 -  Tamanna Domingo PT Physical Therapist PT              PT Recommendation and Plan  Planned Therapy Interventions (PT): bed mobility training, gait training, transfer training, stair training, strengthening, patient/family education  Plan of Care Reviewed With: patient, spouse  Outcome Evaluation: 46 yo female admitted with fever, body aches, vomitting and diarrhea.  Pt was hypotensive and admitted to ICU with d/o septic shock. Pt with MARIANNE and required CRRT.  Pt is now improving and is on 3L O2.  Pt is from home with her spouse and 2 dependent children.  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  Pt feels generally weak and is edematous in her UE/LEs.  Pt transfers OOB to chair with min A x2.  Left RW in room for progressive mobiltiy as pts LEs weak in standing.  Anticiapte steady progress with goal for return home with spouse at d/c.     Time Calculation:    PT Charges     Row Name 08/08/22 1544             Time Calculation    Start Time 1455  -      Stop Time 1525  -      Time Calculation (min) 30 min  -      PT Received On 08/08/22  -      PT - Next Appointment 08/09/22  -      PT Goal Re-Cert Due Date 08/22/22  -              Time Calculation- PT    Total Timed Code Minutes- PT 8 minute(s)  -            User Key  (r) = Recorded By, (t) = Taken By, (c) = Cosigned By    Initials Name Provider Type     Tamanna Domingo PT Physical Therapist              Therapy Charges for Today     Code Description Service Date Service Provider Modifiers Qty    88456289540 HC PT EVAL MOD COMPLEXITY 3 8/8/2022 Tamanna Domingo, PT GP 1    [id number]  GAIT TRAINING EA 15 MIN 8/8/2022 Tamanna Domingo PT GP 1               Tamanna Domingo PT  8/8/2022

## 2022-08-09 ENCOUNTER — APPOINTMENT (OUTPATIENT)
Dept: CT IMAGING | Facility: HOSPITAL | Age: 47
End: 2022-08-09

## 2022-08-09 ENCOUNTER — APPOINTMENT (OUTPATIENT)
Dept: CARDIOLOGY | Facility: HOSPITAL | Age: 47
End: 2022-08-09

## 2022-08-09 LAB
ALBUMIN SERPL-MCNC: 3.2 G/DL (ref 3.5–5.2)
ALBUMIN/GLOB SERPL: 1.7 G/DL
ALP SERPL-CCNC: 217 U/L (ref 39–117)
ALT SERPL W P-5'-P-CCNC: 208 U/L (ref 1–33)
ANION GAP SERPL CALCULATED.3IONS-SCNC: 8 MMOL/L (ref 5–15)
ANISOCYTOSIS BLD QL: ABNORMAL
AST SERPL-CCNC: 141 U/L (ref 1–32)
BH BB BLOOD EXPIRATION DATE: NORMAL
BH BB BLOOD TYPE BARCODE: 6200
BH BB DISPENSE STATUS: NORMAL
BH BB PRODUCT CODE: NORMAL
BH BB UNIT NUMBER: NORMAL
BILIRUB SERPL-MCNC: 1.3 MG/DL (ref 0–1.2)
BUN SERPL-MCNC: 29 MG/DL (ref 6–20)
BUN/CREAT SERPL: 12.4 (ref 7–25)
C-ANCA TITR SER IF: NORMAL TITER
CALCIUM SPEC-SCNC: 8 MG/DL (ref 8.6–10.5)
CHLORIDE SERPL-SCNC: 104 MMOL/L (ref 98–107)
CMV DNA SERPL NAA+PROBE-ACNC: NEGATIVE IU/ML
CMV DNA SERPL NAA+PROBE-LOG IU: NORMAL {LOG_IU}/ML
CMV IGG SERPL IA-ACNC: <0.6 U/ML (ref 0–0.59)
CMV IGM SERPL IA-ACNC: <30 AU/ML (ref 0–29.9)
CO2 SERPL-SCNC: 27 MMOL/L (ref 22–29)
CREAT SERPL-MCNC: 2.33 MG/DL (ref 0.57–1)
DEPRECATED RDW RBC AUTO: 47.3 FL (ref 37–54)
EBV EA-D IGG SER-ACNC: 27.9 U/ML (ref 0–8.9)
EBV VCA IGG SER IA-ACNC: 92.6 U/ML (ref 0–17.9)
EGFRCR SERPLBLD CKD-EPI 2021: 25.7 ML/MIN/1.73
EOSINOPHIL # BLD MANUAL: 0.97 10*3/MM3 (ref 0–0.4)
EOSINOPHIL NFR BLD MANUAL: 6 % (ref 0.3–6.2)
ERYTHROCYTE [DISTWIDTH] IN BLOOD BY AUTOMATED COUNT: 16.3 % (ref 12.3–15.4)
GLOBULIN UR ELPH-MCNC: 1.9 GM/DL
GLUCOSE BLDC GLUCOMTR-MCNC: 101 MG/DL (ref 70–105)
GLUCOSE BLDC GLUCOMTR-MCNC: 106 MG/DL (ref 70–105)
GLUCOSE BLDC GLUCOMTR-MCNC: 118 MG/DL (ref 70–105)
GLUCOSE BLDC GLUCOMTR-MCNC: 124 MG/DL (ref 70–105)
GLUCOSE SERPL-MCNC: 123 MG/DL (ref 65–99)
HCT VFR BLD AUTO: 22.5 % (ref 34–46.6)
HGB BLD-MCNC: 7.4 G/DL (ref 12–15.9)
INR PPP: 1.11 (ref 0.93–1.1)
LAB AP CASE REPORT: NORMAL
LARGE PLATELETS: ABNORMAL
LKM-1 AB SER-ACNC: <1 UNITS (ref 0–20)
LYMPHOCYTES # BLD MANUAL: 3.56 10*3/MM3 (ref 0.7–3.1)
LYMPHOCYTES NFR BLD MANUAL: 8 % (ref 5–12)
MAGNESIUM SERPL-MCNC: 2.9 MG/DL (ref 1.6–2.6)
MCH RBC QN AUTO: 27.3 PG (ref 26.6–33)
MCHC RBC AUTO-ENTMCNC: 32.9 G/DL (ref 31.5–35.7)
MCV RBC AUTO: 82.8 FL (ref 79–97)
METAMYELOCYTES NFR BLD MANUAL: 6 % (ref 0–0)
MICROCYTES BLD QL: ABNORMAL
MITOCHONDRIA M2 IGG SER-ACNC: <20 UNITS (ref 0–20)
MONOCYTES # BLD: 1.3 10*3/MM3 (ref 0.1–0.9)
MYELOPEROXIDASE AB SER IA-ACNC: <0.2 UNITS (ref 0–0.9)
NEUTROPHILS # BLD AUTO: 9.4 10*3/MM3 (ref 1.7–7)
NEUTROPHILS NFR BLD MANUAL: 53 % (ref 42.7–76)
NEUTS BAND NFR BLD MANUAL: 5 % (ref 0–5)
NRBC SPEC MANUAL: 1 /100 WBC (ref 0–0.2)
P-ANCA ATYPICAL TITR SER IF: NORMAL TITER
P-ANCA TITR SER IF: NORMAL TITER
PATH REPORT.FINAL DX SPEC: NORMAL
PHOSPHATE SERPL-MCNC: 3 MG/DL (ref 2.5–4.5)
PLATELET # BLD AUTO: 57 10*3/MM3 (ref 140–450)
PMV BLD AUTO: 8.7 FL (ref 6–12)
POTASSIUM SERPL-SCNC: 4 MMOL/L (ref 3.5–5.2)
PROT SERPL-MCNC: 5.1 G/DL (ref 6–8.5)
PROTEINASE3 AB SER IA-ACNC: <0.2 UNITS (ref 0–0.9)
PROTHROMBIN TIME: 11.4 SECONDS (ref 9.6–11.7)
RBC # BLD AUTO: 2.71 10*6/MM3 (ref 3.77–5.28)
SCAN SLIDE: NORMAL
SMA IGG SER-ACNC: 5 UNITS (ref 0–19)
SMALL PLATELETS BLD QL SMEAR: ABNORMAL
SODIUM SERPL-SCNC: 139 MMOL/L (ref 136–145)
TARGETS BLD QL SMEAR: ABNORMAL
UNIT  ABO: NORMAL
UNIT  RH: NORMAL
VARIANT LYMPHS NFR BLD MANUAL: 22 % (ref 19.6–45.3)
WBC MORPH BLD: NORMAL
WBC NRBC COR # BLD: 16.2 10*3/MM3 (ref 3.4–10.8)

## 2022-08-09 PROCEDURE — 93970 EXTREMITY STUDY: CPT

## 2022-08-09 PROCEDURE — 85610 PROTHROMBIN TIME: CPT | Performed by: NURSE PRACTITIONER

## 2022-08-09 PROCEDURE — 82962 GLUCOSE BLOOD TEST: CPT

## 2022-08-09 PROCEDURE — 74176 CT ABD & PELVIS W/O CONTRAST: CPT

## 2022-08-09 PROCEDURE — 25010000002 CEFTRIAXONE PER 250 MG: Performed by: INTERNAL MEDICINE

## 2022-08-09 PROCEDURE — 25010000002 MORPHINE PER 10 MG: Performed by: NURSE PRACTITIONER

## 2022-08-09 PROCEDURE — 85025 COMPLETE CBC W/AUTO DIFF WBC: CPT | Performed by: NURSE PRACTITIONER

## 2022-08-09 PROCEDURE — 25010000002 HYDRALAZINE PER 20 MG

## 2022-08-09 PROCEDURE — 83735 ASSAY OF MAGNESIUM: CPT | Performed by: NURSE PRACTITIONER

## 2022-08-09 PROCEDURE — 85007 BL SMEAR W/DIFF WBC COUNT: CPT | Performed by: NURSE PRACTITIONER

## 2022-08-09 PROCEDURE — 71250 CT THORAX DX C-: CPT

## 2022-08-09 PROCEDURE — 84100 ASSAY OF PHOSPHORUS: CPT | Performed by: NURSE PRACTITIONER

## 2022-08-09 PROCEDURE — 25010000002 FENTANYL CITRATE (PF) 50 MCG/ML SOLUTION

## 2022-08-09 PROCEDURE — 25010000002 HYDRALAZINE PER 20 MG: Performed by: NURSE PRACTITIONER

## 2022-08-09 PROCEDURE — 80053 COMPREHEN METABOLIC PANEL: CPT | Performed by: NURSE PRACTITIONER

## 2022-08-09 RX ORDER — HYDRALAZINE HYDROCHLORIDE 20 MG/ML
10 INJECTION INTRAMUSCULAR; INTRAVENOUS ONCE
Status: COMPLETED | OUTPATIENT
Start: 2022-08-09 | End: 2022-08-09

## 2022-08-09 RX ORDER — HYDRALAZINE HYDROCHLORIDE 20 MG/ML
10 INJECTION INTRAMUSCULAR; INTRAVENOUS EVERY 6 HOURS PRN
Status: DISCONTINUED | OUTPATIENT
Start: 2022-08-09 | End: 2022-08-17 | Stop reason: HOSPADM

## 2022-08-09 RX ORDER — BUMETANIDE 0.25 MG/ML
2 INJECTION INTRAMUSCULAR; INTRAVENOUS ONCE
Status: COMPLETED | OUTPATIENT
Start: 2022-08-09 | End: 2022-08-09

## 2022-08-09 RX ORDER — AMLODIPINE BESYLATE 5 MG/1
5 TABLET ORAL
Status: DISCONTINUED | OUTPATIENT
Start: 2022-08-09 | End: 2022-08-10

## 2022-08-09 RX ORDER — METOPROLOL TARTRATE 5 MG/5ML
5 INJECTION INTRAVENOUS ONCE
Status: COMPLETED | OUTPATIENT
Start: 2022-08-10 | End: 2022-08-09

## 2022-08-09 RX ADMIN — HYDRALAZINE HYDROCHLORIDE 10 MG: 20 INJECTION INTRAMUSCULAR; INTRAVENOUS at 22:42

## 2022-08-09 RX ADMIN — CEFTRIAXONE 2 G: 2 INJECTION, POWDER, FOR SOLUTION INTRAMUSCULAR; INTRAVENOUS at 12:20

## 2022-08-09 RX ADMIN — FENTANYL CITRATE 25 MCG: 50 INJECTION, SOLUTION INTRAMUSCULAR; INTRAVENOUS at 18:19

## 2022-08-09 RX ADMIN — Medication 10 ML: at 09:44

## 2022-08-09 RX ADMIN — BUMETANIDE 2 MG: 0.25 INJECTION, SOLUTION INTRAMUSCULAR; INTRAVENOUS at 10:57

## 2022-08-09 RX ADMIN — OXYCODONE 10 MG: 5 TABLET ORAL at 21:06

## 2022-08-09 RX ADMIN — Medication 10 ML: at 09:43

## 2022-08-09 RX ADMIN — MORPHINE SULFATE 2 MG: 2 INJECTION, SOLUTION INTRAMUSCULAR; INTRAVENOUS at 17:24

## 2022-08-09 RX ADMIN — ESCITALOPRAM OXALATE 10 MG: 10 TABLET ORAL at 08:40

## 2022-08-09 RX ADMIN — NYSTATIN 500000 UNITS: 100000 SUSPENSION ORAL at 12:20

## 2022-08-09 RX ADMIN — HYDRALAZINE HYDROCHLORIDE 10 MG: 20 INJECTION INTRAMUSCULAR; INTRAVENOUS at 21:10

## 2022-08-09 RX ADMIN — AMLODIPINE BESYLATE 5 MG: 5 TABLET ORAL at 10:57

## 2022-08-09 RX ADMIN — NYSTATIN 500000 UNITS: 100000 SUSPENSION ORAL at 08:40

## 2022-08-09 RX ADMIN — METOPROLOL TARTRATE 5 MG: 1 INJECTION, SOLUTION INTRAVENOUS at 23:17

## 2022-08-09 RX ADMIN — Medication 10 ML: at 21:10

## 2022-08-09 RX ADMIN — Medication 10 ML: at 21:06

## 2022-08-09 RX ADMIN — PANTOPRAZOLE SODIUM 40 MG: 40 TABLET, DELAYED RELEASE ORAL at 05:32

## 2022-08-09 NOTE — SIGNIFICANT NOTE
08/09/22 1329   OTHER   Discipline physical therapist   Rehab Time/Intention   Session Not Performed patient/family declined, not feeling well  (reports up to commode several times, feels exhausted and weak.)   Recommendation   PT - Next Appointment 08/10/22

## 2022-08-09 NOTE — CASE MANAGEMENT/SOCIAL WORK
Continued Stay Note   Rick     Patient Name: Raul Gardner  MRN: [id number]  Today's Date: 8/9/2022    Admit Date: 8/5/2022     Discharge Plan     Row Name 08/09/22 1542       Plan    Plan DC Plan: Pending Clinical Course and PT/OT evaluations. Home health and OP Rehab lists provided and awaiting choices.    Provided Post Acute Provider List? N/A    Provided Post Acute Provider Quality & Resource List? N/A    Plan Comments CM spoke with patient’s nurse and also reviewed chart documentation to obtain clinical updates. No significant changes in condition or care plans to report. DC Barriers: D10, HD cath, IV abx, abnormal labs, and monitor H/H.              Expected Discharge Date and Time     Expected Discharge Date Expected Discharge Time    Aug 15, 2022         Phone communication or documentation only- no physical contact with patient or family.    Sofia Sanches RN      Office Phone: [phone]  Office Cell:     [phone]

## 2022-08-09 NOTE — SIGNIFICANT NOTE
Pt just got up to chair with nsg assist, will f/u later this date for progressive mobility as pt able to tolerate.

## 2022-08-09 NOTE — PROGRESS NOTES
ICU Daily Progress Note        Septic shock (HCC)    Pulmonary hypertension, unspecified (HCC)    Anxiety    Vitamin D deficiency    Essential hypertension    Suspected liver abscess    Diarrhea    Acute kidney injury (HCC)    Metabolic acidosis      Assessment & Plan     Recurrent fever, unknown source  Sepsis with septic shock  UTI  MARIANNE  Coagulopathy probably from DIC  Leukocytosis  Acute anemia with hemoperitoneum after liver biopsy  Anion gap metabolic acidosis  Lactic acidosis  Electrolyte imbalance with hypokalemia and hypocalcemia and hyponatremia and hypomagnesemia  Abnormal CT scan of the liver with possible hepatic abscess: IR attempted drainage but there was no fluid collection 8/5/2022 so CT-guided biopsy was obtained  Mild atelectasis with early noncardiogenic edema probably early ARDS  Esophagitis  Mild pancreatic changes on the CT scan but the lipase is not elevated  History of melanoma resected in 1999 without signs of spread or recurrence  Mild exercise-induced pulmonary hypertension for which she has been on metoprolol but she is out of it for 1 month  Hyperglycemia  Elevated liver enzymes probably from shock liver        Plan:    Lower extremity Dopplers rule out DVT as source of fever     peripheral smear 8/8/2022   Leukocytosis with left shifted granulocytes and rare possible blasts  Occasional schistocytes, , anemia, Thrombocytopenia  possible hemolytic uremic syndrome    Negative HIV test,     History of mosquito bites exposure, awaiting West Nile virus antibodies  Oxygenating well on 5 L per nasal cannula  Hemodynamic support: Vasopressin and stress dose hydrocortisone  Blood transfusion: Platelets are dropping, will continue with RBC transfusion and platelet transfusion as needed  Followed by nephrology: CRRT   Liver enzymes are increasing due to shock liver   insulin sliding scale  Antibiotics:ID following  DVT: SCD/GI prophylaxis  Check daily labs and correct electrolytes as  needed    Discussed with the patient and her  in details         LOS: 4 days         Vital signs for last 24 hours:  Vitals:    08/09/22 0600 08/09/22 0700 08/09/22 0800 08/09/22 0900   BP: 159/91 151/91 152/96 153/87   BP Location:       Patient Position:       Pulse: 84 81 83 99   Resp: 12      Temp: 97.3 °F (36.3 °C) 97.2 °F (36.2 °C) 96.8 °F (36 °C) 97.7 °F (36.5 °C)   TempSrc:       SpO2: 99% 100% 99% (!) 87%   Weight:       Height:           Intake/Output last 3 shifts:  I/O last 3 completed shifts:  In: 1612 [P.O.:350; I.V.:1262]  Out: 1466 [Urine:828; Other:638]  Intake/Output this shift:  No intake/output data recorded.           Radiology  Imaging Results (Last 24 Hours)     ** No results found for the last 24 hours. **          Labs:  Results from last 7 days   Lab Units 08/09/22  0339   WBC 10*3/mm3 16.20*   HEMOGLOBIN g/dL 7.4*   HEMATOCRIT % 22.5*   PLATELETS 10*3/mm3 57*     Results from last 7 days   Lab Units 08/09/22  0339   SODIUM mmol/L 139   POTASSIUM mmol/L 4.0   CHLORIDE mmol/L 104   CO2 mmol/L 27.0   BUN mg/dL 29*   CREATININE mg/dL 2.33*   CALCIUM mg/dL 8.0*   BILIRUBIN mg/dL 1.3*   ALK PHOS U/L 217*   ALT (SGPT) U/L 208*   AST (SGOT) U/L 141*   GLUCOSE mg/dL 123*     Results from last 7 days   Lab Units 08/05/22  0815   PH, ARTERIAL pH units 7.345*   PO2 ART mm Hg 221.4*   PCO2, ARTERIAL mm Hg 28.3*   HCO3 ART mmol/L 15.5*     Results from last 7 days   Lab Units 08/09/22  0339 08/08/22  0812 08/08/22  0359   ALBUMIN g/dL 3.20* 3.20* 3.00*     Results from last 7 days   Lab Units 08/05/22  1852 08/05/22  1400 08/05/22  1056 08/05/22  0811   CK TOTAL U/L  --   --   --  327*   TROPONIN T ng/mL 0.045* 0.032* 0.026 <0.010     Results from last 7 days   Lab Units 08/06/22  1641 08/05/22  1852   KAREN  Negative Negative     Results from last 7 days   Lab Units 08/09/22  0339   MAGNESIUM mg/dL 2.9*     Results from last 7 days   Lab Units 08/09/22  0339 08/07/22  1408 08/06/22  1110  08/05/22  1400 08/05/22  0911 08/05/22  0811   INR  1.11* 1.09 2.07*   < > 2.47* 2.16*   APTT seconds  --   --  51.9*  --  48.8* 43.1*    < > = values in this interval not displayed.     Results from last 7 days   Lab Units 08/05/22  0811   TSH uIU/mL 1.570           Meds:   SCHEDULE  cefTRIAXone, 2 g, Intravenous, Q24H  escitalopram, 10 mg, Oral, Daily  insulin lispro, 0-7 Units, Subcutaneous, Q6H  nystatin, 5 mL, Swish & Swallow, 4x Daily  pantoprazole, 40 mg, Oral, Q AM  sodium chloride, 10 mL, Intravenous, Q12H  sodium chloride, 10 mL, Intravenous, Q12H      Infusions  dextrose, 15 mL/hr, Last Rate: 15 mL/hr (08/08/22 1940)      PRNs  •  acetaminophen **OR** acetaminophen  •  dextrose  •  dextrose  •  fentaNYL citrate (PF)  •  glucagon (human recombinant)  •  HYDROmorphone  •  Morphine  •  nitroglycerin  •  ondansetron **OR** ondansetron  •  oxyCODONE  •  simethicone  •  sodium chloride  •  sodium chloride  •  sodium chloride  •  sodium chloride    Physical Exam:  Physical Exam  Pulmonary:      Breath sounds: Rhonchi present.       General Appearance:  Alert and answering questions appropriately  HEENT:  Normocephalic, without obvious abnormality, Conjunctiva/corneas clear,.   Nares normal, no drainage     Neck:  Supple, symmetrical, trachea midline. No JVD.  Lungs /Chest wall: Mild bilateral basal rhonchi, respirations unlabored, symmetrical wall movement.     Heart:  Regular rate and rhythm, S1 S2 normal  Abdomen: Soft, mild tenderness epigastric and right upper quadrant, no masses, no organomegaly.  Bowel sounds positive  Extremities: No edema, no clubbing or cyanosis  Neuro exam: Patient moving 4 extremities with no focal deficit by observation  Skin: No rash  ROS  Review of Systems  Constitutional: Positive for chills, fever and malaise/fatigue.   HENT: Negative.    Eyes: Negative.    Cardiovascular: Negative.    Respiratory: Positive for mild cough and shortness of breath.    Skin: Negative.     Musculoskeletal: Negative.    Gastrointestinal: Positive for mild abdominal pain  Genitourinary: Negative.    Neurological: Negative.    Psychiatric/Behavioral: Negative.    Critical Care Time greater than: 45 Minutes      Much of this encounter note is an electronic transcription/translation of spoken language to printed text using Dragon Software which might include inadvertent errors in transcription.

## 2022-08-09 NOTE — PROGRESS NOTES
Infectious Diseases Progress Note      LOS: 4 days   Patient Care Team:  Maribel Graf MD as PCP - General (Family Medicine)  AVA Cavanaugh MD as Consulting Physician (Cardiology)    Chief Complaint: Weakness    Subjective     The patient had no high-grade fever during the last 24 hours.  Her highest temperature last night was 100.9.  She remained hemodynamically stable requiring no vasopressors.  She is currently on 2 L of oxygen via nasal cannula.  She had no more diarrhea.  She remained off dialysis      Sandoval catheter  Review of Systems:   Review of Systems   Constitutional: Positive for fatigue.   HENT: Negative.    Eyes: Negative.    Respiratory: Negative.    Cardiovascular: Negative.    Endocrine: Negative.    Genitourinary: Negative.    Musculoskeletal: Negative.    Skin: Negative.    Neurological: Negative.    Psychiatric/Behavioral: Negative.    All other systems reviewed and are negative.       Objective     Vital Signs  Temp:  [95.5 °F (35.3 °C)-100.9 °F (38.3 °C)] 97.7 °F (36.5 °C)  Heart Rate:  [] 112  Resp:  [11-16] 12  BP: (124-159)/() 157/90    Physical Exam:  Physical Exam  Vitals and nursing note reviewed.   Constitutional:       Appearance: She is well-developed.   HENT:      Head: Normocephalic and atraumatic.   Eyes:      Pupils: Pupils are equal, round, and reactive to light.   Cardiovascular:      Rate and Rhythm: Normal rate and regular rhythm.      Heart sounds: Normal heart sounds.   Pulmonary:      Effort: Pulmonary effort is normal. No respiratory distress.      Breath sounds: Normal breath sounds. No wheezing or rales.   Abdominal:      General: Bowel sounds are normal. There is no distension.      Palpations: Abdomen is soft. There is no mass.      Tenderness: There is no abdominal tenderness. There is no guarding or rebound.   Musculoskeletal:         General: No deformity. Normal range of motion.      Cervical back: Normal range of motion and neck supple.    Skin:     General: Skin is warm.      Findings: No erythema or rash.   Neurological:      Mental Status: She is alert and oriented to person, place, and time.      Cranial Nerves: No cranial nerve deficit.          Results Review:    I have reviewed all clinical data, test, lab, and imaging results.     Radiology  No Radiology Exams Resulted Within Past 24 Hours    Cardiology    Laboratory    Results from last 7 days   Lab Units 08/09/22  0339 08/08/22  1322 08/08/22  0812 08/08/22  0359 08/07/22  2327 08/07/22  2002 08/07/22  1615   WBC 10*3/mm3 16.20* 14.10* 14.10* 14.30* 13.90* 13.30* 14.40*   HEMOGLOBIN g/dL 7.4* 7.7* 7.6* 7.2* 7.5* 7.5* 7.6*   HEMATOCRIT % 22.5* 22.8* 22.6* 21.8* 22.8* 22.4* 22.4*   PLATELETS 10*3/mm3 57* 50* 47* 50* 57* 57* 65*     Results from last 7 days   Lab Units 08/09/22  0339 08/08/22  0812 08/08/22  0359 08/07/22  2327 08/07/22  1615 08/07/22  0600 08/06/22  2154 08/06/22  1302 08/06/22  1013 08/06/22  0510 08/05/22  1852 08/05/22  1400 08/05/22  0811   SODIUM mmol/L 139 138 140 140 139 138 141 136 139 138  --  133* 132*   POTASSIUM mmol/L 4.0 4.1 4.1 3.8 3.7 3.7 3.4* 3.5 2.5* 3.4*  --  3.7 3.9   CHLORIDE mmol/L 104 104 105 104 101 99 98 96* 96* 96*  --  99 94*   CO2 mmol/L 27.0 28.0 28.0 28.0 29.0 30.0* 29.0 16.0* 29.0 24.0  --  19.0* 21.0*   BUN mg/dL 29* 16 19 21* 25* 37* 55* 53* 40* 46*  --  38* 32*   CREATININE mg/dL 2.33* 1.35* 1.53* 1.77* 2.05* 2.92* 4.25* 4.58* 3.25* 4.06*  --  4.03* 3.82*   GLUCOSE mg/dL 123* 87 98 105* 112* 124* 162* 264* 543* 218*  --  165* 141*   ALBUMIN g/dL 3.20* 3.20* 3.00* 3.30* 3.20* 2.90* 3.30* 2.70* 1.90* 2.50*   < > 2.90* 3.30*   BILIRUBIN mg/dL 1.3*  --  3.8*  --   --  4.7*  --  4.6* 3.1* 4.5*  --  4.6* 4.3*   ALK PHOS U/L 217*  --  133*  --   --  112  --  89 72 75  --  64 64   AST (SGOT) U/L 141*  --  233*  --   --  522*  --  393* 194* 177*  --  45* 41*   ALT (SGPT) U/L 208*  --  259*  --   --  352*  --  222* 109* 98*  --  27 30   AMYLASE U/L   --   --   --   --   --   --   --   --  39  --   --   --  81   LIPASE U/L  --   --   --   --   --   --   --   --  4*  --   --   --  12*   CALCIUM mg/dL 8.0* 7.6* 7.6* 8.1* 7.7* 7.2* 7.2* 6.9* 5.1* 7.2*  --  7.5* 8.3*    < > = values in this interval not displayed.     Results from last 7 days   Lab Units 08/05/22  0811   CK TOTAL U/L 327*             Microbiology   Microbiology Results (last 10 days)     Procedure Component Value - Date/Time    Clostridioides difficile Toxin - Stool, Per Rectum [[id number]]  (Normal) Collected: 08/06/22 0703    Lab Status: Final result Specimen: Stool from Per Rectum Updated: 08/06/22 0758    Narrative:      The following orders were created for panel order Clostridioides difficile Toxin - Stool, Per Rectum.  Procedure                               Abnormality         Status                     ---------                               -----------         ------                     Clostridioides difficile...[[id number]]  Normal              Final result                 Please view results for these tests on the individual orders.    Clostridioides difficile EIA - Stool, Per Rectum [[id number]]  (Normal) Collected: 08/06/22 0703    Lab Status: Final result Specimen: Stool from Per Rectum Updated: 08/06/22 0758     C Diff GDH / Toxin Negative    Eosinophil Smear - Urine, Urine, Clean Catch [[id number]]  (Normal) Collected: 08/05/22 1917    Lab Status: Final result Specimen: Urine, Clean Catch Updated: 08/05/22 2047     Eosinophil Smear 0 % EOS/100 Cells     Body Fluid Culture - Body Fluid, Liver [[id number]] Collected: 08/05/22 1755    Lab Status: Final result Specimen: Body Fluid from Liver Updated: 08/08/22 0841     Body Fluid Culture No growth at 3 days     Gram Stain Few (2+) WBCs per low power field      No organisms seen    Anaerobic Culture - Swab, Liver [[id number]]  (Normal) Collected: 08/05/22 1755    Lab Status: Preliminary result Specimen: Swab from Liver Updated: 08/08/22 0798      Anaerobic Culture No anaerobes isolated at 3 days    S. Pneumo Ag Urine or CSF - Urine, Urine, Clean Catch [[id number]]  (Normal) Collected: 08/05/22 1154    Lab Status: Final result Specimen: Urine, Clean Catch Updated: 08/05/22 1234     Strep Pneumo Ag Negative    Legionella Antigen, Urine - Urine, Urine, Clean Catch [[id number]]  (Normal) Collected: 08/05/22 1154    Lab Status: Final result Specimen: Urine, Clean Catch Updated: 08/05/22 1234     LEGIONELLA ANTIGEN, URINE Negative    MRSA Screen, PCR (Inpatient) - Swab, Nares [[id number]]  (Normal) Collected: 08/05/22 1057    Lab Status: Final result Specimen: Swab from Nares Updated: 08/05/22 1222     MRSA PCR No MRSA Detected    Narrative:      The negative predictive value of this diagnostic test is high and should only be used to consider de-escalating anti-MRSA therapy. A positive result may indicate colonization with MRSA and must be correlated clinically.    Gastrointestinal Panel, PCR - Stool, Per Rectum [[id number]]  (Abnormal) Collected: 08/05/22 1057    Lab Status: Final result Specimen: Stool from Per Rectum Updated: 08/05/22 1238     Campylobacter Not Detected     Plesiomonas shigelloides Not Detected     Salmonella Not Detected     Vibrio Not Detected     Vibrio cholerae Not Detected     Yersinia enterocolitica Not Detected     Enteroaggregative E. coli (EAEC) Not Detected     Enteropathogenic E. coli (EPEC) Detected     Enterotoxigenic E. coli (ETEC) lt/st Not Detected     Shiga-like toxin-producing E. coli (STEC) stx1/stx2 Not Detected     Shigella/Enteroinvasive E. coli (EIEC) Not Detected     Cryptosporidium Not Detected     Cyclospora cayetanensis Not Detected     Entamoeba histolytica Not Detected     Giardia lamblia Not Detected     Adenovirus F40/41 Not Detected     Astrovirus Not Detected     Norovirus GI/GII Not Detected     Rotavirus A Not Detected     Sapovirus (I, II, IV or V) Not Detected    Urine Culture - Urine, Urine, Catheter  [[id number]]  (Normal) Collected: 08/05/22 1012    Lab Status: Final result Specimen: Urine, Catheter Updated: 08/06/22 1409     Urine Culture No growth    Blood Culture - Blood, Blood, Central Line [[id number]]  (Normal) Collected: 08/05/22 0902    Lab Status: Preliminary result Specimen: Blood, Central Line Updated: 08/09/22 0917     Blood Culture No growth at 4 days    Blood Culture - Blood, Blood, Central Line [[id number]]  (Normal) Collected: 08/05/22 0902    Lab Status: Preliminary result Specimen: Blood, Central Line Updated: 08/09/22 0917     Blood Culture No growth at 4 days    Respiratory Panel PCR w/COVID-19(SARS-CoV-2) CELSA/JONATHAN/DOMINIQUE/PAD/COR/MAD/LISA In-House, NP Swab in UTM/VTM, 3-4 HR TAT - Swab, Nasopharynx [[id number]]  (Normal) Collected: 08/05/22 0815    Lab Status: Final result Specimen: Swab from Nasopharynx Updated: 08/05/22 0909     ADENOVIRUS, PCR Not Detected     Coronavirus 229E Not Detected     Coronavirus HKU1 Not Detected     Coronavirus NL63 Not Detected     Coronavirus OC43 Not Detected     COVID19 Not Detected     Human Metapneumovirus Not Detected     Human Rhinovirus/Enterovirus Not Detected     Influenza A PCR Not Detected     Influenza B PCR Not Detected     Parainfluenza Virus 1 Not Detected     Parainfluenza Virus 2 Not Detected     Parainfluenza Virus 3 Not Detected     Parainfluenza Virus 4 Not Detected     RSV, PCR Not Detected     Bordetella pertussis pcr Not Detected     Bordetella parapertussis PCR Not Detected     Chlamydophila pneumoniae PCR Not Detected     Mycoplasma pneumo by PCR Not Detected    Narrative:      In the setting of a positive respiratory panel with a viral infection PLUS a negative procalcitonin without other underlying concern for bacterial infection, consider observing off antibiotics or discontinuation of antibiotics and continue supportive care. If the respiratory panel is positive for atypical bacterial infection (Bordetella pertussis, Chlamydophila  pneumoniae, or Mycoplasma pneumoniae), consider antibiotic de-escalation to target atypical bacterial infection.          Medication Review:       Schedule Meds  amLODIPine, 5 mg, Oral, Q24H  cefTRIAXone, 2 g, Intravenous, Q24H  escitalopram, 10 mg, Oral, Daily  insulin lispro, 0-7 Units, Subcutaneous, Q6H  nystatin, 5 mL, Swish & Swallow, 4x Daily  pantoprazole, 40 mg, Oral, Q AM  sodium chloride, 10 mL, Intravenous, Q12H  sodium chloride, 10 mL, Intravenous, Q12H        Infusion Meds  dextrose, 15 mL/hr, Last Rate: 15 mL/hr (08/08/22 1940)        PRN Meds  •  acetaminophen **OR** acetaminophen  •  dextrose  •  dextrose  •  fentaNYL citrate (PF)  •  glucagon (human recombinant)  •  hydrALAZINE  •  HYDROmorphone  •  Morphine  •  nitroglycerin  •  ondansetron **OR** ondansetron  •  oxyCODONE  •  simethicone  •  sodium chloride  •  sodium chloride  •  sodium chloride  •  sodium chloride        Assessment & Plan       Antimicrobial Therapy   1.  IV ceftriaxone       Assessment     Severe hypovolemic/septic shock present on admission and was initially on 2 pressors but condition worsened after liver biopsy and was concerning secondary to hemorrhagic shock.  Patient was on 4 vasopressors at one point.  Improved significantly and currently off vasopressors    The patient was admitted for gastroenteritis caused by enteropathogenic E. coli however the patient was very systemically ill and was febrile for a few days.  Work-up was negative for infection except for E. coli and stool    Abnormal liver lesion noted on imaging studies.  It might be incidental findings.  S/p biopsy and there was no purulence to suspect infection.  Pathology report is still pending    Acute kidney injury.  Suspected to be prerenal.  Improving.  Currently off CRRT and making urine    Thrombocytopenia.  Probably secondary to sepsis    Reactive leukocytosis.  Mildly elevated may be now secondary to steroids        Plan    Continue ceftriaxone 2 g IV  daily for now   continue supportive care  A.m. labs  Case was discussed with the patient and her  at bedside  Repeat CT scan of abdomen and pelvis without contrast        Sagar Orozco MD  08/09/22  11:10 EDT    Note is dictated utilizing voice recognition software/Dragon

## 2022-08-09 NOTE — PROGRESS NOTES
PROGRESS NOTE      Patient Name: Raul Gardner  : 1975  MRN: [id number]  Primary Care Physician: Maribel Graf MD  Date of admission: 2022    Patient Care Team:  Maribel Graf MD as PCP - General (Family Medicine)  AVA Cavanaugh MD as Consulting Physician (Cardiology)        Subjective   Subjective:     Patient is slightly better than yesterday but still lethargic sick looking  Review of systems:  Middle-age female complaining of body aches abdominal pain abdominal distention      Allergies:    Allergies   Allergen Reactions   • Cephalexin Rash   • Hydrocodone-Acetaminophen Rash       Objective   Exam:     Vital Signs  Temp:  [95.5 °F (35.3 °C)-100.9 °F (38.3 °C)] 97.7 °F (36.5 °C)  Heart Rate:  [] 85  Resp:  [11-16] 12  BP: (124-159)/() 155/88  SpO2:  [87 %-100 %] 97 %  on  Flow (L/min):  [2] 2;   Device (Oxygen Therapy): nasal cannula  Body mass index is 51.1 kg/m².    General: Middle-age  female i very lethargic  Head:      Normocephalic and atraumatic.    Eyes:      PERRL/EOM intact, conjunctiva and sclera clear with out nystagmus.    Neck:      No masses, thyromegaly,  trachea central with normal respiratory effort   Lungs:    Clear bilaterally to auscultation.    Heart:      Regular rate and rhythm, no murmur no gallop  Abd:        Diffusely tender with decreased bowel sounds  Pulses:   Pulses palpable  Extr:        No cyanosis or clubbing--no edema.    Neuro:    No focal deficits.   alert oriented x3  Skin:       Intact without lesions or rashes.    Psych:    Alert and cooperative; normal mood and affect; .      Results Review:  I have personally reviewed most recent Data :  CBC    Results from last 7 days   Lab Units 22  0339 22  1322 22  0812 22  0359 22  2327 22  1615   WBC 10*3/mm3 16.20* 14.10* 14.10* 14.30* 13.90* 13.30* 14.40*   HEMOGLOBIN g/dL 7.4* 7.7* 7.6* 7.2* 7.5* 7.5* 7.6*   PLATELETS 10*3/mm3 57* 50* 47* 50*  57* 57* 65*     CMP   Results from last 7 days   Lab Units 08/09/22  0339 08/08/22  0812 08/08/22  0359 08/07/22  2327 08/07/22  1615 08/07/22  0600 08/06/22  2154 08/06/22  1302 08/06/22  1013 08/06/22  0510 08/05/22  1852 08/05/22  1400 08/05/22  0811   SODIUM mmol/L 139 138 140 140 139 138 141 136 139 138  --  133* 132*   POTASSIUM mmol/L 4.0 4.1 4.1 3.8 3.7 3.7 3.4* 3.5 2.5* 3.4*  --  3.7 3.9   CHLORIDE mmol/L 104 104 105 104 101 99 98 96* 96* 96*  --  99 94*   CO2 mmol/L 27.0 28.0 28.0 28.0 29.0 30.0* 29.0 16.0* 29.0 24.0  --  19.0* 21.0*   BUN mg/dL 29* 16 19 21* 25* 37* 55* 53* 40* 46*  --  38* 32*   CREATININE mg/dL 2.33* 1.35* 1.53* 1.77* 2.05* 2.92* 4.25* 4.58* 3.25* 4.06*  --  4.03* 3.82*   GLUCOSE mg/dL 123* 87 98 105* 112* 124* 162* 264* 543* 218*  --  165* 141*   ALBUMIN g/dL 3.20* 3.20* 3.00* 3.30* 3.20* 2.90* 3.30* 2.70* 1.90* 2.50*   < > 2.90* 3.30*   BILIRUBIN mg/dL 1.3*  --  3.8*  --   --  4.7*  --  4.6* 3.1* 4.5*  --  4.6* 4.3*   ALK PHOS U/L 217*  --  133*  --   --  112  --  89 72 75  --  64 64   AST (SGOT) U/L 141*  --  233*  --   --  522*  --  393* 194* 177*  --  45* 41*   ALT (SGPT) U/L 208*  --  259*  --   --  352*  --  222* 109* 98*  --  27 30   AMYLASE U/L  --   --   --   --   --   --   --   --  39  --   --   --  81   LIPASE U/L  --   --   --   --   --   --   --   --  4*  --   --   --  12*    < > = values in this interval not displayed.     ABG    Results from last 7 days   Lab Units 08/05/22  0815   PH, ARTERIAL pH units 7.345*   PCO2, ARTERIAL mm Hg 28.3*   PO2 ART mm Hg 221.4*   O2 SATURATION ART % 99.8*   BASE EXCESS ART mmol/L -8.9*     XR Chest 1 View    Result Date: 8/8/2022  1. Stable right IJ central venous catheter and right upper extremity PICC line. 2. Persistent mild bibasilar airspace disease likely atelectasis. 3. No pneumothorax.  Electronically Signed By-Eleazar Narvaez MD On:8/8/2022 10:20 AM This report was finalized on [id number] by  Eleazar Narvaez MD.      Results for  orders placed during the hospital encounter of 08/05/22    Adult Transthoracic Echo Complete w/ Color, Spectral and Contrast if Necessary Per Protocol    Interpretation Summary  · Left ventricular wall thickness is consistent with mild concentric hypertrophy.  · Estimated left ventricular EF = 75% Left ventricular systolic function is hyperdynamic (EF > 70%).  · Left ventricular diastolic function is consistent with (grade I) impaired relaxation.    Scheduled Meds:amLODIPine, 5 mg, Oral, Q24H  cefTRIAXone, 2 g, Intravenous, Q24H  escitalopram, 10 mg, Oral, Daily  insulin lispro, 0-7 Units, Subcutaneous, Q6H  nystatin, 5 mL, Swish & Swallow, 4x Daily  pantoprazole, 40 mg, Oral, Q AM  sodium chloride, 10 mL, Intravenous, Q12H  sodium chloride, 10 mL, Intravenous, Q12H      Continuous Infusions:dextrose, 15 mL/hr, Last Rate: 15 mL/hr (08/08/22 1940)      PRN Meds:•  acetaminophen **OR** acetaminophen  •  dextrose  •  dextrose  •  fentaNYL citrate (PF)  •  glucagon (human recombinant)  •  hydrALAZINE  •  HYDROmorphone  •  Morphine  •  nitroglycerin  •  ondansetron **OR** ondansetron  •  oxyCODONE  •  simethicone  •  sodium chloride  •  sodium chloride  •  sodium chloride  •  sodium chloride    Assessment & Plan   Assessment and Plan:         Septic shock (HCC)    Pulmonary hypertension, unspecified (HCC)    Anxiety    Vitamin D deficiency    Essential hypertension    Suspected liver abscess    Diarrhea    Acute kidney injury (HCC)    Metabolic acidosis    ASSESSMENT:  · Acute kidney injury likely ATN secondary to sepsis and hemodynamic instability on pressors at this time  · Multiple liver lesion unlikely to be abscess  · Chronic kidney disease as per renal ultrasound with some increased echogenicity   · Significant lactic acidosis with increased anion gap   · Some evidence of volume overload   · History of hypertension   ·             Plan:      · Patient was in septic shock with MARIANNE continue ABX   · Patient iwas on  CRRT started 9/6/2022.  9/8/2022 no hemodynamically better blood pressure is on the high side  · Next hemodialysis tomorrow only if needed  · Still has significant edema urine output around 4 to 500 cc we will give extra dose of loop diuretics to improve urine output and edema  · Hemodynamically much better  · Okay to be transferred from ICU   · Metabolic acidosis and sepsis has improved significantly  · Follow-up lab  · Lactic acidosis resolved  · Hemoglobin that dropped initially now stable.  · Repeat CAT scan was done 9/6/2022 yesterday at this time likely ATN because of the multiple injury injuries likely contrast nephropathy in the presence of sepsis  · Discussed with with ICU staff in detail about patient condition   · Intermittent hemodialysis if needed  · Follow-up with volume status electrolytes later today and tomorrow morning  · Serologies sent still pending including ANCA KAREN protein electrophoresis  · Okay to discontinue IV fluid as hemodynamics are improving    •           Electronically signed by Vikram Acosta MD,   [facility] kidney consultant  [phone]

## 2022-08-09 NOTE — SIGNIFICANT NOTE
08/09/22 1138   OTHER   Discipline physical therapist   Rehab Time/Intention   Session Not Performed patient unavailable for treatment  (pt just got up to chair with nsg assist, will follow up this afternoon for progressive mobiltiy as pt tolerates.)

## 2022-08-10 PROBLEM — A04.4 E COLI ENTERITIS: Status: ACTIVE | Noted: 2022-08-05

## 2022-08-10 PROBLEM — S30.1XXA ABDOMINAL HEMATOMA: Status: ACTIVE | Noted: 2022-08-05

## 2022-08-10 PROBLEM — I10 ESSENTIAL HYPERTENSION: Chronic | Status: ACTIVE | Noted: 2019-11-17

## 2022-08-10 PROBLEM — E55.9 VITAMIN D DEFICIENCY: Chronic | Status: ACTIVE | Noted: 2019-11-15

## 2022-08-10 PROBLEM — F41.9 ANXIETY: Chronic | Status: ACTIVE | Noted: 2019-08-05

## 2022-08-10 LAB
ALBUMIN SERPL-MCNC: 3.2 G/DL (ref 3.5–5.2)
ALBUMIN/GLOB SERPL: 1.5 G/DL
ALP SERPL-CCNC: 188 U/L (ref 39–117)
ALT SERPL W P-5'-P-CCNC: 153 U/L (ref 1–33)
ANION GAP SERPL CALCULATED.3IONS-SCNC: 7 MMOL/L (ref 5–15)
ANISOCYTOSIS BLD QL: ABNORMAL
AST SERPL-CCNC: 74 U/L (ref 1–32)
BACTERIA SPEC AEROBE CULT: NORMAL
BACTERIA SPEC AEROBE CULT: NORMAL
BACTERIA SPEC ANAEROBE CULT: NORMAL
BH CV LOWER VASCULAR LEFT COMMON FEMORAL AUGMENT: NORMAL
BH CV LOWER VASCULAR LEFT COMMON FEMORAL COMPETENT: NORMAL
BH CV LOWER VASCULAR LEFT COMMON FEMORAL COMPRESS: NORMAL
BH CV LOWER VASCULAR LEFT COMMON FEMORAL PHASIC: NORMAL
BH CV LOWER VASCULAR LEFT COMMON FEMORAL SPONT: NORMAL
BH CV LOWER VASCULAR LEFT DISTAL FEMORAL COMPRESS: NORMAL
BH CV LOWER VASCULAR LEFT GASTRONEMIUS COMPRESS: NORMAL
BH CV LOWER VASCULAR LEFT GREATER SAPH AK COMPRESS: NORMAL
BH CV LOWER VASCULAR LEFT GREATER SAPH BK COMPRESS: NORMAL
BH CV LOWER VASCULAR LEFT LESSER SAPH COMPRESS: NORMAL
BH CV LOWER VASCULAR LEFT MID FEMORAL AUGMENT: NORMAL
BH CV LOWER VASCULAR LEFT MID FEMORAL COMPETENT: NORMAL
BH CV LOWER VASCULAR LEFT MID FEMORAL COMPRESS: NORMAL
BH CV LOWER VASCULAR LEFT MID FEMORAL PHASIC: NORMAL
BH CV LOWER VASCULAR LEFT MID FEMORAL SPONT: NORMAL
BH CV LOWER VASCULAR LEFT PERONEAL COMPRESS: NORMAL
BH CV LOWER VASCULAR LEFT POPLITEAL AUGMENT: NORMAL
BH CV LOWER VASCULAR LEFT POPLITEAL COMPETENT: NORMAL
BH CV LOWER VASCULAR LEFT POPLITEAL COMPRESS: NORMAL
BH CV LOWER VASCULAR LEFT POPLITEAL PHASIC: NORMAL
BH CV LOWER VASCULAR LEFT POPLITEAL SPONT: NORMAL
BH CV LOWER VASCULAR LEFT POSTERIOR TIBIAL COMPRESS: NORMAL
BH CV LOWER VASCULAR LEFT PROXIMAL FEMORAL COMPRESS: NORMAL
BH CV LOWER VASCULAR LEFT SAPHENOFEMORAL JUNCTION COMPRESS: NORMAL
BH CV LOWER VASCULAR RIGHT COMMON FEMORAL AUGMENT: NORMAL
BH CV LOWER VASCULAR RIGHT COMMON FEMORAL COMPETENT: NORMAL
BH CV LOWER VASCULAR RIGHT COMMON FEMORAL COMPRESS: NORMAL
BH CV LOWER VASCULAR RIGHT COMMON FEMORAL PHASIC: NORMAL
BH CV LOWER VASCULAR RIGHT COMMON FEMORAL SPONT: NORMAL
BH CV LOWER VASCULAR RIGHT DISTAL FEMORAL COMPRESS: NORMAL
BH CV LOWER VASCULAR RIGHT GASTRONEMIUS COMPRESS: NORMAL
BH CV LOWER VASCULAR RIGHT GREATER SAPH AK COMPRESS: NORMAL
BH CV LOWER VASCULAR RIGHT GREATER SAPH BK COMPRESS: NORMAL
BH CV LOWER VASCULAR RIGHT LESSER SAPH COMPRESS: NORMAL
BH CV LOWER VASCULAR RIGHT MID FEMORAL AUGMENT: NORMAL
BH CV LOWER VASCULAR RIGHT MID FEMORAL COMPETENT: NORMAL
BH CV LOWER VASCULAR RIGHT MID FEMORAL COMPRESS: NORMAL
BH CV LOWER VASCULAR RIGHT MID FEMORAL PHASIC: NORMAL
BH CV LOWER VASCULAR RIGHT MID FEMORAL SPONT: NORMAL
BH CV LOWER VASCULAR RIGHT PERONEAL COMPRESS: NORMAL
BH CV LOWER VASCULAR RIGHT POPLITEAL AUGMENT: NORMAL
BH CV LOWER VASCULAR RIGHT POPLITEAL COMPETENT: NORMAL
BH CV LOWER VASCULAR RIGHT POPLITEAL COMPRESS: NORMAL
BH CV LOWER VASCULAR RIGHT POPLITEAL PHASIC: NORMAL
BH CV LOWER VASCULAR RIGHT POPLITEAL SPONT: NORMAL
BH CV LOWER VASCULAR RIGHT POSTERIOR TIBIAL COMPRESS: NORMAL
BH CV LOWER VASCULAR RIGHT PROXIMAL FEMORAL COMPRESS: NORMAL
BH CV LOWER VASCULAR RIGHT SAPHENOFEMORAL JUNCTION COMPRESS: NORMAL
BILIRUB SERPL-MCNC: 0.9 MG/DL (ref 0–1.2)
BLASTS NFR BLD MANUAL: 2 % (ref 0–0)
BUN SERPL-MCNC: 34 MG/DL (ref 6–20)
BUN/CREAT SERPL: 11.8 (ref 7–25)
CALCIUM SPEC-SCNC: 8.2 MG/DL (ref 8.6–10.5)
CHLORIDE SERPL-SCNC: 103 MMOL/L (ref 98–107)
CO2 SERPL-SCNC: 31 MMOL/L (ref 22–29)
CREAT SERPL-MCNC: 2.88 MG/DL (ref 0.57–1)
DEPRECATED RDW RBC AUTO: 48.1 FL (ref 37–54)
EGFRCR SERPLBLD CKD-EPI 2021: 19.9 ML/MIN/1.73
EOSINOPHIL # BLD MANUAL: 0.97 10*3/MM3 (ref 0–0.4)
EOSINOPHIL NFR BLD MANUAL: 6 % (ref 0.3–6.2)
ERYTHROCYTE [DISTWIDTH] IN BLOOD BY AUTOMATED COUNT: 16.2 % (ref 12.3–15.4)
GLOBULIN UR ELPH-MCNC: 2.2 GM/DL
GLUCOSE BLDC GLUCOMTR-MCNC: 112 MG/DL (ref 70–105)
GLUCOSE BLDC GLUCOMTR-MCNC: 119 MG/DL (ref 70–105)
GLUCOSE BLDC GLUCOMTR-MCNC: 129 MG/DL (ref 70–105)
GLUCOSE BLDC GLUCOMTR-MCNC: 132 MG/DL (ref 70–105)
GLUCOSE BLDC GLUCOMTR-MCNC: 133 MG/DL (ref 70–105)
GLUCOSE SERPL-MCNC: 110 MG/DL (ref 65–99)
HCT VFR BLD AUTO: 24.6 % (ref 34–46.6)
HGB BLD-MCNC: 8 G/DL (ref 12–15.9)
HOLD SPECIMEN: NORMAL
LYMPHOCYTES # BLD MANUAL: 3.56 10*3/MM3 (ref 0.7–3.1)
LYMPHOCYTES NFR BLD MANUAL: 9 % (ref 5–12)
MAGNESIUM SERPL-MCNC: 2.3 MG/DL (ref 1.6–2.6)
MAXIMAL PREDICTED HEART RATE: 175 BPM
MCH RBC QN AUTO: 27.3 PG (ref 26.6–33)
MCHC RBC AUTO-ENTMCNC: 32.6 G/DL (ref 31.5–35.7)
MCV RBC AUTO: 83.6 FL (ref 79–97)
METAMYELOCYTES NFR BLD MANUAL: 8 % (ref 0–0)
MONOCYTES # BLD: 1.46 10*3/MM3 (ref 0.1–0.9)
MYELOCYTES NFR BLD MANUAL: 3 % (ref 0–0)
NEUTROPHILS # BLD AUTO: 7.94 10*3/MM3 (ref 1.7–7)
NEUTROPHILS NFR BLD MANUAL: 43 % (ref 42.7–76)
NEUTS BAND NFR BLD MANUAL: 6 % (ref 0–5)
NRBC SPEC MANUAL: 1 /100 WBC (ref 0–0.2)
PHOSPHATE SERPL-MCNC: 3.2 MG/DL (ref 2.5–4.5)
PLAT MORPH BLD: NORMAL
PLATELET # BLD AUTO: 89 10*3/MM3 (ref 140–450)
PMV BLD AUTO: 8.3 FL (ref 6–12)
POLYCHROMASIA BLD QL SMEAR: ABNORMAL
POTASSIUM SERPL-SCNC: 3.4 MMOL/L (ref 3.5–5.2)
PROMYELOCYTES NFR BLD MANUAL: 1 % (ref 0–0)
PROT SERPL-MCNC: 5.4 G/DL (ref 6–8.5)
RBC # BLD AUTO: 2.95 10*6/MM3 (ref 3.77–5.28)
SCAN SLIDE: NORMAL
SODIUM SERPL-SCNC: 141 MMOL/L (ref 136–145)
STOMATOCYTES BLD QL SMEAR: ABNORMAL
STRESS TARGET HR: 149 BPM
VARIANT LYMPHS NFR BLD MANUAL: 1 % (ref 0–5)
VARIANT LYMPHS NFR BLD MANUAL: 21 % (ref 19.6–45.3)
WBC MORPH BLD: NORMAL
WBC NRBC COR # BLD: 16.2 10*3/MM3 (ref 3.4–10.8)
WNV IGG SER QL IA: NEGATIVE
WNV IGM SER QL IA: NEGATIVE

## 2022-08-10 PROCEDURE — 25010000002 MORPHINE PER 10 MG: Performed by: NURSE PRACTITIONER

## 2022-08-10 PROCEDURE — 85007 BL SMEAR W/DIFF WBC COUNT: CPT | Performed by: INTERNAL MEDICINE

## 2022-08-10 PROCEDURE — 25010000002 FENTANYL CITRATE (PF) 50 MCG/ML SOLUTION

## 2022-08-10 PROCEDURE — 82962 GLUCOSE BLOOD TEST: CPT

## 2022-08-10 PROCEDURE — 99223 1ST HOSP IP/OBS HIGH 75: CPT | Performed by: INTERNAL MEDICINE

## 2022-08-10 PROCEDURE — 83735 ASSAY OF MAGNESIUM: CPT | Performed by: NURSE PRACTITIONER

## 2022-08-10 PROCEDURE — 80053 COMPREHEN METABOLIC PANEL: CPT | Performed by: NURSE PRACTITIONER

## 2022-08-10 PROCEDURE — 25010000002 ONDANSETRON PER 1 MG: Performed by: NURSE PRACTITIONER

## 2022-08-10 PROCEDURE — 86431 RHEUMATOID FACTOR QUANT: CPT | Performed by: NURSE PRACTITIONER

## 2022-08-10 PROCEDURE — 86235 NUCLEAR ANTIGEN ANTIBODY: CPT | Performed by: NURSE PRACTITIONER

## 2022-08-10 PROCEDURE — 85025 COMPLETE CBC W/AUTO DIFF WBC: CPT | Performed by: INTERNAL MEDICINE

## 2022-08-10 PROCEDURE — 25010000002 CEFTRIAXONE PER 250 MG: Performed by: NURSE PRACTITIONER

## 2022-08-10 PROCEDURE — 84100 ASSAY OF PHOSPHORUS: CPT | Performed by: NURSE PRACTITIONER

## 2022-08-10 RX ORDER — AMLODIPINE BESYLATE 5 MG/1
10 TABLET ORAL
Status: DISCONTINUED | OUTPATIENT
Start: 2022-08-10 | End: 2022-08-17 | Stop reason: HOSPADM

## 2022-08-10 RX ORDER — GABAPENTIN 100 MG/1
200 CAPSULE ORAL EVERY 8 HOURS SCHEDULED
Status: DISCONTINUED | OUTPATIENT
Start: 2022-08-10 | End: 2022-08-12

## 2022-08-10 RX ORDER — BUMETANIDE 0.25 MG/ML
1 INJECTION INTRAMUSCULAR; INTRAVENOUS ONCE
Status: COMPLETED | OUTPATIENT
Start: 2022-08-10 | End: 2022-08-10

## 2022-08-10 RX ORDER — METOPROLOL TARTRATE 50 MG/1
50 TABLET, FILM COATED ORAL EVERY 12 HOURS SCHEDULED
Status: DISCONTINUED | OUTPATIENT
Start: 2022-08-10 | End: 2022-08-11

## 2022-08-10 RX ORDER — METOPROLOL TARTRATE 5 MG/5ML
5 INJECTION INTRAVENOUS ONCE
Status: COMPLETED | OUTPATIENT
Start: 2022-08-10 | End: 2022-08-10

## 2022-08-10 RX ORDER — POTASSIUM CHLORIDE 20 MEQ/1
20 TABLET, EXTENDED RELEASE ORAL ONCE
Status: COMPLETED | OUTPATIENT
Start: 2022-08-10 | End: 2022-08-10

## 2022-08-10 RX ORDER — METOPROLOL TARTRATE 5 MG/5ML
2.5 INJECTION INTRAVENOUS ONCE
Status: COMPLETED | OUTPATIENT
Start: 2022-08-10 | End: 2022-08-10

## 2022-08-10 RX ADMIN — METOPROLOL TARTRATE 25 MG: 25 TABLET, FILM COATED ORAL at 04:23

## 2022-08-10 RX ADMIN — FENTANYL CITRATE 25 MCG: 50 INJECTION, SOLUTION INTRAMUSCULAR; INTRAVENOUS at 04:29

## 2022-08-10 RX ADMIN — OXYCODONE 10 MG: 5 TABLET ORAL at 12:31

## 2022-08-10 RX ADMIN — Medication 10 ML: at 08:10

## 2022-08-10 RX ADMIN — Medication 10 ML: at 20:20

## 2022-08-10 RX ADMIN — GABAPENTIN 200 MG: 100 CAPSULE ORAL at 21:41

## 2022-08-10 RX ADMIN — ONDANSETRON 4 MG: 2 INJECTION INTRAMUSCULAR; INTRAVENOUS at 12:31

## 2022-08-10 RX ADMIN — METOPROLOL TARTRATE 5 MG: 1 INJECTION, SOLUTION INTRAVENOUS at 02:12

## 2022-08-10 RX ADMIN — NYSTATIN 500000 UNITS: 100000 SUSPENSION ORAL at 12:29

## 2022-08-10 RX ADMIN — AMLODIPINE BESYLATE 10 MG: 5 TABLET ORAL at 08:09

## 2022-08-10 RX ADMIN — MORPHINE SULFATE 4 MG: 4 INJECTION INTRAVENOUS at 20:19

## 2022-08-10 RX ADMIN — BUMETANIDE 1 MG: 0.25 INJECTION, SOLUTION INTRAMUSCULAR; INTRAVENOUS at 12:29

## 2022-08-10 RX ADMIN — OXYCODONE 10 MG: 5 TABLET ORAL at 21:41

## 2022-08-10 RX ADMIN — MORPHINE SULFATE 2 MG: 2 INJECTION, SOLUTION INTRAMUSCULAR; INTRAVENOUS at 06:41

## 2022-08-10 RX ADMIN — DEXTROSE MONOHYDRATE 30 ML/HR: 100 INJECTION, SOLUTION INTRAVENOUS at 03:15

## 2022-08-10 RX ADMIN — OXYCODONE 10 MG: 5 TABLET ORAL at 05:15

## 2022-08-10 RX ADMIN — METOPROLOL TARTRATE 25 MG: 25 TABLET, FILM COATED ORAL at 12:26

## 2022-08-10 RX ADMIN — METOPROLOL TARTRATE 2.5 MG: 1 INJECTION, SOLUTION INTRAVENOUS at 04:22

## 2022-08-10 RX ADMIN — POTASSIUM CHLORIDE 20 MEQ: 1500 TABLET, EXTENDED RELEASE ORAL at 13:15

## 2022-08-10 RX ADMIN — CEFTRIAXONE 2 G: 2 INJECTION, POWDER, FOR SOLUTION INTRAMUSCULAR; INTRAVENOUS at 12:29

## 2022-08-10 RX ADMIN — METOPROLOL TARTRATE 50 MG: 50 TABLET, FILM COATED ORAL at 20:19

## 2022-08-10 RX ADMIN — GABAPENTIN 200 MG: 100 CAPSULE ORAL at 13:14

## 2022-08-10 RX ADMIN — NICARDIPINE HYDROCHLORIDE 5 MG/HR: 25 INJECTION, SOLUTION INTRAVENOUS at 06:35

## 2022-08-10 RX ADMIN — NYSTATIN 500000 UNITS: 100000 SUSPENSION ORAL at 17:47

## 2022-08-10 RX ADMIN — ESCITALOPRAM OXALATE 10 MG: 10 TABLET ORAL at 08:09

## 2022-08-10 RX ADMIN — NYSTATIN 500000 UNITS: 100000 SUSPENSION ORAL at 08:09

## 2022-08-10 RX ADMIN — PANTOPRAZOLE SODIUM 40 MG: 40 TABLET, DELAYED RELEASE ORAL at 05:15

## 2022-08-10 RX ADMIN — Medication 10 ML: at 08:09

## 2022-08-10 RX ADMIN — NICARDIPINE HYDROCHLORIDE 10 MG/HR: 25 INJECTION, SOLUTION INTRAVENOUS at 09:50

## 2022-08-10 RX ADMIN — MORPHINE SULFATE 4 MG: 4 INJECTION INTRAVENOUS at 15:05

## 2022-08-10 NOTE — PROGRESS NOTES
PROGRESS NOTE      Patient Name: Raul Gardner  : 1975  MRN: [id number]  Primary Care Physician: Maribel Graf MD  Date of admission: 2022    Patient Care Team:  Maribel Graf MD as PCP - General (Family Medicine)  AVA Cavanaugh MD as Consulting Physician (Cardiology)        Subjective   Subjective:     Patient is slightly better than yesterday but still lethargic sick looking  Review of systems:  Middle-age female complaining of body aches abdominal pain abdominal distention      Allergies:    Allergies   Allergen Reactions   • Cephalexin Rash   • Hydrocodone-Acetaminophen Rash       Objective   Exam:     Vital Signs  Temp:  [97.3 °F (36.3 °C)-98.1 °F (36.7 °C)] 97.5 °F (36.4 °C)  Heart Rate:  [] 80  BP: (132-193)/() 132/86  SpO2:  [95 %-100 %] 96 %  on  Flow (L/min):  [3] 3;   Device (Oxygen Therapy): nasal cannula  Body mass index is 51.1 kg/m².    General: Middle-age  female i very lethargic  Head:      Normocephalic and atraumatic.    Eyes:      PERRL/EOM intact, conjunctiva and sclera clear with out nystagmus.    Neck:      No masses, thyromegaly,  trachea central with normal respiratory effort   Lungs:    Clear bilaterally to auscultation.    Heart:      Regular rate and rhythm, no murmur no gallop  Abd:        Diffusely tender with decreased bowel sounds  Pulses:   Pulses palpable  Extr:        No cyanosis or clubbing--no edema.    Neuro:    No focal deficits.   alert oriented x3  Skin:       Intact without lesions or rashes.    Psych:    Alert and cooperative; normal mood and affect; .      Results Review:  I have personally reviewed most recent Data :  CBC    Results from last 7 days   Lab Units 08/10/22  0431 22  0339 22  1322 22  0812 22  0359 22  2327 22   WBC 10*3/mm3 16.20* 16.20* 14.10* 14.10* 14.30* 13.90* 13.30*   HEMOGLOBIN g/dL 8.0* 7.4* 7.7* 7.6* 7.2* 7.5* 7.5*   PLATELETS 10*3/mm3 89* 57* 50* 47* 50* 57* 57*     CMP    Results from last 7 days   Lab Units 08/10/22  0431 08/09/22  0339 08/08/22  0812 08/08/22  0359 08/07/22  2327 08/07/22  1615 08/07/22  0600 08/06/22  2154 08/06/22  1302 08/06/22  1013 08/06/22  0510 08/05/22  1400 08/05/22  0811   SODIUM mmol/L 141 139 138 140 140 139 138   < > 136 139 138   < > 132*   POTASSIUM mmol/L 3.4* 4.0 4.1 4.1 3.8 3.7 3.7   < > 3.5 2.5* 3.4*   < > 3.9   CHLORIDE mmol/L 103 104 104 105 104 101 99   < > 96* 96* 96*   < > 94*   CO2 mmol/L 31.0* 27.0 28.0 28.0 28.0 29.0 30.0*   < > 16.0* 29.0 24.0   < > 21.0*   BUN mg/dL 34* 29* 16 19 21* 25* 37*   < > 53* 40* 46*   < > 32*   CREATININE mg/dL 2.88* 2.33* 1.35* 1.53* 1.77* 2.05* 2.92*   < > 4.58* 3.25* 4.06*   < > 3.82*   GLUCOSE mg/dL 110* 123* 87 98 105* 112* 124*   < > 264* 543* 218*   < > 141*   ALBUMIN g/dL 3.20* 3.20* 3.20* 3.00* 3.30* 3.20* 2.90*   < > 2.70* 1.90* 2.50*   < > 3.30*   BILIRUBIN mg/dL 0.9 1.3*  --  3.8*  --   --  4.7*  --  4.6* 3.1* 4.5*   < > 4.3*   ALK PHOS U/L 188* 217*  --  133*  --   --  112  --  89 72 75   < > 64   AST (SGOT) U/L 74* 141*  --  233*  --   --  522*  --  393* 194* 177*   < > 41*   ALT (SGPT) U/L 153* 208*  --  259*  --   --  352*  --  222* 109* 98*   < > 30   AMYLASE U/L  --   --   --   --   --   --   --   --   --  39  --   --  81   LIPASE U/L  --   --   --   --   --   --   --   --   --  4*  --   --  12*    < > = values in this interval not displayed.     ABG    Results from last 7 days   Lab Units 08/05/22  0815   PH, ARTERIAL pH units 7.345*   PCO2, ARTERIAL mm Hg 28.3*   PO2 ART mm Hg 221.4*   O2 SATURATION ART % 99.8*   BASE EXCESS ART mmol/L -8.9*     CT Abdomen Pelvis Without Contrast    Result Date: 8/9/2022  1. Hematoma collection at the inferior and subcapsular margin of the right hepatic lobe appears stable since 8/6/2022. Abdominal and pelvic hemoperitoneum likewise appears stable. 2. Ill-defined heterogeneous low density lesions scattered within liver are again noted. The dominant lesion  in the posterior right hepatic lobe is larger than on 8/5/2022 while the others are stable. No new liver lesions are identified. Correlate with recent CT-guided biopsy findings. 3. Development of  generalized body wall edema. 4. Development of small bilateral pleural effusions and posterior bilateral lower lobe atelectasis or pneumonia. 5. Persistent enhancement of the kidneys on this noncontrast examination. Correlate for nephropathy.  Electronically Signed By-Franca Gallegos MD On:8/9/2022 4:46 PM This report was finalized on [id number] by  Franca Gallegos MD.    CT Chest Without Contrast Diagnostic    Result Date: 8/9/2022  1. Hematoma collection at the inferior and subcapsular margin of the right hepatic lobe appears stable since 8/6/2022. Abdominal and pelvic hemoperitoneum likewise appears stable. 2. Ill-defined heterogeneous low density lesions scattered within liver are again noted. The dominant lesion in the posterior right hepatic lobe is larger than on 8/5/2022 while the others are stable. No new liver lesions are identified. Correlate with recent CT-guided biopsy findings. 3. Development of  generalized body wall edema. 4. Development of small bilateral pleural effusions and posterior bilateral lower lobe atelectasis or pneumonia. 5. Persistent enhancement of the kidneys on this noncontrast examination. Correlate for nephropathy.  Electronically Signed By-Franca Gallegos MD On:8/9/2022 4:46 PM This report was finalized on [id number] by  Franca Gallegos MD.      Results for orders placed during the hospital encounter of 08/05/22    Adult Transthoracic Echo Complete w/ Color, Spectral and Contrast if Necessary Per Protocol    Interpretation Summary  · Left ventricular wall thickness is consistent with mild concentric hypertrophy.  · Estimated left ventricular EF = 75% Left ventricular systolic function is hyperdynamic (EF > 70%).  · Left ventricular diastolic function is consistent with (grade I)  impaired relaxation.    Scheduled Meds:amLODIPine, 10 mg, Oral, Q24H  cefTRIAXone, 2 g, Intravenous, Q24H  escitalopram, 10 mg, Oral, Daily  gabapentin, 200 mg, Oral, Q8H  insulin lispro, 0-7 Units, Subcutaneous, Q6H  metoprolol tartrate, 50 mg, Oral, Q12H  nystatin, 5 mL, Swish & Swallow, 4x Daily  pantoprazole, 40 mg, Oral, Q AM  sodium chloride, 10 mL, Intravenous, Q12H  sodium chloride, 10 mL, Intravenous, Q12H      Continuous Infusions:dextrose, 15 mL/hr, Last Rate: 15 mL/hr (08/10/22 1234)  niCARdipine, 5-15 mg/hr, Last Rate: Stopped (08/10/22 1300)      PRN Meds:•  acetaminophen **OR** acetaminophen  •  dextrose  •  dextrose  •  fentaNYL citrate (PF)  •  glucagon (human recombinant)  •  hydrALAZINE  •  Morphine  •  nitroglycerin  •  ondansetron **OR** ondansetron  •  oxyCODONE  •  simethicone  •  sodium chloride  •  sodium chloride  •  sodium chloride  •  sodium chloride    Assessment & Plan   Assessment and Plan:         Septic shock (HCC)    Pulmonary hypertension, unspecified (HCC)    Anxiety    Vitamin D deficiency    Essential hypertension    Suspected liver abscess    Diarrhea    Acute kidney injury (HCC)    Metabolic acidosis    E coli enteritis    Abdominal hematoma, secondary to liver biopsyh    ASSESSMENT:  · Acute kidney injury likely ATN secondary to sepsis and hemodynamic instability on pressors at this time  · Multiple liver lesion unlikely to be abscess  · Chronic kidney disease as per renal ultrasound with some increased echogenicity   · Significant lactic acidosis with increased anion gap   · Some evidence of volume overload   · History of hypertension   ·             Plan:      · Patient was in septic shock with MARIANNE continue ABX   · Patient iwas on CRRT started 9/6/2022.  9/8/2022 no hemodynamically better blood pressure is on the high side  · Urine output has improved significantly at this time\  · Creatinine slightly higher than before  · Hemodynamically much better  · Okay to be  transferred from ICU   · Blood pressure is increasing at this time add diuretics continue calcium channel blocker  · Lactic acidosis resolved  · I think creatinine will get better in next 2 to 3 days.  · Follow-up with volume status electrolytes later today and tomorrow morning  · So far all the serologies are unremarkable including KAREN, ANCA, complements    •           Electronically signed by Vikram Acosta MD,   [facility] kidney consultant  [phone]         No

## 2022-08-10 NOTE — SIGNIFICANT NOTE
08/10/22 1512   OTHER   Discipline physical therapist   Rehab Time/Intention   Session Not Performed patient/family declined treatment  (Per nurse, pt is in pain and requests therapy return later. Will re-attempt as schedule permits)   Therapy Assessment/Plan (PT)   Criteria for Skilled Interventions Met (PT) yes;skilled treatment is necessary   Recommendation   PT - Next Appointment 08/11/22

## 2022-08-10 NOTE — PROGRESS NOTES
Infectious Diseases Progress Note      LOS: 5 days   Patient Care Team:  Maribel Graf MD as PCP - General (Family Medicine)  AVA Cavanaugh MD as Consulting Physician (Cardiology)    Chief Complaint: Weakness    Subjective     The patient had no fever during the last 24 hours.  She remained off vasopressors actually she was hypotensive required Cardene.  She denied having any new complaints today.  She had no diarrhea since yesterday.      Sandoval catheter  Review of Systems:   Review of Systems   Constitutional: Positive for fatigue.   HENT: Negative.    Eyes: Negative.    Respiratory: Negative.    Cardiovascular: Negative.    Endocrine: Negative.    Genitourinary: Negative.    Musculoskeletal: Negative.    Skin: Negative.    Neurological: Negative.    Psychiatric/Behavioral: Negative.    All other systems reviewed and are negative.       Objective     Vital Signs  Temp:  [97.3 °F (36.3 °C)-98.6 °F (37 °C)] 97.5 °F (36.4 °C)  Heart Rate:  [] 79  BP: (137-193)/() 146/81    Physical Exam:  Physical Exam  Vitals and nursing note reviewed.   Constitutional:       Appearance: She is well-developed.   HENT:      Head: Normocephalic and atraumatic.   Eyes:      Pupils: Pupils are equal, round, and reactive to light.   Cardiovascular:      Rate and Rhythm: Normal rate and regular rhythm.      Heart sounds: Normal heart sounds.   Pulmonary:      Effort: Pulmonary effort is normal. No respiratory distress.      Breath sounds: Normal breath sounds. No wheezing or rales.   Abdominal:      General: Bowel sounds are normal. There is no distension.      Palpations: Abdomen is soft. There is no mass.      Tenderness: There is no abdominal tenderness. There is no guarding or rebound.   Musculoskeletal:         General: No deformity. Normal range of motion.      Cervical back: Normal range of motion and neck supple.   Skin:     General: Skin is warm.      Findings: No erythema or rash.   Neurological:      Mental  Status: She is alert and oriented to person, place, and time.      Cranial Nerves: No cranial nerve deficit.          Results Review:    I have reviewed all clinical data, test, lab, and imaging results.     Radiology  CT Chest Without Contrast Diagnostic, CT Abdomen Pelvis Without Contrast    Result Date: 8/9/2022  CT ABDOMEN PELVIS WO CONTRAST-, CT CHEST WO CONTRAST DIAGNOSTIC-  Date of Exam: 8/9/2022 4:31 PM  Indication: follow up hemoperitoneum; N17.9-Acute kidney failure, unspecified; A41.9-Sepsis, unspecified organism; R65.21-Severe sepsis with septic shock  Comparison: AP portable chest 8/8/2022. CT abdomen and pelvis 8/5/2022, 8/6/2022  CT-guided liver biopsy 5 2022. Liver ultrasound 8/5/2022, 8/6/2022. CT chest 8/5/2022.  Technique: Contiguous axial CT images were obtained from the lung bases to the pubic symphysis without contrast. Sagittal and coronal reconstructions were performed.  Automated exposure control and iterative reconstruction methods were used.  FINDINGS:  CHEST: Small bilateral pleural effusions are present. New posterior bilateral lower lobe airspace disease may represent developing atelectasis or pneumonia. The features of the edema described on the prior examination are no longer apparent. Right arm approach PICC tip terminates at the cavoatrial junction. Right internal jugular central line terminates at the lower SVC level. Heart size is normal. Previously described mild stranding within the anterior superior mediastinum is not significantly changed. No mediastinal drainable fluid collection or soft tissue mass is seen. The thyroid gland is normal. No pathologically enlarged lymph nodes are identified. No acute or suspicious osseous abnormalities.    ABDOMEN AND PELVIS:  The study is limited due to lack of IV contrast. Hematoma at the inferior and lateral subcapsular margin of the liver appears approximately stable in size since 8/6/2022. On series 6 image 79, the hematoma measures  approximately 12.3 x 12.1 cm in the axial plane. Intraparenchymal portion of the hematoma measures about 8 cm anterior to posterior, without significant change.  Ill-defined heterogeneous low density foci are scattered within the liver.. A dominant lesion in the posterior right hepatic lobe measures roughly 6.9 x 4.5 cm compared to 5.8 x 2.5 cm ON 8/5/2022. Another lesion within the anterior right hepatic lobe, laterally, measures 2.6 x 1.8 cm, only slightly larger than on 8/5/2022 where it measured 2.6 x 1.4 cm. A focus within the lateral left hepatic segment measures 2.0 x 1.2 cm, little changed from 1.7 x 1.3 cm ON 8/5/2022. A third lesion within the central right hepatic lobe near the dome measures about 1.8 cm, stable. No definite new liver lesions are identified.  Small quantity hemorrhagic fluid is seen surrounding the spleen, not significant change from 8/6/2022. Noncontrast appearance of the liver, spleen, pancreas, adrenals is within normal limits. There is a described stranding around the pancreatic tail is less conspicuous on today's examination. High density fluid/hemorrhagic ascites extends along the paracolic gutters into the pelvis, unchanged.  There is mild retained IV contrast within the kidneys which may reflect changes of nephropathy. No hydronephrosis is identified.  The bowel does not appear thickened or inflamed. Urinary bladder, uterus, rectum are normal.  No acute osseous abnormalities are identified. There is mild generalized anasarca, which has developed since 8/6/2022.        1. Hematoma collection at the inferior and subcapsular margin of the right hepatic lobe appears stable since 8/6/2022. Abdominal and pelvic hemoperitoneum likewise appears stable. 2. Ill-defined heterogeneous low density lesions scattered within liver are again noted. The dominant lesion in the posterior right hepatic lobe is larger than on 8/5/2022 while the others are stable. No new liver lesions are identified.  Correlate with recent CT-guided biopsy findings. 3. Development of  generalized body wall edema. 4. Development of small bilateral pleural effusions and posterior bilateral lower lobe atelectasis or pneumonia. 5. Persistent enhancement of the kidneys on this noncontrast examination. Correlate for nephropathy.  Electronically Signed By-Franca Gallegos MD On:8/9/2022 4:46 PM This report was finalized on [id number] by  Franca Gallegos MD.      Cardiology    Laboratory    Results from last 7 days   Lab Units 08/10/22  0431 08/09/22  0339 08/08/22  1322 08/08/22  0812 08/08/22  0359 08/07/22  2327 08/07/22 2002   WBC 10*3/mm3 16.20* 16.20* 14.10* 14.10* 14.30* 13.90* 13.30*   HEMOGLOBIN g/dL 8.0* 7.4* 7.7* 7.6* 7.2* 7.5* 7.5*   HEMATOCRIT % 24.6* 22.5* 22.8* 22.6* 21.8* 22.8* 22.4*   PLATELETS 10*3/mm3 89* 57* 50* 47* 50* 57* 57*     Results from last 7 days   Lab Units 08/10/22  0431 08/09/22  0339 08/08/22  0812 08/08/22  0359 08/07/22  2327 08/07/22  1615 08/07/22  0600 08/06/22  2154 08/06/22  1302 08/06/22  1013 08/06/22  0510 08/05/22  1400 08/05/22  0811   SODIUM mmol/L 141 139 138 140 140 139 138   < > 136 139 138   < > 132*   POTASSIUM mmol/L 3.4* 4.0 4.1 4.1 3.8 3.7 3.7   < > 3.5 2.5* 3.4*   < > 3.9   CHLORIDE mmol/L 103 104 104 105 104 101 99   < > 96* 96* 96*   < > 94*   CO2 mmol/L 31.0* 27.0 28.0 28.0 28.0 29.0 30.0*   < > 16.0* 29.0 24.0   < > 21.0*   BUN mg/dL 34* 29* 16 19 21* 25* 37*   < > 53* 40* 46*   < > 32*   CREATININE mg/dL 2.88* 2.33* 1.35* 1.53* 1.77* 2.05* 2.92*   < > 4.58* 3.25* 4.06*   < > 3.82*   GLUCOSE mg/dL 110* 123* 87 98 105* 112* 124*   < > 264* 543* 218*   < > 141*   ALBUMIN g/dL 3.20* 3.20* 3.20* 3.00* 3.30* 3.20* 2.90*   < > 2.70* 1.90* 2.50*   < > 3.30*   BILIRUBIN mg/dL 0.9 1.3*  --  3.8*  --   --  4.7*  --  4.6* 3.1* 4.5*   < > 4.3*   ALK PHOS U/L 188* 217*  --  133*  --   --  112  --  89 72 75   < > 64   AST (SGOT) U/L 74* 141*  --  233*  --   --  522*  --  393* 194* 177*   <  > 41*   ALT (SGPT) U/L 153* 208*  --  259*  --   --  352*  --  222* 109* 98*   < > 30   AMYLASE U/L  --   --   --   --   --   --   --   --   --  39  --   --  81   LIPASE U/L  --   --   --   --   --   --   --   --   --  4*  --   --  12*   CALCIUM mg/dL 8.2* 8.0* 7.6* 7.6* 8.1* 7.7* 7.2*   < > 6.9* 5.1* 7.2*   < > 8.3*    < > = values in this interval not displayed.     Results from last 7 days   Lab Units 08/05/22  0811   CK TOTAL U/L 327*             Microbiology   Microbiology Results (last 10 days)     Procedure Component Value - Date/Time    Clostridioides difficile Toxin - Stool, Per Rectum [[id number]]  (Normal) Collected: 08/06/22 0703    Lab Status: Final result Specimen: Stool from Per Rectum Updated: 08/06/22 0758    Narrative:      The following orders were created for panel order Clostridioides difficile Toxin - Stool, Per Rectum.  Procedure                               Abnormality         Status                     ---------                               -----------         ------                     Clostridioides difficile...[[id number]]  Normal              Final result                 Please view results for these tests on the individual orders.    Clostridioides difficile EIA - Stool, Per Rectum [[id number]]  (Normal) Collected: 08/06/22 0703    Lab Status: Final result Specimen: Stool from Per Rectum Updated: 08/06/22 0758     C Diff GDH / Toxin Negative    Eosinophil Smear - Urine, Urine, Clean Catch [[id number]]  (Normal) Collected: 08/05/22 1917    Lab Status: Final result Specimen: Urine, Clean Catch Updated: 08/05/22 2047     Eosinophil Smear 0 % EOS/100 Cells     Body Fluid Culture - Body Fluid, Liver [[id number]] Collected: 08/05/22 1755    Lab Status: Final result Specimen: Body Fluid from Liver Updated: 08/08/22 0841     Body Fluid Culture No growth at 3 days     Gram Stain Few (2+) WBCs per low power field      No organisms seen    Anaerobic Culture - Swab, Liver [[id number]] Collected:  08/05/22 1755    Lab Status: Final result Specimen: Swab from Liver Updated: 08/10/22 0629     Anaerobic Culture No anaerobes isolated at 5 days    S. Pneumo Ag Urine or CSF - Urine, Urine, Clean Catch [[id number]]  (Normal) Collected: 08/05/22 1154    Lab Status: Final result Specimen: Urine, Clean Catch Updated: 08/05/22 1234     Strep Pneumo Ag Negative    Legionella Antigen, Urine - Urine, Urine, Clean Catch [[id number]]  (Normal) Collected: 08/05/22 1154    Lab Status: Final result Specimen: Urine, Clean Catch Updated: 08/05/22 1234     LEGIONELLA ANTIGEN, URINE Negative    MRSA Screen, PCR (Inpatient) - Swab, Nares [[id number]]  (Normal) Collected: 08/05/22 1057    Lab Status: Final result Specimen: Swab from Nares Updated: 08/05/22 1222     MRSA PCR No MRSA Detected    Narrative:      The negative predictive value of this diagnostic test is high and should only be used to consider de-escalating anti-MRSA therapy. A positive result may indicate colonization with MRSA and must be correlated clinically.    Gastrointestinal Panel, PCR - Stool, Per Rectum [[id number]]  (Abnormal) Collected: 08/05/22 1057    Lab Status: Final result Specimen: Stool from Per Rectum Updated: 08/05/22 1238     Campylobacter Not Detected     Plesiomonas shigelloides Not Detected     Salmonella Not Detected     Vibrio Not Detected     Vibrio cholerae Not Detected     Yersinia enterocolitica Not Detected     Enteroaggregative E. coli (EAEC) Not Detected     Enteropathogenic E. coli (EPEC) Detected     Enterotoxigenic E. coli (ETEC) lt/st Not Detected     Shiga-like toxin-producing E. coli (STEC) stx1/stx2 Not Detected     Shigella/Enteroinvasive E. coli (EIEC) Not Detected     Cryptosporidium Not Detected     Cyclospora cayetanensis Not Detected     Entamoeba histolytica Not Detected     Giardia lamblia Not Detected     Adenovirus F40/41 Not Detected     Astrovirus Not Detected     Norovirus GI/GII Not Detected     Rotavirus A Not Detected      Sapovirus (I, II, IV or V) Not Detected    Urine Culture - Urine, Urine, Catheter [[id number]]  (Normal) Collected: 08/05/22 1012    Lab Status: Final result Specimen: Urine, Catheter Updated: 08/06/22 1409     Urine Culture No growth    Blood Culture - Blood, Blood, Central Line [[id number]]  (Normal) Collected: 08/05/22 0902    Lab Status: Final result Specimen: Blood, Central Line Updated: 08/10/22 0917     Blood Culture No growth at 5 days    Blood Culture - Blood, Blood, Central Line [[id number]]  (Normal) Collected: 08/05/22 0902    Lab Status: Final result Specimen: Blood, Central Line Updated: 08/10/22 0917     Blood Culture No growth at 5 days    Respiratory Panel PCR w/COVID-19(SARS-CoV-2) CELSA/OJNATHAN/DOMINIQUE/PAD/COR/MAD/LISA In-House, NP Swab in UTM/VTM, 3-4 HR TAT - Swab, Nasopharynx [[id number]]  (Normal) Collected: 08/05/22 0815    Lab Status: Final result Specimen: Swab from Nasopharynx Updated: 08/05/22 0909     ADENOVIRUS, PCR Not Detected     Coronavirus 229E Not Detected     Coronavirus HKU1 Not Detected     Coronavirus NL63 Not Detected     Coronavirus OC43 Not Detected     COVID19 Not Detected     Human Metapneumovirus Not Detected     Human Rhinovirus/Enterovirus Not Detected     Influenza A PCR Not Detected     Influenza B PCR Not Detected     Parainfluenza Virus 1 Not Detected     Parainfluenza Virus 2 Not Detected     Parainfluenza Virus 3 Not Detected     Parainfluenza Virus 4 Not Detected     RSV, PCR Not Detected     Bordetella pertussis pcr Not Detected     Bordetella parapertussis PCR Not Detected     Chlamydophila pneumoniae PCR Not Detected     Mycoplasma pneumo by PCR Not Detected    Narrative:      In the setting of a positive respiratory panel with a viral infection PLUS a negative procalcitonin without other underlying concern for bacterial infection, consider observing off antibiotics or discontinuation of antibiotics and continue supportive care. If the respiratory panel is positive for  atypical bacterial infection (Bordetella pertussis, Chlamydophila pneumoniae, or Mycoplasma pneumoniae), consider antibiotic de-escalation to target atypical bacterial infection.          Medication Review:       Schedule Meds  amLODIPine, 10 mg, Oral, Q24H  cefTRIAXone, 2 g, Intravenous, Q24H  escitalopram, 10 mg, Oral, Daily  gabapentin, 200 mg, Oral, Q8H  insulin lispro, 0-7 Units, Subcutaneous, Q6H  metoprolol tartrate, 50 mg, Oral, Q12H  nystatin, 5 mL, Swish & Swallow, 4x Daily  pantoprazole, 40 mg, Oral, Q AM  potassium chloride, 20 mEq, Oral, Once  sodium chloride, 10 mL, Intravenous, Q12H  sodium chloride, 10 mL, Intravenous, Q12H        Infusion Meds  dextrose, 15 mL/hr, Last Rate: 15 mL/hr (08/10/22 1234)  niCARdipine, 5-15 mg/hr, Last Rate: 10 mg/hr (08/10/22 0950)        PRN Meds  •  acetaminophen **OR** acetaminophen  •  dextrose  •  dextrose  •  fentaNYL citrate (PF)  •  glucagon (human recombinant)  •  hydrALAZINE  •  Morphine  •  nitroglycerin  •  ondansetron **OR** ondansetron  •  oxyCODONE  •  simethicone  •  sodium chloride  •  sodium chloride  •  sodium chloride  •  sodium chloride        Assessment & Plan       Antimicrobial Therapy   1.  IV ceftriaxone       Assessment     Severe hypovolemic/septic shock present on admission and was initially on 2 pressors but condition worsened after liver biopsy and was concerning secondary to hemorrhagic shock.  Patient was on 4 vasopressors at one point.  Improved significantly and currently off vasopressors  Repeat CT scan of abdomen pelvis on August 9, 2022 showed stable intra abdomen hemorrhage    The patient was admitted for gastroenteritis caused by enteropathogenic E. coli however the patient was very systemically ill and was febrile for a few days.  Work-up was negative for infection except for E. coli and stool    Abnormal liver lesion noted on imaging studies.  It might be incidental findings.  S/p biopsy and there was no purulence to suspect  infection.  Pathology report is still pending    Acute kidney injury.  Suspected to be prerenal.  Improving.  Currently off CRRT and making urine    Thrombocytopenia.  Probably secondary to sepsis.  Improved    Reactive leukocytosis.  Mildly elevated may be now secondary to steroids        Plan    Continue ceftriaxone 2 g IV daily for now, probably for a total of 10 to 14 days  continue supportive care  A.m. labs  Prognosis continue to improve        Sagar Orozco MD  08/10/22  12:35 EDT    Note is dictated utilizing voice recognition software/Dragon

## 2022-08-10 NOTE — PLAN OF CARE
Goal Outcome Evaluation:      Pt. A&Ox4 and on 2L NC. Still having pain in right upper abdomen. Treated with PO oxycodone and IV morphine. Per Dr. Hurtado after reviewing CT safe for pt to increase activity, Sat on edge of bed and marched at bedside today. Pt unable to increase activity. C/o dizziness, profound weakness in all four extremities. Incentive spirometry encouraged. Pt unable to self administer incentive spirometry. Dietitian consulted for poor PO intake. D10 gtt stopped r/t blood glucose per GRACIELA Trejo.

## 2022-08-10 NOTE — CONSULTS
[facility] - Consult Note    Patient Name: Raul Gardner  : 1975  MRN: [id number]  Primary Care Physician:  Maribel Graf MD  Referring Physician: Marible Graf MD  Date of admission: 2022    Consults    Subjective      Reason for Consult/ Chief Complaint: illness    Note taken from Intensivist with additional editing:    History of Present Illness: Raul Gardner is a 47 y.o. female  with past medical history of melanoma, pulmonary hypertension who presented to [facility] on 2022 after not feeling well. Patient reported this started two days prior with a fever and body aches and on day of admission she started to have vomiting and diarrhea.  In ED, she had a fever of 103.1 and heart rate 146 with blood pressure 36/23 however that improved after IV fluid resuscitation and vasopressors to 103/62.  Patient was admitted to the ICU for further care management.  Infectious disease was consulted due to possible liver abscess.  Nephrology was consulted due to MARIANNE.  Interventional radiology was consulted and patient underwent liver biopsy.    2022: Patient received blood transfusion.  Shiley was placed for possible CRRT.  General surgery was consulted.  Patient noted to be on vancomycin and meropenem per ID.  GI was consulted for acute liver injury.  Patient started on CRRT.    2022: Patient noted to be on Vanco, meropenem, and micafungin per ID.    2022: Patient noted to be on Rocephin per ID.    2022: Plan for hemodialysis tomorrow per nephrology.    8/10/2022: Patient is stable for downgrade.  Hospitalist were consulted for medical management.  Patient is currently on 3 L nasal cannula of oxygen and does not wear this at home.  She is having complaints of right-sided abdominal pain that she describes as sharp.  Her current pain is a 5 out of 10.  Movement hurts.  She denies any alleviating factors.  She reports she has been eating small amounts as she  has had a decreased appetite.  She is now off the Cardene drip.    Review of Systems   Constitutional: Positive for decreased appetite.   HENT: Negative.    Eyes: Negative.    Cardiovascular: Negative.    Respiratory: Negative.    Skin: Negative.    Musculoskeletal: Negative.    Gastrointestinal: Positive for abdominal pain.   Genitourinary: Negative.    Neurological: Negative.    Psychiatric/Behavioral: Negative.         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 Otherwise pertinent FHx was reviewed and not pertinent to current issue.    Social History:  reports that she has never smoked. She has never used smokeless tobacco. She reports current alcohol use. She reports that she does not use drugs.    Home Medications:   escitalopram and lisinopril    Allergies:  Allergies   Allergen Reactions   • Cephalexin Rash   • Hydrocodone-Acetaminophen Rash         Objective      Vitals:  Temp:  [97.3 °F (36.3 °C)-98.1 °F (36.7 °C)] 97.5 °F (36.4 °C)  Heart Rate:  [] 91  BP: (138-193)/() 162/91  Flow (L/min):  [3] 3    Physical Exam  Vitals reviewed.   Constitutional:       Appearance: She is obese. She is ill-appearing.   HENT:      Head: Normocephalic and atraumatic.      Nose: Nose normal.      Mouth/Throat:      Mouth: Mucous membranes are moist.      Pharynx: Oropharynx is clear.   Eyes:      Extraocular Movements: Extraocular movements intact.      Conjunctiva/sclera: Conjunctivae normal.      Pupils:  Pupils are equal, round, and reactive to light.   Neck:      Comments: Right shiley noted  Cardiovascular:      Rate and Rhythm: Normal rate and regular rhythm.      Pulses: Normal pulses.      Heart sounds: Normal heart sounds.      Comments: S1, S2 audible  Pulmonary:      Effort: Pulmonary effort is normal.      Breath sounds: Normal breath sounds.      Comments: On 3L NC and does not wear this at home  Abdominal:      General: Abdomen is flat. Bowel sounds are normal.      Palpations: Abdomen is soft.   Musculoskeletal:         General: Normal range of motion.      Cervical back: Normal range of motion.   Skin:     General: Skin is warm and dry.   Neurological:      General: No focal deficit present.      Mental Status: She is alert and oriented to person, place, and time. Mental status is at baseline.   Psychiatric:         Mood and Affect: Mood normal.         Behavior: Behavior normal.         Thought Content: Thought content normal.         Judgment: Judgment normal.         Result Review    Result Review:  I have personally reviewed the results from the time of this admission to 8/10/2022 15:29 EDT and agree with these findings:  [x]  Laboratory  [x]  Microbiology  [x]  Radiology  []  EKG/Telemetry   []  Cardiology/Vascular   []  Pathology  []  Old records  []  Other:        Assessment & Plan        Active Hospital Problems:  Active Hospital Problems    Diagnosis    • **Septic shock (HCC)    • Suspected liver abscess    • Diarrhea    • Acute kidney injury (HCC)    • Metabolic acidosis    • E coli enteritis    • Abdominal hematoma, secondary to liver biopsyh    • Pulmonary hypertension, unspecified (HCC)    • Essential hypertension    • Vitamin D deficiency    • Anxiety      Plan:     Septic shock  Suspected liver abscess  Acute liver injury   E. Coli enteritis   - CT abd/pelvis reviewed  - Liver biopsy reviewed   - Venous duplex reviewed   - WBC 16.2, monitor   - Blood cultures X2- no growth thus far   -  Monitor LFT's   - Continue Rocephin  - ID, General surgery, and GI following     Acute kidney injury  Acute UTI  - CR 2.8, monitor   - Receiving CRRT, currently off   - Nephrology consulted     Acute anemia with hemoperitoneum   - S/P liver biopsy  - Hbg 8.0, monitor  - General surgery following- no surgical intervention at this time     Essential HTN  - Controlled  - Monitor blood pressure  - Now off Cardene gtt   - Continue amlodipine and metoprolol     Anxiety   - Stable  - Continue lexapro     Morbid obesity  - BMI 51.1  - Encourage lifestyle changes     This patient has been examined wearing appropriate Personal Protective Equipment . 08/10/22      Signature: Electronically signed by GRACIELA Jose, 08/10/22, 3:30 PM EDT.    Hospitalist Physician Assessment/Plan    History:    47 y.o. female  with past medical history of melanoma, pulmonary hypertension who presented to [facility] on 8/5/2022 after not feeling well. Patient reported this started two days prior with a fever and body aches and on day of admission she started to have vomiting and diarrhea.  In ED, she had a fever of 103.1 and heart rate 146 with blood pressure 36/23 however that improved after IV fluid resuscitation and vasopressors to 103/62.  Patient was admitted to the ICU for further care management.  Infectious disease was consulted due to possible liver abscess.  Nephrology was consulted due to MARIANNE.  Interventional radiology was consulted and patient underwent liver biopsy.    Exam:    GENERAL APPEARANCE: ill-appearing HEAD: normocephalic.  EYES: PERRL, EOMI. vision intact grossly.  EARS: Intact hearing.  No gross abnormalities.  NOSE: No nasal discharge.  On nasal cannula 3 L  THROAT: Clear   NECK: Neck supple, non-tender without lymphadenopathy, masses or thyromegaly.  CARDIAC: Normal S1 and S2. No S3, S4 or murmurs. Rhythm is regular. There is no peripheral edema, cyanosis or pallor. Extremities are warm and well perfused. Capillary  refill is less than 2 seconds. No carotid bruits.  LUNGS: Clear to auscultation and percussion without rales, rhonchi, wheezing or diminished breath sounds.  ABDOMEN: Positive bowel sounds. Soft, nondistended, nontender. No guarding or rebound. No masses.  MUSKULOSKELETAL: No deformity or swelling   BACK: No abnormalities noted     EXTREMITIES: No significant deformity or joint abnormality. No edema. Peripheral pulses intact. No varicosities.  LOWER EXTREMITY: No edema or swelling  NEUROLOGICAL: CN II-XII intact. Strength and sensation symmetric and intact throughout. Reflexes 2+ throughout. Cerebellar testing normal.  SKIN: Skin normal color, texture and turgor with no lesions or eruptions.  PSYCHIATRIC: Alert cooperative not suicidal       Medical Decision Making:        Septic shock  Without evidence of liver abscess  Acute liver injury   E. Coli enteritis   - CT abd/pelvis reviewed  - Liver biopsy -report pending  - IR attempted drainage but there was no fluid collection 8/5/2022 so CT-guided biopsy was obtained  Negative HIV test,  - Venous duplex reviewed   - WBC 16.2, monitor   - Blood cultures X2- no growth thus far   - Monitor LFT's   - Continue Rocephin  - ID, General surgery, and GI following     Acute kidney injury  Acute UTI  - CR 2.8, monitor   - Receiving CRRT, currently off   - Nephrology following    Acute anemia with hemoperitoneum   - 2/2 liver biopsy  - Hbg 8.0, monitor  - General surgery following- no surgical intervention at this time     shock liver   History of mosquito bites exposure, awaiting West Nile virus antibodies  Essential HTN  - Controlled  - Monitor blood pressure  - Now off Cardene gtt   - Continue amlodipine and metoprolol     Anxiety   - Stable  - Continue lexapro     Morbid obesity  - BMI 51.1  - Encourage lifestyle changes     Attending Physician Attestation    For this patient encounter, I have reviewed the mid-level provider documentation, medical decision making and  treatment plan, and I have personally spent time with the patient.  All procedures were done by me, and/or all procedures were performed by the mid-level under my supervision.

## 2022-08-10 NOTE — CASE MANAGEMENT/SOCIAL WORK
Continued Stay Note   Rick     Patient Name: Raul Gardner  MRN: [id number]  Today's Date: 8/10/2022    Admit Date: 8/5/2022     Discharge Plan     Row Name 08/10/22 1605       Plan    Plan DC Plan: Anticipate Routine Home with Family. Watch for home O2 needs.    Provided Post Acute Provider List? N/A    Provided Post Acute Provider Quality & Resource List? N/A    Plan Comments CM spoke with patient’s nurse and also reviewed chart documentation to obtain clinical updates. Patient now downgraded to a PCU level of care.No significant changes in condition or care plans to report.CM will continue to follow for any additional needs that may develop and adjust discharge plan accordingly. DC Barriers:D 10 and Cardene gtt, abnormal labs, and monitor H/H.              Expected Discharge Date and Time     Expected Discharge Date Expected Discharge Time    Aug 15, 2022         Phone communication or documentation only- no physical contact with patient or family.        Sofia Sanches RN     Office Phone: [phone]  Office Cell:     [phone]

## 2022-08-10 NOTE — PROGRESS NOTES
ICU Daily Progress Note        Septic shock (HCC)    Pulmonary hypertension, unspecified (HCC)    Anxiety    Vitamin D deficiency    Essential hypertension    Suspected liver abscess    Diarrhea    Acute kidney injury (HCC)    Metabolic acidosis      Assessment & Plan     Recurrent fever, unknown source  Sepsis with septic shock  UTI  MARIANNE  Coagulopathy probably from DIC  Leukocytosis  Acute anemia with hemoperitoneum after liver biopsy  Anion gap metabolic acidosis  Lactic acidosis  Stool culture positive for enteropathic E. coli however Shiga toxin E. coli was negative  Abnormal CT scan of the liver with possible hepatic abscess: IR attempted drainage but there was no fluid collection 8/5/2022 so CT-guided biopsy was obtained  Negative HIV test,  Esophagitis  Mild pancreatic changes on the CT scan but the lipase is not elevated  History of melanoma resected in 1999 without signs of spread or recurrence  Mild exercise-induced pulmonary hypertension for which she has been on metoprolol but she is out of it for 1 month  Hyperglycemia  Elevated liver enzymes probably from shock liver        Plan:    peripheral smear 8/8/2022   Leukocytosis with left shifted granulocytes and rare possible blasts  Occasional schistocytes, , anemia, Thrombocytopenia  ? hemolytic uremic syndrome vs MAHA  Lupus panel     Lower extremity Dopplers results pending  History of mosquito bites exposure, awaiting West Nile virus antibodies  Oxygenating well on 5 L per nasal cannula  Hemodynamic support: Vasopressin and stress dose hydrocortisone  Blood transfusion: Platelets are dropping, will continue with RBC transfusion and platelet transfusion as needed  Followed by nephrology: CRRT   Liver enzymes are increasing due to shock liver   insulin sliding scale  Antibiotics:ID following  DVT: SCD/GI prophylaxis  Check daily labs and correct electrolytes as needed           LOS: 5 days         Vital signs for last 24 hours:  Vitals:    08/10/22 0700  08/10/22 0800 08/10/22 0900 08/10/22 1000   BP: (!) 185/98 160/85 163/89 146/81   Pulse: 90 87 85 79   Resp:       Temp:  97.5 °F (36.4 °C)     TempSrc:  Axillary     SpO2: 96% 95% 95% 98%   Weight:       Height:           Intake/Output last 3 shifts:  I/O last 3 completed shifts:  In: 872 [P.O.:610; I.V.:262]  Out: 4200 [Urine:4200]  Intake/Output this shift:  I/O this shift:  In: -   Out: 1000 [Urine:1000]           Radiology  Imaging Results (Last 24 Hours)     Procedure Component Value Units Date/Time    CT Chest Without Contrast Diagnostic [[id number]] Collected: 08/09/22 1634     Updated: 08/09/22 1648    Narrative:      CT ABDOMEN PELVIS WO CONTRAST-, CT CHEST WO CONTRAST DIAGNOSTIC-     Date of Exam: 8/9/2022 4:31 PM     Indication: follow up hemoperitoneum; N17.9-Acute kidney failure,  unspecified; A41.9-Sepsis, unspecified organism; R65.21-Severe sepsis  with septic shock     Comparison: AP portable chest 8/8/2022. CT abdomen and pelvis 8/5/2022,  8/6/2022  CT-guided liver biopsy 5 2022. Liver ultrasound 8/5/2022,  8/6/2022. CT chest 8/5/2022.     Technique: Contiguous axial CT images were obtained from the lung bases  to the pubic symphysis without contrast. Sagittal and coronal  reconstructions were performed.  Automated exposure control and  iterative reconstruction methods were used.     FINDINGS:     CHEST:  Small bilateral pleural effusions are present. New posterior bilateral  lower lobe airspace disease may represent developing atelectasis or  pneumonia. The features of the edema described on the prior examination  are no longer apparent. Right arm approach PICC tip terminates at the  cavoatrial junction. Right internal jugular central line terminates at  the lower SVC level. Heart size is normal. Previously described mild  stranding within the anterior superior mediastinum is not significantly  changed. No mediastinal drainable fluid collection or soft tissue mass  is seen. The thyroid gland is  normal. No pathologically enlarged lymph  nodes are identified. No acute or suspicious osseous abnormalities.           ABDOMEN AND PELVIS:  The study is limited due to lack of IV contrast.  Hematoma at the inferior and lateral subcapsular margin of the liver  appears approximately stable in size since 8/6/2022. On series 6 image  79, the hematoma measures approximately 12.3 x 12.1 cm in the axial  plane. Intraparenchymal portion of the hematoma measures about 8 cm  anterior to posterior, without significant change.     Ill-defined heterogeneous low density foci are scattered within the  liver.. A dominant lesion in the posterior right hepatic lobe measures  roughly 6.9 x 4.5 cm compared to 5.8 x 2.5 cm ON 8/5/2022. Another  lesion within the anterior right hepatic lobe, laterally, measures 2.6 x  1.8 cm, only slightly larger than on 8/5/2022 where it measured 2.6 x  1.4 cm. A focus within the lateral left hepatic segment measures 2.0 x  1.2 cm, little changed from 1.7 x 1.3 cm ON 8/5/2022. A third lesion  within the central right hepatic lobe near the dome measures about 1.8  cm, stable. No definite new liver lesions are identified.     Small quantity hemorrhagic fluid is seen surrounding the spleen, not  significant change from 8/6/2022. Noncontrast appearance of the liver,  spleen, pancreas, adrenals is within normal limits. There is a described  stranding around the pancreatic tail is less conspicuous on today's  examination. High density fluid/hemorrhagic ascites extends along the  paracolic gutters into the pelvis, unchanged.     There is mild retained IV contrast within the kidneys which may reflect  changes of nephropathy. No hydronephrosis is identified.     The bowel does not appear thickened or inflamed. Urinary bladder,  uterus, rectum are normal.     No acute osseous abnormalities are identified. There is mild generalized  anasarca, which has developed since 8/6/2022.             Impression:      1.  Hematoma collection at the inferior and subcapsular margin of the  right hepatic lobe appears stable since 8/6/2022. Abdominal and pelvic  hemoperitoneum likewise appears stable.  2. Ill-defined heterogeneous low density lesions scattered within liver  are again noted. The dominant lesion in the posterior right hepatic lobe  is larger than on 8/5/2022 while the others are stable. No new liver  lesions are identified. Correlate with recent CT-guided biopsy findings.  3. Development of  generalized body wall edema.  4. Development of small bilateral pleural effusions and posterior  bilateral lower lobe atelectasis or pneumonia.  5. Persistent enhancement of the kidneys on this noncontrast  examination. Correlate for nephropathy.     Electronically Signed By-Franca Gallegos MD On:8/9/2022 4:46 PM  This report was finalized on [id number] by  Franca Gallegos MD.    CT Abdomen Pelvis Without Contrast [[id number]] Collected: 08/09/22 1634     Updated: 08/09/22 1648    Narrative:      CT ABDOMEN PELVIS WO CONTRAST-, CT CHEST WO CONTRAST DIAGNOSTIC-     Date of Exam: 8/9/2022 4:31 PM     Indication: follow up hemoperitoneum; N17.9-Acute kidney failure,  unspecified; A41.9-Sepsis, unspecified organism; R65.21-Severe sepsis  with septic shock     Comparison: AP portable chest 8/8/2022. CT abdomen and pelvis 8/5/2022,  8/6/2022  CT-guided liver biopsy 5 2022. Liver ultrasound 8/5/2022,  8/6/2022. CT chest 8/5/2022.     Technique: Contiguous axial CT images were obtained from the lung bases  to the pubic symphysis without contrast. Sagittal and coronal  reconstructions were performed.  Automated exposure control and  iterative reconstruction methods were used.     FINDINGS:     CHEST:  Small bilateral pleural effusions are present. New posterior bilateral  lower lobe airspace disease may represent developing atelectasis or  pneumonia. The features of the edema described on the prior examination  are no longer apparent. Right  arm approach PICC tip terminates at the  cavoatrial junction. Right internal jugular central line terminates at  the lower SVC level. Heart size is normal. Previously described mild  stranding within the anterior superior mediastinum is not significantly  changed. No mediastinal drainable fluid collection or soft tissue mass  is seen. The thyroid gland is normal. No pathologically enlarged lymph  nodes are identified. No acute or suspicious osseous abnormalities.           ABDOMEN AND PELVIS:  The study is limited due to lack of IV contrast.  Hematoma at the inferior and lateral subcapsular margin of the liver  appears approximately stable in size since 8/6/2022. On series 6 image  79, the hematoma measures approximately 12.3 x 12.1 cm in the axial  plane. Intraparenchymal portion of the hematoma measures about 8 cm  anterior to posterior, without significant change.     Ill-defined heterogeneous low density foci are scattered within the  liver.. A dominant lesion in the posterior right hepatic lobe measures  roughly 6.9 x 4.5 cm compared to 5.8 x 2.5 cm ON 8/5/2022. Another  lesion within the anterior right hepatic lobe, laterally, measures 2.6 x  1.8 cm, only slightly larger than on 8/5/2022 where it measured 2.6 x  1.4 cm. A focus within the lateral left hepatic segment measures 2.0 x  1.2 cm, little changed from 1.7 x 1.3 cm ON 8/5/2022. A third lesion  within the central right hepatic lobe near the dome measures about 1.8  cm, stable. No definite new liver lesions are identified.     Small quantity hemorrhagic fluid is seen surrounding the spleen, not  significant change from 8/6/2022. Noncontrast appearance of the liver,  spleen, pancreas, adrenals is within normal limits. There is a described  stranding around the pancreatic tail is less conspicuous on today's  examination. High density fluid/hemorrhagic ascites extends along the  paracolic gutters into the pelvis, unchanged.     There is mild retained IV  contrast within the kidneys which may reflect  changes of nephropathy. No hydronephrosis is identified.     The bowel does not appear thickened or inflamed. Urinary bladder,  uterus, rectum are normal.     No acute osseous abnormalities are identified. There is mild generalized  anasarca, which has developed since 8/6/2022.             Impression:      1. Hematoma collection at the inferior and subcapsular margin of the  right hepatic lobe appears stable since 8/6/2022. Abdominal and pelvic  hemoperitoneum likewise appears stable.  2. Ill-defined heterogeneous low density lesions scattered within liver  are again noted. The dominant lesion in the posterior right hepatic lobe  is larger than on 8/5/2022 while the others are stable. No new liver  lesions are identified. Correlate with recent CT-guided biopsy findings.  3. Development of  generalized body wall edema.  4. Development of small bilateral pleural effusions and posterior  bilateral lower lobe atelectasis or pneumonia.  5. Persistent enhancement of the kidneys on this noncontrast  examination. Correlate for nephropathy.     Electronically Signed By-Franca Gallegos MD On:8/9/2022 4:46 PM  This report was finalized on [id number] by  Franca Gallegos MD.          Labs:  Results from last 7 days   Lab Units 08/10/22  0431   WBC 10*3/mm3 16.20*   HEMOGLOBIN g/dL 8.0*   HEMATOCRIT % 24.6*   PLATELETS 10*3/mm3 89*     Results from last 7 days   Lab Units 08/10/22  0431   SODIUM mmol/L 141   POTASSIUM mmol/L 3.4*   CHLORIDE mmol/L 103   CO2 mmol/L 31.0*   BUN mg/dL 34*   CREATININE mg/dL 2.88*   CALCIUM mg/dL 8.2*   BILIRUBIN mg/dL 0.9   ALK PHOS U/L 188*   ALT (SGPT) U/L 153*   AST (SGOT) U/L 74*   GLUCOSE mg/dL 110*     Results from last 7 days   Lab Units 08/05/22  0815   PH, ARTERIAL pH units 7.345*   PO2 ART mm Hg 221.4*   PCO2, ARTERIAL mm Hg 28.3*   HCO3 ART mmol/L 15.5*     Results from last 7 days   Lab Units 08/10/22  0431 08/09/22  0339 08/08/22  0812    ALBUMIN g/dL 3.20* 3.20* 3.20*     Results from last 7 days   Lab Units 08/05/22  1852 08/05/22  1400 08/05/22  1056 08/05/22  0811   CK TOTAL U/L  --   --   --  327*   TROPONIN T ng/mL 0.045* 0.032* 0.026 <0.010     Results from last 7 days   Lab Units 08/06/22  1641 08/05/22  1852   KAREN  Negative Negative     Results from last 7 days   Lab Units 08/10/22  0431   MAGNESIUM mg/dL 2.3     Results from last 7 days   Lab Units 08/09/22  0339 08/07/22  1408 08/06/22  1110 08/05/22  1400 08/05/22  0911 08/05/22  0811   INR  1.11* 1.09 2.07*   < > 2.47* 2.16*   APTT seconds  --   --  51.9*  --  48.8* 43.1*    < > = values in this interval not displayed.     Results from last 7 days   Lab Units 08/05/22  0811   TSH uIU/mL 1.570           Meds:   SCHEDULE  amLODIPine, 10 mg, Oral, Q24H  bumetanide, 1 mg, Intravenous, Once  cefTRIAXone, 2 g, Intravenous, Q24H  escitalopram, 10 mg, Oral, Daily  insulin lispro, 0-7 Units, Subcutaneous, Q6H  metoprolol tartrate, 50 mg, Oral, Q12H  nystatin, 5 mL, Swish & Swallow, 4x Daily  pantoprazole, 40 mg, Oral, Q AM  sodium chloride, 10 mL, Intravenous, Q12H  sodium chloride, 10 mL, Intravenous, Q12H      Infusions  dextrose, 30 mL/hr, Last Rate: 30 mL/hr (08/10/22 0315)  niCARdipine, 5-15 mg/hr, Last Rate: 10 mg/hr (08/10/22 0950)      PRNs  •  acetaminophen **OR** acetaminophen  •  dextrose  •  dextrose  •  fentaNYL citrate (PF)  •  glucagon (human recombinant)  •  hydrALAZINE  •  Morphine  •  nitroglycerin  •  ondansetron **OR** ondansetron  •  oxyCODONE  •  simethicone  •  sodium chloride  •  sodium chloride  •  sodium chloride  •  sodium chloride    Physical Exam:  Physical Exam  Pulmonary:      Breath sounds: Rhonchi present.       General Appearance:  Alert and answering questions appropriately  HEENT:  Normocephalic, without obvious abnormality, Conjunctiva/corneas clear,.   Nares normal, no drainage     Neck:  Supple, symmetrical, trachea midline. No JVD.  Lungs /Chest wall:  Mild bilateral basal rhonchi, respirations unlabored, symmetrical wall movement.     Heart:  Regular rate and rhythm, S1 S2 normal  Abdomen: Soft, mild tenderness epigastric and right upper quadrant, no masses, no organomegaly.  Bowel sounds positive  Extremities: No edema, no clubbing or cyanosis  Neuro exam: Patient moving 4 extremities with no focal deficit by observation  Skin: No rash  ROS  Review of Systems  Constitutional: Positive for chills, fever and malaise/fatigue.   HENT: Negative.    Eyes: Negative.    Cardiovascular: Negative.    Respiratory: Positive for mild cough and shortness of breath.    Skin: Negative.    Musculoskeletal: Negative.    Gastrointestinal: Positive for mild abdominal pain  Genitourinary: Negative.    Neurological: Negative.    Psychiatric/Behavioral: Negative.    Critical Care Time greater than: 45 Minutes      Much of this encounter note is an electronic transcription/translation of spoken language to printed text using Dragon Software which might include inadvertent errors in transcription.

## 2022-08-11 LAB
ALBUMIN SERPL-MCNC: 3.4 G/DL (ref 3.5–5.2)
ALBUMIN/GLOB SERPL: 1.3 G/DL
ALP SERPL-CCNC: 171 U/L (ref 39–117)
ALT SERPL W P-5'-P-CCNC: 108 U/L (ref 1–33)
ANION GAP SERPL CALCULATED.3IONS-SCNC: 9 MMOL/L (ref 5–15)
AST SERPL-CCNC: 38 U/L (ref 1–32)
BASOPHILS # BLD MANUAL: 0.16 10*3/MM3 (ref 0–0.2)
BASOPHILS NFR BLD MANUAL: 1 % (ref 0–1.5)
BILIRUB SERPL-MCNC: 0.9 MG/DL (ref 0–1.2)
BLASTS NFR BLD MANUAL: 1 % (ref 0–0)
BUN SERPL-MCNC: 36 MG/DL (ref 6–20)
BUN/CREAT SERPL: 13.4 (ref 7–25)
CALCIUM SPEC-SCNC: 8.4 MG/DL (ref 8.6–10.5)
CHLORIDE SERPL-SCNC: 101 MMOL/L (ref 98–107)
CHROMATIN AB SERPL-ACNC: <0.2 AI (ref 0–0.9)
CO2 SERPL-SCNC: 32 MMOL/L (ref 22–29)
CREAT SERPL-MCNC: 2.69 MG/DL (ref 0.57–1)
DEPRECATED RDW RBC AUTO: 47.7 FL (ref 37–54)
DSDNA AB SER-ACNC: 3 IU/ML (ref 0–9)
EGFRCR SERPLBLD CKD-EPI 2021: 21.6 ML/MIN/1.73
ENA RNP AB SER-ACNC: 0.2 AI (ref 0–0.9)
ENA SM AB SER-ACNC: <0.2 AI (ref 0–0.9)
ENA SS-A AB SER-ACNC: 0.2 AI (ref 0–0.9)
ENA SS-B AB SER-ACNC: <0.2 AI (ref 0–0.9)
EOSINOPHIL # BLD MANUAL: 0.65 10*3/MM3 (ref 0–0.4)
EOSINOPHIL NFR BLD MANUAL: 4 % (ref 0.3–6.2)
ERYTHROCYTE [DISTWIDTH] IN BLOOD BY AUTOMATED COUNT: 16.1 % (ref 12.3–15.4)
GLOBULIN UR ELPH-MCNC: 2.6 GM/DL
GLUCOSE BLDC GLUCOMTR-MCNC: 110 MG/DL (ref 70–105)
GLUCOSE SERPL-MCNC: 106 MG/DL (ref 65–99)
HCT VFR BLD AUTO: 25.5 % (ref 34–46.6)
HGB BLD-MCNC: 8.2 G/DL (ref 12–15.9)
IRON 24H UR-MRATE: 22 MCG/DL (ref 37–145)
IRON SATN MFR SERPL: 6 % (ref 20–50)
LARGE PLATELETS: ABNORMAL
LYMPHOCYTES # BLD MANUAL: 3.56 10*3/MM3 (ref 0.7–3.1)
LYMPHOCYTES NFR BLD MANUAL: 5 % (ref 5–12)
MAGNESIUM SERPL-MCNC: 2.1 MG/DL (ref 1.6–2.6)
MCH RBC QN AUTO: 27.1 PG (ref 26.6–33)
MCHC RBC AUTO-ENTMCNC: 32.1 G/DL (ref 31.5–35.7)
MCV RBC AUTO: 84.7 FL (ref 79–97)
METAMYELOCYTES NFR BLD MANUAL: 7 % (ref 0–0)
MONOCYTES # BLD: 0.81 10*3/MM3 (ref 0.1–0.9)
MYELOCYTES NFR BLD MANUAL: 3 % (ref 0–0)
NEUTROPHILS # BLD AUTO: 9.23 10*3/MM3 (ref 1.7–7)
NEUTROPHILS NFR BLD MANUAL: 51 % (ref 42.7–76)
NEUTS BAND NFR BLD MANUAL: 6 % (ref 0–5)
PHOSPHATE SERPL-MCNC: 4.3 MG/DL (ref 2.5–4.5)
PLATELET # BLD AUTO: 155 10*3/MM3 (ref 140–450)
PMV BLD AUTO: 8.6 FL (ref 6–12)
POTASSIUM SERPL-SCNC: 3.6 MMOL/L (ref 3.5–5.2)
PROT SERPL-MCNC: 6 G/DL (ref 6–8.5)
QT INTERVAL: 360 MS
RBC # BLD AUTO: 3.01 10*6/MM3 (ref 3.77–5.28)
RBC MORPH BLD: NORMAL
RHEUMATOID FACT SERPL-ACNC: <10 IU/ML
SCAN SLIDE: NORMAL
SODIUM SERPL-SCNC: 142 MMOL/L (ref 136–145)
TIBC SERPL-MCNC: 364 MCG/DL (ref 298–536)
TRANSFERRIN SERPL-MCNC: 244 MG/DL (ref 200–360)
TROPONIN T SERPL-MCNC: <0.01 NG/ML (ref 0–0.03)
VARIANT LYMPHS NFR BLD MANUAL: 1 % (ref 0–5)
VARIANT LYMPHS NFR BLD MANUAL: 21 % (ref 19.6–45.3)
WBC MORPH BLD: NORMAL
WBC NRBC COR # BLD: 16.2 10*3/MM3 (ref 3.4–10.8)

## 2022-08-11 PROCEDURE — 25010000002 HYDRALAZINE PER 20 MG: Performed by: NURSE PRACTITIONER

## 2022-08-11 PROCEDURE — 83540 ASSAY OF IRON: CPT | Performed by: NURSE PRACTITIONER

## 2022-08-11 PROCEDURE — 25010000002 CEFTRIAXONE PER 250 MG: Performed by: NURSE PRACTITIONER

## 2022-08-11 PROCEDURE — 84466 ASSAY OF TRANSFERRIN: CPT | Performed by: NURSE PRACTITIONER

## 2022-08-11 PROCEDURE — 97116 GAIT TRAINING THERAPY: CPT

## 2022-08-11 PROCEDURE — 82962 GLUCOSE BLOOD TEST: CPT

## 2022-08-11 PROCEDURE — 94799 UNLISTED PULMONARY SVC/PX: CPT

## 2022-08-11 PROCEDURE — 84100 ASSAY OF PHOSPHORUS: CPT | Performed by: NURSE PRACTITIONER

## 2022-08-11 PROCEDURE — 83735 ASSAY OF MAGNESIUM: CPT | Performed by: NURSE PRACTITIONER

## 2022-08-11 PROCEDURE — 80053 COMPREHEN METABOLIC PANEL: CPT | Performed by: NURSE PRACTITIONER

## 2022-08-11 PROCEDURE — 99233 SBSQ HOSP IP/OBS HIGH 50: CPT | Performed by: INTERNAL MEDICINE

## 2022-08-11 PROCEDURE — 84484 ASSAY OF TROPONIN QUANT: CPT | Performed by: INTERNAL MEDICINE

## 2022-08-11 PROCEDURE — 93005 ELECTROCARDIOGRAM TRACING: CPT | Performed by: INTERNAL MEDICINE

## 2022-08-11 PROCEDURE — 63710000001 ONDANSETRON ODT 4 MG TABLET DISPERSIBLE: Performed by: NURSE PRACTITIONER

## 2022-08-11 PROCEDURE — 85025 COMPLETE CBC W/AUTO DIFF WBC: CPT | Performed by: INTERNAL MEDICINE

## 2022-08-11 PROCEDURE — 85007 BL SMEAR W/DIFF WBC COUNT: CPT | Performed by: INTERNAL MEDICINE

## 2022-08-11 PROCEDURE — 93010 ELECTROCARDIOGRAM REPORT: CPT | Performed by: INTERNAL MEDICINE

## 2022-08-11 PROCEDURE — 97530 THERAPEUTIC ACTIVITIES: CPT

## 2022-08-11 RX ORDER — METOPROLOL TARTRATE 50 MG/1
100 TABLET, FILM COATED ORAL EVERY 12 HOURS SCHEDULED
Status: DISCONTINUED | OUTPATIENT
Start: 2022-08-11 | End: 2022-08-17 | Stop reason: HOSPADM

## 2022-08-11 RX ORDER — METOPROLOL TARTRATE 50 MG/1
50 TABLET, FILM COATED ORAL ONCE
Status: COMPLETED | OUTPATIENT
Start: 2022-08-11 | End: 2022-08-11

## 2022-08-11 RX ORDER — ONDANSETRON 4 MG/1
4 TABLET, ORALLY DISINTEGRATING ORAL EVERY 6 HOURS PRN
Status: DISCONTINUED | OUTPATIENT
Start: 2022-08-11 | End: 2022-08-17 | Stop reason: HOSPADM

## 2022-08-11 RX ORDER — HYDROCHLOROTHIAZIDE 12.5 MG/1
25 TABLET ORAL ONCE
Status: COMPLETED | OUTPATIENT
Start: 2022-08-11 | End: 2022-08-11

## 2022-08-11 RX ADMIN — GABAPENTIN 200 MG: 100 CAPSULE ORAL at 20:01

## 2022-08-11 RX ADMIN — ESCITALOPRAM OXALATE 10 MG: 10 TABLET ORAL at 08:31

## 2022-08-11 RX ADMIN — METOPROLOL TARTRATE 100 MG: 50 TABLET, FILM COATED ORAL at 20:01

## 2022-08-11 RX ADMIN — NYSTATIN 500000 UNITS: 100000 SUSPENSION ORAL at 08:28

## 2022-08-11 RX ADMIN — Medication 10 ML: at 08:28

## 2022-08-11 RX ADMIN — METOPROLOL TARTRATE 50 MG: 50 TABLET, FILM COATED ORAL at 09:34

## 2022-08-11 RX ADMIN — CEFTRIAXONE 2 G: 2 INJECTION, POWDER, FOR SOLUTION INTRAMUSCULAR; INTRAVENOUS at 12:03

## 2022-08-11 RX ADMIN — METOPROLOL TARTRATE 50 MG: 50 TABLET, FILM COATED ORAL at 08:28

## 2022-08-11 RX ADMIN — Medication 10 ML: at 21:26

## 2022-08-11 RX ADMIN — NYSTATIN 500000 UNITS: 100000 SUSPENSION ORAL at 21:17

## 2022-08-11 RX ADMIN — HYDRALAZINE HYDROCHLORIDE 10 MG: 20 INJECTION INTRAMUSCULAR; INTRAVENOUS at 09:27

## 2022-08-11 RX ADMIN — HYDROCHLOROTHIAZIDE 25 MG: 12.5 TABLET ORAL at 09:27

## 2022-08-11 RX ADMIN — ONDANSETRON 4 MG: 4 TABLET, ORALLY DISINTEGRATING ORAL at 09:51

## 2022-08-11 RX ADMIN — Medication 10 ML: at 20:01

## 2022-08-11 RX ADMIN — AMLODIPINE BESYLATE 10 MG: 5 TABLET ORAL at 08:28

## 2022-08-11 RX ADMIN — PANTOPRAZOLE SODIUM 40 MG: 40 TABLET, DELAYED RELEASE ORAL at 05:14

## 2022-08-11 RX ADMIN — GABAPENTIN 200 MG: 100 CAPSULE ORAL at 05:13

## 2022-08-11 RX ADMIN — NYSTATIN 500000 UNITS: 100000 SUSPENSION ORAL at 12:03

## 2022-08-11 RX ADMIN — OXYCODONE 10 MG: 5 TABLET ORAL at 02:23

## 2022-08-11 NOTE — PROGRESS NOTES
ICU Daily Progress Note        Septic shock (HCC)    Pulmonary hypertension, unspecified (HCC)    Anxiety    Vitamin D deficiency    Essential hypertension    Suspected liver abscess    Diarrhea    Acute kidney injury (HCC)    Metabolic acidosis    E coli enteritis    Abdominal hematoma, secondary to liver biopsyh      Assessment & Plan     Fever resolved  Sepsis with septic shock, resolved  UTI  MARIANNE  Coagulopathy probably from DIC  Leukocytosis  Acute anemia with hemoperitoneum after liver biopsy  Anion gap metabolic acidosis  Lactic acidosis  Stool culture positive for enteropathic E. coli however Shiga toxin E. coli was negative  Abnormal CT scan of the liver with possible hepatic abscess: IR attempted drainage but there was no fluid collection 8/5/2022 so CT-guided biopsy was obtained  Negative HIV test,  Esophagitis  Mild pancreatic changes on the CT scan but the lipase is not elevated  History of melanoma resected in 1999 without signs of spread or recurrence  Mild exercise-induced pulmonary hypertension for which she has been on metoprolol but she is out of it for 1 month  Hyperglycemia  Elevated liver enzymes probably from shock liver         Plan:    peripheral smear 8/8/2022   Leukocytosis with left shifted granulocytes and rare possible blasts  Occasional schistocytes, , anemia, Thrombocytopenia  ? hemolytic uremic syndrome vs MAHA  Lupus panel     Lower extremity Dopplers neg  History of mosquito bites exposure, negative IgG and IgM West Nile virus antibodies  Oxygenating well on 4 L per nasal cannula  Hemodynamic support: Vasopressin and stress dose hydrocortisone  Blood transfusion: Platelets are dropping, will continue with RBC transfusion and platelet transfusion as needed  Followed by nephrology: CRRT   Liver enzymes are increasing due to shock liver   insulin sliding scale  Antibiotics:ID following  DVT: SCD/GI prophylaxis  Check daily labs and correct electrolytes as needed           LOS: 6 days          Vital signs for last 24 hours:  Vitals:    08/11/22 0900 08/11/22 0930 08/11/22 1000 08/11/22 1008   BP: (!) 181/103 (!) 189/104 (!) 201/113 151/86   Pulse: 94 91 100 90   Resp:       Temp:       TempSrc:       SpO2: 98% 93% 97% 98%   Weight:       Height:           Intake/Output last 3 shifts:  I/O last 3 completed shifts:  In: 2001 [P.O.:120; I.V.:1881]  Out: 4900 [Urine:4900]  Intake/Output this shift:  I/O this shift:  In: -   Out: 700 [Urine:700]           Radiology  Imaging Results (Last 24 Hours)     ** No results found for the last 24 hours. **          Labs:  Results from last 7 days   Lab Units 08/11/22  0515   WBC 10*3/mm3 16.20*   HEMOGLOBIN g/dL 8.2*   HEMATOCRIT % 25.5*   PLATELETS 10*3/mm3 155     Results from last 7 days   Lab Units 08/11/22  0515   SODIUM mmol/L 142   POTASSIUM mmol/L 3.6   CHLORIDE mmol/L 101   CO2 mmol/L 32.0*   BUN mg/dL 36*   CREATININE mg/dL 2.69*   CALCIUM mg/dL 8.4*   BILIRUBIN mg/dL 0.9   ALK PHOS U/L 171*   ALT (SGPT) U/L 108*   AST (SGOT) U/L 38*   GLUCOSE mg/dL 106*     Results from last 7 days   Lab Units 08/05/22  0815   PH, ARTERIAL pH units 7.345*   PO2 ART mm Hg 221.4*   PCO2, ARTERIAL mm Hg 28.3*   HCO3 ART mmol/L 15.5*     Results from last 7 days   Lab Units 08/11/22  0515 08/10/22  0431 08/09/22  0339   ALBUMIN g/dL 3.40* 3.20* 3.20*     Results from last 7 days   Lab Units 08/05/22  1852 08/05/22  1400 08/05/22  1056 08/05/22  0811   CK TOTAL U/L  --   --   --  327*   TROPONIN T ng/mL 0.045* 0.032* 0.026 <0.010     Results from last 7 days   Lab Units 08/06/22  1641 08/05/22  1852   KAREN  Negative Negative     Results from last 7 days   Lab Units 08/11/22  0515   MAGNESIUM mg/dL 2.1     Results from last 7 days   Lab Units 08/09/22  0339 08/07/22  1408 08/06/22  1110 08/05/22  1400 08/05/22  0911 08/05/22  0811   INR  1.11* 1.09 2.07*   < > 2.47* 2.16*   APTT seconds  --   --  51.9*  --  48.8* 43.1*    < > = values in this interval not displayed.      Results from last 7 days   Lab Units 08/05/22  0811   TSH uIU/mL 1.570           Meds:   SCHEDULE  amLODIPine, 10 mg, Oral, Q24H  cefTRIAXone, 2 g, Intravenous, Q24H  escitalopram, 10 mg, Oral, Daily  gabapentin, 200 mg, Oral, Q8H  insulin lispro, 0-7 Units, Subcutaneous, Q6H  metoprolol tartrate, 100 mg, Oral, Q12H  nystatin, 5 mL, Swish & Swallow, 4x Daily  pantoprazole, 40 mg, Oral, Q AM  sodium chloride, 10 mL, Intravenous, Q12H  sodium chloride, 10 mL, Intravenous, Q12H      Infusions  dextrose, 15 mL/hr, Last Rate: Stopped (08/10/22 1700)  niCARdipine, 5-15 mg/hr, Last Rate: Stopped (08/10/22 1300)      PRNs  •  acetaminophen **OR** acetaminophen  •  dextrose  •  dextrose  •  fentaNYL citrate (PF)  •  glucagon (human recombinant)  •  hydrALAZINE  •  nitroglycerin  •  ondansetron **OR** ondansetron  •  ondansetron ODT  •  oxyCODONE  •  simethicone  •  sodium chloride  •  sodium chloride  •  sodium chloride  •  sodium chloride    Physical Exam:  Physical Exam  Pulmonary:      Breath sounds: Rhonchi present.       General Appearance:  Alert and answering questions appropriately  HEENT:  Normocephalic, without obvious abnormality, Conjunctiva/corneas clear,.   Nares normal, no drainage     Neck:  Supple, symmetrical, trachea midline. No JVD.  Lungs /Chest wall: Mild bilateral basal rhonchi, respirations unlabored, symmetrical wall movement.     Heart:  Regular rate and rhythm, S1 S2 normal  Abdomen: Soft, mild tenderness epigastric and right upper quadrant, no masses, no organomegaly.  Bowel sounds positive  Extremities: No edema, no clubbing or cyanosis  Neuro exam: Patient moving 4 extremities with no focal deficit by observation  Skin: No rash  ROS  Review of Systems  Constitutional: Positive for chills, fever and malaise/fatigue.   HENT: Negative.    Eyes: Negative.    Cardiovascular: Negative.    Respiratory: Positive for mild cough and shortness of breath.    Skin: Negative.    Musculoskeletal: Negative.     Gastrointestinal: Positive for mild abdominal pain  Genitourinary: Negative.    Neurological: Negative.    Psychiatric/Behavioral: Negative.    Critical Care Time greater than: 45 Minutes      Much of this encounter note is an electronic transcription/translation of spoken language to printed text using Dragon Software which might include inadvertent errors in transcription.

## 2022-08-11 NOTE — THERAPY TREATMENT NOTE
"Subjective: Pt agreeable to therapeutic plan of care.  Cognition: oriented to Person, Place, Time and Situation    Objective:     Bed Mobility: CGA excess time needed d/t weakness, fatigue and nausea.   Functional Transfers: CGA and with rolling walker  Functional Ambulation: CGA and with rolling walker ambulating 5 feet to recliner, placed away from bed to force ambulation. Patient with very slow lynn, seeming somewhat nervous about ambulating d/t weakness.      Vitals: WNL /86, HR 85, 96% O2 on 2L    Pain: 5 VAS  Education: Provided education on importance of mobility and skilled verbal / tactile cueing throughout intervention.     Assessment: Raul Gardner presents with ADL impairments below baseline abilities which indicate the need for continued skilled intervention while inpatient. Patient with estefania tolerance today, however nauseated and remains weak with low activity tolerance. Patient was CGA for bed mobility, transfer, and ambulation, however required excess time. OT now recommending home health at d/c. Tolerating session today without incident. Will continue to follow and progress as tolerated.     Plan/Recommendations:   Low Intensity Therapy recommended post-acute care - This is recommended as therapy feels this patient would require 2-3 visits per week. OP or HH would be the best option depending on patient's home bound status. Consider, if the patient has other  \"skilled\" needs such as wounds, IV antibiotics, etc. Combined with \"low intensity\" could also equate to a SNF. If patient is medically complex, consider LTAC.    Pt desires Home with Home Health at discharge. Pt cooperative; agreeable to therapeutic recommendations and plan of care.     Modified Pablo: N/A = No pre-op stroke/TIA    Post-Tx Position: Up in Chair and Call light and personal items within reach  PPE: gloves, surgical mask, eyewear protection    "

## 2022-08-11 NOTE — THERAPY TREATMENT NOTE
Subjective: Pt agreeable to therapeutic plan of care. C/o nausea but willing to work through it.    Objective:     Bed mobility - CGA verbal cues for sequence and extra time for movements  Transfers - CGA with RW  Ambulation - 5 feet CGA with RW, small steps, decreased clearance, decreased knee flexion.  Steady on feet, no LOB.    Vitals:/86, HR 85, SpO2 96% on 2L.     Pain: 5 VAS  Education: Provided education on importance of mobility and skilled verbal / tactile cueing throughout intervention. Pt receptive to education and applies to activities.    Assessment: Raul Gardner presents with functional mobility impairments which indicate the need for skilled intervention. Tolerating session today without incident. Distance limited by c/o nausea and fatigue.  Due to slow progression, pt may benefit from HH PT to continue to advance mobility and activity.  Will continue to follow and progress as tolerated. Pt is aware of recommendation and is agreeable.     Plan/Recommendations:   Low Intensity Therapy recommended post-acute care - This is recommended as therapy feels this patient would require 2-3 visits per week.  HH PT.  Pt requires no DME at discharge.     Pt desires Home with Home Health at discharge. Pt cooperative; agreeable to therapeutic recommendations and plan of care.         Basic Mobility 6-click:  Rollin = Total, A lot = 2, A little = 3; 4 = None  Supine>Sit:   1 = Total, A lot = 2, A little = 3; 4 = None   Sit>Stand with arms:  1 = Total, A lot = 2, A little = 3; 4 = None  Bed>Chair:   1 = Total, A lot = 2, A little = 3; 4 = None  Ambulate in room:  1 = Total, A lot = 2, A little = 3; 4 = None  3-5 Steps with railin = Total, A lot = 2, A little = 3; 4 = None  Score: 16    Modified [place]: N/A = No pre-op stroke/TIA    Post-Tx Position: Up in Chair, Alarms activated and Call light and personal items within reach  PPE: gloves, surgical mask, eyewear protection

## 2022-08-11 NOTE — PROGRESS NOTES
PROGRESS NOTE      Patient Name: Raul Gardner  : 1975  MRN: [id number]  Primary Care Physician: Maribel Graf MD  Date of admission: 2022    Patient Care Team:  Maribel Graf MD as PCP - General (Family Medicine)  AVA Cavanaugh MD as Consulting Physician (Cardiology)        Subjective   Subjective:     Patient is slightly better than yesterday but still lethargic sick looking  Review of systems:  Middle-age female complaining of body aches abdominal pain abdominal distention      Allergies:    Allergies   Allergen Reactions   • Cephalexin Rash   • Hydrocodone-Acetaminophen Rash       Objective   Exam:     Vital Signs  Temp:  [97.3 °F (36.3 °C)-97.8 °F (36.6 °C)] 97.7 °F (36.5 °C)  Heart Rate:  [73-94] 94  Resp:  [8] 8  BP: (130-181)/() 181/103  SpO2:  [92 %-98 %] 98 %  on  Flow (L/min):  [2-4] 4;   Device (Oxygen Therapy): nasal cannula  Body mass index is 51.1 kg/m².    General: Middle-age  female i very lethargic  Head:      Normocephalic and atraumatic.    Eyes:      PERRL/EOM intact, conjunctiva and sclera clear with out nystagmus.    Neck:      No masses, thyromegaly,  trachea central with normal respiratory effort   Lungs:    Clear bilaterally to auscultation.    Heart:      Regular rate and rhythm, no murmur no gallop  Abd:        Diffusely tender with decreased bowel sounds  Pulses:   Pulses palpable  Extr:        No cyanosis or clubbing--no edema.    Neuro:    No focal deficits.   alert oriented x3  Skin:       Intact without lesions or rashes.    Psych:    Alert and cooperative; normal mood and affect; .      Results Review:  I have personally reviewed most recent Data :  CBC    Results from last 7 days   Lab Units 22  0515 08/10/22  0431 22  0339 22  1322 22  0812 22  0359 22  2327   WBC 10*3/mm3 16.20* 16.20* 16.20* 14.10* 14.10* 14.30* 13.90*   HEMOGLOBIN g/dL 8.2* 8.0* 7.4* 7.7* 7.6* 7.2* 7.5*   PLATELETS 10*3/mm3 155 89* 57* 50* 47*  50* 57*     CMP   Results from last 7 days   Lab Units 08/11/22  0515 08/10/22  0431 08/09/22  0339 08/08/22  0812 08/08/22  0359 08/07/22  2327 08/07/22  1615 08/07/22  0600 08/06/22  2154 08/06/22  1302 08/06/22  1013 08/05/22  1400 08/05/22  0811   SODIUM mmol/L 142 141 139 138 140 140 139 138   < > 136 139   < > 132*   POTASSIUM mmol/L 3.6 3.4* 4.0 4.1 4.1 3.8 3.7 3.7   < > 3.5 2.5*   < > 3.9   CHLORIDE mmol/L 101 103 104 104 105 104 101 99   < > 96* 96*   < > 94*   CO2 mmol/L 32.0* 31.0* 27.0 28.0 28.0 28.0 29.0 30.0*   < > 16.0* 29.0   < > 21.0*   BUN mg/dL 36* 34* 29* 16 19 21* 25* 37*   < > 53* 40*   < > 32*   CREATININE mg/dL 2.69* 2.88* 2.33* 1.35* 1.53* 1.77* 2.05* 2.92*   < > 4.58* 3.25*   < > 3.82*   GLUCOSE mg/dL 106* 110* 123* 87 98 105* 112* 124*   < > 264* 543*   < > 141*   ALBUMIN g/dL 3.40* 3.20* 3.20* 3.20* 3.00* 3.30* 3.20* 2.90*   < > 2.70* 1.90*   < > 3.30*   BILIRUBIN mg/dL 0.9 0.9 1.3*  --  3.8*  --   --  4.7*  --  4.6* 3.1*   < > 4.3*   ALK PHOS U/L 171* 188* 217*  --  133*  --   --  112  --  89 72   < > 64   AST (SGOT) U/L 38* 74* 141*  --  233*  --   --  522*  --  393* 194*   < > 41*   ALT (SGPT) U/L 108* 153* 208*  --  259*  --   --  352*  --  222* 109*   < > 30   AMYLASE U/L  --   --   --   --   --   --   --   --   --   --  39  --  81   LIPASE U/L  --   --   --   --   --   --   --   --   --   --  4*  --  12*    < > = values in this interval not displayed.     ABG    Results from last 7 days   Lab Units 08/05/22  0815   PH, ARTERIAL pH units 7.345*   PCO2, ARTERIAL mm Hg 28.3*   PO2 ART mm Hg 221.4*   O2 SATURATION ART % 99.8*   BASE EXCESS ART mmol/L -8.9*     CT Abdomen Pelvis Without Contrast    Result Date: 8/9/2022  1. Hematoma collection at the inferior and subcapsular margin of the right hepatic lobe appears stable since 8/6/2022. Abdominal and pelvic hemoperitoneum likewise appears stable. 2. Ill-defined heterogeneous low density lesions scattered within liver are again noted.  The dominant lesion in the posterior right hepatic lobe is larger than on 8/5/2022 while the others are stable. No new liver lesions are identified. Correlate with recent CT-guided biopsy findings. 3. Development of  generalized body wall edema. 4. Development of small bilateral pleural effusions and posterior bilateral lower lobe atelectasis or pneumonia. 5. Persistent enhancement of the kidneys on this noncontrast examination. Correlate for nephropathy.  Electronically Signed By-Franca Gallegos MD On:8/9/2022 4:46 PM This report was finalized on [id number] by  Franca Gallegos MD.    CT Chest Without Contrast Diagnostic    Result Date: 8/9/2022  1. Hematoma collection at the inferior and subcapsular margin of the right hepatic lobe appears stable since 8/6/2022. Abdominal and pelvic hemoperitoneum likewise appears stable. 2. Ill-defined heterogeneous low density lesions scattered within liver are again noted. The dominant lesion in the posterior right hepatic lobe is larger than on 8/5/2022 while the others are stable. No new liver lesions are identified. Correlate with recent CT-guided biopsy findings. 3. Development of  generalized body wall edema. 4. Development of small bilateral pleural effusions and posterior bilateral lower lobe atelectasis or pneumonia. 5. Persistent enhancement of the kidneys on this noncontrast examination. Correlate for nephropathy.  Electronically Signed By-Franca Gallegos MD On:8/9/2022 4:46 PM This report was finalized on [id number] by  Franca Gallegos MD.      Results for orders placed during the hospital encounter of 08/05/22    Adult Transthoracic Echo Complete w/ Color, Spectral and Contrast if Necessary Per Protocol    Interpretation Summary  · Left ventricular wall thickness is consistent with mild concentric hypertrophy.  · Estimated left ventricular EF = 75% Left ventricular systolic function is hyperdynamic (EF > 70%).  · Left ventricular diastolic function is consistent  with (grade I) impaired relaxation.    Scheduled Meds:amLODIPine, 10 mg, Oral, Q24H  cefTRIAXone, 2 g, Intravenous, Q24H  escitalopram, 10 mg, Oral, Daily  gabapentin, 200 mg, Oral, Q8H  insulin lispro, 0-7 Units, Subcutaneous, Q6H  metoprolol tartrate, 100 mg, Oral, Q12H  nystatin, 5 mL, Swish & Swallow, 4x Daily  pantoprazole, 40 mg, Oral, Q AM  sodium chloride, 10 mL, Intravenous, Q12H  sodium chloride, 10 mL, Intravenous, Q12H      Continuous Infusions:dextrose, 15 mL/hr, Last Rate: Stopped (08/10/22 1700)  niCARdipine, 5-15 mg/hr, Last Rate: Stopped (08/10/22 1300)      PRN Meds:•  acetaminophen **OR** acetaminophen  •  dextrose  •  dextrose  •  fentaNYL citrate (PF)  •  glucagon (human recombinant)  •  hydrALAZINE  •  nitroglycerin  •  ondansetron **OR** ondansetron  •  ondansetron ODT  •  oxyCODONE  •  simethicone  •  sodium chloride  •  sodium chloride  •  sodium chloride  •  sodium chloride    Assessment & Plan   Assessment and Plan:         Septic shock (HCC)    Pulmonary hypertension, unspecified (HCC)    Anxiety    Vitamin D deficiency    Essential hypertension    Suspected liver abscess    Diarrhea    Acute kidney injury (HCC)    Metabolic acidosis    E coli enteritis    Abdominal hematoma, secondary to liver biopsyh    ASSESSMENT:  · Acute kidney injury likely ATN secondary to sepsis and hemodynamic instability on pressors at this time  · Multiple liver lesion unlikely to be abscess  · Chronic kidney disease as per renal ultrasound with some increased echogenicity   · Significant lactic acidosis with increased anion gap   · Some evidence of volume overload   · History of hypertension   ·             Plan:      · Patient was in septic shock with MARIANNE continue ABX   · Patient iwas on CRRT started 9/6/2022.  9/8/2022 no hemodynamically better blood pressure is on the high side  · No need for hemodialysis as creatinine has improved slightly since yesterday  · Urine output at 3.3 L which is  stable  · Hemoglobin is stable in last couple of days   · Electrolytes are acceptable  · Continue medications  · We will start diuretic at this time thiazide diuretic will be added to improve blood pressure  · Increase beta-blocker today continue calcium channel blocker  · Okay to be transferred from ICU  · Lactic acidosis resolved  · I think creatinine will continue to improve in next 2 to 3 days.  · Follow-up with volume status electrolytes later today and tomorrow morning  · So far all the serologies are unremarkable including KAREN, ANCA, complements    •           Electronically signed by Vikram Acosta MD,   [facility] kidney consultant  [phone]

## 2022-08-11 NOTE — PROGRESS NOTES
[facility] Medicine Services Daily Progress Note    Patient Name: Raul Gardner  : 1975  MRN: [id number]  Primary Care Physician:  Maribel Graf MD  Date of admission: 2022      Subjective      Chief Complaint: *Generalized weakness     Note taken from Intensivist with additional editing:     History of Present Illness: Raul Gardner is a 47 y.o. female  with past medical history of melanoma, pulmonary hypertension who presented to [facility] on 2022 after not feeling well. Patient reported this started two days prior with a fever and body aches and on day of admission she started to have vomiting and diarrhea.  In ED, she had a fever of 103.1 and heart rate 146 with blood pressure 36/23 however that improved after IV fluid resuscitation and vasopressors to 103/62.  Patient was admitted to the ICU for further care management.  Infectious disease was consulted due to possible liver abscess.  Nephrology was consulted due to MARIANNE.  Interventional radiology was consulted and patient underwent liver biopsy.     2022: Patient received blood transfusion.  Shiley was placed for possible CRRT.  General surgery was consulted.  Patient noted to be on vancomycin and meropenem per ID.  GI was consulted for acute liver injury.  Patient started on CRRT.     2022: Patient noted to be on Vanco, meropenem, and micafungin per ID.     2022: Patient noted to be on Rocephin per ID.     2022: Plan for hemodialysis tomorrow per nephrology.     8/10/2022: Patient is stable for downgrade.  Hospitalist were consulted for medical management.  Patient is currently on 3 L nasal cannula of oxygen and does not wear this at home.  She is having complaints of right-sided abdominal pain that she describes as sharp.  Her current pain is a 5 out of 10.  Movement hurts.  She denies any alleviating factors.  She reports she has been eating small amounts as she has had a decreased appetite.  She is now off  the Cardene drip.       Patient Reports     8/11  Labs and vitals reviewed  Blood pressure is quite elevated now  Blood pressure medications adjusted by nephrologist    Review of Systems   All other systems reviewed and are negative.           Objective      Vitals:   Temp:  [97.3 °F (36.3 °C)-97.8 °F (36.6 °C)] 97.7 °F (36.5 °C)  Heart Rate:  [] 90  Resp:  [8] 8  BP: (130-201)/() 151/86  Flow (L/min):  [2-4] 4    Physical Exam  Vitals and nursing note reviewed.   Constitutional:       General: She is not in acute distress.     Appearance: Normal appearance. She is well-developed. She is not ill-appearing, toxic-appearing or diaphoretic.   HENT:      Head: Normocephalic and atraumatic.      Right Ear: Ear canal and external ear normal.      Left Ear: Ear canal and external ear normal.      Nose: Nose normal. No congestion or rhinorrhea.      Mouth/Throat:      Mouth: Mucous membranes are moist.      Pharynx: No oropharyngeal exudate.   Eyes:      General: No scleral icterus.        Right eye: No discharge.         Left eye: No discharge.      Extraocular Movements: Extraocular movements intact.      Conjunctiva/sclera: Conjunctivae normal.      Pupils: Pupils are equal, round, and reactive to light.   Neck:      Thyroid: No thyromegaly.      Vascular: No carotid bruit or JVD.      Trachea: No tracheal deviation.   Cardiovascular:      Rate and Rhythm: Normal rate and regular rhythm.      Pulses: Normal pulses.      Heart sounds: Normal heart sounds. No murmur heard.    No friction rub. No gallop.   Pulmonary:      Effort: Pulmonary effort is normal. No respiratory distress.      Breath sounds: Normal breath sounds. No stridor. No wheezing, rhonchi or rales.   Chest:      Chest wall: No tenderness.   Abdominal:      General: Bowel sounds are normal. There is no distension.      Palpations: Abdomen is soft. There is no mass.      Tenderness: There is no abdominal tenderness. There is no guarding or  rebound.      Hernia: No hernia is present.   Musculoskeletal:         General: No swelling, tenderness, deformity or signs of injury. Normal range of motion.      Cervical back: Normal range of motion and neck supple. No rigidity. No muscular tenderness.      Right lower leg: No edema.      Left lower leg: No edema.   Lymphadenopathy:      Cervical: No cervical adenopathy.   Skin:     General: Skin is warm and dry.      Coloration: Skin is not jaundiced or pale.      Findings: No bruising, erythema or rash.   Neurological:      General: No focal deficit present.      Mental Status: She is alert and oriented to person, place, and time. Mental status is at baseline.      Cranial Nerves: No cranial nerve deficit.      Sensory: No sensory deficit.      Motor: No weakness or abnormal muscle tone.      Coordination: Coordination normal.   Psychiatric:         Mood and Affect: Mood normal.         Behavior: Behavior normal.         Thought Content: Thought content normal.         Judgment: Judgment normal.               Result Review    Result Review:  I have personally reviewed the results from the time of this admission to 8/11/2022 11:26 EDT and agree with these findings:  []  Laboratory  []  Microbiology  []  Radiology  []  EKG/Telemetry   []  Cardiology/Vascular   []  Pathology  []  Old records  []  Other:  Most notable findings include: As above    Wounds (last 24 hours)     LDA Wound     Row Name 08/11/22 0830 08/11/22 0400 08/10/22 2340       Wound 08/09/22 0700 Right posterior torso Incision    Wound - Properties Group Placement Date: 08/09/22  -SR Placement Time: 0700  -SR Side: Right  -SR Orientation: posterior  -SR Location: torso  -SR Primary Wound Type: Incision  -SR Additional Comments: liver biopsy site  -SR    Dressing Appearance open to air  -AA -- open to air  -JOHN    Closure Approximated  -AA Approximated  -JOHN Approximated  -JOHN    Drainage Amount none  -AA none  -JOHN none  -JOHN    Retired Wound -  Properties Group Placement Date: 08/09/22 -SR Placement Time: 0700 -SR Side: Right  -SR Orientation: posterior  -SR Location: torso  -SR Primary Wound Type: Incision  -SR Additional Comments: liver biopsy site  -SR    Retired Wound - Properties Group Date first assessed: 08/09/22  -SR Time first assessed: 0700 -SR Side: Right  -SR Location: torso  -SR Primary Wound Type: Incision  -SR Additional Comments: liver biopsy site  -SR    Row Name 08/10/22 2000 08/10/22 1500 08/10/22 1230       Wound 08/09/22 0700 Right posterior torso Incision    Wound - Properties Group Placement Date: 08/09/22 -SR Placement Time: 0700 -SR Side: Right  -SR Orientation: posterior  -SR Location: torso  -SR Primary Wound Type: Incision  -SR Additional Comments: liver biopsy site  -SR    Dressing Appearance no drainage;dry;open to air  -JOHN no drainage;dry;other (see comments)  not intact  -SR dry;intact;no drainage  -SR    Closure Approximated  -JOHN Approximated  -SR --    Drainage Amount none  -JOHN none  -SR --    Care, Wound -- cleansed with;antimicrobial agent applied  -SR --    Dressing Care -- dressing changed;gauze;transparent film  -SR --    Retired Wound - Properties Group Placement Date: 08/09/22 -SR Placement Time: 0700 -SR Side: Right  -SR Orientation: posterior  -SR Location: torso  -SR Primary Wound Type: Incision  -SR Additional Comments: liver biopsy site  -SR    Retired Wound - Properties Group Date first assessed: 08/09/22  -SR Time first assessed: 0700 -SR Side: Right  -SR Location: torso  -SR Primary Wound Type: Incision  -SR Additional Comments: liver biopsy site  -SR          User Key  (r) = Recorded By, (t) = Taken By, (c) = Cosigned By    Initials Name Provider Type    Daljit Serrano, RN Registered Nurse    Shaye Bauer RN Registered Nurse    Yaa Cotto RN Registered Nurse                  Assessment & Plan      Brief Patient Summary:  Raul Gardner is a 47 y.o. female who         amLODIPine,  10 mg, Oral, Q24H  cefTRIAXone, 2 g, Intravenous, Q24H  escitalopram, 10 mg, Oral, Daily  gabapentin, 200 mg, Oral, Q8H  metoprolol tartrate, 100 mg, Oral, Q12H  nystatin, 5 mL, Swish & Swallow, 4x Daily  pantoprazole, 40 mg, Oral, Q AM  sodium chloride, 10 mL, Intravenous, Q12H  sodium chloride, 10 mL, Intravenous, Q12H             Active Hospital Problems:  Active Hospital Problems    Diagnosis    • **Septic shock (HCC)    • Suspected liver abscess    • Diarrhea    • Acute kidney injury (HCC)    • Metabolic acidosis    • E coli enteritis    • Abdominal hematoma, secondary to liver biopsyh    • Essential hypertension    • Vitamin D deficiency    • Pulmonary hypertension, unspecified (HCC)    • Anxiety      Plan:        Septic shock  Without evidence of liver abscess  Acute liver injury   E. Coli enteritis   - CT abd/pelvis reviewed  - Liver biopsy -report pending  - IR attempted drainage but there was no fluid collection 8/5/2022 so CT-guided biopsy was obtained  Negative HIV test,  - Venous duplex reviewed   - WBC 16.2, monitor   - Blood cultures X2- no growth thus far   - Monitor LFT's   - Continue Rocephin  - ID, General surgery, and GI following      Acute kidney injury  Acute UTI  - CR 2.8, monitor   - Receiving CRRT, currently off   - Nephrology following  -Uncontrolled hypertension.  Started on Norvasc.  Also on metoprolol    Acute anemia with hemoperitoneum   - 2/2 liver biopsy  - Hbg 8.0, monitor  - General surgery following- no surgical intervention at this time      shock liver   History of mosquito bites exposure, awaiting West Nile virus antibodies  Essential HTN  - Controlled  - Monitor blood pressure  - Now off Cardene gtt   - Continue amlodipine and metoprolol      Anxiety   - Stable  - Continue lexapro      Morbid obesity  - BMI 51.1  - Encourage lifestyle changes        DVT prophylaxis:  Mechanical DVT prophylaxis orders are present.    CODE STATUS:    Code Status (Patient has no pulse and is not  breathing): CPR (Attempt to Resuscitate)  Medical Interventions (Patient has pulse or is breathing): Full Support      Disposition:  I expect patient to be discharged  Home versus rehab.    This patient has been examined wearing appropriate Personal Protective Equipment and discussed with hospital infection control department. 08/11/22      Electronically signed by Rios Borges MD, 08/11/22, 11:26 EDT.  [facility] Hospitalist Team

## 2022-08-11 NOTE — PROGRESS NOTES
Infectious Diseases Progress Note      LOS: 6 days   Patient Care Team:  Maribel Graf MD as PCP - General (Family Medicine)  AVA Cavanaugh MD as Consulting Physician (Cardiology)    Chief Complaint: Weakness    Subjective     The patient had no fever during the last 24 hours.  She remained off vasopressors.  She was complaining of some nausea.  She denied having any new complaints today.  She had no more diarrhea.  The patient remained off dialysis with relatively good urine output      Sandoval catheter  Review of Systems:   Review of Systems   Constitutional: Positive for fatigue.   HENT: Negative.    Eyes: Negative.    Respiratory: Negative.    Cardiovascular: Negative.    Gastrointestinal: Positive for nausea.   Endocrine: Negative.    Genitourinary: Negative.    Musculoskeletal: Negative.    Skin: Negative.    Neurological: Negative.    Psychiatric/Behavioral: Negative.    All other systems reviewed and are negative.       Objective     Vital Signs  Temp:  [97.3 °F (36.3 °C)-97.8 °F (36.6 °C)] 97.7 °F (36.5 °C)  Heart Rate:  [] 90  Resp:  [8] 8  BP: (130-201)/() 151/86    Physical Exam:  Physical Exam  Vitals and nursing note reviewed.   Constitutional:       Appearance: She is well-developed.   HENT:      Head: Normocephalic and atraumatic.   Eyes:      Pupils: Pupils are equal, round, and reactive to light.   Cardiovascular:      Rate and Rhythm: Normal rate and regular rhythm.      Heart sounds: Normal heart sounds.   Pulmonary:      Effort: Pulmonary effort is normal. No respiratory distress.      Breath sounds: Normal breath sounds. No wheezing or rales.   Abdominal:      General: Bowel sounds are normal. There is no distension.      Palpations: Abdomen is soft. There is no mass.      Tenderness: There is no abdominal tenderness. There is no guarding or rebound.   Musculoskeletal:         General: No deformity. Normal range of motion.      Cervical back: Normal range of motion and neck  supple.   Skin:     General: Skin is warm.      Findings: No erythema or rash.   Neurological:      Mental Status: She is alert and oriented to person, place, and time.      Cranial Nerves: No cranial nerve deficit.          Results Review:    I have reviewed all clinical data, test, lab, and imaging results.     Radiology  No Radiology Exams Resulted Within Past 24 Hours    Cardiology    Laboratory    Results from last 7 days   Lab Units 08/11/22  0515 08/10/22  0431 08/09/22  0339 08/08/22  1322 08/08/22  0812 08/08/22  0359 08/07/22  2327   WBC 10*3/mm3 16.20* 16.20* 16.20* 14.10* 14.10* 14.30* 13.90*   HEMOGLOBIN g/dL 8.2* 8.0* 7.4* 7.7* 7.6* 7.2* 7.5*   HEMATOCRIT % 25.5* 24.6* 22.5* 22.8* 22.6* 21.8* 22.8*   PLATELETS 10*3/mm3 155 89* 57* 50* 47* 50* 57*     Results from last 7 days   Lab Units 08/11/22  0515 08/10/22  0431 08/09/22  0339 08/08/22  0812 08/08/22  0359 08/07/22  2327 08/07/22  1615 08/07/22  0600 08/06/22  2154 08/06/22  1302 08/06/22  1013 08/05/22  1400 08/05/22  0811   SODIUM mmol/L 142 141 139 138 140 140 139 138   < > 136 139   < > 132*   POTASSIUM mmol/L 3.6 3.4* 4.0 4.1 4.1 3.8 3.7 3.7   < > 3.5 2.5*   < > 3.9   CHLORIDE mmol/L 101 103 104 104 105 104 101 99   < > 96* 96*   < > 94*   CO2 mmol/L 32.0* 31.0* 27.0 28.0 28.0 28.0 29.0 30.0*   < > 16.0* 29.0   < > 21.0*   BUN mg/dL 36* 34* 29* 16 19 21* 25* 37*   < > 53* 40*   < > 32*   CREATININE mg/dL 2.69* 2.88* 2.33* 1.35* 1.53* 1.77* 2.05* 2.92*   < > 4.58* 3.25*   < > 3.82*   GLUCOSE mg/dL 106* 110* 123* 87 98 105* 112* 124*   < > 264* 543*   < > 141*   ALBUMIN g/dL 3.40* 3.20* 3.20* 3.20* 3.00* 3.30* 3.20* 2.90*   < > 2.70* 1.90*   < > 3.30*   BILIRUBIN mg/dL 0.9 0.9 1.3*  --  3.8*  --   --  4.7*  --  4.6* 3.1*   < > 4.3*   ALK PHOS U/L 171* 188* 217*  --  133*  --   --  112  --  89 72   < > 64   AST (SGOT) U/L 38* 74* 141*  --  233*  --   --  522*  --  393* 194*   < > 41*   ALT (SGPT) U/L 108* 153* 208*  --  259*  --   --  352*  --   222* 109*   < > 30   AMYLASE U/L  --   --   --   --   --   --   --   --   --   --  39  --  81   LIPASE U/L  --   --   --   --   --   --   --   --   --   --  4*  --  12*   CALCIUM mg/dL 8.4* 8.2* 8.0* 7.6* 7.6* 8.1* 7.7* 7.2*   < > 6.9* 5.1*   < > 8.3*    < > = values in this interval not displayed.     Results from last 7 days   Lab Units 08/05/22  0811   CK TOTAL U/L 327*             Microbiology   Microbiology Results (last 10 days)     Procedure Component Value - Date/Time    Clostridioides difficile Toxin - Stool, Per Rectum [[id number]]  (Normal) Collected: 08/06/22 0703    Lab Status: Final result Specimen: Stool from Per Rectum Updated: 08/06/22 0758    Narrative:      The following orders were created for panel order Clostridioides difficile Toxin - Stool, Per Rectum.  Procedure                               Abnormality         Status                     ---------                               -----------         ------                     Clostridioides difficile...[[id number]]  Normal              Final result                 Please view results for these tests on the individual orders.    Clostridioides difficile EIA - Stool, Per Rectum [[id number]]  (Normal) Collected: 08/06/22 0703    Lab Status: Final result Specimen: Stool from Per Rectum Updated: 08/06/22 0758     C Diff GDH / Toxin Negative    Eosinophil Smear - Urine, Urine, Clean Catch [[id number]]  (Normal) Collected: 08/05/22 1917    Lab Status: Final result Specimen: Urine, Clean Catch Updated: 08/05/22 2047     Eosinophil Smear 0 % EOS/100 Cells     Body Fluid Culture - Body Fluid, Liver [[id number]] Collected: 08/05/22 1755    Lab Status: Final result Specimen: Body Fluid from Liver Updated: 08/08/22 0841     Body Fluid Culture No growth at 3 days     Gram Stain Few (2+) WBCs per low power field      No organisms seen    Anaerobic Culture - Swab, Liver [[id number]] Collected: 08/05/22 1755    Lab Status: Final result Specimen: Swab from Liver  Updated: 08/10/22 0629     Anaerobic Culture No anaerobes isolated at 5 days    S. Pneumo Ag Urine or CSF - Urine, Urine, Clean Catch [[id number]]  (Normal) Collected: 08/05/22 1154    Lab Status: Final result Specimen: Urine, Clean Catch Updated: 08/05/22 1234     Strep Pneumo Ag Negative    Legionella Antigen, Urine - Urine, Urine, Clean Catch [[id number]]  (Normal) Collected: 08/05/22 1154    Lab Status: Final result Specimen: Urine, Clean Catch Updated: 08/05/22 1234     LEGIONELLA ANTIGEN, URINE Negative    MRSA Screen, PCR (Inpatient) - Swab, Nares [[id number]]  (Normal) Collected: 08/05/22 1057    Lab Status: Final result Specimen: Swab from Nares Updated: 08/05/22 1222     MRSA PCR No MRSA Detected    Narrative:      The negative predictive value of this diagnostic test is high and should only be used to consider de-escalating anti-MRSA therapy. A positive result may indicate colonization with MRSA and must be correlated clinically.    Gastrointestinal Panel, PCR - Stool, Per Rectum [[id number]]  (Abnormal) Collected: 08/05/22 1057    Lab Status: Final result Specimen: Stool from Per Rectum Updated: 08/05/22 1238     Campylobacter Not Detected     Plesiomonas shigelloides Not Detected     Salmonella Not Detected     Vibrio Not Detected     Vibrio cholerae Not Detected     Yersinia enterocolitica Not Detected     Enteroaggregative E. coli (EAEC) Not Detected     Enteropathogenic E. coli (EPEC) Detected     Enterotoxigenic E. coli (ETEC) lt/st Not Detected     Shiga-like toxin-producing E. coli (STEC) stx1/stx2 Not Detected     Shigella/Enteroinvasive E. coli (EIEC) Not Detected     Cryptosporidium Not Detected     Cyclospora cayetanensis Not Detected     Entamoeba histolytica Not Detected     Giardia lamblia Not Detected     Adenovirus F40/41 Not Detected     Astrovirus Not Detected     Norovirus GI/GII Not Detected     Rotavirus A Not Detected     Sapovirus (I, II, IV or V) Not Detected    Urine Culture - Urine,  Urine, Catheter [[id number]]  (Normal) Collected: 08/05/22 1012    Lab Status: Final result Specimen: Urine, Catheter Updated: 08/06/22 1409     Urine Culture No growth    Blood Culture - Blood, Blood, Central Line [[id number]]  (Normal) Collected: 08/05/22 0902    Lab Status: Final result Specimen: Blood, Central Line Updated: 08/10/22 0917     Blood Culture No growth at 5 days    Blood Culture - Blood, Blood, Central Line [[id number]]  (Normal) Collected: 08/05/22 0902    Lab Status: Final result Specimen: Blood, Central Line Updated: 08/10/22 0917     Blood Culture No growth at 5 days    Respiratory Panel PCR w/COVID-19(SARS-CoV-2) CELSA/JONATHAN/DOMINIQUE/PAD/COR/MAD/LISA In-House, NP Swab in UTM/VTM, 3-4 HR TAT - Swab, Nasopharynx [[id number]]  (Normal) Collected: 08/05/22 0815    Lab Status: Final result Specimen: Swab from Nasopharynx Updated: 08/05/22 0909     ADENOVIRUS, PCR Not Detected     Coronavirus 229E Not Detected     Coronavirus HKU1 Not Detected     Coronavirus NL63 Not Detected     Coronavirus OC43 Not Detected     COVID19 Not Detected     Human Metapneumovirus Not Detected     Human Rhinovirus/Enterovirus Not Detected     Influenza A PCR Not Detected     Influenza B PCR Not Detected     Parainfluenza Virus 1 Not Detected     Parainfluenza Virus 2 Not Detected     Parainfluenza Virus 3 Not Detected     Parainfluenza Virus 4 Not Detected     RSV, PCR Not Detected     Bordetella pertussis pcr Not Detected     Bordetella parapertussis PCR Not Detected     Chlamydophila pneumoniae PCR Not Detected     Mycoplasma pneumo by PCR Not Detected    Narrative:      In the setting of a positive respiratory panel with a viral infection PLUS a negative procalcitonin without other underlying concern for bacterial infection, consider observing off antibiotics or discontinuation of antibiotics and continue supportive care. If the respiratory panel is positive for atypical bacterial infection (Bordetella pertussis, Chlamydophila  pneumoniae, or Mycoplasma pneumoniae), consider antibiotic de-escalation to target atypical bacterial infection.          Medication Review:       Schedule Meds  amLODIPine, 10 mg, Oral, Q24H  cefTRIAXone, 2 g, Intravenous, Q24H  escitalopram, 10 mg, Oral, Daily  gabapentin, 200 mg, Oral, Q8H  metoprolol tartrate, 100 mg, Oral, Q12H  nystatin, 5 mL, Swish & Swallow, 4x Daily  pantoprazole, 40 mg, Oral, Q AM  sodium chloride, 10 mL, Intravenous, Q12H  sodium chloride, 10 mL, Intravenous, Q12H        Infusion Meds       PRN Meds  •  acetaminophen **OR** acetaminophen  •  dextrose  •  dextrose  •  fentaNYL citrate (PF)  •  glucagon (human recombinant)  •  hydrALAZINE  •  nitroglycerin  •  ondansetron **OR** ondansetron  •  ondansetron ODT  •  oxyCODONE  •  simethicone  •  sodium chloride  •  sodium chloride  •  sodium chloride  •  sodium chloride        Assessment & Plan       Antimicrobial Therapy   1.  IV ceftriaxone       Assessment     Severe hypovolemic/septic shock present on admission and was initially on 2 pressors but condition worsened after liver biopsy and was concerning secondary to hemorrhagic shock.  Patient was on 4 vasopressors at one point.  Improved significantly and currently off vasopressors  Repeat CT scan of abdomen pelvis on August 9, 2022 showed stable intra abdomen hemorrhage    The patient was admitted for gastroenteritis caused by enteropathogenic E. coli however the patient was very systemically ill and was febrile for a few days.  Work-up was negative for infection except for E. coli and stool    Abnormal liver lesion noted on imaging studies.  It might be incidental findings.  S/p biopsy and there was no purulence to suspect infection.  Pathology report is still pending    Acute kidney injury.  Suspected to be prerenal.  Improving.  Currently off CRRT and making urine    Thrombocytopenia.  Probably secondary to sepsis.  Improved    Reactive leukocytosis.  Mildly elevated may be now  secondary to steroids        Plan    Continue ceftriaxone 2 g IV daily for now, probably for a total of 10 to 14 days of antimicrobial therapy  continue supportive care  A.m. labs  Prognosis continue to improve  May transfer from ICU      Sagar Orozco MD  08/11/22  11:14 EDT    Note is dictated utilizing voice recognition software/Dragon

## 2022-08-11 NOTE — PLAN OF CARE
Goal Outcome Evaluation:              Assessment: Raul Gardner presents with functional mobility impairments which indicate the need for skilled intervention. Tolerating session today without incident. Distance limited by c/o nausea and fatigue.  Due to slow progression, pt may benefit from HH PT to continue to advance mobility and activity.  Will continue to follow and progress as tolerated. Pt is aware of recommendation and is agreeable.      Plan/Recommendations:   Low Intensity Therapy recommended post-acute care - This is recommended as therapy feels this patient would require 2-3 visits per week.  HH PT.  Pt requires no DME at discharge.      Pt desires Home with Home Health at discharge. Pt cooperative; agreeable to therapeutic recommendations and plan of care.

## 2022-08-11 NOTE — PLAN OF CARE
"Assessment: Raul Gardner presents with ADL impairments below baseline abilities which indicate the need for continued skilled intervention while inpatient. Patient with estefania tolerance today, however nauseated and remains weak with low activity tolerance. Patient was CGA for bed mobility, transfer, and ambulation, however required excess time. OT now recommending home health at d/c. Tolerating session today without incident. Will continue to follow and progress as tolerated.      Plan/Recommendations:   Low Intensity Therapy recommended post-acute care - This is recommended as therapy feels this patient would require 2-3 visits per week. OP or HH would be the best option depending on patient's home bound status. Consider, if the patient has other  \"skilled\" needs such as wounds, IV antibiotics, etc. Combined with \"low intensity\" could also equate to a SNF. If patient is medically complex, consider LTAC.     Pt desires Home with Home Health at discharge. Pt cooperative; agreeable to therapeutic recommendations and plan of care.   "

## 2022-08-12 ENCOUNTER — APPOINTMENT (OUTPATIENT)
Dept: GENERAL RADIOLOGY | Facility: HOSPITAL | Age: 47
End: 2022-08-12

## 2022-08-12 LAB
ALBUMIN SERPL-MCNC: 3.1 G/DL (ref 3.5–5.2)
ALBUMIN/GLOB SERPL: 1.2 G/DL
ALP SERPL-CCNC: 146 U/L (ref 39–117)
ALT SERPL W P-5'-P-CCNC: 73 U/L (ref 1–33)
ANION GAP SERPL CALCULATED.3IONS-SCNC: 9 MMOL/L (ref 5–15)
ANISOCYTOSIS BLD QL: ABNORMAL
AST SERPL-CCNC: 28 U/L (ref 1–32)
BILIRUB SERPL-MCNC: 1 MG/DL (ref 0–1.2)
BLASTS NFR BLD MANUAL: 1 % (ref 0–0)
BUN SERPL-MCNC: 36 MG/DL (ref 6–20)
BUN/CREAT SERPL: 14.7 (ref 7–25)
CALCIUM SPEC-SCNC: 8.5 MG/DL (ref 8.6–10.5)
CHLORIDE SERPL-SCNC: 97 MMOL/L (ref 98–107)
CO2 SERPL-SCNC: 33 MMOL/L (ref 22–29)
CREAT SERPL-MCNC: 2.45 MG/DL (ref 0.57–1)
DEPRECATED RDW RBC AUTO: 45.9 FL (ref 37–54)
EGFRCR SERPLBLD CKD-EPI 2021: 24.2 ML/MIN/1.73
ERYTHROCYTE [DISTWIDTH] IN BLOOD BY AUTOMATED COUNT: 15.6 % (ref 12.3–15.4)
GLOBULIN UR ELPH-MCNC: 2.6 GM/DL
GLUCOSE SERPL-MCNC: 112 MG/DL (ref 65–99)
HCT VFR BLD AUTO: 24 % (ref 34–46.6)
HGB BLD-MCNC: 8 G/DL (ref 12–15.9)
LAB AP CASE REPORT: NORMAL
LAB AP DIAGNOSIS COMMENT: NORMAL
LARGE PLATELETS: ABNORMAL
LDH SERPL-CCNC: 443 U/L (ref 135–214)
LYMPHOCYTES # BLD MANUAL: 2.37 10*3/MM3 (ref 0.7–3.1)
LYMPHOCYTES NFR BLD MANUAL: 5 % (ref 5–12)
MAGNESIUM SERPL-MCNC: 1.9 MG/DL (ref 1.6–2.6)
MCH RBC QN AUTO: 27.6 PG (ref 26.6–33)
MCHC RBC AUTO-ENTMCNC: 33.4 G/DL (ref 31.5–35.7)
MCV RBC AUTO: 82.8 FL (ref 79–97)
METAMYELOCYTES NFR BLD MANUAL: 9 % (ref 0–0)
MONOCYTES # BLD: 0.79 10*3/MM3 (ref 0.1–0.9)
MYELOCYTES NFR BLD MANUAL: 6 % (ref 0–0)
NEUTROPHILS # BLD AUTO: 9.95 10*3/MM3 (ref 1.7–7)
NEUTROPHILS NFR BLD MANUAL: 53 % (ref 42.7–76)
NEUTS BAND NFR BLD MANUAL: 10 % (ref 0–5)
PATH REPORT.FINAL DX SPEC: NORMAL
PATH REPORT.GROSS SPEC: NORMAL
PHOSPHATE SERPL-MCNC: 4 MG/DL (ref 2.5–4.5)
PLATELET # BLD AUTO: 184 10*3/MM3 (ref 140–450)
PMV BLD AUTO: 8.2 FL (ref 6–12)
POTASSIUM SERPL-SCNC: 3.2 MMOL/L (ref 3.5–5.2)
PROMYELOCYTES NFR BLD MANUAL: 1 % (ref 0–0)
PROT SERPL-MCNC: 5.7 G/DL (ref 6–8.5)
RBC # BLD AUTO: 2.9 10*6/MM3 (ref 3.77–5.28)
SCAN SLIDE: NORMAL
SODIUM SERPL-SCNC: 139 MMOL/L (ref 136–145)
TOXIC GRANULATION: ABNORMAL
VARIANT LYMPHS NFR BLD MANUAL: 15 % (ref 19.6–45.3)
WBC NRBC COR # BLD: 15.8 10*3/MM3 (ref 3.4–10.8)

## 2022-08-12 PROCEDURE — 25010000002 CEFTRIAXONE PER 250 MG: Performed by: NURSE PRACTITIONER

## 2022-08-12 PROCEDURE — 83735 ASSAY OF MAGNESIUM: CPT | Performed by: NURSE PRACTITIONER

## 2022-08-12 PROCEDURE — 25010000002 HYDRALAZINE PER 20 MG: Performed by: NURSE PRACTITIONER

## 2022-08-12 PROCEDURE — 83615 LACTATE (LD) (LDH) ENZYME: CPT | Performed by: INTERNAL MEDICINE

## 2022-08-12 PROCEDURE — 71045 X-RAY EXAM CHEST 1 VIEW: CPT

## 2022-08-12 PROCEDURE — 80053 COMPREHEN METABOLIC PANEL: CPT | Performed by: NURSE PRACTITIONER

## 2022-08-12 PROCEDURE — 99233 SBSQ HOSP IP/OBS HIGH 50: CPT | Performed by: INTERNAL MEDICINE

## 2022-08-12 PROCEDURE — 97116 GAIT TRAINING THERAPY: CPT

## 2022-08-12 PROCEDURE — 85007 BL SMEAR W/DIFF WBC COUNT: CPT | Performed by: INTERNAL MEDICINE

## 2022-08-12 PROCEDURE — 84100 ASSAY OF PHOSPHORUS: CPT | Performed by: NURSE PRACTITIONER

## 2022-08-12 PROCEDURE — 85025 COMPLETE CBC W/AUTO DIFF WBC: CPT | Performed by: INTERNAL MEDICINE

## 2022-08-12 RX ORDER — POTASSIUM CHLORIDE 20 MEQ/1
40 TABLET, EXTENDED RELEASE ORAL ONCE
Status: COMPLETED | OUTPATIENT
Start: 2022-08-12 | End: 2022-08-12

## 2022-08-12 RX ADMIN — Medication 10 ML: at 08:44

## 2022-08-12 RX ADMIN — POTASSIUM CHLORIDE 40 MEQ: 1500 TABLET, EXTENDED RELEASE ORAL at 08:43

## 2022-08-12 RX ADMIN — NYSTATIN 500000 UNITS: 100000 SUSPENSION ORAL at 08:43

## 2022-08-12 RX ADMIN — METOPROLOL TARTRATE 100 MG: 50 TABLET, FILM COATED ORAL at 08:43

## 2022-08-12 RX ADMIN — ESCITALOPRAM OXALATE 10 MG: 10 TABLET ORAL at 08:43

## 2022-08-12 RX ADMIN — CEFTRIAXONE 2 G: 2 INJECTION, POWDER, FOR SOLUTION INTRAMUSCULAR; INTRAVENOUS at 12:54

## 2022-08-12 RX ADMIN — HYDRALAZINE HYDROCHLORIDE 10 MG: 20 INJECTION INTRAMUSCULAR; INTRAVENOUS at 19:19

## 2022-08-12 RX ADMIN — PANTOPRAZOLE SODIUM 40 MG: 40 TABLET, DELAYED RELEASE ORAL at 05:52

## 2022-08-12 RX ADMIN — METOPROLOL TARTRATE 100 MG: 50 TABLET, FILM COATED ORAL at 21:32

## 2022-08-12 RX ADMIN — Medication 10 ML: at 21:33

## 2022-08-12 RX ADMIN — Medication 10 ML: at 21:32

## 2022-08-12 RX ADMIN — AMLODIPINE BESYLATE 10 MG: 5 TABLET ORAL at 08:43

## 2022-08-12 RX ADMIN — NYSTATIN 500000 UNITS: 100000 SUSPENSION ORAL at 21:32

## 2022-08-12 RX ADMIN — Medication 10 ML: at 08:43

## 2022-08-12 NOTE — PROGRESS NOTES
Infectious Diseases Progress Note      LOS: 7 days   Patient Care Team:  Maribel Graf MD as PCP - General (Family Medicine)  AVA Cavanaugh MD as Consulting Physician (Cardiology)    Chief Complaint: Weakness    Subjective     The patient had no fever during the last 24 hours.  She remained off vasopressors.  The patient is feeling better today.  Denied having diarrhea      Sandoval catheter  Review of Systems:   Review of Systems   Constitutional: Positive for fatigue.   HENT: Negative.    Eyes: Negative.    Respiratory: Negative.    Cardiovascular: Negative.    Endocrine: Negative.    Genitourinary: Negative.    Musculoskeletal: Negative.    Skin: Negative.    Neurological: Negative.    Psychiatric/Behavioral: Negative.    All other systems reviewed and are negative.       Objective     Vital Signs  Temp:  [97.7 °F (36.5 °C)-98.9 °F (37.2 °C)] 98.9 °F (37.2 °C)  Heart Rate:  [77-95] 85  BP: (122-165)/(75-93) 137/77    Physical Exam:  Physical Exam  Vitals and nursing note reviewed.   Constitutional:       Appearance: She is well-developed.   HENT:      Head: Normocephalic and atraumatic.   Eyes:      Pupils: Pupils are equal, round, and reactive to light.   Cardiovascular:      Rate and Rhythm: Normal rate and regular rhythm.      Heart sounds: Normal heart sounds.   Pulmonary:      Effort: Pulmonary effort is normal. No respiratory distress.      Breath sounds: Normal breath sounds. No wheezing or rales.   Abdominal:      General: Bowel sounds are normal. There is no distension.      Palpations: Abdomen is soft. There is no mass.      Tenderness: There is no abdominal tenderness. There is no guarding or rebound.   Musculoskeletal:         General: No deformity. Normal range of motion.      Cervical back: Normal range of motion and neck supple.   Skin:     General: Skin is warm.      Findings: No erythema or rash.   Neurological:      Mental Status: She is alert and oriented to person, place, and time.       Cranial Nerves: No cranial nerve deficit.          Results Review:    I have reviewed all clinical data, test, lab, and imaging results.     Radiology  No Radiology Exams Resulted Within Past 24 Hours    Cardiology    Laboratory    Results from last 7 days   Lab Units 08/12/22  0400 08/11/22  0515 08/10/22  0431 08/09/22  0339 08/08/22  1322 08/08/22  0812 08/08/22  0359   WBC 10*3/mm3 15.80* 16.20* 16.20* 16.20* 14.10* 14.10* 14.30*   HEMOGLOBIN g/dL 8.0* 8.2* 8.0* 7.4* 7.7* 7.6* 7.2*   HEMATOCRIT % 24.0* 25.5* 24.6* 22.5* 22.8* 22.6* 21.8*   PLATELETS 10*3/mm3 184 155 89* 57* 50* 47* 50*     Results from last 7 days   Lab Units 08/12/22  0400 08/11/22  0515 08/10/22  0431 08/09/22  0339 08/08/22  0812 08/08/22  0359 08/07/22  2327 08/07/22  1615 08/07/22  0600 08/06/22  2154 08/06/22  1302 08/06/22  1013   SODIUM mmol/L 139 142 141 139 138 140 140   < > 138   < > 136 139   POTASSIUM mmol/L 3.2* 3.6 3.4* 4.0 4.1 4.1 3.8   < > 3.7   < > 3.5 2.5*   CHLORIDE mmol/L 97* 101 103 104 104 105 104   < > 99   < > 96* 96*   CO2 mmol/L 33.0* 32.0* 31.0* 27.0 28.0 28.0 28.0   < > 30.0*   < > 16.0* 29.0   BUN mg/dL 36* 36* 34* 29* 16 19 21*   < > 37*   < > 53* 40*   CREATININE mg/dL 2.45* 2.69* 2.88* 2.33* 1.35* 1.53* 1.77*   < > 2.92*   < > 4.58* 3.25*   GLUCOSE mg/dL 112* 106* 110* 123* 87 98 105*   < > 124*   < > 264* 543*   ALBUMIN g/dL 3.10* 3.40* 3.20* 3.20* 3.20* 3.00* 3.30*   < > 2.90*   < > 2.70* 1.90*   BILIRUBIN mg/dL 1.0 0.9 0.9 1.3*  --  3.8*  --   --  4.7*  --  4.6* 3.1*   ALK PHOS U/L 146* 171* 188* 217*  --  133*  --   --  112  --  89 72   AST (SGOT) U/L 28 38* 74* 141*  --  233*  --   --  522*  --  393* 194*   ALT (SGPT) U/L 73* 108* 153* 208*  --  259*  --   --  352*  --  222* 109*   AMYLASE U/L  --   --   --   --   --   --   --   --   --   --   --  39   LIPASE U/L  --   --   --   --   --   --   --   --   --   --   --  4*   CALCIUM mg/dL 8.5* 8.4* 8.2* 8.0* 7.6* 7.6* 8.1*   < > 7.2*   < > 6.9* 5.1*    < > =  values in this interval not displayed.                 Microbiology   Microbiology Results (last 10 days)     Procedure Component Value - Date/Time    Clostridioides difficile Toxin - Stool, Per Rectum [[id number]]  (Normal) Collected: 08/06/22 0703    Lab Status: Final result Specimen: Stool from Per Rectum Updated: 08/06/22 0758    Narrative:      The following orders were created for panel order Clostridioides difficile Toxin - Stool, Per Rectum.  Procedure                               Abnormality         Status                     ---------                               -----------         ------                     Clostridioides difficile...[[id number]]  Normal              Final result                 Please view results for these tests on the individual orders.    Clostridioides difficile EIA - Stool, Per Rectum [[id number]]  (Normal) Collected: 08/06/22 0703    Lab Status: Final result Specimen: Stool from Per Rectum Updated: 08/06/22 0758     C Diff GDH / Toxin Negative    Eosinophil Smear - Urine, Urine, Clean Catch [[id number]]  (Normal) Collected: 08/05/22 1917    Lab Status: Final result Specimen: Urine, Clean Catch Updated: 08/05/22 2047     Eosinophil Smear 0 % EOS/100 Cells     Body Fluid Culture - Body Fluid, Liver [[id number]] Collected: 08/05/22 1755    Lab Status: Final result Specimen: Body Fluid from Liver Updated: 08/08/22 0841     Body Fluid Culture No growth at 3 days     Gram Stain Few (2+) WBCs per low power field      No organisms seen    Anaerobic Culture - Swab, Liver [[id number]] Collected: 08/05/22 1755    Lab Status: Final result Specimen: Swab from Liver Updated: 08/10/22 0629     Anaerobic Culture No anaerobes isolated at 5 days    S. Pneumo Ag Urine or CSF - Urine, Urine, Clean Catch [[id number]]  (Normal) Collected: 08/05/22 1154    Lab Status: Final result Specimen: Urine, Clean Catch Updated: 08/05/22 1234     Strep Pneumo Ag Negative    Legionella Antigen, Urine - Urine, Urine,  Clean Catch [[id number]]  (Normal) Collected: 08/05/22 1154    Lab Status: Final result Specimen: Urine, Clean Catch Updated: 08/05/22 1234     LEGIONELLA ANTIGEN, URINE Negative    MRSA Screen, PCR (Inpatient) - Swab, Nares [[id number]]  (Normal) Collected: 08/05/22 1057    Lab Status: Final result Specimen: Swab from Nares Updated: 08/05/22 1222     MRSA PCR No MRSA Detected    Narrative:      The negative predictive value of this diagnostic test is high and should only be used to consider de-escalating anti-MRSA therapy. A positive result may indicate colonization with MRSA and must be correlated clinically.    Gastrointestinal Panel, PCR - Stool, Per Rectum [[id number]]  (Abnormal) Collected: 08/05/22 1057    Lab Status: Final result Specimen: Stool from Per Rectum Updated: 08/05/22 1238     Campylobacter Not Detected     Plesiomonas shigelloides Not Detected     Salmonella Not Detected     Vibrio Not Detected     Vibrio cholerae Not Detected     Yersinia enterocolitica Not Detected     Enteroaggregative E. coli (EAEC) Not Detected     Enteropathogenic E. coli (EPEC) Detected     Enterotoxigenic E. coli (ETEC) lt/st Not Detected     Shiga-like toxin-producing E. coli (STEC) stx1/stx2 Not Detected     Shigella/Enteroinvasive E. coli (EIEC) Not Detected     Cryptosporidium Not Detected     Cyclospora cayetanensis Not Detected     Entamoeba histolytica Not Detected     Giardia lamblia Not Detected     Adenovirus F40/41 Not Detected     Astrovirus Not Detected     Norovirus GI/GII Not Detected     Rotavirus A Not Detected     Sapovirus (I, II, IV or V) Not Detected    Urine Culture - Urine, Urine, Catheter [[id number]]  (Normal) Collected: 08/05/22 1012    Lab Status: Final result Specimen: Urine, Catheter Updated: 08/06/22 1409     Urine Culture No growth    Blood Culture - Blood, Blood, Central Line [[id number]]  (Normal) Collected: 08/05/22 0902    Lab Status: Final result Specimen: Blood, Central Line Updated:  08/10/22 0917     Blood Culture No growth at 5 days    Blood Culture - Blood, Blood, Central Line [[id number]]  (Normal) Collected: 08/05/22 0902    Lab Status: Final result Specimen: Blood, Central Line Updated: 08/10/22 0917     Blood Culture No growth at 5 days    Respiratory Panel PCR w/COVID-19(SARS-CoV-2) CELSA/JONATHAN/DOMINIQUE/PAD/COR/MAD/LISA In-House, NP Swab in UTM/VTM, 3-4 HR TAT - Swab, Nasopharynx [[id number]]  (Normal) Collected: 08/05/22 0815    Lab Status: Final result Specimen: Swab from Nasopharynx Updated: 08/05/22 0909     ADENOVIRUS, PCR Not Detected     Coronavirus 229E Not Detected     Coronavirus HKU1 Not Detected     Coronavirus NL63 Not Detected     Coronavirus OC43 Not Detected     COVID19 Not Detected     Human Metapneumovirus Not Detected     Human Rhinovirus/Enterovirus Not Detected     Influenza A PCR Not Detected     Influenza B PCR Not Detected     Parainfluenza Virus 1 Not Detected     Parainfluenza Virus 2 Not Detected     Parainfluenza Virus 3 Not Detected     Parainfluenza Virus 4 Not Detected     RSV, PCR Not Detected     Bordetella pertussis pcr Not Detected     Bordetella parapertussis PCR Not Detected     Chlamydophila pneumoniae PCR Not Detected     Mycoplasma pneumo by PCR Not Detected    Narrative:      In the setting of a positive respiratory panel with a viral infection PLUS a negative procalcitonin without other underlying concern for bacterial infection, consider observing off antibiotics or discontinuation of antibiotics and continue supportive care. If the respiratory panel is positive for atypical bacterial infection (Bordetella pertussis, Chlamydophila pneumoniae, or Mycoplasma pneumoniae), consider antibiotic de-escalation to target atypical bacterial infection.          Medication Review:       Schedule Meds  amLODIPine, 10 mg, Oral, Q24H  cefTRIAXone, 2 g, Intravenous, Q24H  escitalopram, 10 mg, Oral, Daily  metoprolol tartrate, 100 mg, Oral, Q12H  nystatin, 5 mL, Swish &  Swallow, 4x Daily  pantoprazole, 40 mg, Oral, Q AM  sodium chloride, 10 mL, Intravenous, Q12H  sodium chloride, 10 mL, Intravenous, Q12H        Infusion Meds       PRN Meds  •  acetaminophen **OR** acetaminophen  •  dextrose  •  dextrose  •  glucagon (human recombinant)  •  hydrALAZINE  •  nitroglycerin  •  ondansetron **OR** ondansetron  •  ondansetron ODT  •  oxyCODONE  •  simethicone  •  sodium chloride  •  sodium chloride  •  sodium chloride  •  sodium chloride        Assessment & Plan       Antimicrobial Therapy   1.  IV ceftriaxone       Assessment     Severe hypovolemic/septic shock present on admission and was initially on 2 pressors but condition worsened after liver biopsy and was concerning secondary to hemorrhagic shock.  Patient was on 4 vasopressors at one point.  Improved significantly and currently off vasopressors  Repeat CT scan of abdomen pelvis on August 9, 2022 showed stable intra abdomen hemorrhage    The patient was admitted for gastroenteritis caused by enteropathogenic E. coli however the patient was very systemically ill and was febrile for a few days.  Work-up was negative for infection except for E. coli and stool    Abnormal liver lesion noted on imaging studies.  It might be incidental findings.  S/p biopsy and there was no purulence to suspect infection.  Pathology report is still pending    Acute kidney injury.  Suspected to be prerenal.  Improving.  Currently off CRRT and making urine    Thrombocytopenia.  Probably secondary to sepsis.  Improved    Reactive leukocytosis.  Mildly elevated may be now secondary to steroids        Plan    Continue ceftriaxone 2 g IV daily for now, probably for a total of 10  days of antimicrobial therapy  continue supportive care  A.m. labs  Prognosis continue to improve  May transfer to [facility] or [facility]      Sagar Orozco MD  08/12/22  12:52 EDT    Note is dictated utilizing voice recognition software/Dragon

## 2022-08-12 NOTE — PROGRESS NOTES
PROGRESS NOTE      Patient Name: Raul Gardner  : 1975  MRN: [id number]  Primary Care Physician: Maribel Graf MD  Date of admission: 2022    Patient Care Team:  Maribel Graf MD as PCP - General (Family Medicine)  AVA Cavanaugh MD as Consulting Physician (Cardiology)        Subjective   Subjective:     Patient is slightly better than yesterday but still lethargic sick looking  Review of systems:  Middle-age female complaining of body aches abdominal pain abdominal distention      Allergies:    Allergies   Allergen Reactions   • Cephalexin Rash   • Hydrocodone-Acetaminophen Rash       Objective   Exam:     Vital Signs  Temp:  [97.5 °F (36.4 °C)-98.5 °F (36.9 °C)] 98.5 °F (36.9 °C)  Heart Rate:  [77-95] 95  BP: (122-165)/(75-93) 165/90  SpO2:  [90 %-98 %] 97 %  on  Flow (L/min):  [1-2] 1;   Device (Oxygen Therapy): nasal cannula with ETCO2  Body mass index is 48.72 kg/m².    General: Middle-age  female i very lethargic  Head:      Normocephalic and atraumatic.    Eyes:      PERRL/EOM intact, conjunctiva and sclera clear with out nystagmus.    Neck:      No masses, thyromegaly,  trachea central with normal respiratory effort   Lungs:    Clear bilaterally to auscultation.    Heart:      Regular rate and rhythm, no murmur no gallop  Abd:        Diffusely tender with decreased bowel sounds  Pulses:   Pulses palpable  Extr:        No cyanosis or clubbing--no edema.    Neuro:    No focal deficits.   alert oriented x3  Skin:       Intact without lesions or rashes.    Psych:    Alert and cooperative; normal mood and affect; .      Results Review:  I have personally reviewed most recent Data :  CBC    Results from last 7 days   Lab Units 22  0400 22  0515 08/10/22  0431 22  0339 22  1322 22  0812 22  0359   WBC 10*3/mm3 15.80* 16.20* 16.20* 16.20* 14.10* 14.10* 14.30*   HEMOGLOBIN g/dL 8.0* 8.2* 8.0* 7.4* 7.7* 7.6* 7.2*   PLATELETS 10*3/mm3 184 155 89* 57* 50* 47*  50*     CMP   Results from last 7 days   Lab Units 08/12/22  0400 08/11/22  0515 08/10/22  0431 08/09/22  0339 08/08/22  0812 08/08/22  0359 08/07/22  2327 08/07/22  1615 08/07/22  0600 08/06/22  2154 08/06/22  1302 08/06/22  1013   SODIUM mmol/L 139 142 141 139 138 140 140   < > 138   < > 136 139   POTASSIUM mmol/L 3.2* 3.6 3.4* 4.0 4.1 4.1 3.8   < > 3.7   < > 3.5 2.5*   CHLORIDE mmol/L 97* 101 103 104 104 105 104   < > 99   < > 96* 96*   CO2 mmol/L 33.0* 32.0* 31.0* 27.0 28.0 28.0 28.0   < > 30.0*   < > 16.0* 29.0   BUN mg/dL 36* 36* 34* 29* 16 19 21*   < > 37*   < > 53* 40*   CREATININE mg/dL 2.45* 2.69* 2.88* 2.33* 1.35* 1.53* 1.77*   < > 2.92*   < > 4.58* 3.25*   GLUCOSE mg/dL 112* 106* 110* 123* 87 98 105*   < > 124*   < > 264* 543*   ALBUMIN g/dL 3.10* 3.40* 3.20* 3.20* 3.20* 3.00* 3.30*   < > 2.90*   < > 2.70* 1.90*   BILIRUBIN mg/dL 1.0 0.9 0.9 1.3*  --  3.8*  --   --  4.7*  --  4.6* 3.1*   ALK PHOS U/L 146* 171* 188* 217*  --  133*  --   --  112  --  89 72   AST (SGOT) U/L 28 38* 74* 141*  --  233*  --   --  522*  --  393* 194*   ALT (SGPT) U/L 73* 108* 153* 208*  --  259*  --   --  352*  --  222* 109*   AMYLASE U/L  --   --   --   --   --   --   --   --   --   --   --  39   LIPASE U/L  --   --   --   --   --   --   --   --   --   --   --  4*    < > = values in this interval not displayed.     ABG        No radiology results for the last day    Results for orders placed during the hospital encounter of 08/05/22    Adult Transthoracic Echo Complete w/ Color, Spectral and Contrast if Necessary Per Protocol    Interpretation Summary  · Left ventricular wall thickness is consistent with mild concentric hypertrophy.  · Estimated left ventricular EF = 75% Left ventricular systolic function is hyperdynamic (EF > 70%).  · Left ventricular diastolic function is consistent with (grade I) impaired relaxation.    Scheduled Meds:amLODIPine, 10 mg, Oral, Q24H  cefTRIAXone, 2 g, Intravenous, Q24H  escitalopram, 10 mg,  Oral, Daily  gabapentin, 200 mg, Oral, Q8H  metoprolol tartrate, 100 mg, Oral, Q12H  nystatin, 5 mL, Swish & Swallow, 4x Daily  pantoprazole, 40 mg, Oral, Q AM  sodium chloride, 10 mL, Intravenous, Q12H  sodium chloride, 10 mL, Intravenous, Q12H      Continuous Infusions:   PRN Meds:•  acetaminophen **OR** acetaminophen  •  dextrose  •  dextrose  •  glucagon (human recombinant)  •  hydrALAZINE  •  nitroglycerin  •  ondansetron **OR** ondansetron  •  ondansetron ODT  •  oxyCODONE  •  simethicone  •  sodium chloride  •  sodium chloride  •  sodium chloride  •  sodium chloride    Assessment & Plan   Assessment and Plan:         Septic shock (HCC)    Pulmonary hypertension, unspecified (HCC)    Anxiety    Vitamin D deficiency    Essential hypertension    Suspected liver abscess    Diarrhea    Acute kidney injury (HCC)    Metabolic acidosis    E coli enteritis    Abdominal hematoma, secondary to liver biopsyh    ASSESSMENT:  · Acute kidney injury likely ATN secondary to sepsis and hemodynamic instability on pressors at this time  · Multiple liver lesion unlikely to be abscess  · Chronic kidney disease as per renal ultrasound with some increased echogenicity   · Significant lactic acidosis with increased anion gap   · Some evidence of volume overload   · History of hypertension   ·             Plan:      · Patient was in septic shock with MARIANNE continue ABX  and now start getting better  · Patient iwas on CRRT started 9/6/2022.  9/8/2022 no hemodynamically better blood pressure is on the high side  · I think we can remove Shiley catheter either later today or tomorrow morning  · Hemoglobin is stable in last couple of days   · Electrolytes are acceptable  · Continue medications  · Will consider stopping diuretics tomorrow as patient is getting more alkalotic  · Increase beta-blocker today continue calcium channel blocker  · Okay to be transferred from ICU  · Lactic acidosis resolved  · I think creatinine will continue to  improve in next 2 to 3 days.  · Follow-up with volume status electrolytes later today and tomorrow morning  · So far all the serologies are unremarkable including KAREN, ANCA, complements    •           Electronically signed by Vikram Acosta MD,   [facility] kidney consultant  [phone]

## 2022-08-12 NOTE — PLAN OF CARE
Goal Outcome Evaluation:                 Able to wean pt to 1L NC. Sats in mid 80s on room air. Voiding without difficulty. No BM. Remained oriented x 4 for whole shift. Less drowsy. VSS

## 2022-08-12 NOTE — THERAPY TREATMENT NOTE
Subjective: Pt agreeable to therapeutic plan of care.    Objective:     Bed mobility - SBA  Transfers - CGA  Ambulation - 40 feet and 80' with RW and  CGA. After initial VC, pt demonstrates improved gait pattern with longer more efficient step length    Vitals: /79, HR 93, SpO2 92% on 2L at rest., 98% on 2L with moblity    Pain: 0 VAS  Education: Provided education on importance of mobility and skilled verbal / tactile cueing throughout intervention. Educated on gait pattern.    Assessment: Raul Gardner presents with functional mobility impairments which indicate the need for skilled intervention. Tolerating session today without incident. Progressing with functional mobility and activity tolerance. She would benefit from HH PT to continue to progress mobility as she was independent with community mobility prior to hospitalization. Will continue to follow and progress as tolerated.     Plan/Recommendations:   HH PT. Pt requires no DME at discharge.     Pt desires Home with family assist and and Home Health at discharge. Pt cooperative; agreeable to therapeutic recommendations and plan of care.         Basic Mobility 6-click:  Rollin = Total, A lot = 2, A little = 3; 4 = None  Supine>Sit:   1 = Total, A lot = 2, A little = 3; 4 = None   Sit>Stand with arms:  1 = Total, A lot = 2, A little = 3; 4 = None  Bed>Chair:   1 = Total, A lot = 2, A little = 3; 4 = None  Ambulate in room:  1 = Total, A lot = 2, A little = 3; 4 = None  3-5 Steps with railin = Total, A lot = 2, A little = 3; 4 = None  Score: 17    Modified [place]: N/A = No pre-op stroke/TIA    Post-Tx Position: Up in Chair and Call light and personal items within reach  PPE: gloves, surgical mask, eyewear protection

## 2022-08-12 NOTE — PROGRESS NOTES
ICU Daily Progress Note        Septic shock (HCC)    Pulmonary hypertension, unspecified (HCC)    Anxiety    Vitamin D deficiency    Essential hypertension    Suspected liver abscess    Diarrhea    Acute kidney injury (HCC)    Metabolic acidosis    E coli enteritis    Abdominal hematoma, secondary to liver biopsyh      Assessment & Plan     Fever resolved  Sepsis with septic shock, resolved  UTI  MARIANNE  Coagulopathy probably from DIC  Leukocytosis  Acute anemia with hemoperitoneum after liver biopsy  Anion gap metabolic acidosis  Lactic acidosis  Stool culture positive for enteropathic E. coli however Shiga toxin E. coli was negative  Abnormal CT scan of the liver with possible hepatic abscess: IR attempted drainage but there was no fluid collection 8/5/2022 so CT-guided biopsy was obtained  Negative HIV test,  Esophagitis  Mild pancreatic changes on the CT scan but the lipase is not elevated  History of melanoma resected in 1999 without signs of spread or recurrence  Mild exercise-induced pulmonary hypertension for which she has been on metoprolol but she is out of it for 1 month  Hyperglycemia  Elevated liver enzymes probably from shock liver         Plan:    Oxygenating well on 1 L per nasal cannula    peripheral smear 8/8/2022   Leukocytosis with left shifted granulocytes and rare possible blasts  Occasional schistocytes, , anemia, Thrombocytopenia  ? hemolytic uremic syndrome vs MAHA  Lupus panel     Lower extremity Dopplers neg  History of mosquito bites exposure, negative IgG and IgM West Nile virus antibodies    Hemodynamic support: Vasopressin and stress dose hydrocortisone  Blood transfusion: Platelets are dropping, will continue with RBC transfusion and platelet transfusion as needed  Followed by nephrology: CRRT   Liver enzymes are increasing due to shock liver   insulin sliding scale  Antibiotics:ID following  DVT: SCD/GI prophylaxis  Check daily labs and correct electrolytes as needed           LOS: 7  days         Vital signs for last 24 hours:  Vitals:    08/12/22 0400 08/12/22 0600 08/12/22 0700 08/12/22 0800   BP: 146/89  159/87 158/89   Pulse: 90  88 88   Resp:       Temp:       TempSrc:       SpO2: 95%  95% 95%   Weight:  133 kg (292 lb 12.3 oz)     Height:           Intake/Output last 3 shifts:  I/O last 3 completed shifts:  In: 765 [P.O.:480; I.V.:285]  Out: 4200 [Urine:4200]  Intake/Output this shift:  I/O this shift:  In: -   Out: 1000 [Urine:1000]           Radiology  Imaging Results (Last 24 Hours)     ** No results found for the last 24 hours. **          Labs:  Results from last 7 days   Lab Units 08/12/22  0400   WBC 10*3/mm3 15.80*   HEMOGLOBIN g/dL 8.0*   HEMATOCRIT % 24.0*   PLATELETS 10*3/mm3 184     Results from last 7 days   Lab Units 08/12/22  0400   SODIUM mmol/L 139   POTASSIUM mmol/L 3.2*   CHLORIDE mmol/L 97*   CO2 mmol/L 33.0*   BUN mg/dL 36*   CREATININE mg/dL 2.45*   CALCIUM mg/dL 8.5*   BILIRUBIN mg/dL 1.0   ALK PHOS U/L 146*   ALT (SGPT) U/L 73*   AST (SGOT) U/L 28   GLUCOSE mg/dL 112*         Results from last 7 days   Lab Units 08/12/22  0400 08/11/22  0515 08/10/22  0431   ALBUMIN g/dL 3.10* 3.40* 3.20*     Results from last 7 days   Lab Units 08/11/22  1420 08/05/22  1852 08/05/22  1400   TROPONIN T ng/mL <0.010 0.045* 0.032*     Results from last 7 days   Lab Units 08/06/22  1641 08/05/22  1852   KAREN  Negative Negative     Results from last 7 days   Lab Units 08/12/22  0400   MAGNESIUM mg/dL 1.9     Results from last 7 days   Lab Units 08/09/22  0339 08/07/22  1408 08/06/22  1110   INR  1.11* 1.09 2.07*   APTT seconds  --   --  51.9*               Meds:   SCHEDULE  amLODIPine, 10 mg, Oral, Q24H  cefTRIAXone, 2 g, Intravenous, Q24H  escitalopram, 10 mg, Oral, Daily  gabapentin, 200 mg, Oral, Q8H  metoprolol tartrate, 100 mg, Oral, Q12H  nystatin, 5 mL, Swish & Swallow, 4x Daily  pantoprazole, 40 mg, Oral, Q AM  sodium chloride, 10 mL, Intravenous, Q12H  sodium chloride, 10 mL,  Intravenous, Q12H      Infusions     PRNs  •  acetaminophen **OR** acetaminophen  •  dextrose  •  dextrose  •  fentaNYL citrate (PF)  •  glucagon (human recombinant)  •  hydrALAZINE  •  nitroglycerin  •  ondansetron **OR** ondansetron  •  ondansetron ODT  •  oxyCODONE  •  simethicone  •  sodium chloride  •  sodium chloride  •  sodium chloride  •  sodium chloride    Physical Exam:  Physical Exam  Pulmonary:      Breath sounds: Rhonchi present.       General Appearance:  Alert and answering questions appropriately  HEENT:  Normocephalic, without obvious abnormality, Conjunctiva/corneas clear,.   Nares normal, no drainage     Neck:  Supple, symmetrical, trachea midline. No JVD.  Lungs /Chest wall: Mild bilateral basal rhonchi, respirations unlabored, symmetrical wall movement.     Heart:  Regular rate and rhythm, S1 S2 normal  Abdomen: Soft, mild tenderness epigastric and right upper quadrant, no masses, no organomegaly.  Bowel sounds positive  Extremities: No edema, no clubbing or cyanosis  Neuro exam: Patient moving 4 extremities with no focal deficit by observation  Skin: No rash  ROS  Review of Systems  Constitutional: Positive for chills, fever and malaise/fatigue.   HENT: Negative.    Eyes: Negative.    Cardiovascular: Negative.    Respiratory: Positive for mild cough and shortness of breath.    Skin: Negative.    Musculoskeletal: Negative.    Gastrointestinal: Positive for mild abdominal pain  Genitourinary: Negative.    Neurological: Negative.    Psychiatric/Behavioral: Negative.    Critical Care Time greater than: 45 Minutes      Much of this encounter note is an electronic transcription/translation of spoken language to printed text using Dragon Software which might include inadvertent errors in transcription.

## 2022-08-12 NOTE — CASE MANAGEMENT/SOCIAL WORK
Continued Stay Note  [facility]     Patient Name: Raul Gardner  MRN: [id number]  Today's Date: 8/12/2022    Admit Date: 8/5/2022     Discharge Plan     Row Name 08/12/22 1802       Plan    Plan DC Plan: Home with [facility] pending acceptance. Watch for Home O2 needs.    Provided Post Acute Provider List? N/A    Provided Post Acute Provider Quality & Resource List? N/A    Plan Comments CM spoke with patient’s nurse and Intensivist NP to obtain clinical updates. PT/OT now recommending home health at discharge. CM spoke with patient about discharge planning and patient has selected 1) [facility] and 2) [facility] for services.CM placed referral in basket for [facility] and liaison Taylor notified. [facility] has declined secondary to insurance out of network.CM placed referral in basket for [facility] and liaison Anita notified.Awaiting confirmation of acceptance or denial at this time. Patient downgraded to Medical Telemetry bed today 8/12/22.CM will continue to follow for any additional needs that may develop and adjust discharge plan accordingly. DC Barriers: Abnormal labs              Expected Discharge Date and Time     Expected Discharge Date Expected Discharge Time    Aug 15, 2022         Met with patient in room wearing PPE: mask,     Maintain distance greater than six feet and spent less than fifteen minutes in the room.      Sofia Sanches RN     Office Phone: [phone]  Office Cell:     [phone]

## 2022-08-12 NOTE — PLAN OF CARE
Goal Outcome Evaluation:            Assessment: Raul Gardner presents with functional mobility impairments which indicate the need for skilled intervention. Tolerating session today without incident. Progressing with functional mobility and activity tolerance. She would benefit from HH PT to continue to progress mobility as she was independent with community mobility prior to hospitalization. Will continue to follow and progress as tolerated.      Plan/Recommendations:   HH PT. Pt requires no DME at discharge.      Pt desires Home with family assist and and Home Health at discharge. Pt cooperative; agreeable to therapeutic recommendations and plan of care.

## 2022-08-12 NOTE — PROGRESS NOTES
[facility] Medicine Services Daily Progress Note    Patient Name: Raul Gardner  : 1975  MRN: [id number]  Primary Care Physician:  Maribel Graf MD  Date of admission: 2022      Subjective      Chief Complaint: *Generalized weakness     Note taken from Intensivist with additional editing:     History of Present Illness: Raul Gardner is a 47 y.o. female  with past medical history of melanoma, pulmonary hypertension who presented to [facility] on 2022 after not feeling well. Patient reported this started two days prior with a fever and body aches and on day of admission she started to have vomiting and diarrhea.  In ED, she had a fever of 103.1 and heart rate 146 with blood pressure 36/23 however that improved after IV fluid resuscitation and vasopressors to 103/62.  Patient was admitted to the ICU for further care management.  Infectious disease was consulted due to possible liver abscess.  Nephrology was consulted due to MARIANNE.  Interventional radiology was consulted and patient underwent liver biopsy.     2022: Patient received blood transfusion.  Shiley was placed for possible CRRT.  General surgery was consulted.  Patient noted to be on vancomycin and meropenem per ID.  GI was consulted for acute liver injury.  Patient started on CRRT.     2022: Patient noted to be on Vanco, meropenem, and micafungin per ID.     2022: Patient noted to be on Rocephin per ID.     2022: Plan for hemodialysis tomorrow per nephrology.     8/10/2022: Patient is stable for downgrade.  Hospitalist were consulted for medical management.  Patient is currently on 3 L nasal cannula of oxygen and does not wear this at home.  She is having complaints of right-sided abdominal pain that she describes as sharp.  Her current pain is a 5 out of 10.  Movement hurts.  She denies any alleviating factors.  She reports she has been eating small amounts as she has had a decreased appetite.  She is now off  the Cardene drip.       Patient Reports     8/11  Labs and vitals reviewed  Blood pressure is quite elevated now  Blood pressure medications adjusted by nephrologist  Cp addressed     8/12  Seen and examined vital signs reviewed  Blood pressure is moderately elevated on 1 L of oxygen saturating 95%  Potassium little low at 3.2 creatinine improving 2.4 White count slightly better 15.8  Had good urine output  Still w rt sided sharp cp  Check x ray  ekg 8/11 foir ant cp. wnl  Sat well on 1-2 l O2    Review of Systems   All other systems reviewed and are negative.           Objective      Vitals:   Temp:  [97.5 °F (36.4 °C)-98.5 °F (36.9 °C)] 98.5 °F (36.9 °C)  Heart Rate:  [] 88  BP: (122-201)/() 158/89  Flow (L/min):  [1-2] 1    Physical Exam  Vitals and nursing note reviewed.   Constitutional:       General: She is not in acute distress.     Appearance: Normal appearance. She is well-developed. She is not ill-appearing, toxic-appearing or diaphoretic.   HENT:      Head: Normocephalic and atraumatic.      Right Ear: Ear canal and external ear normal.      Left Ear: Ear canal and external ear normal.      Nose: Nose normal. No congestion or rhinorrhea.      Mouth/Throat:      Mouth: Mucous membranes are moist.      Pharynx: No oropharyngeal exudate.   Eyes:      General: No scleral icterus.        Right eye: No discharge.         Left eye: No discharge.      Extraocular Movements: Extraocular movements intact.      Conjunctiva/sclera: Conjunctivae normal.      Pupils: Pupils are equal, round, and reactive to light.   Neck:      Thyroid: No thyromegaly.      Vascular: No carotid bruit or JVD.      Trachea: No tracheal deviation.   Cardiovascular:      Rate and Rhythm: Normal rate and regular rhythm.      Pulses: Normal pulses.      Heart sounds: Normal heart sounds. No murmur heard.    No friction rub. No gallop.   Pulmonary:      Effort: Pulmonary effort is normal. No respiratory distress.      Breath  sounds: Normal breath sounds. No stridor. No wheezing, rhonchi or rales.   Chest:      Chest wall: No tenderness.   Abdominal:      General: Bowel sounds are normal. There is no distension.      Palpations: Abdomen is soft. There is no mass.      Tenderness: There is no abdominal tenderness. There is no guarding or rebound.      Hernia: No hernia is present.   Musculoskeletal:         General: No swelling, tenderness, deformity or signs of injury. Normal range of motion.      Cervical back: Normal range of motion and neck supple. No rigidity. No muscular tenderness.      Right lower leg: No edema.      Left lower leg: No edema.   Lymphadenopathy:      Cervical: No cervical adenopathy.   Skin:     General: Skin is warm and dry.      Coloration: Skin is not jaundiced or pale.      Findings: No bruising, erythema or rash.   Neurological:      General: No focal deficit present.      Mental Status: She is alert and oriented to person, place, and time. Mental status is at baseline.      Cranial Nerves: No cranial nerve deficit.      Sensory: No sensory deficit.      Motor: No weakness or abnormal muscle tone.      Coordination: Coordination normal.   Psychiatric:         Mood and Affect: Mood normal.         Behavior: Behavior normal.         Thought Content: Thought content normal.         Judgment: Judgment normal.               Result Review    Result Review:  I have personally reviewed the results from the time of this admission to 8/12/2022 09:45 EDT and agree with these findings:  [x]  Laboratory  [x]  Microbiology  [x]  Radiology  []  EKG/Telemetry   []  Cardiology/Vascular   []  Pathology  []  Old records  []  Other:  Most notable findings include: As above    Wounds (last 24 hours)     LDA Wound     Row Name 08/12/22 0845 08/12/22 0400 08/12/22 0000       Wound 08/09/22 0700 Right posterior torso Incision    Wound - Properties Group Placement Date: 08/09/22  -SR Placement Time: 0700  -SR Side: Right  -SR  Orientation: posterior  -SR Location: torso  -SR Primary Wound Type: Incision  -SR Additional Comments: liver biopsy site  -SR    Dressing Appearance open to air  -AA -- --    Closure Approximated  -AA Approximated  -JM Approximated  -JM    Base dry  -AA dry  -JM dry  -JM    Drainage Amount none  -AA none  -JM none  -JM    Retired Wound - Properties Group Placement Date: 08/09/22  -SR Placement Time: 0700  -SR Side: Right  -SR Orientation: posterior  -SR Location: torso  -SR Primary Wound Type: Incision  -SR Additional Comments: liver biopsy site  -SR    Retired Wound - Properties Group Date first assessed: 08/09/22  -SR Time first assessed: 0700  -SR Side: Right  -SR Location: torso  -SR Primary Wound Type: Incision  -SR Additional Comments: liver biopsy site  -SR    Row Name 08/11/22 2000 08/11/22 1600 08/11/22 1200       Wound 08/09/22 0700 Right posterior torso Incision    Wound - Properties Group Placement Date: 08/09/22  -SR Placement Time: 0700  -SR Side: Right  -SR Orientation: posterior  -SR Location: torso  -SR Primary Wound Type: Incision  -SR Additional Comments: liver biopsy site  -SR    Dressing Appearance open to air  -JM open to air  -AA open to air  -AA    Closure Approximated  -JM Approximated  -AA Approximated  -AA    Base dry  -JM dry  -AA --    Drainage Amount none  -JM none  -AA none  -AA    Retired Wound - Properties Group Placement Date: 08/09/22  -SR Placement Time: 0700  -SR Side: Right  -SR Orientation: posterior  -SR Location: torso  -SR Primary Wound Type: Incision  -SR Additional Comments: liver biopsy site  -SR    Retired Wound - Properties Group Date first assessed: 08/09/22  -SR Time first assessed: 0700  -SR Side: Right  -SR Location: torso  -SR Primary Wound Type: Incision  -SR Additional Comments: liver biopsy site  -SR          User Key  (r) = Recorded By, (t) = Taken By, (c) = Cosigned By    Initials Name Provider Type    Kiana Nagel, RN Registered Nurse    FINN Reza  Daljit BROWN, RN Registered Nurse    SR James, Shaye LOPEZ RN Registered Nurse                  Assessment & Plan      Brief Patient Summary:  Raul Gardner is a 47 y.o. female who who presented with hemorrhagic/septic shock        amLODIPine, 10 mg, Oral, Q24H  cefTRIAXone, 2 g, Intravenous, Q24H  escitalopram, 10 mg, Oral, Daily  gabapentin, 200 mg, Oral, Q8H  metoprolol tartrate, 100 mg, Oral, Q12H  nystatin, 5 mL, Swish & Swallow, 4x Daily  pantoprazole, 40 mg, Oral, Q AM  sodium chloride, 10 mL, Intravenous, Q12H  sodium chloride, 10 mL, Intravenous, Q12H             Active Hospital Problems:  Active Hospital Problems    Diagnosis    • **Septic shock (HCC)    • Suspected liver abscess    • Diarrhea    • Acute kidney injury (HCC)    • Metabolic acidosis    • E coli enteritis    • Abdominal hematoma, secondary to liver biopsyh    • Essential hypertension    • Vitamin D deficiency    • Pulmonary hypertension, unspecified (HCC)    • Anxiety      Plan:        Septic/hemorrhagic shock  Without evidence of liver abscess  Acute liver injury   E. Coli enteritis   - CT abd/pelvis reviewed  - Liver biopsy -report pending  - IR attempted drainage but there was no fluid collection 8/5/2022 so CT-guided biopsy was obtained  -Negative HIV test,  - Venous duplex negative for DVT  - WBC 16.2, monitor   - Blood cultures X2- no growth thus far   - Monitor LFT's   - Continue Rocephin  - ID, General surgery, and GI following      Acute kidney injury  Acute UTI  - CR 2.8, monitor   -Received CRRT, currently off   - Nephrology following  - Renal function improving  -Uncontrolled hypertension.  Started on Norvasc.  Also on metoprolol      CP    ? musculosk  Rt side  ekg wnl  Troponin neg  Check x ray  Will dw dr draw      Acute blood loss anemia with hemoperitoneum   - 2/2 liver biopsy  - Hbg 8.0, monitor  - General surgery following- no surgical intervention at this time      shock liver.  Liver enzymes improving  Autoimmune panel reviewed.   Negative C ANCA antichromatin double-stranded DNA mitochondrial P ANCA smooth muscle antibody liver fraction CMV DNA      History of mosquito bites exposure, awaiting West Nile virus antibodies  negative IgG and IgM West Nile virus antibodies       Essential HTN  - Controlled  - Monitor blood pressure  - Now off Cardene gtt   - Continue amlodipine and metoprolol      Anxiety   - Stable  - Continue lexapro      Morbid obesity  - BMI 51.1  - Encourage lifestyle changes        DVT prophylaxis: Heparin subcutaneous if okay with Dr. Oconnor  Disposition likely home with home health  Seen by PT OT    Mechanical DVT prophylaxis orders are present.    CODE STATUS:    Code Status (Patient has no pulse and is not breathing): CPR (Attempt to Resuscitate)  Medical Interventions (Patient has pulse or is breathing): Full Support      Disposition:  I expect patient to be discharged  Home versus rehab.    This patient has been examined wearing appropriate Personal Protective Equipment and discussed with hospital infection control department. 08/12/22      Electronically signed by Rios Borges MD, 08/12/22, 09:45 EDT.  [facility] Hospitalist Team

## 2022-08-12 NOTE — DISCHARGE PLACEMENT REQUEST
"Raul Middleton (47 y.o. Female)             Date of Birth   1975    Social Security Number       Address   [address] [place]    Home Phone   [phone]    MRN   [id number]       Zoroastrianism   None    Marital Status                               Admission Date   8/5/22    Admission Type   Emergency    Admitting Provider   Shy Moreno MD    Attending Provider   Rios Borges MD    Department, Room/Bed   [facility] INTENSIVE CARE UNIT, 2305/1       Discharge Date       Discharge Disposition       Discharge Destination                               Attending Provider: Rios Borges MD    Allergies: Cephalexin, Hydrocodone-acetaminophen    Isolation: None   Infection: None   Code Status: CPR   Advance Care Planning Activity    Ht: 165.1 cm (65\")   Wt: 133 kg (292 lb 12.3 oz)    Admission Cmt: None   Principal Problem: Septic shock (HCC) [A41.9,R65.21]                 Active Insurance as of 8/5/2022     Primary Coverage     Payor Plan Insurance Group Employer/Plan Group    KEY BENEFIT ADMINISTRATORS LBP KEY BENEFIT ADMINISTRATORS LBP [id number]     Payor Plan Address Payor Plan Phone Number Payor Plan Fax Number Effective Dates    [address] [phone]  1/1/2022 - None Entered    [facility] [id number]       Subscriber Name Subscriber Birth Date Member ID       RAUL MIDDLETON 1975 [id number]                 Emergency Contacts      (Rel.) Home Phone Work Phone Mobile Phone    CHUN MIDDLETON (Spouse) -- -- [phone]               History & Physical      Shy Moreno MD at 08/05/22 1042          Patient Care Team:  Maribel Graf MD as PCP - General (Family Medicine)  AVA Cavanaugh MD as Consulting Physician (Cardiology)    Chief complaint fever and vomiting        Assessment & Plan   Sepsis with septic shock  UTI  MARIANNE  Coagulopathy probably from DIC  Leukocytosis  Anion gap metabolic acidosis  Electrolyte " imbalance with hypokalemia and hypocalcemia and hyponatremia  Abnormal CT scan of the liver with possible hepatic abscess  Mild atelectasis with early noncardiogenic edema probably early ARDS  Esophagitis  Mild pancreatic changes on the CT scan but the lipase is not elevated  History of melanoma resected in  without signs of spread or recurrence  Mild exercise-induced pulmonary hypertension for which she has been on metoprolol but she is out of it for 1 month      Plan:  Patient is critically ill we will keep her in ICU  Hemodynamic support: Pressors  IV fluid  Ultrasound of the liver  Antibiotics: Consult ID  DVT: SCD/GI prophylaxis  Check daily labs and correct electrolytes as needed    History  47-year-old female with past medical history of melanoma, pulmonary hypertension who was feeling well until 2 days ago when she started to have fever with body aches and today she started to have vomiting and diarrhea.  In ED she had a fever of 103.1 and heart rate 146 with blood pressure 36/23 however that improved after IV fluid resuscitation and vasopressors to 103/62.          Septic shock (HCC)      Past Medical History:   Diagnosis Date   • Melanoma (CMS/HCC)    • Pulmonary hypertension (CMS/HCC)    • Urinary tract infection     chronic hematuria, seen urology       Past Surgical History:   Procedure Laterality Date   •  SECTION  13 and 14   • SKIN CANCER EXCISION     • TUBAL ABDOMINAL LIGATION         Family History   Problem Relation Age of Onset   • Kidney disease Mother    • Kidney disease Father    • Cancer Father         lung   • Diabetes Brother    • Heart disease Brother         CHF       Social History     Socioeconomic History   • Marital status:    Tobacco Use   • Smoking status: Never Smoker   • Smokeless tobacco: Never Used   Substance and Sexual Activity   • Alcohol use: Yes     Comment: caffeine 1 cup qd   • Drug use: No   • Sexual activity: Yes     Birth  control/protection: Surgical       Review of Systems  Review of Systems   Constitutional: Positive for chills, fever and malaise/fatigue.   HENT: Negative.    Eyes: Negative.    Cardiovascular: Negative.    Respiratory: Negative for cough and shortness of breath.    Skin: Negative.    Musculoskeletal: Negative.    Gastrointestinal: Mild abdominal pain with vomiting and diarrhea  Genitourinary: Chronic cystitis  Neurological: Negative.    Psychiatric/Behavioral: Negative.  Vital Signs  Temp:  [97.7 °F (36.5 °C)-103.1 °F (39.5 °C)] 103.1 °F (39.5 °C)  Heart Rate:  [] 124  Resp:  [20-27] 27  BP: ()/(12-89) 103/62    Physical Exam:  Physical Exam  General Appearance:  Alert   HEENT:  Normocephalic, without obvious abnormality, Conjunctiva/corneas clear,.   Nares normal, no drainage, dry oral mucosa     Neck:  Supple, symmetrical, trachea midline. No JVD.  Lungs /Chest wall: Minimal bilateral basal rhonchi, respirations unlabored, symmetrical wall movement.     Heart:  Regular tachycardia, S1 S2 normal, no murmur  Abdomen: Soft, tenderness in right upper quadrant, no masses, no organomegaly.  Bowel sounds positive  Extremities: No edema, no clubbing or cyanosis, no palpable cords in the calves  Skin: No rash  Neuro exam: Moving 4 extremities no apparent focal deficit by observation    Radiology  Imaging Results (Last 24 Hours)     Procedure Component Value Units Date/Time    XR Chest 1 View [[id number]] Collected: 08/05/22 0927     Updated: 08/05/22 0936    Narrative:      DATE OF EXAM:  8/5/2022 9:14 AM     PROCEDURE:  XR CHEST 1 VW-     INDICATIONS:  Severe Sepsis Protocol     COMPARISON:  CT chest PE protocol 08/05/2022. No previous chest x-ray for comparison.     TECHNIQUE:   Single radiographic AP view of the chest was obtained.     FINDINGS:  Tip of the right internal jugular central venous catheter terminates in  the right atrium. No pneumothorax is seen. Allowing for low lung  volumes, the lungs appear  grossly clear. Cardia mediastinal contours  appear within normal limits.        Impression:      1.     Tip of the right internal jugular central venous catheter  terminates in the right atrium. No visible pneumothorax.  2.     Low lung volumes without evidence of acute cardiopulmonary  abnormality.     Electronically Signed By-Hui Raman MD On:8/5/2022 9:34 AM  This report was finalized on [id number] by  Hui Raman MD.    CT Angiogram Chest Pulmonary Embolism [[id number]] Collected: 08/05/22 0846     Updated: 08/05/22 0900    Narrative:      CT ABDOMEN PELVIS W CONTRAST-, CT ANGIOGRAM CHEST PULMONARY EMBOLISM-     Date of Exam: 8/5/2022 8:25 AM     Indication: septic shock abd pain.     Comparison: None available.     Technique: Contiguous axial CT images were obtained from the lung bases  to the pubic symphysis following uneventful administration of 100 cc  Isovue-370 intravenous and oral contrast. Sagittal and coronal  reconstructions were performed.  Automated exposure control and  iterative reconstruction methods were used.     FINDINGS:     CHEST: No pulmonary embolism is seen. Heart size is within normal  limits. There is no pericardial effusion. There is no pleural effusion.     There is mild concentric thickening of the mid to lower thoracic  esophagus, and there is a small esophageal hiatal hernia.     There is mild ill-defined the soft tissue stranding within the anterior  superior mediastinum. Trace fluid is seen within the pericardial  recesses. No pleural effusion is seen.     No dense lung consolidations are identified. There is dependent  atelectasis in both lungs. There is very mild interstitial thickening in  the lungs which could reflect changes of early interstitial edema and  the appropriate clinical context. No abnormal bronchial wall thickening  or bronchiectasis is evident.     No acute or suspicious osseous abnormalities are identified.           ABDOMEN AND PELVIS: Multiple  irregularly marginated and somewhat  ill-defined low-density lesions are seen within the liver parenchyma. A  dominant bilobed lesion in the posterior right hepatic lobe segment 6  measures 2.5 x 5.7 cm. Another index lesion within the right hepatic  lobe more superiorly and segment 8 measures 2.6 x 1.4 cm. An index  lesion in the left hepatic lobe laterally segment 2 measures 1.7 x 1.3  cm. Other scattered smaller lesions are noted. These do not have the  appearance of cysts. They could represent atypical hemangiomas; however,  in the context of septic shock and abdominal pain, hepatic phlegmon or  evolving abscess is could be considered.     The gallbladder is normal. No abnormal biliary dilation is identified.  The spleen, adrenals, and left kidney are normal. A right renal cyst  measures 1.3 cm. Kidneys maintain normal enhancement.     There is minimal stranding near the level of the pancreatic tail. The  pancreas itself, however, otherwise appears unremarkable.     The unopacified bowel does not appear abnormally thickened, dilated, or  inflamed. The appendix is normal. No free air, free fluid or  pathologically enlarged lymph nodes are identified.     The uterus and rectum appear unremarkable. The urinary bladder is  decompressed.     There are no acute or suspicious osseous abnormalities.             Impression:      1. Multiple ill-defined low-density lesions are scattered within the  liver parenchyma. These do not represent the appearance of typical  cysts. In the context of history of septic shock, hepatic phlegmon or  evolving hepatic abscesses could be considered.  2. Minimal stranding is seen at the level of the pancreatic tail which  is nonspecific. This could represent normal variant for this patient.  Very mild pancreatitis could have a similar appearance, although the  remainder the pancreas itself appears unremarkable. Correlate with  laboratory findings.  3. There is very mild interstitial  thickening in both lungs and faint  stranding within the anterior superior mediastinum, which may raise the  possibility of mild early edema.  4. There is long segment thickening of the mid to lower thoracic  esophagus with small esophageal hiatal hernia. Correlate for esophagitis  symptoms. Endoscopic correlation may prove helpful.  5. Mild dependent atelectasis in both lungs.  6. No pulmonary embolism is seen.           Electronically Signed By-Franca Gallegos MD On:8/5/2022 8:58 AM  This report was finalized on [id number] by  Franac Gallegos MD.    CT Abdomen Pelvis With Contrast [[id number]] Collected: 08/05/22 0846     Updated: 08/05/22 0900    Narrative:      CT ABDOMEN PELVIS W CONTRAST-, CT ANGIOGRAM CHEST PULMONARY EMBOLISM-     Date of Exam: 8/5/2022 8:25 AM     Indication: septic shock abd pain.     Comparison: None available.     Technique: Contiguous axial CT images were obtained from the lung bases  to the pubic symphysis following uneventful administration of 100 cc  Isovue-370 intravenous and oral contrast. Sagittal and coronal  reconstructions were performed.  Automated exposure control and  iterative reconstruction methods were used.     FINDINGS:     CHEST: No pulmonary embolism is seen. Heart size is within normal  limits. There is no pericardial effusion. There is no pleural effusion.     There is mild concentric thickening of the mid to lower thoracic  esophagus, and there is a small esophageal hiatal hernia.     There is mild ill-defined the soft tissue stranding within the anterior  superior mediastinum. Trace fluid is seen within the pericardial  recesses. No pleural effusion is seen.     No dense lung consolidations are identified. There is dependent  atelectasis in both lungs. There is very mild interstitial thickening in  the lungs which could reflect changes of early interstitial edema and  the appropriate clinical context. No abnormal bronchial wall thickening  or bronchiectasis is  evident.     No acute or suspicious osseous abnormalities are identified.           ABDOMEN AND PELVIS: Multiple irregularly marginated and somewhat  ill-defined low-density lesions are seen within the liver parenchyma. A  dominant bilobed lesion in the posterior right hepatic lobe segment 6  measures 2.5 x 5.7 cm. Another index lesion within the right hepatic  lobe more superiorly and segment 8 measures 2.6 x 1.4 cm. An index  lesion in the left hepatic lobe laterally segment 2 measures 1.7 x 1.3  cm. Other scattered smaller lesions are noted. These do not have the  appearance of cysts. They could represent atypical hemangiomas; however,  in the context of septic shock and abdominal pain, hepatic phlegmon or  evolving abscess is could be considered.     The gallbladder is normal. No abnormal biliary dilation is identified.  The spleen, adrenals, and left kidney are normal. A right renal cyst  measures 1.3 cm. Kidneys maintain normal enhancement.     There is minimal stranding near the level of the pancreatic tail. The  pancreas itself, however, otherwise appears unremarkable.     The unopacified bowel does not appear abnormally thickened, dilated, or  inflamed. The appendix is normal. No free air, free fluid or  pathologically enlarged lymph nodes are identified.     The uterus and rectum appear unremarkable. The urinary bladder is  decompressed.     There are no acute or suspicious osseous abnormalities.             Impression:      1. Multiple ill-defined low-density lesions are scattered within the  liver parenchyma. These do not represent the appearance of typical  cysts. In the context of history of septic shock, hepatic phlegmon or  evolving hepatic abscesses could be considered.  2. Minimal stranding is seen at the level of the pancreatic tail which  is nonspecific. This could represent normal variant for this patient.  Very mild pancreatitis could have a similar appearance, although the  remainder the  pancreas itself appears unremarkable. Correlate with  laboratory findings.  3. There is very mild interstitial thickening in both lungs and faint  stranding within the anterior superior mediastinum, which may raise the  possibility of mild early edema.  4. There is long segment thickening of the mid to lower thoracic  esophagus with small esophageal hiatal hernia. Correlate for esophagitis  symptoms. Endoscopic correlation may prove helpful.  5. Mild dependent atelectasis in both lungs.  6. No pulmonary embolism is seen.           Electronically Signed By-Franca Gallegos MD On:8/5/2022 8:58 AM  This report was finalized on [id number] by  Franca Gallegos MD.          Labs:  Results from last 7 days   Lab Units 08/05/22  0815 08/05/22  0811   WBC 10*3/mm3  --  15.90*   HEMOGLOBIN g/dL  --  14.8   HEMOGLOBIN, POC g/dL 12.0  --    HEMATOCRIT %  --  45.7   HEMATOCRIT POC % 35*  --    PLATELETS 10*3/mm3  --  241     Results from last 7 days   Lab Units 08/05/22  0811   SODIUM mmol/L 132*   POTASSIUM mmol/L 3.9   CHLORIDE mmol/L 94*   CO2 mmol/L 21.0*   BUN mg/dL 32*   CREATININE mg/dL 3.82*   CALCIUM mg/dL 8.3*   BILIRUBIN mg/dL 4.3*   ALK PHOS U/L 64   ALT (SGPT) U/L 30   AST (SGOT) U/L 41*   GLUCOSE mg/dL 141*     Results from last 7 days   Lab Units 08/05/22  0815   PH, ARTERIAL pH units 7.345*   PO2 ART mm Hg 221.4*   PCO2, ARTERIAL mm Hg 28.3*   HCO3 ART mmol/L 15.5*     Results from last 7 days   Lab Units 08/05/22  0811   ALBUMIN g/dL 3.30*     Results from last 7 days   Lab Units 08/05/22  0811   CK TOTAL U/L 327*   TROPONIN T ng/mL <0.010             Results from last 7 days   Lab Units 08/05/22  0911 08/05/22  0811   INR  2.47* 2.16*   APTT seconds 48.8* 43.1*               Meds:   SCHEDULE  insulin lispro, 0-7 Units, Subcutaneous, Q6H  Norepinephrine-Sodium Chloride, , ,   sodium chloride, 10 mL, Intravenous, Q12H  sodium chloride 0.9% - IBW for BMI > 30, 30 mL/kg (Ideal), Intravenous,  Once      Infusions  norepinephrine, 0.02-0.3 mcg/kg/min  sodium bicarbonate drip (greater than 75 mEq/bag), 150 mEq  vasopressin, 0.03 Units/min, Last Rate: 0.03 Units/min (08/05/22 0910)      PRNs  •  acetaminophen **OR** acetaminophen  •  aluminum-magnesium hydroxide-simethicone  •  dextrose  •  dextrose  •  glucagon (human recombinant)  •  nitroglycerin  •  ondansetron **OR** ondansetron  •  sodium chloride  •  sodium chloride      I discussed the patients findings and my recommendations with patient and nursing staff.     Shy Moreno MD  08/05/22  10:42 EDT    Time: Critical care 50 min      Electronically signed by Shy Moreno MD at 08/05/22 1903

## 2022-08-12 NOTE — PLAN OF CARE
Goal Outcome Evaluation:  Patient is a SIPS monitor overflow. Patient A/O x 4. VSS. UOP adequate.

## 2022-08-13 LAB
ALBUMIN SERPL-MCNC: 3.2 G/DL (ref 3.5–5.2)
ALBUMIN/GLOB SERPL: 1.2 G/DL
ALP SERPL-CCNC: 135 U/L (ref 39–117)
ALT SERPL W P-5'-P-CCNC: 52 U/L (ref 1–33)
ANION GAP SERPL CALCULATED.3IONS-SCNC: 9 MMOL/L (ref 5–15)
AST SERPL-CCNC: 27 U/L (ref 1–32)
BILIRUB SERPL-MCNC: 1.2 MG/DL (ref 0–1.2)
BUN SERPL-MCNC: 33 MG/DL (ref 6–20)
BUN/CREAT SERPL: 15.7 (ref 7–25)
CALCIUM SPEC-SCNC: 9 MG/DL (ref 8.6–10.5)
CHLORIDE SERPL-SCNC: 98 MMOL/L (ref 98–107)
CO2 SERPL-SCNC: 35 MMOL/L (ref 22–29)
CREAT SERPL-MCNC: 2.1 MG/DL (ref 0.57–1)
EGFRCR SERPLBLD CKD-EPI 2021: 29.1 ML/MIN/1.73
GLOBULIN UR ELPH-MCNC: 2.7 GM/DL
GLUCOSE SERPL-MCNC: 112 MG/DL (ref 65–99)
MAGNESIUM SERPL-MCNC: 1.8 MG/DL (ref 1.6–2.6)
PHOSPHATE SERPL-MCNC: 3.7 MG/DL (ref 2.5–4.5)
POTASSIUM SERPL-SCNC: 3.3 MMOL/L (ref 3.5–5.2)
PROT SERPL-MCNC: 5.9 G/DL (ref 6–8.5)
SODIUM SERPL-SCNC: 142 MMOL/L (ref 136–145)

## 2022-08-13 PROCEDURE — 25010000002 CEFTRIAXONE PER 250 MG: Performed by: INTERNAL MEDICINE

## 2022-08-13 PROCEDURE — 80053 COMPREHEN METABOLIC PANEL: CPT | Performed by: NURSE PRACTITIONER

## 2022-08-13 PROCEDURE — 97530 THERAPEUTIC ACTIVITIES: CPT

## 2022-08-13 PROCEDURE — 84100 ASSAY OF PHOSPHORUS: CPT | Performed by: NURSE PRACTITIONER

## 2022-08-13 PROCEDURE — 99233 SBSQ HOSP IP/OBS HIGH 50: CPT | Performed by: INTERNAL MEDICINE

## 2022-08-13 PROCEDURE — 97116 GAIT TRAINING THERAPY: CPT

## 2022-08-13 PROCEDURE — 83735 ASSAY OF MAGNESIUM: CPT | Performed by: NURSE PRACTITIONER

## 2022-08-13 RX ADMIN — Medication 10 ML: at 09:58

## 2022-08-13 RX ADMIN — METOPROLOL TARTRATE 100 MG: 50 TABLET, FILM COATED ORAL at 09:57

## 2022-08-13 RX ADMIN — NYSTATIN 500000 UNITS: 100000 SUSPENSION ORAL at 13:42

## 2022-08-13 RX ADMIN — METOPROLOL TARTRATE 100 MG: 50 TABLET, FILM COATED ORAL at 20:48

## 2022-08-13 RX ADMIN — ESCITALOPRAM OXALATE 10 MG: 10 TABLET ORAL at 09:57

## 2022-08-13 RX ADMIN — PANTOPRAZOLE SODIUM 40 MG: 40 TABLET, DELAYED RELEASE ORAL at 06:08

## 2022-08-13 RX ADMIN — NYSTATIN 500000 UNITS: 100000 SUSPENSION ORAL at 20:48

## 2022-08-13 RX ADMIN — NYSTATIN 500000 UNITS: 100000 SUSPENSION ORAL at 09:57

## 2022-08-13 RX ADMIN — CEFTRIAXONE 2 G: 2 INJECTION, POWDER, FOR SOLUTION INTRAMUSCULAR; INTRAVENOUS at 13:42

## 2022-08-13 RX ADMIN — AMLODIPINE BESYLATE 10 MG: 5 TABLET ORAL at 09:57

## 2022-08-13 RX ADMIN — Medication 10 ML: at 20:48

## 2022-08-13 RX ADMIN — OXYCODONE 10 MG: 5 TABLET ORAL at 19:43

## 2022-08-13 NOTE — CONSULTS
"Nutrition Services    Patient Name: Raul Gardner  YOB: 1975  MRN: [id number]  Admission date: 8/5/2022    PPE Documentation        PPE Worn By Provider mask, gloves and eye protection   PPE Worn By Patient  None     NUTRITION SCREENING      Encounter Information: Received consult for assessment due to concern for poor PO intake. Since consult received, intake has actually been improving and pt reporting much better appetite now. Intake averaging 64% across the last four meals. Will continue to monitor closely, but so far, current diet is meeting needs.       PO Diet: Diet Regular   PO Supplements: None ordered   PO Intake:  Averaging 64% at meals       Labs (reviewed below): Reviewed; management per attending        GI Function:  Stool Output  Stool Unmeasured Occurrence: 1 (08/12/22 1909)  Bowel Incontinence: No (08/12/22 1909)  Stool Amount: moderate (08/12/22 1909)          Skin: Incision       Weight Hx Review: Estimated body mass index is 54.03 kg/m² as calculated from the following:    Height as of this encounter: 157.5 cm (62\").    Weight as of this encounter: 134 kg (295 lb 6.4 oz).  Weight trending up; will continue to monitor.        Nutrition Intervention: Continue Regular diet as tolerated.       Results from last 7 days   Lab Units 08/13/22  0609 08/12/22  0400 08/11/22  0515   SODIUM mmol/L 142 139 142   POTASSIUM mmol/L 3.3* 3.2* 3.6   CHLORIDE mmol/L 98 97* 101   CO2 mmol/L 35.0* 33.0* 32.0*   BUN mg/dL 33* 36* 36*   CREATININE mg/dL 2.10* 2.45* 2.69*   CALCIUM mg/dL 9.0 8.5* 8.4*   BILIRUBIN mg/dL 1.2 1.0 0.9   ALK PHOS U/L 135* 146* 171*   ALT (SGPT) U/L 52* 73* 108*   AST (SGOT) U/L 27 28 38*   GLUCOSE mg/dL 112* 112* 106*     Results from last 7 days   Lab Units 08/13/22  0609 08/12/22  0400 08/11/22  0515   MAGNESIUM mg/dL 1.8 1.9 2.1   PHOSPHORUS mg/dL 3.7 4.0 4.3   HEMOGLOBIN g/dL  --  8.0* 8.2*   HEMATOCRIT %  --  24.0* 25.5*     COVID19   Date Value Ref Range Status   08/05/2022 " Not Detected Not Detected - Ref. Range Final     No results found for: HGBA1C    RD to follow up per protocol.    Electronically signed by:  Laly Brown RD  08/13/22 19:40 EDT

## 2022-08-13 NOTE — PROGRESS NOTES
Infectious Diseases Progress Note      LOS: 8 days   Patient Care Team:  Maribel Graf MD as PCP - General (Family Medicine)  AVA Cavanaugh MD as Consulting Physician (Cardiology)    Chief Complaint: Weakness    Subjective     The patient had no fever during the last 24 hours.  She remained off vasopressors.  The patient is feeling better today.  Denied having diarrhea      Sandoval catheter  Review of Systems:   Review of Systems   Constitutional: Positive for fatigue.   HENT: Negative.    Eyes: Negative.    Respiratory: Negative.    Cardiovascular: Negative.    Endocrine: Negative.    Genitourinary: Negative.    Musculoskeletal: Negative.    Skin: Negative.    Neurological: Negative.    Psychiatric/Behavioral: Negative.    All other systems reviewed and are negative.       Objective     Vital Signs  Temp:  [97.7 °F (36.5 °C)-98.4 °F (36.9 °C)] 98 °F (36.7 °C)  Heart Rate:  [] 85  Resp:  [12-18] 14  BP: (138-168)/(75-92) 140/87    Physical Exam:  Physical Exam  Vitals and nursing note reviewed.   Constitutional:       Appearance: She is well-developed.   HENT:      Head: Normocephalic and atraumatic.   Eyes:      Pupils: Pupils are equal, round, and reactive to light.   Cardiovascular:      Rate and Rhythm: Normal rate and regular rhythm.      Heart sounds: Normal heart sounds.   Pulmonary:      Effort: Pulmonary effort is normal. No respiratory distress.      Breath sounds: Normal breath sounds. No wheezing or rales.   Abdominal:      General: Bowel sounds are normal. There is no distension.      Palpations: Abdomen is soft. There is no mass.      Tenderness: There is no abdominal tenderness. There is no guarding or rebound.   Musculoskeletal:         General: No deformity. Normal range of motion.      Cervical back: Normal range of motion and neck supple.   Skin:     General: Skin is warm.      Findings: No erythema or rash.   Neurological:      Mental Status: She is alert and oriented to person,  place, and time.      Cranial Nerves: No cranial nerve deficit.          Results Review:    I have reviewed all clinical data, test, lab, and imaging results.     Radiology  XR Chest 1 View    Result Date: 8/12/2022  DATE OF EXAM: 8/12/2022 4:34 PM  PROCEDURE: XR CHEST 1 VW-  INDICATIONS: Chest pain; N17.9-Acute kidney failure, unspecified; A41.9-Sepsis, unspecified organism; R65.21-Severe sepsis with septic shock  COMPARISON: CT chest 08/09/2022, AP chest x-ray 08/08/2022.  TECHNIQUE: Single radiographic AP view of the chest was obtained.  FINDINGS: Tip of the right internal jugular central venous catheter terminates near the cavoatrial junction. There are increased bibasilar airspace opacities with obscuration of each hemidiaphragm. Cardiac silhouette remains mildly enlarged. No pneumothorax is seen.      Increased bibasilar airspace opacities, likely due to bilateral pleural fluid with underlying atelectasis and/or pneumonia.  Electronically Signed By-Hui Raman MD On:8/12/2022 4:52 PM This report was finalized on [id number] by  Hui Raman MD.      Cardiology    Laboratory    Results from last 7 days   Lab Units 08/12/22  0400 08/11/22  0515 08/10/22  0431 08/09/22  0339 08/08/22  1322 08/08/22  0812 08/08/22  0359   WBC 10*3/mm3 15.80* 16.20* 16.20* 16.20* 14.10* 14.10* 14.30*   HEMOGLOBIN g/dL 8.0* 8.2* 8.0* 7.4* 7.7* 7.6* 7.2*   HEMATOCRIT % 24.0* 25.5* 24.6* 22.5* 22.8* 22.6* 21.8*   PLATELETS 10*3/mm3 184 155 89* 57* 50* 47* 50*     Results from last 7 days   Lab Units 08/13/22  0609 08/12/22  0400 08/11/22  0515 08/10/22  0431 08/09/22  0339 08/08/22  0812 08/08/22  0359 08/07/22  1615 08/07/22  0600   SODIUM mmol/L 142 139 142 141 139 138 140   < > 138   POTASSIUM mmol/L 3.3* 3.2* 3.6 3.4* 4.0 4.1 4.1   < > 3.7   CHLORIDE mmol/L 98 97* 101 103 104 104 105   < > 99   CO2 mmol/L 35.0* 33.0* 32.0* 31.0* 27.0 28.0 28.0   < > 30.0*   BUN mg/dL 33* 36* 36* 34* 29* 16 19   < > 37*   CREATININE mg/dL  2.10* 2.45* 2.69* 2.88* 2.33* 1.35* 1.53*   < > 2.92*   GLUCOSE mg/dL 112* 112* 106* 110* 123* 87 98   < > 124*   ALBUMIN g/dL 3.20* 3.10* 3.40* 3.20* 3.20* 3.20* 3.00*   < > 2.90*   BILIRUBIN mg/dL 1.2 1.0 0.9 0.9 1.3*  --  3.8*  --  4.7*   ALK PHOS U/L 135* 146* 171* 188* 217*  --  133*  --  112   AST (SGOT) U/L 27 28 38* 74* 141*  --  233*  --  522*   ALT (SGPT) U/L 52* 73* 108* 153* 208*  --  259*  --  352*   CALCIUM mg/dL 9.0 8.5* 8.4* 8.2* 8.0* 7.6* 7.6*   < > 7.2*    < > = values in this interval not displayed.                 Microbiology   Microbiology Results (last 10 days)     Procedure Component Value - Date/Time    Clostridioides difficile Toxin - Stool, Per Rectum [[id number]]  (Normal) Collected: 08/06/22 0703    Lab Status: Final result Specimen: Stool from Per Rectum Updated: 08/06/22 0758    Narrative:      The following orders were created for panel order Clostridioides difficile Toxin - Stool, Per Rectum.  Procedure                               Abnormality         Status                     ---------                               -----------         ------                     Clostridioides difficile...[[id number]]  Normal              Final result                 Please view results for these tests on the individual orders.    Clostridioides difficile EIA - Stool, Per Rectum [[id number]]  (Normal) Collected: 08/06/22 0703    Lab Status: Final result Specimen: Stool from Per Rectum Updated: 08/06/22 0758     C Diff GDH / Toxin Negative    Eosinophil Smear - Urine, Urine, Clean Catch [[id number]]  (Normal) Collected: 08/05/22 1917    Lab Status: Final result Specimen: Urine, Clean Catch Updated: 08/05/22 2047     Eosinophil Smear 0 % EOS/100 Cells     Body Fluid Culture - Body Fluid, Liver [[id number]] Collected: 08/05/22 9154    Lab Status: Final result Specimen: Body Fluid from Liver Updated: 08/08/22 0800     Body Fluid Culture No growth at 3 days     Gram Stain Few (2+) WBCs per low power field       No organisms seen    Anaerobic Culture - Swab, Liver [[id number]] Collected: 08/05/22 1755    Lab Status: Final result Specimen: Swab from Liver Updated: 08/10/22 0629     Anaerobic Culture No anaerobes isolated at 5 days    S. Pneumo Ag Urine or CSF - Urine, Urine, Clean Catch [[id number]]  (Normal) Collected: 08/05/22 1154    Lab Status: Final result Specimen: Urine, Clean Catch Updated: 08/05/22 1234     Strep Pneumo Ag Negative    Legionella Antigen, Urine - Urine, Urine, Clean Catch [[id number]]  (Normal) Collected: 08/05/22 1154    Lab Status: Final result Specimen: Urine, Clean Catch Updated: 08/05/22 1234     LEGIONELLA ANTIGEN, URINE Negative    MRSA Screen, PCR (Inpatient) - Swab, Nares [[id number]]  (Normal) Collected: 08/05/22 1057    Lab Status: Final result Specimen: Swab from Nares Updated: 08/05/22 1222     MRSA PCR No MRSA Detected    Narrative:      The negative predictive value of this diagnostic test is high and should only be used to consider de-escalating anti-MRSA therapy. A positive result may indicate colonization with MRSA and must be correlated clinically.    Gastrointestinal Panel, PCR - Stool, Per Rectum [[id number]]  (Abnormal) Collected: 08/05/22 1057    Lab Status: Final result Specimen: Stool from Per Rectum Updated: 08/05/22 1238     Campylobacter Not Detected     Plesiomonas shigelloides Not Detected     Salmonella Not Detected     Vibrio Not Detected     Vibrio cholerae Not Detected     Yersinia enterocolitica Not Detected     Enteroaggregative E. coli (EAEC) Not Detected     Enteropathogenic E. coli (EPEC) Detected     Enterotoxigenic E. coli (ETEC) lt/st Not Detected     Shiga-like toxin-producing E. coli (STEC) stx1/stx2 Not Detected     Shigella/Enteroinvasive E. coli (EIEC) Not Detected     Cryptosporidium Not Detected     Cyclospora cayetanensis Not Detected     Entamoeba histolytica Not Detected     Giardia lamblia Not Detected     Adenovirus F40/41 Not Detected     Astrovirus  Not Detected     Norovirus GI/GII Not Detected     Rotavirus A Not Detected     Sapovirus (I, II, IV or V) Not Detected    Urine Culture - Urine, Urine, Catheter [[id number]]  (Normal) Collected: 08/05/22 1012    Lab Status: Final result Specimen: Urine, Catheter Updated: 08/06/22 1409     Urine Culture No growth    Blood Culture - Blood, Blood, Central Line [[id number]]  (Normal) Collected: 08/05/22 0902    Lab Status: Final result Specimen: Blood, Central Line Updated: 08/10/22 0917     Blood Culture No growth at 5 days    Blood Culture - Blood, Blood, Central Line [[id number]]  (Normal) Collected: 08/05/22 0902    Lab Status: Final result Specimen: Blood, Central Line Updated: 08/10/22 0917     Blood Culture No growth at 5 days    Respiratory Panel PCR w/COVID-19(SARS-CoV-2) CELSA/JONATHAN/DOMINIQUE/PAD/COR/MAD/LISA In-House, NP Swab in UTM/VTM, 3-4 HR TAT - Swab, Nasopharynx [[id number]]  (Normal) Collected: 08/05/22 0815    Lab Status: Final result Specimen: Swab from Nasopharynx Updated: 08/05/22 0909     ADENOVIRUS, PCR Not Detected     Coronavirus 229E Not Detected     Coronavirus HKU1 Not Detected     Coronavirus NL63 Not Detected     Coronavirus OC43 Not Detected     COVID19 Not Detected     Human Metapneumovirus Not Detected     Human Rhinovirus/Enterovirus Not Detected     Influenza A PCR Not Detected     Influenza B PCR Not Detected     Parainfluenza Virus 1 Not Detected     Parainfluenza Virus 2 Not Detected     Parainfluenza Virus 3 Not Detected     Parainfluenza Virus 4 Not Detected     RSV, PCR Not Detected     Bordetella pertussis pcr Not Detected     Bordetella parapertussis PCR Not Detected     Chlamydophila pneumoniae PCR Not Detected     Mycoplasma pneumo by PCR Not Detected    Narrative:      In the setting of a positive respiratory panel with a viral infection PLUS a negative procalcitonin without other underlying concern for bacterial infection, consider observing off antibiotics or discontinuation of  antibiotics and continue supportive care. If the respiratory panel is positive for atypical bacterial infection (Bordetella pertussis, Chlamydophila pneumoniae, or Mycoplasma pneumoniae), consider antibiotic de-escalation to target atypical bacterial infection.          Medication Review:       Schedule Meds  amLODIPine, 10 mg, Oral, Q24H  cefTRIAXone, 2 g, Intravenous, Q24H  escitalopram, 10 mg, Oral, Daily  metoprolol tartrate, 100 mg, Oral, Q12H  nystatin, 5 mL, Swish & Swallow, 4x Daily  pantoprazole, 40 mg, Oral, Q AM  sodium chloride, 10 mL, Intravenous, Q12H  sodium chloride, 10 mL, Intravenous, Q12H        Infusion Meds       PRN Meds  •  acetaminophen **OR** acetaminophen  •  dextrose  •  dextrose  •  glucagon (human recombinant)  •  hydrALAZINE  •  nitroglycerin  •  ondansetron **OR** ondansetron  •  ondansetron ODT  •  oxyCODONE  •  simethicone  •  sodium chloride  •  sodium chloride  •  sodium chloride  •  sodium chloride        Assessment & Plan       Antimicrobial Therapy   1.  IV ceftriaxone       Assessment     Severe hypovolemic/septic shock present on admission and was initially on 2 pressors but condition worsened after liver biopsy and was concerning secondary to hemorrhagic shock.  Patient was on 4 vasopressors at one point.  Improved significantly and currently off vasopressors  Repeat CT scan of abdomen pelvis on August 9, 2022 showed stable intra abdomen hemorrhage    The patient was admitted for gastroenteritis caused by enteropathogenic E. coli however the patient was very systemically ill and was febrile for a few days.  Work-up was negative for infection except for E. coli and stool    Abnormal liver lesion noted on imaging studies.  It might be incidental findings.  S/p biopsy and there was no purulence to suspect infection.  Pathology report is still pending    Acute kidney injury.  Suspected to be prerenal.  Improving.  Currently off CRRT and making urine    Thrombocytopenia.  Probably  secondary to sepsis.  Resolved    Reactive leukocytosis.  Mildly elevated may be now secondary to steroids        Plan    Continue ceftriaxone 2 g IV daily for a total of 10  days of antimicrobial therapy, will discontinue after tomorrow's dose  continue supportive care  A.m. labs  Prognosis continue to improve        Sagar Orozco MD  08/13/22  14:50 EDT    Note is dictated utilizing voice recognition software/Dragon

## 2022-08-13 NOTE — PROGRESS NOTES
PROGRESS NOTE      Patient Name: Raul Gardner  : 1975  MRN: [id number]  Primary Care Physician: Maribel Graf MD  Date of admission: 2022    Patient Care Team:  Maribel Graf MD as PCP - General (Family Medicine)  VAA Cavanaugh MD as Consulting Physician (Cardiology)        Subjective   Subjective:     Patient is slightly better than yesterday but still lethargic sick looking  Review of systems:  Middle-age female complaining of body aches abdominal pain abdominal distention      Allergies:    Allergies   Allergen Reactions   • Cephalexin Rash   • Hydrocodone-Acetaminophen Rash       Objective   Exam:     Vital Signs  Temp:  [97.7 °F (36.5 °C)-98.7 °F (37.1 °C)] 98.7 °F (37.1 °C)  Heart Rate:  [] 93  Resp:  [12-18] 14  BP: (138-168)/(75-92) 148/85  SpO2:  [96 %-99 %] 96 %  on  Flow (L/min):  [1-1.5] 1.5;   Device (Oxygen Therapy): nasal cannula  Body mass index is 54.03 kg/m².    General: Middle-age  female i very lethargic  Head:      Normocephalic and atraumatic.    Eyes:      PERRL/EOM intact, conjunctiva and sclera clear with out nystagmus.    Neck:      No masses, thyromegaly,  trachea central with normal respiratory effort   Lungs:    Clear bilaterally to auscultation.    Heart:      Regular rate and rhythm, no murmur no gallop  Abd:        Diffusely tender with decreased bowel sounds  Pulses:   Pulses palpable  Extr:        No cyanosis or clubbing--no edema.    Neuro:    No focal deficits.   alert oriented x3  Skin:       Intact without lesions or rashes.    Psych:    Alert and cooperative; normal mood and affect; .      Results Review:  I have personally reviewed most recent Data :  CBC    Results from last 7 days   Lab Units 22  0400 22  0515 08/10/22  0431 22  0339 22  1322 22  0812 22  0359   WBC 10*3/mm3 15.80* 16.20* 16.20* 16.20* 14.10* 14.10* 14.30*   HEMOGLOBIN g/dL 8.0* 8.2* 8.0* 7.4* 7.7* 7.6* 7.2*   PLATELETS 10*3/mm3 184 155 89*  57* 50* 47* 50*     CMP   Results from last 7 days   Lab Units 08/13/22  0609 08/12/22  0400 08/11/22  0515 08/10/22  0431 08/09/22  0339 08/08/22  0812 08/08/22  0359 08/07/22  1615 08/07/22  0600   SODIUM mmol/L 142 139 142 141 139 138 140   < > 138   POTASSIUM mmol/L 3.3* 3.2* 3.6 3.4* 4.0 4.1 4.1   < > 3.7   CHLORIDE mmol/L 98 97* 101 103 104 104 105   < > 99   CO2 mmol/L 35.0* 33.0* 32.0* 31.0* 27.0 28.0 28.0   < > 30.0*   BUN mg/dL 33* 36* 36* 34* 29* 16 19   < > 37*   CREATININE mg/dL 2.10* 2.45* 2.69* 2.88* 2.33* 1.35* 1.53*   < > 2.92*   GLUCOSE mg/dL 112* 112* 106* 110* 123* 87 98   < > 124*   ALBUMIN g/dL 3.20* 3.10* 3.40* 3.20* 3.20* 3.20* 3.00*   < > 2.90*   BILIRUBIN mg/dL 1.2 1.0 0.9 0.9 1.3*  --  3.8*  --  4.7*   ALK PHOS U/L 135* 146* 171* 188* 217*  --  133*  --  112   AST (SGOT) U/L 27 28 38* 74* 141*  --  233*  --  522*   ALT (SGPT) U/L 52* 73* 108* 153* 208*  --  259*  --  352*    < > = values in this interval not displayed.     ABG        XR Chest 1 View    Result Date: 8/12/2022  Increased bibasilar airspace opacities, likely due to bilateral pleural fluid with underlying atelectasis and/or pneumonia.  Electronically Signed By-Hui Raman MD On:8/12/2022 4:52 PM This report was finalized on [id number] by  Hui Raman MD.      Results for orders placed during the hospital encounter of 08/05/22    Adult Transthoracic Echo Complete w/ Color, Spectral and Contrast if Necessary Per Protocol    Interpretation Summary  · Left ventricular wall thickness is consistent with mild concentric hypertrophy.  · Estimated left ventricular EF = 75% Left ventricular systolic function is hyperdynamic (EF > 70%).  · Left ventricular diastolic function is consistent with (grade I) impaired relaxation.    Scheduled Meds:amLODIPine, 10 mg, Oral, Q24H  cefTRIAXone, 2 g, Intravenous, Q24H  escitalopram, 10 mg, Oral, Daily  metoprolol tartrate, 100 mg, Oral, Q12H  nystatin, 5 mL, Swish & Swallow, 4x  Daily  pantoprazole, 40 mg, Oral, Q AM  sodium chloride, 10 mL, Intravenous, Q12H  sodium chloride, 10 mL, Intravenous, Q12H      Continuous Infusions:   PRN Meds:•  acetaminophen **OR** acetaminophen  •  dextrose  •  dextrose  •  glucagon (human recombinant)  •  hydrALAZINE  •  nitroglycerin  •  ondansetron **OR** ondansetron  •  ondansetron ODT  •  oxyCODONE  •  simethicone  •  sodium chloride  •  sodium chloride  •  sodium chloride  •  sodium chloride    Assessment & Plan   Assessment and Plan:         Septic shock (HCC)    Pulmonary hypertension, unspecified (HCC)    Anxiety    Vitamin D deficiency    Essential hypertension    Suspected liver abscess    Diarrhea    Acute kidney injury (HCC)    Metabolic acidosis    E coli enteritis    Abdominal hematoma, secondary to liver biopsyh    ASSESSMENT:  · Acute kidney injury likely ATN secondary to sepsis and hemodynamic instability on pressors at this time  · Multiple liver lesion unlikely to be abscess  · Chronic kidney disease as per renal ultrasound with some increased echogenicity   · Significant lactic acidosis with increased anion gap   · Some evidence of volume overload   · History of hypertension   ·             Plan:      · Patient was in septic shock with MARIANNE continue ABX  and now start getting better  · Patient iwas on CRRT started 9/6/2022.  9/8/2022 no hemodynamically better blood pressure is on the high side  · Shiley catheter will be removed today  · Hemoglobin is stable in last couple of days   · Electrolytes are acceptable  · Continue medications  · Will consider stopping diuretics tomorrow as patient is getting more alkalotic  · Increase beta-blocker today continue calcium channel blocker  · Sodium better, potassium is on the low side, acidosis completely resolved at this time creatinine improved from yesterday  · Follow-up with volume status electrolytes later today and tomorrow morning  · So far all the serologies are unremarkable including KAREN,  ANCA, complements    •           Electronically signed by Vikram Acosta MD,   [facility] kidney consultant  [phone]

## 2022-08-13 NOTE — THERAPY TREATMENT NOTE
"Subjective: Pt agreeable to therapeutic plan of care. Pt stated she felt comfortable on stairs after training today. [facility]'t think [facility] would [facility].    Objective:     Bed mobility - Modified-Independent  Transfers - CGA and with rolling walker  Ambulation - 80 feet CGA and with rolling walker    Vitals: pt was on 2L O2 upon arrival but amb without it and sats only droppd from 97>93%. HR 93. RN oked to leave off and they would monitor  Pain: 7 VAS without meds R side  Education: Provided education on importance of mobility and skilled verbal / tactile cueing throughout intervention.     Assessment: Raul Gardner presents with functional mobility impairments which indicate the need for skilled intervention. Tolerating session today without incident. Pt still limited by pain and guarding. No LOB noted and should do well a homew ith assist and HH. Will continue to follow and progress as tolerated.     Plan/Recommendations:   Low Intensity Therapy recommended post-acute care - This is recommended as therapy feels this patient would require 2-3 visits per week. OP or HH would be the best option depending on patient's home bound status. Consider, if the patient has other  \"skilled\" needs such as wounds, IV antibiotics, etc. Combined with \"low intensity\" could also equate to a SNF. If patient is medically complex, consider LTAC.. Pt requires no DME at discharge.     Pt desires Home with Home Health and Home with family assist at discharge. Pt cooperative; agreeable to therapeutic recommendations and plan of care.         Basic Mobility 6-click:  Rollin = Total, A lot = 2, A little = 3; 4 = None  Supine>Sit:   1 = Total, A lot = 2, A little = 3; 4 = None   Sit>Stand with arms:  1 = Total, A lot = 2, A little = 3; 4 = None  Bed>Chair:   1 = Total, A lot = 2, A little = 3; 4 = None  Ambulate in room:  1 = Total, A lot = 2, A little = 3; 4 = None  3-5 Steps with railin = Total, A lot = 2, A little = 3; 4 " = None  Score: 22      Post-Tx Position: Supine with HOB Elevated and Call light and personal items within reach  PPE: gloves, surgical mask, eyewear protection

## 2022-08-13 NOTE — PROGRESS NOTES
[facility] Medicine Services Daily Progress Note    Patient Name: Raul Gardner  : 1975  MRN: [id number]  Primary Care Physician:  Maribel Graf MD  Date of admission: 2022      Subjective      Chief Complaint: *Generalized weakness     Note taken from Intensivist with additional editing:     History of Present Illness: Raul Gardner is a 47 y.o. female  with past medical history of melanoma, pulmonary hypertension who presented to [facility] on 2022 after not feeling well. Patient reported this started two days prior with a fever and body aches and on day of admission she started to have vomiting and diarrhea.  In ED, she had a fever of 103.1 and heart rate 146 with blood pressure 36/23 however that improved after IV fluid resuscitation and vasopressors to 103/62.  Patient was admitted to the ICU for further care management.  Infectious disease was consulted due to possible liver abscess.  Nephrology was consulted due to MARIANNE.  Interventional radiology was consulted and patient underwent liver biopsy.     2022: Patient received blood transfusion.  Shiley was placed for possible CRRT.  General surgery was consulted.  Patient noted to be on vancomycin and meropenem per ID.  GI was consulted for acute liver injury.  Patient started on CRRT.     2022: Patient noted to be on Vanco, meropenem, and micafungin per ID.     2022: Patient noted to be on Rocephin per ID.     2022: Plan for hemodialysis tomorrow per nephrology.     8/10/2022: Patient is stable for downgrade.  Hospitalist were consulted for medical management.  Patient is currently on 3 L nasal cannula of oxygen and does not wear this at home.  She is having complaints of right-sided abdominal pain that she describes as sharp.  Her current pain is a 5 out of 10.  Movement hurts.  She denies any alleviating factors.  She reports she has been eating small amounts as she has had a decreased appetite.  She is now off  the Cardene drip.       Patient Reports     8/11  Labs and vitals reviewed  Blood pressure is quite elevated now  Blood pressure medications adjusted by nephrologist  Cp addressed     8/12  Seen and examined vital signs reviewed  Blood pressure is moderately elevated on 1 L of oxygen saturating 95%  Potassium little low at 3.2 creatinine improving 2.4 White count slightly better 15.8  Had good urine output  Still w rt sided sharp cp  Check x ray  ekg 8/11 foir ant cp. wnl  Sat well on 1-2 l O2    8/13  Vitals and labs reviewed  Chest x-ray shows increasing atelectasis and effusion  No pneumothorax  Renal function spontaneously slowly improving  LFTs also improving  No CBC today    Review of Systems   All other systems reviewed and are negative.           Objective      Vitals:   Temp:  [97.7 °F (36.5 °C)-98.4 °F (36.9 °C)] 98.1 °F (36.7 °C)  Heart Rate:  [] 92  Resp:  [12-18] 12  BP: (137-168)/(75-92) 148/85  Flow (L/min):  [1-1.5] 1.5    Physical Exam  Vitals and nursing note reviewed.   Constitutional:       General: She is not in acute distress.     Appearance: Normal appearance. She is well-developed. She is not ill-appearing, toxic-appearing or diaphoretic.   HENT:      Head: Normocephalic and atraumatic.      Right Ear: Ear canal and external ear normal.      Left Ear: Ear canal and external ear normal.      Nose: Nose normal. No congestion or rhinorrhea.      Mouth/Throat:      Mouth: Mucous membranes are moist.      Pharynx: No oropharyngeal exudate.   Eyes:      General: No scleral icterus.        Right eye: No discharge.         Left eye: No discharge.      Extraocular Movements: Extraocular movements intact.      Conjunctiva/sclera: Conjunctivae normal.      Pupils: Pupils are equal, round, and reactive to light.   Neck:      Thyroid: No thyromegaly.      Vascular: No carotid bruit or JVD.      Trachea: No tracheal deviation.   Cardiovascular:      Rate and Rhythm: Normal rate and regular rhythm.       Pulses: Normal pulses.      Heart sounds: Normal heart sounds. No murmur heard.    No friction rub. No gallop.   Pulmonary:      Effort: Pulmonary effort is normal. No respiratory distress.      Breath sounds: Normal breath sounds. No stridor. No wheezing, rhonchi or rales.   Chest:      Chest wall: No tenderness.   Abdominal:      General: Bowel sounds are normal. There is no distension.      Palpations: Abdomen is soft. There is no mass.      Tenderness: There is no abdominal tenderness. There is no guarding or rebound.      Hernia: No hernia is present.   Musculoskeletal:         General: No swelling, tenderness, deformity or signs of injury. Normal range of motion.      Cervical back: Normal range of motion and neck supple. No rigidity. No muscular tenderness.      Right lower leg: No edema.      Left lower leg: No edema.   Lymphadenopathy:      Cervical: No cervical adenopathy.   Skin:     General: Skin is warm and dry.      Coloration: Skin is not jaundiced or pale.      Findings: No bruising, erythema or rash.   Neurological:      General: No focal deficit present.      Mental Status: She is alert and oriented to person, place, and time. Mental status is at baseline.      Cranial Nerves: No cranial nerve deficit.      Sensory: No sensory deficit.      Motor: No weakness or abnormal muscle tone.      Coordination: Coordination normal.   Psychiatric:         Mood and Affect: Mood normal.         Behavior: Behavior normal.         Thought Content: Thought content normal.         Judgment: Judgment normal.               Result Review    Result Review:  I have personally reviewed the results from the time of this admission to 8/13/2022 11:14 EDT and agree with these findings:  [x]  Laboratory  [x]  Microbiology  [x]  Radiology  []  EKG/Telemetry   []  Cardiology/Vascular   []  Pathology  []  Old records  []  Other:  Most notable findings include: As above    Wounds (last 24 hours)     LDA Wound     Row Name  08/12/22 1630 08/12/22 1200          Wound 08/09/22 0700 Right posterior torso Incision    Wound - Properties Group Placement Date: 08/09/22  -SR Placement Time: 0700 -SR Side: Right  -SR Orientation: posterior  -SR Location: torso  -SR Primary Wound Type: Incision  -SR Additional Comments: liver biopsy site  -SR     Dressing Appearance open to air  -AA --     Closure Approximated  -AA Approximated  -AA     Base dry  -AA dry  -AA     Drainage Amount -- none  -AA     Retired Wound - Properties Group Placement Date: 08/09/22  -SR Placement Time: 0700  -SR Side: Right  -SR Orientation: posterior  -SR Location: torso  -SR Primary Wound Type: Incision  -SR Additional Comments: liver biopsy site  -SR     Retired Wound - Properties Group Date first assessed: 08/09/22 -SR Time first assessed: 0700  -SR Side: Right  -SR Location: torso  -SR Primary Wound Type: Incision  -SR Additional Comments: liver biopsy site  -SR           User Key  (r) = Recorded By, (t) = Taken By, (c) = Cosigned By    Initials Name Provider Type    Daljit Serrano, RN Registered Nurse    SR Shaye Ramirez RN Registered Nurse                  Assessment & Plan      Brief Patient Summary:  Raul Gardner is a 47 y.o. female who who presented with hemorrhagic/septic shock        amLODIPine, 10 mg, Oral, Q24H  cefTRIAXone, 2 g, Intravenous, Q24H  escitalopram, 10 mg, Oral, Daily  metoprolol tartrate, 100 mg, Oral, Q12H  nystatin, 5 mL, Swish & Swallow, 4x Daily  pantoprazole, 40 mg, Oral, Q AM  sodium chloride, 10 mL, Intravenous, Q12H  sodium chloride, 10 mL, Intravenous, Q12H             Active Hospital Problems:  Active Hospital Problems    Diagnosis    • **Septic shock (HCC)    • Suspected liver abscess    • Diarrhea    • Acute kidney injury (HCC)    • Metabolic acidosis    • E coli enteritis    • Abdominal hematoma, secondary to liver biopsyh    • Essential hypertension    • Vitamin D deficiency    • Pulmonary hypertension, unspecified (HCC)     • Anxiety      Plan:        Septic/hemorrhagic shock  Without evidence of liver abscess  Acute liver injury   E. Coli enteritis   - CT abd/pelvis reviewed  - Liver biopsy -report pending  - IR attempted drainage but there was no fluid collection 8/5/2022 so CT-guided liver abscess Chowhan cholangitis with malignancy.  -Negative HIV test,  - Venous duplex negative for DVT  - WBC 16.2, monitor   - Blood cultures X2- no growth thus far   - Monitor LFT's   - Continue Rocephin per ID recommendation for 10 days.  - ID, General surgery, and GI following      Acute kidney injury  Acute UTI  - CR 2.8, monitor   -Received CRRT, currently off   - Nephrology following  - Renal function improving  -Uncontrolled hypertension.  Started on Norvasc.  Also on metoprolol      CP  Right side  ? musculosk  ekg wnl  Troponin neg   x ray shows increasing atelectasis.  Oxygen saturation remainsAppropriate and improving  Do not think patient has PE.  D-dimer would not be helpful given her renal failure at this time.  She had negative venous Doppler as well  Her echo shows no problems with the right cardiac parameters  Cannot do CT angiogram due to renal failure  Will dw dr draw      Acute blood loss anemia with hemoperitoneum   - 2/2 liver biopsy  - Hbg 8.0, monitor  - General surgery following- no surgical intervention at this time      shock liver.  Liver enzymes improving  Autoimmune panel reviewed.  Negative C ANCA antichromatin double-stranded DNA mitochondrial P ANCA smooth muscle antibody liver fraction CMV DNA  -Liver biopsy-8/5/2022 pathology shows acute cholangitis but no evidence of malignancy.    History of mosquito bites exposure, awaiting West Nile virus antibodies  negative IgG and IgM West Nile virus antibodies       Essential HTN  - Controlled  - Monitor blood pressure  - Now off Cardene gtt   - Continue amlodipine and metoprolol      Anxiety   - Stable  - Continue lexapro      Morbid obesity  - BMI 51.1  - Encourage  lifestyle changes        DVT prophylaxis: Heparin subcutaneous if okay with Dr. Oconnor  Disposition likely home with home health  Seen by PT OT    Mechanical DVT prophylaxis orders are present.    CODE STATUS:    Code Status (Patient has no pulse and is not breathing): CPR (Attempt to Resuscitate)  Medical Interventions (Patient has pulse or is breathing): Full Support      Disposition:  I expect patient to be discharged Home with [facility] pending acceptance. Watch for Home O2 needs.    This patient has been examined wearing appropriate Personal Protective Equipment and discussed with hospital infection control department. 08/13/22      Electronically signed by Rios Borges MD, 08/13/22, 11:14 EDT.  [facility] Hospitalist Team

## 2022-08-13 NOTE — PROGRESS NOTES
Daily Progress Note        Septic shock (HCC)    Pulmonary hypertension, unspecified (HCC)    Anxiety    Vitamin D deficiency    Essential hypertension    Suspected liver abscess    Diarrhea    Acute kidney injury (HCC)    Metabolic acidosis    E coli enteritis    Abdominal hematoma, secondary to liver biopsyh      Assessment    Fever resolved  Sepsis with septic shock, resolved  UTI  MARIANNE  Coagulopathy probably from DIC  Leukocytosis  Acute anemia with hemoperitoneum after liver biopsy  Anion gap metabolic acidosis  Lactic acidosis  Stool culture positive for enteropathic E. coli however Shiga toxin E. coli was negative  Abnormal CT scan of the liver with possible hepatic abscess: IR attempted drainage but there was no fluid collection 8/5/2022 so CT-guided biopsy was obtained  Negative HIV test,  Esophagitis  Mild pancreatic changes on the CT scan but the lipase is not elevated  History of melanoma resected in 1999 without signs of spread or recurrence  Mild exercise-induced pulmonary hypertension for which she has been on metoprolol but she is out of it for 1 month  Hyperglycemia  Elevated liver enzymes probably from shock liver         Plan:     Oxygenating well on 1.5 L per nasal cannula     peripheral smear 8/8/2022   Leukocytosis with left shifted granulocytes and rare possible blasts  Occasional schistocytes, , anemia, Thrombocytopenia  ? hemolytic uremic syndrome vs MAHA  Lupus panel     Lower extremity Dopplers neg  History of mosquito bites exposure, negative IgG and IgM West Nile virus antibodies     Hemodynamic support    Blood transfusion: Platelets are dropping, will continue with RBC transfusion and platelet transfusion as needed    Followed by nephrology  -Plan to [facility] Mckee today    Antibiotics: Rocephin, ID following  DVT: SCD/GI prophylaxis  Check daily labs and correct electrolytes as needed             LOS: 8 days     Subjective         Objective     Vital signs for last 24 hours:  Vitals:     "08/12/22 1909 08/13/22 0044 08/13/22 0500 08/13/22 0823   BP:  139/75 168/92 148/85   BP Location:  Left arm Left arm Left leg   Patient Position:  Lying Lying Sitting   Pulse:  93 90 92   Resp:  18 13 12   Temp:  97.9 °F (36.6 °C) 97.7 °F (36.5 °C) 98.1 °F (36.7 °C)   TempSrc:  Oral Oral Oral   SpO2:  96% 97% 96%   Weight: 135 kg (297 lb 4.8 oz) 135 kg (297 lb 4.8 oz) 134 kg (295 lb 6.4 oz)    Height: 157.5 cm (62\")          Intake/Output last 3 shifts:  I/O last 3 completed shifts:  In: 960 [P.O.:960]  Out: 3900 [Urine:3900]  Intake/Output this shift:  I/O this shift:  In: -   Out: 500 [Urine:500]      Radiology  Imaging Results (Last 24 Hours)     Procedure Component Value Units Date/Time    XR Chest 1 View [[id number]] Collected: 08/12/22 1645     Updated: 08/12/22 1654    Narrative:      DATE OF EXAM:  8/12/2022 4:34 PM     PROCEDURE:  XR CHEST 1 VW-     INDICATIONS:  Chest pain; N17.9-Acute kidney failure, unspecified; A41.9-Sepsis,  unspecified organism; R65.21-Severe sepsis with septic shock     COMPARISON:  CT chest 08/09/2022, AP chest x-ray 08/08/2022.     TECHNIQUE:   Single radiographic AP view of the chest was obtained.     FINDINGS:  Tip of the right internal jugular central venous catheter terminates  near the cavoatrial junction. There are increased bibasilar airspace  opacities with obscuration of each hemidiaphragm. Cardiac silhouette  remains mildly enlarged. No pneumothorax is seen.        Impression:      Increased bibasilar airspace opacities, likely due to bilateral pleural  fluid with underlying atelectasis and/or pneumonia.     Electronically Signed By-Hui Raman MD On:8/12/2022 4:52 PM  This report was finalized on [id number] by  Hui Raman MD.          Labs:  Results from last 7 days   Lab Units 08/12/22  0400   WBC 10*3/mm3 15.80*   HEMOGLOBIN g/dL 8.0*   HEMATOCRIT % 24.0*   PLATELETS 10*3/mm3 184     Results from last 7 days   Lab Units 08/13/22  0609   SODIUM mmol/L 142 "   POTASSIUM mmol/L 3.3*   CHLORIDE mmol/L 98   CO2 mmol/L 35.0*   BUN mg/dL 33*   CREATININE mg/dL 2.10*   CALCIUM mg/dL 9.0   BILIRUBIN mg/dL 1.2   ALK PHOS U/L 135*   ALT (SGPT) U/L 52*   AST (SGOT) U/L 27   GLUCOSE mg/dL 112*         Results from last 7 days   Lab Units 08/13/22  0609 08/12/22  0400 08/11/22  0515   ALBUMIN g/dL 3.20* 3.10* 3.40*     Results from last 7 days   Lab Units 08/11/22  1420   TROPONIN T ng/mL <0.010     Results from last 7 days   Lab Units 08/06/22  1641   KAREN  Negative     Results from last 7 days   Lab Units 08/13/22  0609   MAGNESIUM mg/dL 1.8     Results from last 7 days   Lab Units 08/09/22  0339 08/07/22  1408 08/06/22  1110   INR  1.11* 1.09 2.07*   APTT seconds  --   --  51.9*               Meds:   SCHEDULE  amLODIPine, 10 mg, Oral, Q24H  cefTRIAXone, 2 g, Intravenous, Q24H  escitalopram, 10 mg, Oral, Daily  metoprolol tartrate, 100 mg, Oral, Q12H  nystatin, 5 mL, Swish & Swallow, 4x Daily  pantoprazole, 40 mg, Oral, Q AM  sodium chloride, 10 mL, Intravenous, Q12H  sodium chloride, 10 mL, Intravenous, Q12H      Infusions     PRNs  •  acetaminophen **OR** acetaminophen  •  dextrose  •  dextrose  •  glucagon (human recombinant)  •  hydrALAZINE  •  nitroglycerin  •  ondansetron **OR** ondansetron  •  ondansetron ODT  •  oxyCODONE  •  simethicone  •  sodium chloride  •  sodium chloride  •  sodium chloride  •  sodium chloride    Physical Exam:  Physical Exam  Vitals reviewed.   Pulmonary:      Effort: Pulmonary effort is normal.      Breath sounds: Decreased breath sounds present.   Skin:     General: Skin is warm and dry.   Neurological:      Mental Status: She is alert and oriented to person, place, and time.         ROS  Review of Systems   Respiratory: Positive for cough.    Gastrointestinal:        Lower left back discomfort S\P biopsy       I have reviewed the patient's new clinical results.    Electronically signed by GRACIELA Wilkerson.

## 2022-08-13 NOTE — PLAN OF CARE
Assessment: Raul Gardner presents with functional mobility impairments which indicate the need for skilled intervention. Tolerating session today without incident. Pt still limited by pain and guarding. No LOB noted and should do well a homew ith assist and HH. Will continue to follow and progress as tolerated.

## 2022-08-13 NOTE — PLAN OF CARE
Goal Outcome Evaluation:  Patient transferred from ICU to this unit this shift, BP elevated with prn and routine meds given, no complaints voiced, did have small nosebleed with staff able to control and stop, humidification added to oxygen due to dryness in nose

## 2022-08-13 NOTE — PLAN OF CARE
Goal Outcome Evaluation:    Patient is able to make needs known. She reports pain but does not want to take any pain medication.  at bedside assisting in ADLs. Encouraged IS and activity. She reported having a BM yesterday.

## 2022-08-14 ENCOUNTER — INPATIENT HOSPITAL (OUTPATIENT)
Dept: URBAN - METROPOLITAN AREA HOSPITAL 84 | Facility: HOSPITAL | Age: 47
End: 2022-08-14

## 2022-08-14 ENCOUNTER — APPOINTMENT (OUTPATIENT)
Dept: MRI IMAGING | Facility: HOSPITAL | Age: 47
End: 2022-08-14

## 2022-08-14 DIAGNOSIS — K83.09 OTHER CHOLANGITIS: ICD-10-CM

## 2022-08-14 DIAGNOSIS — R10.11 RIGHT UPPER QUADRANT PAIN: ICD-10-CM

## 2022-08-14 DIAGNOSIS — R74.8 ABNORMAL LEVELS OF OTHER SERUM ENZYMES: ICD-10-CM

## 2022-08-14 DIAGNOSIS — A04.0 ENTEROPATHOGENIC ESCHERICHIA COLI INFECTION: ICD-10-CM

## 2022-08-14 DIAGNOSIS — D50.0 IRON DEFICIENCY ANEMIA SECONDARY TO BLOOD LOSS (CHRONIC): ICD-10-CM

## 2022-08-14 LAB
ALBUMIN SERPL-MCNC: 3.2 G/DL (ref 3.5–5.2)
ALBUMIN/GLOB SERPL: 1.1 G/DL
ALP SERPL-CCNC: 133 U/L (ref 39–117)
ALT SERPL W P-5'-P-CCNC: 44 U/L (ref 1–33)
ANION GAP SERPL CALCULATED.3IONS-SCNC: 10 MMOL/L (ref 5–15)
AST SERPL-CCNC: 33 U/L (ref 1–32)
BASOPHILS # BLD MANUAL: 0.13 10*3/MM3 (ref 0–0.2)
BASOPHILS NFR BLD MANUAL: 1 % (ref 0–1.5)
BILIRUB SERPL-MCNC: 1.7 MG/DL (ref 0–1.2)
BLASTS NFR BLD MANUAL: 1 % (ref 0–0)
BUN SERPL-MCNC: 29 MG/DL (ref 6–20)
BUN/CREAT SERPL: 13.8 (ref 7–25)
CALCIUM SPEC-SCNC: 8.8 MG/DL (ref 8.6–10.5)
CHLORIDE SERPL-SCNC: 99 MMOL/L (ref 98–107)
CO2 SERPL-SCNC: 34 MMOL/L (ref 22–29)
CREAT SERPL-MCNC: 2.1 MG/DL (ref 0.57–1)
DEPRECATED RDW RBC AUTO: 46.8 FL (ref 37–54)
EGFRCR SERPLBLD CKD-EPI 2021: 29.1 ML/MIN/1.73
EOSINOPHIL # BLD MANUAL: 0.13 10*3/MM3 (ref 0–0.4)
EOSINOPHIL NFR BLD MANUAL: 1 % (ref 0.3–6.2)
ERYTHROCYTE [DISTWIDTH] IN BLOOD BY AUTOMATED COUNT: 15.7 % (ref 12.3–15.4)
GLOBULIN UR ELPH-MCNC: 2.8 GM/DL
GLUCOSE SERPL-MCNC: 114 MG/DL (ref 65–99)
HCT VFR BLD AUTO: 23.9 % (ref 34–46.6)
HGB BLD-MCNC: 7.8 G/DL (ref 12–15.9)
LARGE PLATELETS: ABNORMAL
LYMPHOCYTES # BLD MANUAL: 2.9 10*3/MM3 (ref 0.7–3.1)
LYMPHOCYTES NFR BLD MANUAL: 7 % (ref 5–12)
MAGNESIUM SERPL-MCNC: 1.7 MG/DL (ref 1.6–2.6)
MCH RBC QN AUTO: 27.4 PG (ref 26.6–33)
MCHC RBC AUTO-ENTMCNC: 32.5 G/DL (ref 31.5–35.7)
MCV RBC AUTO: 84.2 FL (ref 79–97)
METAMYELOCYTES NFR BLD MANUAL: 2 % (ref 0–0)
MONOCYTES # BLD: 0.88 10*3/MM3 (ref 0.1–0.9)
MYELOCYTES NFR BLD MANUAL: 1 % (ref 0–0)
NEUTROPHILS # BLD AUTO: 8.06 10*3/MM3 (ref 1.7–7)
NEUTROPHILS NFR BLD MANUAL: 58 % (ref 42.7–76)
NEUTS BAND NFR BLD MANUAL: 6 % (ref 0–5)
PHOSPHATE SERPL-MCNC: 4.1 MG/DL (ref 2.5–4.5)
PLATELET # BLD AUTO: 302 10*3/MM3 (ref 140–450)
PMV BLD AUTO: 8.2 FL (ref 6–12)
POTASSIUM SERPL-SCNC: 3.2 MMOL/L (ref 3.5–5.2)
PROT SERPL-MCNC: 6 G/DL (ref 6–8.5)
RBC # BLD AUTO: 2.84 10*6/MM3 (ref 3.77–5.28)
RBC MORPH BLD: NORMAL
SCAN SLIDE: NORMAL
SODIUM SERPL-SCNC: 143 MMOL/L (ref 136–145)
TOXIC GRANULATION: ABNORMAL
VARIANT LYMPHS NFR BLD MANUAL: 23 % (ref 19.6–45.3)
WBC NRBC COR # BLD: 12.6 10*3/MM3 (ref 3.4–10.8)

## 2022-08-14 PROCEDURE — 85025 COMPLETE CBC W/AUTO DIFF WBC: CPT | Performed by: INTERNAL MEDICINE

## 2022-08-14 PROCEDURE — 84100 ASSAY OF PHOSPHORUS: CPT | Performed by: NURSE PRACTITIONER

## 2022-08-14 PROCEDURE — 74181 MRI ABDOMEN W/O CONTRAST: CPT

## 2022-08-14 PROCEDURE — 80053 COMPREHEN METABOLIC PANEL: CPT | Performed by: NURSE PRACTITIONER

## 2022-08-14 PROCEDURE — 25010000002 HYDRALAZINE PER 20 MG: Performed by: NURSE PRACTITIONER

## 2022-08-14 PROCEDURE — 83735 ASSAY OF MAGNESIUM: CPT | Performed by: NURSE PRACTITIONER

## 2022-08-14 PROCEDURE — 25010000002 CEFTRIAXONE PER 250 MG: Performed by: INTERNAL MEDICINE

## 2022-08-14 PROCEDURE — 85007 BL SMEAR W/DIFF WBC COUNT: CPT | Performed by: INTERNAL MEDICINE

## 2022-08-14 PROCEDURE — 99233 SBSQ HOSP IP/OBS HIGH 50: CPT | Performed by: INTERNAL MEDICINE

## 2022-08-14 PROCEDURE — 25010000002 ONDANSETRON PER 1 MG: Performed by: NURSE PRACTITIONER

## 2022-08-14 PROCEDURE — 99233 SBSQ HOSP IP/OBS HIGH 50: CPT | Performed by: NURSE PRACTITIONER

## 2022-08-14 PROCEDURE — 36415 COLL VENOUS BLD VENIPUNCTURE: CPT | Performed by: NURSE PRACTITIONER

## 2022-08-14 RX ORDER — SIMETHICONE 80 MG
80 TABLET,CHEWABLE ORAL
Status: DISCONTINUED | OUTPATIENT
Start: 2022-08-14 | End: 2022-08-17 | Stop reason: HOSPADM

## 2022-08-14 RX ORDER — DIPHENHYDRAMINE HYDROCHLORIDE AND LIDOCAINE HYDROCHLORIDE AND ALUMINUM HYDROXIDE AND MAGNESIUM HYDRO
5 KIT EVERY 6 HOURS SCHEDULED
Status: DISCONTINUED | OUTPATIENT
Start: 2022-08-14 | End: 2022-08-17 | Stop reason: HOSPADM

## 2022-08-14 RX ORDER — HYDROCHLOROTHIAZIDE 25 MG/1
25 TABLET ORAL DAILY
Status: DISCONTINUED | OUTPATIENT
Start: 2022-08-14 | End: 2022-08-17 | Stop reason: HOSPADM

## 2022-08-14 RX ORDER — POTASSIUM CHLORIDE 20 MEQ/1
20 TABLET, EXTENDED RELEASE ORAL ONCE
Status: COMPLETED | OUTPATIENT
Start: 2022-08-14 | End: 2022-08-14

## 2022-08-14 RX ORDER — OXYCODONE HYDROCHLORIDE 5 MG/1
10 TABLET ORAL EVERY 4 HOURS PRN
Status: DISCONTINUED | OUTPATIENT
Start: 2022-08-14 | End: 2022-08-17 | Stop reason: HOSPADM

## 2022-08-14 RX ADMIN — Medication 10 ML: at 14:57

## 2022-08-14 RX ADMIN — ONDANSETRON 4 MG: 2 INJECTION INTRAMUSCULAR; INTRAVENOUS at 07:19

## 2022-08-14 RX ADMIN — OXYCODONE 10 MG: 5 TABLET ORAL at 15:05

## 2022-08-14 RX ADMIN — HYDRALAZINE HYDROCHLORIDE 10 MG: 20 INJECTION INTRAMUSCULAR; INTRAVENOUS at 12:00

## 2022-08-14 RX ADMIN — METOPROLOL TARTRATE 100 MG: 50 TABLET, FILM COATED ORAL at 21:40

## 2022-08-14 RX ADMIN — SIMETHICONE 80 MG: 80 TABLET, CHEWABLE ORAL at 21:40

## 2022-08-14 RX ADMIN — POTASSIUM CHLORIDE 20 MEQ: 20 TABLET, EXTENDED RELEASE ORAL at 14:56

## 2022-08-14 RX ADMIN — OXYCODONE 10 MG: 5 TABLET ORAL at 21:40

## 2022-08-14 RX ADMIN — DIPHENHYDRAMINE HYDROCHLORIDE AND LIDOCAINE HYDROCHLORIDE AND ALUMINUM HYDROXIDE AND MAGNESIUM HYDRO 5 ML: KIT at 14:56

## 2022-08-14 RX ADMIN — METOPROLOL TARTRATE 100 MG: 50 TABLET, FILM COATED ORAL at 14:56

## 2022-08-14 RX ADMIN — ESCITALOPRAM OXALATE 10 MG: 10 TABLET ORAL at 14:56

## 2022-08-14 RX ADMIN — AMLODIPINE BESYLATE 10 MG: 5 TABLET ORAL at 14:56

## 2022-08-14 RX ADMIN — SIMETHICONE 80 MG: 80 TABLET, CHEWABLE ORAL at 15:29

## 2022-08-14 RX ADMIN — HYDROCHLOROTHIAZIDE 25 MG: 25 TABLET ORAL at 14:56

## 2022-08-14 RX ADMIN — PANTOPRAZOLE SODIUM 40 MG: 40 TABLET, DELAYED RELEASE ORAL at 06:03

## 2022-08-14 RX ADMIN — CEFTRIAXONE 2 G: 2 INJECTION, POWDER, FOR SOLUTION INTRAMUSCULAR; INTRAVENOUS at 14:56

## 2022-08-14 NOTE — PROGRESS NOTES
[facility] Medicine Services Daily Progress Note    Patient Name: Raul Gardner  : 1975  MRN: [id number]  Primary Care Physician:  Maribel Graf MD  Date of admission: 2022      Subjective      Chief Complaint: *Generalized weakness     Note taken from Intensivist with additional editing:     History of Present Illness: Raul Gardner is a 47 y.o. female  with past medical history of melanoma, pulmonary hypertension who presented to [facility] on 2022 after not feeling well. Patient reported this started two days prior with a fever and body aches and on day of admission she started to have vomiting and diarrhea.  In ED, she had a fever of 103.1 and heart rate 146 with blood pressure 36/23 however that improved after IV fluid resuscitation and vasopressors to 103/62.  Patient was admitted to the ICU for further care management.  Infectious disease was consulted due to possible liver abscess.  Nephrology was consulted due to MARIANNE.  Interventional radiology was consulted and patient underwent liver biopsy.     2022: Patient received blood transfusion.  Shiley was placed for possible CRRT.  General surgery was consulted.  Patient noted to be on vancomycin and meropenem per ID.  GI was consulted for acute liver injury.  Patient started on CRRT.     2022: Patient noted to be on Vanco, meropenem, and micafungin per ID.     2022: Patient noted to be on Rocephin per ID.     2022: Plan for hemodialysis tomorrow per nephrology.     8/10/2022: Patient is stable for downgrade.  Hospitalist were consulted for medical management.  Patient is currently on 3 L nasal cannula of oxygen and does not wear this at home.  She is having complaints of right-sided abdominal pain that she describes as sharp.  Her current pain is a 5 out of 10.  Movement hurts.  She denies any alleviating factors.  She reports she has been eating small amounts as she has had a decreased appetite.  She is now off  the Cardene drip.       Patient Reports     8/11  Labs and vitals reviewed  Blood pressure is quite elevated now  Blood pressure medications adjusted by nephrologist  Cp addressed     8/12  Seen and examined vital signs reviewed  Blood pressure is moderately elevated on 1 L of oxygen saturating 95%  Potassium little low at 3.2 creatinine improving 2.4 White count slightly better 15.8  Had good urine output  Still w rt sided sharp cp  Check x ray  ekg 8/11 foir ant cp. wnl  Sat well on 1-2 l O2    8/13  Vitals and labs reviewed  Chest x-ray shows increasing atelectasis and effusion  No pneumothorax  Renal function spontaneously slowly improving  LFTs also improving  No CBC today  8/14  Vitals and labs and notes reviewed  C/o mouth sores  Slightly low potassium noted will replace  Slight drop in hemoglobin to 7.8  White count better 12.6  Seen by GI-MRCP planned      Review of Systems   All other systems reviewed and are negative.           Objective      Vitals:   Temp:  [97.9 °F (36.6 °C)-98.7 °F (37.1 °C)] 98.4 °F (36.9 °C)  Heart Rate:  [79-95] 90  Resp:  [13-16] 15  BP: (140-189)/(85-90) 189/88  Flow (L/min):  [0-1.5] 0    Physical Exam  Vitals and nursing note reviewed.   Constitutional:       General: She is not in acute distress.     Appearance: Normal appearance. She is well-developed. She is not ill-appearing, toxic-appearing or diaphoretic.   HENT:      Head: Normocephalic and atraumatic.      Right Ear: Ear canal and external ear normal.      Left Ear: Ear canal and external ear normal.      Nose: Nose normal. No congestion or rhinorrhea.      Mouth/Throat:      Mouth: Mucous membranes are moist.      Pharynx: No oropharyngeal exudate.   Eyes:      General: No scleral icterus.        Right eye: No discharge.         Left eye: No discharge.      Extraocular Movements: Extraocular movements intact.      Conjunctiva/sclera: Conjunctivae normal.      Pupils: Pupils are equal, round, and reactive to light.    Neck:      Thyroid: No thyromegaly.      Vascular: No carotid bruit or JVD.      Trachea: No tracheal deviation.   Cardiovascular:      Rate and Rhythm: Normal rate and regular rhythm.      Pulses: Normal pulses.      Heart sounds: Normal heart sounds. No murmur heard.    No friction rub. No gallop.   Pulmonary:      Effort: Pulmonary effort is normal. No respiratory distress.      Breath sounds: Normal breath sounds. No stridor. No wheezing, rhonchi or rales.   Chest:      Chest wall: No tenderness.   Abdominal:      General: Bowel sounds are normal. There is no distension.      Palpations: Abdomen is soft. There is no mass.      Tenderness: There is no abdominal tenderness. There is no guarding or rebound.      Hernia: No hernia is present.   Musculoskeletal:         General: No swelling, tenderness, deformity or signs of injury. Normal range of motion.      Cervical back: Normal range of motion and neck supple. No rigidity. No muscular tenderness.      Right lower leg: No edema.      Left lower leg: No edema.   Lymphadenopathy:      Cervical: No cervical adenopathy.   Skin:     General: Skin is warm and dry.      Coloration: Skin is not jaundiced or pale.      Findings: No bruising, erythema or rash.   Neurological:      General: No focal deficit present.      Mental Status: She is alert and oriented to person, place, and time. Mental status is at baseline.      Cranial Nerves: No cranial nerve deficit.      Sensory: No sensory deficit.      Motor: No weakness or abnormal muscle tone.      Coordination: Coordination normal.   Psychiatric:         Mood and Affect: Mood normal.         Behavior: Behavior normal.         Thought Content: Thought content normal.         Judgment: Judgment normal.               Result Review    Result Review:  I have personally reviewed the results from the time of this admission to 8/14/2022 12:44 EDT and agree with these findings:  [x]  Laboratory  [x]  Microbiology  [x]   Radiology  []  EKG/Telemetry   []  Cardiology/Vascular   []  Pathology  []  Old records  []  Other:  Most notable findings include: As above    Wounds (last 24 hours)     LDA Wound     Row Name 08/13/22 2043 08/13/22 1251          Wound 08/09/22 0700 Right posterior torso Incision    Wound - Properties Group Placement Date: 08/09/22  -SR Placement Time: 0700  -SR Side: Right  -SR Orientation: posterior  -SR Location: torso  -SR Primary Wound Type: Incision  -SR Additional Comments: liver biopsy site  -SR     Dressing Appearance open to air  -JR open to air  -HT     Retired Wound - Properties Group Placement Date: 08/09/22  -SR Placement Time: 0700  -SR Side: Right  -SR Orientation: posterior  -SR Location: torso  -SR Primary Wound Type: Incision  -SR Additional Comments: liver biopsy site  -SR     Retired Wound - Properties Group Date first assessed: 08/09/22  -SR Time first assessed: 0700  -SR Side: Right  -SR Location: torso  -SR Primary Wound Type: Incision  -SR Additional Comments: liver biopsy site  -SR           User Key  (r) = Recorded By, (t) = Taken By, (c) = Cosigned By    Initials Name Provider Type    SR Shaye Ramirez RN Registered Nurse    Mariya Collazo RN Registered Nurse    Valerie Jackson LPN Licensed Nurse                  Assessment & Plan      Brief Patient Summary:  Raul Gardner is a 47 y.o. female who who presented with hemorrhagic/septic shock        amLODIPine, 10 mg, Oral, Q24H  cefTRIAXone, 2 g, Intravenous, Q24H  escitalopram, 10 mg, Oral, Daily  First Mouthwash (Magic Mouthwash), 5 mL, Swish & Spit, Q6H  hydroCHLOROthiazide, 25 mg, Oral, Daily  metoprolol tartrate, 100 mg, Oral, Q12H  pantoprazole, 40 mg, Oral, Q AM  sodium chloride, 10 mL, Intravenous, Q12H  sodium chloride, 10 mL, Intravenous, Q12H             Active Hospital Problems:  Active Hospital Problems    Diagnosis    • **Septic shock (HCC)    • Suspected liver abscess    • Diarrhea    • Acute kidney injury (HCC)     • Metabolic acidosis    • E coli enteritis    • Abdominal hematoma, secondary to liver biopsyh    • Essential hypertension    • Vitamin D deficiency    • Pulmonary hypertension, unspecified (HCC)    • Anxiety      Plan:        Septic/hemorrhagic shock  Without evidence of liver abscess  Acute liver injury   E. Coli enteritis   - CT abd/pelvis reviewed  - Liver biopsy -report pending  - IR attempted drainage but there was no fluid collection 8/5/2022 so CT-guided liver abscess Chowhan cholangitis with malignancy.  -Negative HIV test,  - Venous duplex negative for DVT  - WBC 16.2, monitor   - Blood cultures X2- no growth thus far   - Monitor LFT's   - Continue Rocephin per ID recommendation for 10 days.  - ID, General surgery, and GI following      Acute kidney injury  Acute UTI  - CR 2.8, monitor   -Received CRRT, currently off   - Nephrology following  - Renal function improving  -Uncontrolled hypertension.  Started on Norvasc.  Also on metoprolol      CP  Right side  ? musculosk  ekg wnl  Troponin neg   x ray shows increasing atelectasis.  Oxygen saturation remainsAppropriate and improving  Do not think patient has PE.  D-dimer would not be helpful given her renal failure at this time.  She had negative venous Doppler as well  Her echo shows no problems with the right cardiac parameters  Cannot do CT angiogram due to renal failure  Will rosibel jones  resolved    Acute blood loss anemia with hemoperitoneum   - 2/2 liver biopsy  - Hbg 8.0, monitor  - General surgery following- no surgical intervention at this time      shock liver.  Liver enzymes improving  Autoimmune panel reviewed.  Negative C ANCA antichromatin double-stranded DNA mitochondrial P ANCA smooth muscle antibody liver fraction CMV DNA  -Liver biopsy-8/5/2022 pathology shows acute cholangitis but no evidence of malignancy.  -mrcp pensing  reconsulted GI  .    History of mosquito bites exposure, awaiting West Nile virus antibodies  negative IgG and IgM  West Nile virus antibodies     Mouth aphthous yulcers  Hina cortes might help      Essential HTN  - Controlled  - Monitor blood pressure  - Now off Cardene gtt   - Continue amlodipine and metoprolol      Anxiety   - Stable  - Continue lexapro      Morbid obesity  - BMI 51.1  - Encourage lifestyle changes        DVT prophylaxis: Heparin subcutaneous if okay with Dr. Oconnor  Disposition likely home with home health  Seen by PT OT    Mechanical DVT prophylaxis orders are present.    CODE STATUS:    Code Status (Patient has no pulse and is not breathing): CPR (Attempt to Resuscitate)  Medical Interventions (Patient has pulse or is breathing): Full Support      Disposition:  I expect patient to be discharged Home with [facility] pending acceptance. Watch for Home O2 needs.    This patient has been examined wearing appropriate Personal Protective Equipment and discussed with hospital infection control department. 08/14/22      Electronically signed by Rios Borges MD, 08/14/22, 12:44 EDT.  [facility] Hospitalist Team

## 2022-08-14 NOTE — PROGRESS NOTES
Daily Progress Note        Septic shock (HCC)    Pulmonary hypertension, unspecified (HCC)    Anxiety    Vitamin D deficiency    Essential hypertension    Suspected liver abscess    Diarrhea    Acute kidney injury (HCC)    Metabolic acidosis    E coli enteritis    Abdominal hematoma, secondary to liver biopsyh      Assessment    Fever resolved  Sepsis with septic shock, resolved  UTI  MARIANNE  Coagulopathy probably from DIC  Leukocytosis  Acute anemia with hemoperitoneum after liver biopsy  Anion gap metabolic acidosis  Lactic acidosis  Stool culture positive for enteropathic E. coli however Shiga toxin E. coli was negative  Abnormal CT scan of the liver with possible hepatic abscess: IR attempted drainage but there was no fluid collection 8/5/2022 so CT-guided biopsy was obtained  Negative HIV test,  Esophagitis  Mild pancreatic changes on the CT scan but the lipase is not elevated  History of melanoma resected in 1999 without signs of spread or recurrence  Mild exercise-induced pulmonary hypertension for which she has been on metoprolol but she is out of it for 1 month  Hyperglycemia  Elevated liver enzymes probably from shock liver         Plan:     Oxygenating well on room air     peripheral smear 8/8/2022   Leukocytosis with left shifted granulocytes and rare possible blasts  Occasional schistocytes, , anemia, Thrombocytopenia  ? hemolytic uremic syndrome vs MAHA  Lupus panel     Lower extremity Dopplers neg  History of mosquito bites exposure, negative IgG and IgM West Nile virus antibodies     Hemodynamic support    Followed by nephrology  -Currently off CRRT    Antibiotics: Rocephin, ID following, finishing today  DVT: SCD/GI prophylaxis  Check daily labs and correct electrolytes as needed             LOS: 9 days     Subjective         Objective     Vital signs for last 24 hours:  Vitals:    08/13/22 1251 08/13/22 1618 08/13/22 2050 08/14/22 0500   BP: 140/87 148/85 164/87 161/90   BP Location: Left arm Left arm  Right arm Right arm   Patient Position: Sitting Lying  Lying   Pulse: 85 93 95 79   Resp: 14 14 16 13   Temp: 98 °F (36.7 °C) 98.7 °F (37.1 °C) 97.9 °F (36.6 °C) 98 °F (36.7 °C)   TempSrc: Oral Oral Oral Oral   SpO2: 96% 96% 95%    Weight:    131 kg (288 lb 14.4 oz)   Height:           Intake/Output last 3 shifts:  I/O last 3 completed shifts:  In: 960 [P.O.:960]  Out: 2800 [Urine:2800]  Intake/Output this shift:  No intake/output data recorded.      Radiology  Imaging Results (Last 24 Hours)     ** No results found for the last 24 hours. **          Labs:  Results from last 7 days   Lab Units 08/14/22  0355   WBC 10*3/mm3 12.60*   HEMOGLOBIN g/dL 7.8*   HEMATOCRIT % 23.9*   PLATELETS 10*3/mm3 302     Results from last 7 days   Lab Units 08/14/22  0355   SODIUM mmol/L 143   POTASSIUM mmol/L 3.2*   CHLORIDE mmol/L 99   CO2 mmol/L 34.0*   BUN mg/dL 29*   CREATININE mg/dL 2.10*   CALCIUM mg/dL 8.8   BILIRUBIN mg/dL 1.7*   ALK PHOS U/L 133*   ALT (SGPT) U/L 44*   AST (SGOT) U/L 33*   GLUCOSE mg/dL 114*         Results from last 7 days   Lab Units 08/14/22  0355 08/13/22  0609 08/12/22  0400   ALBUMIN g/dL 3.20* 3.20* 3.10*     Results from last 7 days   Lab Units 08/11/22  1420   TROPONIN T ng/mL <0.010         Results from last 7 days   Lab Units 08/14/22  0355   MAGNESIUM mg/dL 1.7     Results from last 7 days   Lab Units 08/09/22  0339 08/07/22  1408   INR  1.11* 1.09               Meds:   SCHEDULE  amLODIPine, 10 mg, Oral, Q24H  cefTRIAXone, 2 g, Intravenous, Q24H  escitalopram, 10 mg, Oral, Daily  metoprolol tartrate, 100 mg, Oral, Q12H  pantoprazole, 40 mg, Oral, Q AM  sodium chloride, 10 mL, Intravenous, Q12H  sodium chloride, 10 mL, Intravenous, Q12H      Infusions     PRNs  •  acetaminophen **OR** acetaminophen  •  dextrose  •  dextrose  •  glucagon (human recombinant)  •  hydrALAZINE  •  nitroglycerin  •  ondansetron **OR** ondansetron  •  ondansetron ODT  •  oxyCODONE  •  simethicone  •  sodium chloride  •   sodium chloride  •  sodium chloride  •  sodium chloride    Physical Exam:  Physical Exam  Vitals reviewed.   Pulmonary:      Effort: Pulmonary effort is normal.      Breath sounds: Decreased breath sounds present.   Skin:     General: Skin is warm and dry.   Neurological:      Mental Status: She is alert and oriented to person, place, and time.         ROS  Review of Systems   Respiratory: Positive for cough.    Gastrointestinal:        Lower left back discomfort S\P biopsy       I have reviewed the patient's new clinical results.    Electronically signed by GRACIELA Wilkerson.

## 2022-08-14 NOTE — PROGRESS NOTES
" LOS: 9 days   Patient Care Team:  Maribel Graf MD as PCP - General (Family Medicine)  AVA Cavanaugh MD as Consulting Physician (Cardiology)      Subjective   \"Still having right sided pain.\"     Interval History:   Signed off 8/8/22. Originally consulted for elevated LFTs.  Reconsulted today for acute cholangitis noted on liver biopsy.    Labs: Sodium 143, potassium 3.2, creatinine 2.10.  Total bilirubin 1.7, alk phos 133, AST 33, ALT 44.  WBCs 12.6, hemoglobin 7.8, MCV 84.2, platelets 302.  6 bands.  States she is only having slight decrease in right upper quadrant abdominal pain from last weekend.  She is eating very little states she feels full quickly.  Mild nausea this morning after eating.  Has had some hot and cold flashes.  Is having bowel movements every day denies any bloody stool or black stool.  Does complain of gas.  Patient does complain of tongue ulcer causing decreased appetite and pain.    ROS:   Decreased appetite, gas, right upper quadrant abdominal pain, hot and cold chills.  Nausea.  No chest pain, shortness of breath, or cough.        Medication Review:     Current Facility-Administered Medications:   •  acetaminophen (TYLENOL) tablet 650 mg, 650 mg, Oral, Q4H PRN, 650 mg at 08/05/22 2147 **OR** acetaminophen (TYLENOL) suppository 650 mg, 650 mg, Rectal, Q4H PRN, Connor Rod, APRN  •  amLODIPine (NORVASC) tablet 10 mg, 10 mg, Oral, Q24H, Connor Rod APRN, 10 mg at 08/13/22 0957  •  cefTRIAXone (ROCEPHIN) 2 g in sodium chloride 0.9 % 100 mL IVPB, 2 g, Intravenous, Q24H, Sagar Orozco MD, Last Rate: 200 mL/hr at 08/13/22 1342, 2 g at 08/13/22 1342  •  dextrose (D50W) (25 g/50 mL) IV injection 25 g, 25 g, Intravenous, Q15 Min PRN, Connor Rod, APRN  •  dextrose (GLUTOSE) oral gel 15 g, 15 g, Oral, Q15 Min PRN, Connor Rod APRN  •  escitalopram (LEXAPRO) tablet 10 mg, 10 mg, Oral, Daily, Connor Rod APRN, 10 mg at 08/13/22 0957  •  glucagon (human recombinant) " (GLUCAGEN DIAGNOSTIC) 1 mg in sterile water (preservative free) 1 mL injection, 1 mg, Intramuscular, Q15 Min PRN, Connor Rod, APRN  •  hydrALAZINE (APRESOLINE) injection 10 mg, 10 mg, Intravenous, Q6H PRN, Connor Rod, APRN, 10 mg at 08/12/22 1919  •  metoprolol tartrate (LOPRESSOR) tablet 100 mg, 100 mg, Oral, Q12H, Connor Rod, APRN, 100 mg at 08/13/22 2048  •  nitroglycerin (NITROSTAT) SL tablet 0.4 mg, 0.4 mg, Sublingual, Q5 Min PRN, Connor Rod, APRN  •  ondansetron (ZOFRAN) tablet 4 mg, 4 mg, Oral, Q6H PRN **OR** ondansetron (ZOFRAN) injection 4 mg, 4 mg, Intravenous, Q6H PRN, Connor Rod, APRN, 4 mg at 08/14/22 0719  •  ondansetron ODT (ZOFRAN-ODT) disintegrating tablet 4 mg, 4 mg, Oral, Q6H PRN, Connor Rod, APRN, 4 mg at 08/11/22 0951  •  oxyCODONE (ROXICODONE) immediate release tablet 10 mg, 10 mg, Oral, Q4H PRN, Connor Rod, APRN, 10 mg at 08/13/22 1943  •  pantoprazole (PROTONIX) EC tablet 40 mg, 40 mg, Oral, Q AM, Connor Rod, APRN, 40 mg at 08/14/22 0603  •  simethicone (MYLICON) chewable tablet 80 mg, 80 mg, Oral, 4x Daily PRN, Connor Rod, APRN, 80 mg at 08/06/22 0128  •  sodium chloride 0.9 % flush 10 mL, 10 mL, Intravenous, PRN, Connor Rod, APRN  •  sodium chloride 0.9 % flush 10 mL, 10 mL, Intravenous, Q12H, Connor Rod, APRN, 10 mL at 08/13/22 0958  •  sodium chloride 0.9 % flush 10 mL, 10 mL, Intravenous, PRN, Connor Rod, APRN  •  sodium chloride 0.9 % flush 10 mL, 10 mL, Intravenous, PRN, Connor Rod, APRN  •  sodium chloride 0.9 % flush 10 mL, 10 mL, Intravenous, Q12H, Connor Rod APRN, 10 mL at 08/13/22 2048  •  sodium chloride 0.9 % flush 20 mL, 20 mL, Intravenous, PRN, Connor Rod APRN      Objective Resting in bed, no acute distress, family at bedside.  Room 4121.    Vital Signs  Vitals:    08/13/22 1251 08/13/22 1618 08/13/22 2050 08/14/22 0500   BP: 140/87 148/85 164/87 161/90   BP Location: Left arm Left arm Right arm  Right arm   Patient Position: Sitting Lying  Lying   Pulse: 85 93 95 79   Resp: 14 14 16 13   Temp: 98 °F (36.7 °C) 98.7 °F (37.1 °C) 97.9 °F (36.6 °C) 98 °F (36.7 °C)   TempSrc: Oral Oral Oral Oral   SpO2: 96% 96% 95%    Weight:    131 kg (288 lb 14.4 oz)   Height:           Physical Exam: Emesis occasionally with movement.    General Appearance:    Awake and alert, in no acute distress, obese, on CRRT   Head:    Normocephalic, without obvious abnormality   Eyes:          Conjunctivae normal, anicteric sclera   Throat:   No oral lesions, no thrush, oral mucosa moist. Elliptical ulcer noted on left lateral tongue. No signs of thrush.    Neck:   No adenopathy, supple, no JVD   Lungs:     respirations regular, even and unlabored   Abdomen:     Soft, right upper quadrant abdomen tenderness to epigastric area, no obvious mass   Rectal:     Deferred   Extremities:   no cyanosis   Skin:   No bruising or rash, no jaundice, Sandoval catheter with dark yellow urine noted        Results Review:    CBC    Results from last 7 days   Lab Units 08/14/22  0355 08/12/22  0400 08/11/22  0515 08/10/22  0431 08/09/22  0339 08/08/22  1322 08/08/22  0812   WBC 10*3/mm3 12.60* 15.80* 16.20* 16.20* 16.20* 14.10* 14.10*   HEMOGLOBIN g/dL 7.8* 8.0* 8.2* 8.0* 7.4* 7.7* 7.6*   PLATELETS 10*3/mm3 302 184 155 89* 57* 50* 47*     CMP   Results from last 7 days   Lab Units 08/14/22  0355 08/13/22  0609 08/12/22  0400 08/11/22  0515 08/10/22  0431 08/09/22  0339 08/08/22  0812 08/08/22  0359   SODIUM mmol/L 143 142 139 142 141 139 138 140   POTASSIUM mmol/L 3.2* 3.3* 3.2* 3.6 3.4* 4.0 4.1 4.1   CHLORIDE mmol/L 99 98 97* 101 103 104 104 105   CO2 mmol/L 34.0* 35.0* 33.0* 32.0* 31.0* 27.0 28.0 28.0   BUN mg/dL 29* 33* 36* 36* 34* 29* 16 19   CREATININE mg/dL 2.10* 2.10* 2.45* 2.69* 2.88* 2.33* 1.35* 1.53*   GLUCOSE mg/dL 114* 112* 112* 106* 110* 123* 87 98   ALBUMIN g/dL 3.20* 3.20* 3.10* 3.40* 3.20* 3.20* 3.20* 3.00*   BILIRUBIN mg/dL 1.7* 1.2 1.0 0.9  0.9 1.3*  --  3.8*   ALK PHOS U/L 133* 135* 146* 171* 188* 217*  --  133*   AST (SGOT) U/L 33* 27 28 38* 74* 141*  --  233*   ALT (SGPT) U/L 44* 52* 73* 108* 153* 208*  --  259*   MAGNESIUM mg/dL 1.7 1.8 1.9 2.1 2.3 2.9* 2.5  --    PHOSPHORUS mg/dL 4.1 3.7 4.0 4.3 3.2 3.0 3.7  --      Cr Clearance Estimated Creatinine Clearance: 44.1 mL/min (A) (by C-G formula based on SCr of 2.1 mg/dL (H)).  Coag   Results from last 7 days   Lab Units 08/09/22  0339 08/07/22  1408   INR  1.11* 1.09     HbA1C No results found for: HGBA1C  Blood Glucose   No results found for: POCGLU  Infection       UA        Radiology(recent) XR Chest 1 View    Result Date: 8/12/2022  Increased bibasilar airspace opacities, likely due to bilateral pleural fluid with underlying atelectasis and/or pneumonia.  Electronically Signed By-Hui Raman MD On:8/12/2022 4:52 PM This report was finalized on [id number] by  Hui Raman MD.         Assessment & Plan    Acute cholangitis per liver biopsy   Elevated liver enzymes   Acute anemia -normocytic  Hypotension -resolved, off pressors  Sepsis unknown origin at this time  Enteropathic E. coli and stool  Abnormal CT showing hemoperitoneum s/p liver biopsy  Acute kidney injury on CRRT  Enteropathic E. coli  Thrombocytopenia RESOLVED  Leukocytosis with bandemia  Aphthous ulcer left lateral tongue.      PLAN:  Reconsulted for acute cholangitis noted on liver biopsy.  Liver ultrasound on 8/6/2022 shows common bile duct 4.1 mm and no sign of intrahepatic biliary ductal dilation.  Infectious disease has been following and continues to be on ceftriaxone.   Treatment is supportive care with antibiotics, IV fluids, pain control, correction of electrolytes.   Pain control per primary.  Will add Hina's magic mouthwash for tongue.     Electronically signed by GRACIELA Cowan, 08/14/22, 9:44 AM EDT.

## 2022-08-14 NOTE — PLAN OF CARE
Goal Outcome Evaluation:  Patient continues to improve clinically, IJ removed yesterday earlier in day, did have episode of severe pain on right side/back, given Oxycodone for pain and effective

## 2022-08-14 NOTE — PROGRESS NOTES
Infectious Diseases Progress Note      LOS: 9 days   Patient Care Team:  Maribel Graf MD as PCP - General (Family Medicine)  AVA Cavanaugh MD as Consulting Physician (Cardiology)    Chief Complaint: Weakness    Subjective     The patient had no fever during the last 24 hours.  She remained off vasopressors.  The patient is feeling better today.  Denied having diarrhea.  Patient is having some abdominal pain and very mild shortness of breath but nothing new or increased        Review of Systems:   Review of Systems   Constitutional: Positive for fatigue.   HENT: Negative.    Eyes: Negative.    Respiratory: Positive for shortness of breath.    Cardiovascular: Negative.    Gastrointestinal: Positive for abdominal pain.   Endocrine: Negative.    Genitourinary: Negative.    Musculoskeletal: Negative.    Skin: Negative.    Neurological: Negative.    Psychiatric/Behavioral: Negative.    All other systems reviewed and are negative.       Objective     Vital Signs  Temp:  [97.9 °F (36.6 °C)-98.7 °F (37.1 °C)] 98.4 °F (36.9 °C)  Heart Rate:  [79-95] 90  Resp:  [13-16] 15  BP: (148-189)/(85-90) 189/88    Physical Exam:  Physical Exam  Vitals and nursing note reviewed.   Constitutional:       Appearance: She is well-developed.   HENT:      Head: Normocephalic and atraumatic.   Eyes:      Pupils: Pupils are equal, round, and reactive to light.   Cardiovascular:      Rate and Rhythm: Normal rate and regular rhythm.      Heart sounds: Normal heart sounds.   Pulmonary:      Effort: Pulmonary effort is normal. No respiratory distress.      Breath sounds: Normal breath sounds. No wheezing or rales.   Abdominal:      General: Bowel sounds are normal. There is no distension.      Palpations: Abdomen is soft. There is no mass.      Tenderness: There is no abdominal tenderness. There is no guarding or rebound.   Genitourinary:     Comments: External catheter  Musculoskeletal:         General: No deformity. Normal range of  motion.      Cervical back: Normal range of motion and neck supple.   Skin:     General: Skin is warm.      Findings: No erythema or rash.   Neurological:      Mental Status: She is alert and oriented to person, place, and time.      Cranial Nerves: No cranial nerve deficit.          Results Review:    I have reviewed all clinical data, test, lab, and imaging results.     Radiology  No Radiology Exams Resulted Within Past 24 Hours    Cardiology    Laboratory    Results from last 7 days   Lab Units 08/14/22  0355 08/12/22  0400 08/11/22  0515 08/10/22  0431 08/09/22  0339 08/08/22  1322 08/08/22  0812   WBC 10*3/mm3 12.60* 15.80* 16.20* 16.20* 16.20* 14.10* 14.10*   HEMOGLOBIN g/dL 7.8* 8.0* 8.2* 8.0* 7.4* 7.7* 7.6*   HEMATOCRIT % 23.9* 24.0* 25.5* 24.6* 22.5* 22.8* 22.6*   PLATELETS 10*3/mm3 302 184 155 89* 57* 50* 47*     Results from last 7 days   Lab Units 08/14/22  0355 08/13/22  0609 08/12/22  0400 08/11/22  0515 08/10/22  0431 08/09/22  0339 08/08/22  0812 08/08/22  0359   SODIUM mmol/L 143 142 139 142 141 139 138 140   POTASSIUM mmol/L 3.2* 3.3* 3.2* 3.6 3.4* 4.0 4.1 4.1   CHLORIDE mmol/L 99 98 97* 101 103 104 104 105   CO2 mmol/L 34.0* 35.0* 33.0* 32.0* 31.0* 27.0 28.0 28.0   BUN mg/dL 29* 33* 36* 36* 34* 29* 16 19   CREATININE mg/dL 2.10* 2.10* 2.45* 2.69* 2.88* 2.33* 1.35* 1.53*   GLUCOSE mg/dL 114* 112* 112* 106* 110* 123* 87 98   ALBUMIN g/dL 3.20* 3.20* 3.10* 3.40* 3.20* 3.20* 3.20* 3.00*   BILIRUBIN mg/dL 1.7* 1.2 1.0 0.9 0.9 1.3*  --  3.8*   ALK PHOS U/L 133* 135* 146* 171* 188* 217*  --  133*   AST (SGOT) U/L 33* 27 28 38* 74* 141*  --  233*   ALT (SGPT) U/L 44* 52* 73* 108* 153* 208*  --  259*   CALCIUM mg/dL 8.8 9.0 8.5* 8.4* 8.2* 8.0* 7.6* 7.6*                 Microbiology   Microbiology Results (last 10 days)     Procedure Component Value - Date/Time    Clostridioides difficile Toxin - Stool, Per Rectum [[id number]]  (Normal) Collected: 08/06/22 0703    Lab Status: Final result Specimen: Stool  from Per Rectum Updated: 08/06/22 0758    Narrative:      The following orders were created for panel order Clostridioides difficile Toxin - Stool, Per Rectum.  Procedure                               Abnormality         Status                     ---------                               -----------         ------                     Clostridioides difficile...[[id number]]  Normal              Final result                 Please view results for these tests on the individual orders.    Clostridioides difficile EIA - Stool, Per Rectum [[id number]]  (Normal) Collected: 08/06/22 0703    Lab Status: Final result Specimen: Stool from Per Rectum Updated: 08/06/22 0758     C Diff GDH / Toxin Negative    Eosinophil Smear - Urine, Urine, Clean Catch [[id number]]  (Normal) Collected: 08/05/22 1917    Lab Status: Final result Specimen: Urine, Clean Catch Updated: 08/05/22 2047     Eosinophil Smear 0 % EOS/100 Cells     Body Fluid Culture - Body Fluid, Liver [[id number]] Collected: 08/05/22 1755    Lab Status: Final result Specimen: Body Fluid from Liver Updated: 08/08/22 0841     Body Fluid Culture No growth at 3 days     Gram Stain Few (2+) WBCs per low power field      No organisms seen    Anaerobic Culture - Swab, Liver [[id number]] Collected: 08/05/22 1755    Lab Status: Final result Specimen: Swab from Liver Updated: 08/10/22 0629     Anaerobic Culture No anaerobes isolated at 5 days    S. Pneumo Ag Urine or CSF - Urine, Urine, Clean Catch [[id number]]  (Normal) Collected: 08/05/22 1154    Lab Status: Final result Specimen: Urine, Clean Catch Updated: 08/05/22 1234     Strep Pneumo Ag Negative    Legionella Antigen, Urine - Urine, Urine, Clean Catch [[id number]]  (Normal) Collected: 08/05/22 1154    Lab Status: Final result Specimen: Urine, Clean Catch Updated: 08/05/22 1234     LEGIONELLA ANTIGEN, URINE Negative    MRSA Screen, PCR (Inpatient) - Swab, Nares [[id number]]  (Normal) Collected: 08/05/22 1057    Lab Status: Final  result Specimen: Swab from Nares Updated: 08/05/22 1222     MRSA PCR No MRSA Detected    Narrative:      The negative predictive value of this diagnostic test is high and should only be used to consider de-escalating anti-MRSA therapy. A positive result may indicate colonization with MRSA and must be correlated clinically.    Gastrointestinal Panel, PCR - Stool, Per Rectum [[id number]]  (Abnormal) Collected: 08/05/22 1057    Lab Status: Final result Specimen: Stool from Per Rectum Updated: 08/05/22 1238     Campylobacter Not Detected     Plesiomonas shigelloides Not Detected     Salmonella Not Detected     Vibrio Not Detected     Vibrio cholerae Not Detected     Yersinia enterocolitica Not Detected     Enteroaggregative E. coli (EAEC) Not Detected     Enteropathogenic E. coli (EPEC) Detected     Enterotoxigenic E. coli (ETEC) lt/st Not Detected     Shiga-like toxin-producing E. coli (STEC) stx1/stx2 Not Detected     Shigella/Enteroinvasive E. coli (EIEC) Not Detected     Cryptosporidium Not Detected     Cyclospora cayetanensis Not Detected     Entamoeba histolytica Not Detected     Giardia lamblia Not Detected     Adenovirus F40/41 Not Detected     Astrovirus Not Detected     Norovirus GI/GII Not Detected     Rotavirus A Not Detected     Sapovirus (I, II, IV or V) Not Detected    Urine Culture - Urine, Urine, Catheter [[id number]]  (Normal) Collected: 08/05/22 1012    Lab Status: Final result Specimen: Urine, Catheter Updated: 08/06/22 1409     Urine Culture No growth    Blood Culture - Blood, Blood, Central Line [[id number]]  (Normal) Collected: 08/05/22 0902    Lab Status: Final result Specimen: Blood, Central Line Updated: 08/10/22 0917     Blood Culture No growth at 5 days    Blood Culture - Blood, Blood, Central Line [[id number]]  (Normal) Collected: 08/05/22 0902    Lab Status: Final result Specimen: Blood, Central Line Updated: 08/10/22 0917     Blood Culture No growth at 5 days    Respiratory Panel PCR  w/COVID-19(SARS-CoV-2) CELSA/JONATHAN/DOMINIQUE/PAD/COR/MAD/LISA In-House, NP Swab in UTM/VTM, 3-4 HR TAT - Swab, Nasopharynx [[id number]]  (Normal) Collected: 08/05/22 0815    Lab Status: Final result Specimen: Swab from Nasopharynx Updated: 08/05/22 0909     ADENOVIRUS, PCR Not Detected     Coronavirus 229E Not Detected     Coronavirus HKU1 Not Detected     Coronavirus NL63 Not Detected     Coronavirus OC43 Not Detected     COVID19 Not Detected     Human Metapneumovirus Not Detected     Human Rhinovirus/Enterovirus Not Detected     Influenza A PCR Not Detected     Influenza B PCR Not Detected     Parainfluenza Virus 1 Not Detected     Parainfluenza Virus 2 Not Detected     Parainfluenza Virus 3 Not Detected     Parainfluenza Virus 4 Not Detected     RSV, PCR Not Detected     Bordetella pertussis pcr Not Detected     Bordetella parapertussis PCR Not Detected     Chlamydophila pneumoniae PCR Not Detected     Mycoplasma pneumo by PCR Not Detected    Narrative:      In the setting of a positive respiratory panel with a viral infection PLUS a negative procalcitonin without other underlying concern for bacterial infection, consider observing off antibiotics or discontinuation of antibiotics and continue supportive care. If the respiratory panel is positive for atypical bacterial infection (Bordetella pertussis, Chlamydophila pneumoniae, or Mycoplasma pneumoniae), consider antibiotic de-escalation to target atypical bacterial infection.          Medication Review:       Schedule Meds  amLODIPine, 10 mg, Oral, Q24H  cefTRIAXone, 2 g, Intravenous, Q24H  escitalopram, 10 mg, Oral, Daily  First Mouthwash (Magic Mouthwash), 5 mL, Swish & Spit, Q6H  hydroCHLOROthiazide, 25 mg, Oral, Daily  metoprolol tartrate, 100 mg, Oral, Q12H  pantoprazole, 40 mg, Oral, Q AM  sodium chloride, 10 mL, Intravenous, Q12H  sodium chloride, 10 mL, Intravenous, Q12H        Infusion Meds       PRN Meds  •  acetaminophen **OR** acetaminophen  •  dextrose  •   dextrose  •  glucagon (human recombinant)  •  hydrALAZINE  •  nitroglycerin  •  ondansetron **OR** ondansetron  •  ondansetron ODT  •  simethicone  •  sodium chloride  •  sodium chloride  •  sodium chloride  •  sodium chloride        Assessment & Plan       Antimicrobial Therapy   1.  IV ceftriaxone       Assessment     Severe hypovolemic/septic shock present on admission and was initially on 2 pressors but condition worsened after liver biopsy and was concerning secondary to hemorrhagic shock.  Patient was on 4 vasopressors at one point.  Improved significantly and currently off vasopressors  Repeat CT scan of abdomen pelvis on August 9, 2022 showed stable intra abdomen hemorrhage    The patient was admitted for gastroenteritis caused by enteropathogenic E. coli however the patient was very systemically ill and was febrile for a few days.  Work-up was negative for infection except for E. coli and stool    Abnormal liver lesion noted on imaging studies.  It might be incidental findings.  S/p biopsy and there was no purulence to suspect infection.  Pathology report is still pending  -Cultures were negative for malignancy but did show acute cholangitis    Acute kidney injury.  Suspected to be prerenal.  Improving.  Currently off CRRT and making urine    Thrombocytopenia.  Probably secondary to sepsis.  Resolved    Reactive leukocytosis.  Mildly elevated may be now secondary to steroids        Plan    Patient received 10 days of IV Rocephin 2 g -last dose on 8/14/2022  Patient is scheduled for an MRCP later today  Monitor off antimicrobial therapy  continue supportive care  A.m. labs  Prognosis continues to improve  Case discussed with patient and family member at bedside        GRACIELA Davis  08/14/22  13:05 EDT    Note is dictated utilizing voice recognition software/Dragon

## 2022-08-14 NOTE — PROGRESS NOTES
PROGRESS NOTE      Patient Name: Raul Gardner  : 1975  MRN: [id number]  Primary Care Physician: Maribel Graf MD  Date of admission: 2022    Patient Care Team:  Maribel Graf MD as PCP - General (Family Medicine)  AVA Cavanaugh MD as Consulting Physician (Cardiology)        Subjective   Subjective:     Patient is slightly better than yesterday but still lethargic sick looking  Review of systems:  Middle-age female complaining of body aches abdominal pain abdominal distention      Allergies:    Allergies   Allergen Reactions   • Cephalexin Rash   • Hydrocodone-Acetaminophen Rash       Objective   Exam:     Vital Signs  Temp:  [97.9 °F (36.6 °C)-98.7 °F (37.1 °C)] 98.4 °F (36.9 °C)  Heart Rate:  [79-95] 90  Resp:  [13-16] 15  BP: (140-189)/(85-90) 189/88  SpO2:  [95 %-96 %] 95 %  on  Flow (L/min):  [0-1.5] 0;   Device (Oxygen Therapy): room air  Body mass index is 52.84 kg/m².    General: Middle-age  female i very lethargic  Head:      Normocephalic and atraumatic.    Eyes:      PERRL/EOM intact, conjunctiva and sclera clear with out nystagmus.    Neck:      No masses, thyromegaly,  trachea central with normal respiratory effort   Lungs:    Clear bilaterally to auscultation.    Heart:      Regular rate and rhythm, no murmur no gallop  Abd:        Diffusely tender with decreased bowel sounds  Pulses:   Pulses palpable  Extr:        No cyanosis or clubbing--no edema.    Neuro:    No focal deficits.   alert oriented x3  Skin:       Intact without lesions or rashes.    Psych:    Alert and cooperative; normal mood and affect; .      Results Review:  I have personally reviewed most recent Data :  CBC    Results from last 7 days   Lab Units 22  0355 22  0400 22  0515 08/10/22  0431 22  0339 22  1322 22  0812   WBC 10*3/mm3 12.60* 15.80* 16.20* 16.20* 16.20* 14.10* 14.10*   HEMOGLOBIN g/dL 7.8* 8.0* 8.2* 8.0* 7.4* 7.7* 7.6*   PLATELETS 10*3/mm3 302 184 155 89* 57*  50* 47*     CMP   Results from last 7 days   Lab Units 08/14/22  0355 08/13/22  0609 08/12/22  0400 08/11/22  0515 08/10/22  0431 08/09/22  0339 08/08/22  0812 08/08/22  0359   SODIUM mmol/L 143 142 139 142 141 139 138 140   POTASSIUM mmol/L 3.2* 3.3* 3.2* 3.6 3.4* 4.0 4.1 4.1   CHLORIDE mmol/L 99 98 97* 101 103 104 104 105   CO2 mmol/L 34.0* 35.0* 33.0* 32.0* 31.0* 27.0 28.0 28.0   BUN mg/dL 29* 33* 36* 36* 34* 29* 16 19   CREATININE mg/dL 2.10* 2.10* 2.45* 2.69* 2.88* 2.33* 1.35* 1.53*   GLUCOSE mg/dL 114* 112* 112* 106* 110* 123* 87 98   ALBUMIN g/dL 3.20* 3.20* 3.10* 3.40* 3.20* 3.20* 3.20* 3.00*   BILIRUBIN mg/dL 1.7* 1.2 1.0 0.9 0.9 1.3*  --  3.8*   ALK PHOS U/L 133* 135* 146* 171* 188* 217*  --  133*   AST (SGOT) U/L 33* 27 28 38* 74* 141*  --  233*   ALT (SGPT) U/L 44* 52* 73* 108* 153* 208*  --  259*     ABG        XR Chest 1 View    Result Date: 8/12/2022  Increased bibasilar airspace opacities, likely due to bilateral pleural fluid with underlying atelectasis and/or pneumonia.  Electronically Signed By-Hui Raman MD On:8/12/2022 4:52 PM This report was finalized on [id number] by  Hui Raman MD.      Results for orders placed during the hospital encounter of 08/05/22    Adult Transthoracic Echo Complete w/ Color, Spectral and Contrast if Necessary Per Protocol    Interpretation Summary  · Left ventricular wall thickness is consistent with mild concentric hypertrophy.  · Estimated left ventricular EF = 75% Left ventricular systolic function is hyperdynamic (EF > 70%).  · Left ventricular diastolic function is consistent with (grade I) impaired relaxation.    Scheduled Meds:amLODIPine, 10 mg, Oral, Q24H  cefTRIAXone, 2 g, Intravenous, Q24H  escitalopram, 10 mg, Oral, Daily  First Mouthwash (Magic Mouthwash), 5 mL, Swish & Spit, Q6H  metoprolol tartrate, 100 mg, Oral, Q12H  pantoprazole, 40 mg, Oral, Q AM  sodium chloride, 10 mL, Intravenous, Q12H  sodium chloride, 10 mL, Intravenous,  Q12H      Continuous Infusions:   PRN Meds:•  acetaminophen **OR** acetaminophen  •  dextrose  •  dextrose  •  glucagon (human recombinant)  •  hydrALAZINE  •  nitroglycerin  •  ondansetron **OR** ondansetron  •  ondansetron ODT  •  simethicone  •  sodium chloride  •  sodium chloride  •  sodium chloride  •  sodium chloride    Assessment & Plan   Assessment and Plan:         Septic shock (HCC)    Pulmonary hypertension, unspecified (HCC)    Anxiety    Vitamin D deficiency    Essential hypertension    Suspected liver abscess    Diarrhea    Acute kidney injury (HCC)    Metabolic acidosis    E coli enteritis    Abdominal hematoma, secondary to liver biopsyh    ASSESSMENT:  · Acute kidney injury likely ATN secondary to sepsis and hemodynamic instability on pressors at this time  · Multiple liver lesion unlikely to be abscess  · Chronic kidney disease as per renal ultrasound with some increased echogenicity   · Significant lactic acidosis with increased anion gap   · Some evidence of volume overload   · History of hypertension   ·             Plan:      · Patient is feeling much better septic shock recovered.  · Patient iwas on CRRT started 9/6/2022.  9/8/2022 no hemodynamically better blood pressure is on the high side  · Shiley catheter was removed yesterday  · Hemoglobin is stable in last couple of days   · Some alkalosis but acceptable.  Volume status is acceptable  · Urine output is more than adequate  · No need for diuretics blood pressure is on the high side continue calcium channel and beta blockers  · We will start low-dose thiazide diuretics to improve blood pressure  · Sodium level will be followed up closely follow-up with potassium  · Sodium better, potassium is on the low side, acidosis completely resolved at this time creatinine improved from yesterday  · Follow-up with volume status electrolytes later today and tomorrow morning  · So far all the serologies are unremarkable including KAREN, ANCA,  complements    •           Electronically signed by Vikram Acosta MD,   [facility] kidney consultant  [phone]

## 2022-08-14 NOTE — PLAN OF CARE
Goal Outcome Evaluation:  Plan of Care Reviewed With: patient, spouse, family        Progress: improving

## 2022-08-15 ENCOUNTER — INPATIENT HOSPITAL (OUTPATIENT)
Dept: URBAN - METROPOLITAN AREA HOSPITAL 84 | Facility: HOSPITAL | Age: 47
End: 2022-08-15

## 2022-08-15 DIAGNOSIS — D50.0 IRON DEFICIENCY ANEMIA SECONDARY TO BLOOD LOSS (CHRONIC): ICD-10-CM

## 2022-08-15 DIAGNOSIS — R74.8 ABNORMAL LEVELS OF OTHER SERUM ENZYMES: ICD-10-CM

## 2022-08-15 DIAGNOSIS — A04.0 ENTEROPATHOGENIC ESCHERICHIA COLI INFECTION: ICD-10-CM

## 2022-08-15 DIAGNOSIS — K83.09 OTHER CHOLANGITIS: ICD-10-CM

## 2022-08-15 LAB
ALBUMIN SERPL-MCNC: 3.4 G/DL (ref 3.5–5.2)
ALBUMIN/GLOB SERPL: 1.3 G/DL
ALP SERPL-CCNC: 133 U/L (ref 39–117)
ALT SERPL W P-5'-P-CCNC: 35 U/L (ref 1–33)
ANION GAP SERPL CALCULATED.3IONS-SCNC: 12 MMOL/L (ref 5–15)
AST SERPL-CCNC: 35 U/L (ref 1–32)
BASOPHILS # BLD MANUAL: 0.1 10*3/MM3 (ref 0–0.2)
BASOPHILS NFR BLD MANUAL: 1 % (ref 0–1.5)
BILIRUB SERPL-MCNC: 2.2 MG/DL (ref 0–1.2)
BUN SERPL-MCNC: 26 MG/DL (ref 6–20)
BUN/CREAT SERPL: 13 (ref 7–25)
CALCIUM SPEC-SCNC: 8.9 MG/DL (ref 8.6–10.5)
CHLORIDE SERPL-SCNC: 96 MMOL/L (ref 98–107)
CO2 SERPL-SCNC: 31 MMOL/L (ref 22–29)
CREAT SERPL-MCNC: 2 MG/DL (ref 0.57–1)
DEPRECATED RDW RBC AUTO: 45.9 FL (ref 37–54)
EGFRCR SERPLBLD CKD-EPI 2021: 30.9 ML/MIN/1.73
EOSINOPHIL # BLD MANUAL: 0.3 10*3/MM3 (ref 0–0.4)
EOSINOPHIL NFR BLD MANUAL: 3 % (ref 0.3–6.2)
ERYTHROCYTE [DISTWIDTH] IN BLOOD BY AUTOMATED COUNT: 15.5 % (ref 12.3–15.4)
GLOBULIN UR ELPH-MCNC: 2.7 GM/DL
GLUCOSE SERPL-MCNC: 110 MG/DL (ref 65–99)
HCT VFR BLD AUTO: 25.1 % (ref 34–46.6)
HGB BLD-MCNC: 8.6 G/DL (ref 12–15.9)
HYPOCHROMIA BLD QL: ABNORMAL
LYMPHOCYTES # BLD MANUAL: 1.98 10*3/MM3 (ref 0.7–3.1)
LYMPHOCYTES NFR BLD MANUAL: 2 % (ref 5–12)
MAGNESIUM SERPL-MCNC: 1.7 MG/DL (ref 1.6–2.6)
MCH RBC QN AUTO: 28.5 PG (ref 26.6–33)
MCHC RBC AUTO-ENTMCNC: 34.2 G/DL (ref 31.5–35.7)
MCV RBC AUTO: 83.4 FL (ref 79–97)
MONOCYTES # BLD: 0.2 10*3/MM3 (ref 0.1–0.9)
NEUTROPHILS # BLD AUTO: 7.33 10*3/MM3 (ref 1.7–7)
NEUTROPHILS NFR BLD MANUAL: 69 % (ref 42.7–76)
NEUTS BAND NFR BLD MANUAL: 5 % (ref 0–5)
PHOSPHATE SERPL-MCNC: 3.9 MG/DL (ref 2.5–4.5)
PLATELET # BLD AUTO: 325 10*3/MM3 (ref 140–450)
PMV BLD AUTO: 7.9 FL (ref 6–12)
POTASSIUM SERPL-SCNC: 3 MMOL/L (ref 3.5–5.2)
PROT SERPL-MCNC: 6.1 G/DL (ref 6–8.5)
RBC # BLD AUTO: 3.01 10*6/MM3 (ref 3.77–5.28)
SCAN SLIDE: NORMAL
SMALL PLATELETS BLD QL SMEAR: ADEQUATE
SODIUM SERPL-SCNC: 139 MMOL/L (ref 136–145)
TOXIC GRANULATION: ABNORMAL
VARIANT LYMPHS NFR BLD MANUAL: 20 % (ref 19.6–45.3)
WBC NRBC COR # BLD: 9.9 10*3/MM3 (ref 3.4–10.8)

## 2022-08-15 PROCEDURE — 85007 BL SMEAR W/DIFF WBC COUNT: CPT | Performed by: NURSE PRACTITIONER

## 2022-08-15 PROCEDURE — 80053 COMPREHEN METABOLIC PANEL: CPT | Performed by: NURSE PRACTITIONER

## 2022-08-15 PROCEDURE — 85025 COMPLETE CBC W/AUTO DIFF WBC: CPT | Performed by: NURSE PRACTITIONER

## 2022-08-15 PROCEDURE — 99232 SBSQ HOSP IP/OBS MODERATE 35: CPT | Performed by: NURSE PRACTITIONER

## 2022-08-15 PROCEDURE — 99233 SBSQ HOSP IP/OBS HIGH 50: CPT | Performed by: INTERNAL MEDICINE

## 2022-08-15 PROCEDURE — 83735 ASSAY OF MAGNESIUM: CPT | Performed by: NURSE PRACTITIONER

## 2022-08-15 PROCEDURE — 25010000002 ONDANSETRON PER 1 MG: Performed by: NURSE PRACTITIONER

## 2022-08-15 PROCEDURE — 84100 ASSAY OF PHOSPHORUS: CPT | Performed by: NURSE PRACTITIONER

## 2022-08-15 RX ORDER — TRIAMCINOLONE ACETONIDE 0.1 %
PASTE (GRAM) DENTAL 2 TIMES DAILY
Status: DISCONTINUED | OUTPATIENT
Start: 2022-08-15 | End: 2022-08-17 | Stop reason: HOSPADM

## 2022-08-15 RX ADMIN — OXYCODONE 10 MG: 5 TABLET ORAL at 21:52

## 2022-08-15 RX ADMIN — Medication 10 ML: at 10:02

## 2022-08-15 RX ADMIN — METOPROLOL TARTRATE 100 MG: 50 TABLET, FILM COATED ORAL at 21:52

## 2022-08-15 RX ADMIN — HYDROCHLOROTHIAZIDE 25 MG: 25 TABLET ORAL at 08:42

## 2022-08-15 RX ADMIN — ESCITALOPRAM OXALATE 10 MG: 10 TABLET ORAL at 08:42

## 2022-08-15 RX ADMIN — METOPROLOL TARTRATE 100 MG: 50 TABLET, FILM COATED ORAL at 08:42

## 2022-08-15 RX ADMIN — DIPHENHYDRAMINE HYDROCHLORIDE AND LIDOCAINE HYDROCHLORIDE AND ALUMINUM HYDROXIDE AND MAGNESIUM HYDRO 5 ML: KIT at 05:10

## 2022-08-15 RX ADMIN — PANTOPRAZOLE SODIUM 40 MG: 40 TABLET, DELAYED RELEASE ORAL at 05:10

## 2022-08-15 RX ADMIN — DIPHENHYDRAMINE HYDROCHLORIDE AND LIDOCAINE HYDROCHLORIDE AND ALUMINUM HYDROXIDE AND MAGNESIUM HYDRO 5 ML: KIT at 00:11

## 2022-08-15 RX ADMIN — SIMETHICONE 80 MG: 80 TABLET, CHEWABLE ORAL at 21:52

## 2022-08-15 RX ADMIN — ONDANSETRON 4 MG: 2 INJECTION INTRAMUSCULAR; INTRAVENOUS at 08:01

## 2022-08-15 RX ADMIN — TRIAMCINOLONE ACETONIDE: 1 PASTE DENTAL at 16:41

## 2022-08-15 RX ADMIN — Medication 10 ML: at 21:52

## 2022-08-15 RX ADMIN — ONDANSETRON 4 MG: 2 INJECTION INTRAMUSCULAR; INTRAVENOUS at 16:39

## 2022-08-15 RX ADMIN — DIPHENHYDRAMINE HYDROCHLORIDE AND LIDOCAINE HYDROCHLORIDE AND ALUMINUM HYDROXIDE AND MAGNESIUM HYDRO 5 ML: KIT at 16:40

## 2022-08-15 RX ADMIN — DIPHENHYDRAMINE HYDROCHLORIDE AND LIDOCAINE HYDROCHLORIDE AND ALUMINUM HYDROXIDE AND MAGNESIUM HYDRO 5 ML: KIT at 11:42

## 2022-08-15 RX ADMIN — DIPHENHYDRAMINE HYDROCHLORIDE AND LIDOCAINE HYDROCHLORIDE AND ALUMINUM HYDROXIDE AND MAGNESIUM HYDRO 5 ML: KIT at 23:22

## 2022-08-15 RX ADMIN — TRIAMCINOLONE ACETONIDE: 1 PASTE DENTAL at 21:53

## 2022-08-15 RX ADMIN — SIMETHICONE 80 MG: 80 TABLET, CHEWABLE ORAL at 08:42

## 2022-08-15 RX ADMIN — Medication 10 ML: at 10:01

## 2022-08-15 RX ADMIN — AMLODIPINE BESYLATE 10 MG: 5 TABLET ORAL at 08:42

## 2022-08-15 RX ADMIN — Medication 10 ML: at 00:11

## 2022-08-15 RX ADMIN — SIMETHICONE 80 MG: 80 TABLET, CHEWABLE ORAL at 16:39

## 2022-08-15 NOTE — PLAN OF CARE
Goal Outcome Evaluation:  Plan of Care Reviewed With: patient, spouse        Progress: improving  Outcome Evaluation: Pt on room air, VSS, able to ambulate x1 stand by assist with walker. Was able to sit up in chair. Pain well controlled. Resting comfortably with call light in reach. Will continue to monitor. Plan of care ongoing.

## 2022-08-15 NOTE — PLAN OF CARE
Pt declined PT a few mins prior.  Reports she had been walking and took a shower prior to lying back down in bed.  Will follow up to assess tomorrow.    Shaye Dean, OTR/L

## 2022-08-15 NOTE — PROGRESS NOTES
" LOS: 10 days   Patient Care Team:  Maribel Graf MD as PCP - General (Family Medicine)  AVA Cavanaugh MD as Consulting Physician (Cardiology)      Subjective \" I still have upper abdominal pain\"    Interval History:     Subjective: Nausea or vomiting and tolerating clear liquids.  Having some looser stools with antibiotics.  Upper abdominal pain unchanged.  Tongue sores persist.      ROS:   No chest pain, shortness of breath, or cough.        Medication Review:     Current Facility-Administered Medications:   •  acetaminophen (TYLENOL) tablet 650 mg, 650 mg, Oral, Q4H PRN, 650 mg at 08/05/22 2147 **OR** acetaminophen (TYLENOL) suppository 650 mg, 650 mg, Rectal, Q4H PRN, Connor Rod, APRN  •  amLODIPine (NORVASC) tablet 10 mg, 10 mg, Oral, Q24H, Connor Rod E, APRN, 10 mg at 08/15/22 0842  •  dextrose (D50W) (25 g/50 mL) IV injection 25 g, 25 g, Intravenous, Q15 Min PRN, Connor Rod, APRN  •  dextrose (GLUTOSE) oral gel 15 g, 15 g, Oral, Q15 Min PRN, Connor Rod E, APRN  •  escitalopram (LEXAPRO) tablet 10 mg, 10 mg, Oral, Daily, Connor Rod, APRN, 10 mg at 08/15/22 0842  •  First Mouthwash (Magic Mouthwash) 5 mL, 5 mL, Swish & Spit, Q6H, Janeth Oquendo APRN, 5 mL at 08/15/22 0510  •  glucagon (human recombinant) (GLUCAGEN DIAGNOSTIC) 1 mg in sterile water (preservative free) 1 mL injection, 1 mg, Intramuscular, Q15 Min PRN, Connor Rod, APRN  •  hydrALAZINE (APRESOLINE) injection 10 mg, 10 mg, Intravenous, Q6H PRN, Connor Rod, APRN, 10 mg at 08/14/22 1200  •  hydroCHLOROthiazide (HYDRODIURIL) tablet 25 mg, 25 mg, Oral, Daily, Vikram Acosta MD, 25 mg at 08/15/22 0842  •  metoprolol tartrate (LOPRESSOR) tablet 100 mg, 100 mg, Oral, Q12H, Connor Rod, APRN, 100 mg at 08/15/22 0842  •  nitroglycerin (NITROSTAT) SL tablet 0.4 mg, 0.4 mg, Sublingual, Q5 Min PRN, Connor Rod APRN  •  ondansetron (ZOFRAN) tablet 4 mg, 4 mg, Oral, Q6H PRN **OR** ondansetron (ZOFRAN) injection " 4 mg, 4 mg, Intravenous, Q6H PRN, Viola, Connor E, APRN, 4 mg at 08/15/22 0801  •  ondansetron ODT (ZOFRAN-ODT) disintegrating tablet 4 mg, 4 mg, Oral, Q6H PRN, Love, Connor E, APRN, 4 mg at 08/11/22 0951  •  oxyCODONE (ROXICODONE) immediate release tablet 10 mg, 10 mg, Oral, Q4H PRN, Rios Borges MD, 10 mg at 08/14/22 2140  •  pantoprazole (PROTONIX) EC tablet 40 mg, 40 mg, Oral, Q AM, Viola, Connor E, APRN, 40 mg at 08/15/22 0510  •  simethicone (MYLICON) chewable tablet 80 mg, 80 mg, Oral, 4x Daily PRN, Viola, Connor E, APRN, 80 mg at 08/14/22 1529  •  simethicone (MYLICON) chewable tablet 80 mg, 80 mg, Oral, 4x Daily AC & at Bedtime, Janeth Oquendo, APRN, 80 mg at 08/15/22 0842  •  sodium chloride 0.9 % flush 10 mL, 10 mL, Intravenous, PRN, Love, Connor E, APRN  •  sodium chloride 0.9 % flush 10 mL, 10 mL, Intravenous, Q12H, Love, Connor E, APRN, 10 mL at 08/15/22 1002  •  sodium chloride 0.9 % flush 10 mL, 10 mL, Intravenous, PRN, Love, Connor E, APRN  •  sodium chloride 0.9 % flush 10 mL, 10 mL, Intravenous, PRN, Love, Connor E, APRN  •  sodium chloride 0.9 % flush 10 mL, 10 mL, Intravenous, Q12H, Love, Connor E, APRN, 10 mL at 08/15/22 1001  •  sodium chloride 0.9 % flush 20 mL, 20 mL, Intravenous, PRN, Love, Connor E, APRN      Objective Resting in chair, no acute distress, family and nurse in room    Vital Signs  Vitals:    08/14/22 2018 08/15/22 0500 08/15/22 0830 08/15/22 0845   BP: 155/92 168/88 156/90 159/86   BP Location: Left arm Left arm Left arm Left arm   Patient Position: Lying Lying Lying Sitting   Pulse: 83 83 81    Resp: 18 20 18    Temp: 98.7 °F (37.1 °C) 98.6 °F (37 °C) 98.2 °F (36.8 °C)    TempSrc: Oral Oral Oral    SpO2: 95% 93% 95%    Weight:  128 kg (281 lb 4.8 oz)     Height:           Physical Exam:     General Appearance:    Awake and alert, in no acute distress, obese   Head:    Normocephalic, without obvious abnormality   Eyes:          Conjunctivae  normal, anicteric sclera   Throat:   No oral lesions, no thrush, oral mucosa moist   Neck:    supple, no JVD   Lungs:     respirations regular, even and unlabored   Abdomen:     Soft, abdominal tenderness without rebound , nondistended   Rectal:     Deferred   Extremities:   no cyanosis   Skin:   No bruising or rash, no jaundice        Results Review:    CBC    Results from last 7 days   Lab Units 08/15/22  0246 08/14/22  0355 08/12/22  0400 08/11/22  0515 08/10/22  0431 08/09/22  0339 08/08/22  1322   WBC 10*3/mm3 9.90 12.60* 15.80* 16.20* 16.20* 16.20* 14.10*   HEMOGLOBIN g/dL 8.6* 7.8* 8.0* 8.2* 8.0* 7.4* 7.7*   PLATELETS 10*3/mm3 325 302 184 155 89* 57* 50*     CMP   Results from last 7 days   Lab Units 08/15/22  0246 08/14/22  0355 08/13/22  0609 08/12/22  0400 08/11/22  0515 08/10/22  0431 08/09/22  0339   SODIUM mmol/L 139 143 142 139 142 141 139   POTASSIUM mmol/L 3.0* 3.2* 3.3* 3.2* 3.6 3.4* 4.0   CHLORIDE mmol/L 96* 99 98 97* 101 103 104   CO2 mmol/L 31.0* 34.0* 35.0* 33.0* 32.0* 31.0* 27.0   BUN mg/dL 26* 29* 33* 36* 36* 34* 29*   CREATININE mg/dL 2.00* 2.10* 2.10* 2.45* 2.69* 2.88* 2.33*   GLUCOSE mg/dL 110* 114* 112* 112* 106* 110* 123*   ALBUMIN g/dL 3.40* 3.20* 3.20* 3.10* 3.40* 3.20* 3.20*   BILIRUBIN mg/dL 2.2* 1.7* 1.2 1.0 0.9 0.9 1.3*   ALK PHOS U/L 133* 133* 135* 146* 171* 188* 217*   AST (SGOT) U/L 35* 33* 27 28 38* 74* 141*   ALT (SGPT) U/L 35* 44* 52* 73* 108* 153* 208*   MAGNESIUM mg/dL 1.7 1.7 1.8 1.9 2.1 2.3 2.9*   PHOSPHORUS mg/dL 3.9 4.1 3.7 4.0 4.3 3.2 3.0     Cr Clearance Estimated Creatinine Clearance: 45.6 mL/min (A) (by C-G formula based on SCr of 2 mg/dL (H)).  Coag   Results from last 7 days   Lab Units 08/09/22  0339   INR  1.11*     HbA1C No results found for: HGBA1C  Blood Glucose No results found for: POCGLU  Infection     UA      Radiology(recent) MRI abdomen wo contrast mrcp    Result Date: 8/14/2022  IMPRESSION : 1. Normal caliber common bile duct without evidence of  obstructing mass or stone. 2. Heterogeneous appearance of the liver parenchyma within the posterior superior right hepatic lobe segment VII likely representing an area of intraparenchymal hemorrhage. Additional subcapsular hematoma is present along the posterior and lateral aspect of the right hepatic lobe. 3. Additional nonspecific T2 hyperintense lesions are present within both the right and left hepatic lobe which appear overall similar to the earliest prior CT dated 08/05/2022. 4. Mild hepatomegaly measuring 19 cm in craniocaudal dimension.  Electronically Signed By-Moe Montiel MD On:8/14/2022 3:49 PM This report was finalized on [id number] by  Moe Montiel MD.       Assessment & Plan   Acute cholangitis per liver biopsy   Elevated liver enzymes   Acute anemia -normocytic  Sepsis-improved  Enteropathic E. coli in stool  Abnormal CT showing hemoperitoneum s/p liver biopsy  Acute kidney injury -off CRRT  Thrombocytopenia -RESOLVED  Aphthous ulcer left lateral tongue     PLAN:  Elevated LFTs with fever and sepsis on admission with liver biopsy showing cholangitis is concerning for an underlying obstructive process of her bile duct but repeat MRCP without biliary dilation.  Check HIDA scan.  E. coli of the stool could be contributing to her sepsis/cholangitis issues as well.  Continue supportive care with diet as tolerated.  Hemoglobin stable.  We will follow.    Electronically signed by GRACIELA Vera, 08/15/22, 11:39 AM EDT.

## 2022-08-15 NOTE — CASE MANAGEMENT/SOCIAL WORK
Continued Stay Note  [facility]     Patient Name: Raul Gardner  MRN: [id number]  Today's Date: 8/15/2022    Admit Date: 8/5/2022     Discharge Plan     Row Name 08/15/22 0950       Plan    Plan Declined by [facility]. No other home healths serving patients county are in network  with patient's insurance.    Plan Comments Patient is not in network with any home healths serving [facility]. Patient instructed and verbalizes understanding. IV abx finished 8/14.    Row Name 08/15/22 0947       Plan    [facility] . Referral made previously by another cm to [facility] and it is pending acceptance at this tme. Will need orders.              [place] paper work sent to [employer] and patent instructed to follow up with her employer. Verbalizes understanding     Expected Discharge Date and Time     Expected Discharge Date Expected Discharge Time    Aug 15, 2022         Met with patient in room wearing PPE: mask, face shield/goggles, gloves, gown.      Maintained distance greater than six feet and spent less than 15 minutes in the room.        Maribel Mcmillan RN

## 2022-08-15 NOTE — PROGRESS NOTES
[facility] Daily Progress Note    Patient Name: Raul Gardner  : 1975  MRN: [id number]  Primary Care Physician:  Maribel Graf MD  Date of admission: 2022      Subjective      Chief Complaint: Generalized weakness     Patient reports     2022: Patient received blood transfusion.  Shiley was placed for possible CRRT.  General surgery was consulted.  Patient noted to be on vancomycin and meropenem per ID.  GI was consulted for acute liver injury.  Patient started on CRRT.     2022: Patient noted to be on Vanco, meropenem, and micafungin per ID.     2022: Patient noted to be on Rocephin per ID.     2022: Plan for hemodialysis tomorrow per nephrology.     8/10/2022: Patient is stable for downgrade.  Hospitalist were consulted for medical management.  Patient is currently on 3 L nasal cannula of oxygen and does not wear this at home.  She is having complaints of right-sided abdominal pain that she describes as sharp.  Her current pain is a 5 out of 10.  Movement hurts.  She denies any alleviating factors.  She reports she has been eating small amounts as she has had a decreased appetite.  She is now off the Cardene drip.      Labs and vitals reviewed  Blood pressure is quite elevated now  Blood pressure medications adjusted by nephrologist  Cp addressed       Seen and examined vital signs reviewed  Blood pressure is moderately elevated on 1 L of oxygen saturating 95%  Potassium little low at 3.2 creatinine improving 2.4 White count slightly better 15.8  Had good urine output  Still w rt sided sharp cp  Check x ray  ekg  foir ant cp. wnl  Sat well on 1-2 l O2      Vitals and labs reviewed  Chest x-ray shows increasing atelectasis and effusion  No pneumothorax  Renal function spontaneously slowly improving  LFTs also improving  No CBC today      Vitals and labs and notes reviewed  C/o mouth sores  Slightly low potassium noted will  replace  Slight drop in hemoglobin to 7.8  White count better 12.6  Seen by GI-MRCP planned    8/15/2022  Patient seen and examined  Complained of generalized body weakness otherwise no new symptoms  Scheduled for HIDA scan today      Review of Systems   Constitutional: Negative for chills and fever.   HENT: Negative for congestion.    Eyes: Negative for blurred vision.   Cardiovascular: Negative for chest pain.   Respiratory: Negative for shortness of breath.    Endocrine: Negative for cold intolerance and heat intolerance.   Gastrointestinal: Negative for abdominal pain, nausea and vomiting.   Genitourinary: Negative for flank pain.   Neurological: Positive for weakness.   Psychiatric/Behavioral: Negative for altered mental status.            Objective      Vitals:   Temp:  [98.2 °F (36.8 °C)-98.7 °F (37.1 °C)] 98.5 °F (36.9 °C)  Heart Rate:  [76-83] 76  Resp:  [16-20] 16  BP: (155-168)/(85-92) 156/85    Physical Exam  Vitals reviewed.   Constitutional:       General: She is not in acute distress.     Appearance: She is well-developed.   HENT:      Head: Normocephalic.      Nose: Nose normal.      Mouth/Throat:      Mouth: Mucous membranes are moist.   Eyes:      Extraocular Movements: Extraocular movements intact.      Conjunctiva/sclera: Conjunctivae normal.      Pupils: Pupils are equal, round, and reactive to light.   Neck:      Thyroid: No thyromegaly.      Vascular: No JVD.      Trachea: No tracheal deviation.   Cardiovascular:      Rate and Rhythm: Normal rate and regular rhythm.   Pulmonary:      Effort: No respiratory distress.      Breath sounds: Normal breath sounds.   Abdominal:      General: Bowel sounds are normal.      Palpations: Abdomen is soft.      Tenderness: There is no abdominal tenderness.   Musculoskeletal:         General: Normal range of motion.      Cervical back: Normal range of motion. No muscular tenderness.   Skin:     General: Skin is warm and dry.   Neurological:      General: No  focal deficit present.      Mental Status: She is alert and oriented to person, place, and time.      Motor: No abnormal muscle tone.   Psychiatric:         Mood and Affect: Mood normal.               Result Review    Result Review:  I have personally reviewed the results from the time of this admission to 8/15/2022 15:57 EDT and agree with these findings:  [x]  Laboratory  [x]  Microbiology  [x]  Radiology  []  EKG/Telemetry   []  Cardiology/Vascular   []  Pathology  []  Old records  []  Other:  Most notable findings include: As above    Wounds (last 24 hours)     LDA Wound     Row Name 08/15/22 1200 08/15/22 0800          Wound 08/09/22 0700 Right posterior torso Incision    Wound - Properties Group Placement Date: 08/09/22 -SR Placement Time: 0700  -SR Side: Right  -SR Orientation: posterior  -SR Location: torso  -SR Primary Wound Type: Incision  -SR Additional Comments: liver biopsy site  -SR     Dressing Appearance open to air  -DA open to air  -DA     Closure Approximated  -DA Approximated  -DA     Base dry  -DA dry  -DA     Drainage Amount none  -DA none  -DA     Dressing Care open to air  -DA open to air  -DA     Retired Wound - Properties Group Placement Date: 08/09/22 -SR Placement Time: 0700  -SR Side: Right  -SR Orientation: posterior  -SR Location: torso  -SR Primary Wound Type: Incision  -SR Additional Comments: liver biopsy site  -SR     Retired Wound - Properties Group Date first assessed: 08/09/22 -SR Time first assessed: 0700  -SR Side: Right  -SR Location: torso  -SR Primary Wound Type: Incision  -SR Additional Comments: liver biopsy site  -SR           User Key  (r) = Recorded By, (t) = Taken By, (c) = Cosigned By    Initials Name Provider Type    Shaye Bauer RN Registered Nurse    Maricruz Hagan LPN Licensed Nurse                  Assessment & Plan      Brief Patient Summary:    Patient is a 47-year-old with medical history significant for depression and  hypertension.  Presented to [facility] ED on 8/5/2022 with complaints of generally feeling unwell for the last 2 days prior to presentation.  Reported having intermittent fever, body aches, vomiting and diarrhea.  In the ED patient was noted to have a temperature of 103.1, heart rate of 147 and significantly hypotensive.  Fluid resuscitation with vasopressor was initiated.  She was thought to have possible liver abscess and also had MARIANNE  Nephrologist and ID were consulted and patient admitted to the intensive care unit.  Was subsequently transferred to surgical floor and hospitalist consulted for medical management.      amLODIPine, 10 mg, Oral, Q24H  escitalopram, 10 mg, Oral, Daily  First Mouthwash (Magic Mouthwash), 5 mL, Swish & Spit, Q6H  hydroCHLOROthiazide, 25 mg, Oral, Daily  metoprolol tartrate, 100 mg, Oral, Q12H  pantoprazole, 40 mg, Oral, Q AM  simethicone, 80 mg, Oral, 4x Daily AC & at Bedtime  sodium chloride, 10 mL, Intravenous, Q12H  sodium chloride, 10 mL, Intravenous, Q12H  triamcinolone, , Mouth/Throat, BID             Active Hospital Problems:  Active Hospital Problems    Diagnosis    • **Septic shock (HCC)    • Suspected liver abscess    • Diarrhea    • Acute kidney injury (HCC)    • Metabolic acidosis    • E coli enteritis    • Abdominal hematoma, secondary to liver biopsyh    • Essential hypertension    • Vitamin D deficiency    • Pulmonary hypertension, unspecified (HCC)    • Anxiety      Plan:     Severe hypovolemic/septic shock on admission  Initially on 2 pressors  Clinical condition worsened after liver biopsy-raising concern for possible hemorrhagic shock and patient was placed on 4 pressors at one-point.  CT abdomen/pelvis 8/9/2022-stable intra-abdominal hemorrhage  Improved  Off pressors  Hemodynamically stable  Supportive care    Gastroenteritis  Enteropathogenic E. coli  GI panel- enteropathogenic E. coli  Further work-up essentially insignificant  ID following  Had 10 days  course of IV Rocephin.    Abnormal liver lesion noted on imaging  Status post liver biopsy 8/5/2022- no evidence of infection  Pathology reports revealed acute cholangitis but no evidence of malignancy.  Liver/body fluid culture-negative    Acute kidney injury  Most likely ATN secondary to sepsis and hemodynamic instability  Off CRRT  Improving  Nephrologist following    Thrombocytopenia  Most likely due to sepsis  Resolved    Reactive leukocytosis  Probably due to steroids/infection  Resolved    Hypertension  Blood pressure fairly controlled  On Norvasc and metoprolol  Started on hydrochlorothiazide  Monitor blood pressure and adjust medication as needed      Right-sided chest pain  Unlikely PE  Negative venous Dopplers  Resolved    Acute blood loss anemia with hemoperitoneum  Following liver biopsy  Was seen by general surgeon-no surgical intervention recommended  Hemoglobin stable    Sepsis on admission  Fever with elevated liver enzymes  Liver biopsy showing acute cholangitis  ?  Underlying biliary obstructive process  Autoimmune panel nonrevealing  MRCP- normal common bile duct with no evidence of obstructing mass or stone  GI following-checking HIDA scan  Continue supportive    Depression-on Lexapro       Morbid obesity  BMI 51.1   Encourage lifestyle changes            Mechanical DVT prophylaxis orders are present.    CODE STATUS:    Code Status (Patient has no pulse and is not breathing): CPR (Attempt to Resuscitate)  Medical Interventions (Patient has pulse or is breathing): Full Support      Disposition: Pending clinical progress    This patient has been examined wearing appropriate Personal Protective Equipment and discussed with hospital infection control department. 08/15/22      Electronically signed by Bob Dunne MD, 08/15/22, 15:57 EDT.  Isai Cabrera Hospitalist Team

## 2022-08-15 NOTE — PROGRESS NOTES
"PULMONARY CRITICAL CARE Progress  NOTE      PATIENT IDENTIFICATION:  Name: Raul Gardner  MRN: [id number]  :  1975     Age: 47 y.o.  Sex: female    DATE OF Note:  8/15/2022   Referring Physician: Shy Moreno MD                  Subjective:   Feeling better, on room air no SOB, no chest or abdominal pain, no bowel or bladder issues reported    Objective:  tMax 24 hrs: Temp (24hrs), Av.5 °F (36.9 °C), Min:98.2 °F (36.8 °C), Max:98.7 °F (37.1 °C)      Vitals Ranges:   Temp:  [98.2 °F (36.8 °C)-98.7 °F (37.1 °C)] 98.2 °F (36.8 °C)  Heart Rate:  [78-90] 81  Resp:  [15-20] 18  BP: (155-189)/(86-92) 159/86    Intake and Output Last 3 Shifts:   I/O last 3 completed shifts:  In: -   Out: 1875 [Urine:1875]    Exam:  /86 (BP Location: Left arm, Patient Position: Sitting)   Pulse 81   Temp 98.2 °F (36.8 °C) (Oral)   Resp 18   Ht 157.5 cm (62\")   Wt 128 kg (281 lb 4.8 oz)   SpO2 95%   BMI 51.45 kg/m²     General Appearance:   Alert  HEENT:  Normocephalic, without obvious abnormality, Conjunctivae/corneas clear.  Normal external ear canals, nares normal, no drainage     Neck:  Supple, symmetrical, trachea midline. No JVD.  Lungs /Chest wall:   Bilateral basal rhonchi, respirations unlabored, symmetrical wall movement.     Heart:  Regular rate and rhythm, systolic murmur PMI left sternal border  Abdomen: Soft, nontender, no masses, no organomegaly.    Extremities: Trace edema, no clubbing or cyanosis        Medications:    Current Facility-Administered Medications:   •  acetaminophen (TYLENOL) tablet 650 mg, 650 mg, Oral, Q4H PRN, 650 mg at 22 8207 **OR** acetaminophen (TYLENOL) suppository 650 mg, 650 mg, Rectal, Q4H PRN, Connor Rod APRN  •  amLODIPine (NORVASC) tablet 10 mg, 10 mg, Oral, Q24H, Connor Rod APRN, 10 mg at 08/15/22 0842  •  dextrose (D50W) (25 g/50 mL) IV injection 25 g, 25 g, Intravenous, Q15 Min PRN, Connor Rod APRN  •  dextrose (GLUTOSE) oral gel 15 g, 15 g, " Oral, Q15 Min PRN, Connor Rod APRSALLY  •  escitalopram (LEXAPRO) tablet 10 mg, 10 mg, Oral, Daily, Connor Rod APRN, 10 mg at 08/15/22 0842  •  First Mouthwash (Magic Mouthwash) 5 mL, 5 mL, Swish & Spit, Q6H, Janeth Oquendo, APRN, 5 mL at 08/15/22 0510  •  glucagon (human recombinant) (GLUCAGEN DIAGNOSTIC) 1 mg in sterile water (preservative free) 1 mL injection, 1 mg, Intramuscular, Q15 Min PRN, Connor Rod APRN  •  hydrALAZINE (APRESOLINE) injection 10 mg, 10 mg, Intravenous, Q6H PRN, Connor Rod APRN, 10 mg at 08/14/22 1200  •  hydroCHLOROthiazide (HYDRODIURIL) tablet 25 mg, 25 mg, Oral, Daily, Vikram Acosta MD, 25 mg at 08/15/22 0842  •  metoprolol tartrate (LOPRESSOR) tablet 100 mg, 100 mg, Oral, Q12H, Connor Rod APRN, 100 mg at 08/15/22 0842  •  nitroglycerin (NITROSTAT) SL tablet 0.4 mg, 0.4 mg, Sublingual, Q5 Min PRN, Connor Rod APRN  •  ondansetron (ZOFRAN) tablet 4 mg, 4 mg, Oral, Q6H PRN **OR** ondansetron (ZOFRAN) injection 4 mg, 4 mg, Intravenous, Q6H PRN, Connor Rod APRN, 4 mg at 08/15/22 0801  •  ondansetron ODT (ZOFRAN-ODT) disintegrating tablet 4 mg, 4 mg, Oral, Q6H PRN, Connor Rod APRN, 4 mg at 08/11/22 0951  •  oxyCODONE (ROXICODONE) immediate release tablet 10 mg, 10 mg, Oral, Q4H PRN, Rios Borges MD, 10 mg at 08/14/22 2140  •  pantoprazole (PROTONIX) EC tablet 40 mg, 40 mg, Oral, Q AM, Connor Rod APRN, 40 mg at 08/15/22 0510  •  simethicone (MYLICON) chewable tablet 80 mg, 80 mg, Oral, 4x Daily PRN, Connor Rod APRN, 80 mg at 08/14/22 1529  •  simethicone (MYLICON) chewable tablet 80 mg, 80 mg, Oral, 4x Daily AC & at Bedtime, Janeth Oquedno, APRN, 80 mg at 08/15/22 0842  •  sodium chloride 0.9 % flush 10 mL, 10 mL, Intravenous, PRN, Connor Rod, APRN  •  sodium chloride 0.9 % flush 10 mL, 10 mL, Intravenous, Q12H, Connor Rod APRN, 10 mL at 08/13/22 0958  •  sodium chloride 0.9 % flush 10 mL, 10 mL,  Intravenous, PRN, Viola, Connor E, APRN  •  sodium chloride 0.9 % flush 10 mL, 10 mL, Intravenous, PRN, Viola, Connor MOLINA, APRN  •  sodium chloride 0.9 % flush 10 mL, 10 mL, Intravenous, Q12H, Connor Rod, APRN, 10 mL at 08/15/22 0011  •  sodium chloride 0.9 % flush 20 mL, 20 mL, Intravenous, PRN, Connor Rod, APRN    Data Review:  All labs (24hrs):   Recent Results (from the past 24 hour(s))   Magnesium    Collection Time: 08/15/22  2:46 AM    Specimen: Blood   Result Value Ref Range    Magnesium 1.7 1.6 - 2.6 mg/dL   Phosphorus    Collection Time: 08/15/22  2:46 AM    Specimen: Blood   Result Value Ref Range    Phosphorus 3.9 2.5 - 4.5 mg/dL   Comprehensive Metabolic Panel    Collection Time: 08/15/22  2:46 AM    Specimen: Blood   Result Value Ref Range    Glucose 110 (H) 65 - 99 mg/dL    BUN 26 (H) 6 - 20 mg/dL    Creatinine 2.00 (H) 0.57 - 1.00 mg/dL    Sodium 139 136 - 145 mmol/L    Potassium 3.0 (L) 3.5 - 5.2 mmol/L    Chloride 96 (L) 98 - 107 mmol/L    CO2 31.0 (H) 22.0 - 29.0 mmol/L    Calcium 8.9 8.6 - 10.5 mg/dL    Total Protein 6.1 6.0 - 8.5 g/dL    Albumin 3.40 (L) 3.50 - 5.20 g/dL    ALT (SGPT) 35 (H) 1 - 33 U/L    AST (SGOT) 35 (H) 1 - 32 U/L    Alkaline Phosphatase 133 (H) 39 - 117 U/L    Total Bilirubin 2.2 (H) 0.0 - 1.2 mg/dL    Globulin 2.7 gm/dL    A/G Ratio 1.3 g/dL    BUN/Creatinine Ratio 13.0 7.0 - 25.0    Anion Gap 12.0 5.0 - 15.0 mmol/L    eGFR 30.9 (L) >60.0 mL/min/1.73   CBC Auto Differential    Collection Time: 08/15/22  2:46 AM    Specimen: Blood   Result Value Ref Range    WBC 9.90 3.40 - 10.80 10*3/mm3    RBC 3.01 (L) 3.77 - 5.28 10*6/mm3    Hemoglobin 8.6 (L) 12.0 - 15.9 g/dL    Hematocrit 25.1 (L) 34.0 - 46.6 %    MCV 83.4 79.0 - 97.0 fL    MCH 28.5 26.6 - 33.0 pg    MCHC 34.2 31.5 - 35.7 g/dL    RDW 15.5 (H) 12.3 - 15.4 %    RDW-SD 45.9 37.0 - 54.0 fl    MPV 7.9 6.0 - 12.0 fL    Platelets 325 140 - 450 10*3/mm3   Scan Slide    Collection Time: 08/15/22  2:46 AM    Specimen:  Blood   Result Value Ref Range    Scan Slide     Manual Differential    Collection Time: 08/15/22  2:46 AM    Specimen: Blood   Result Value Ref Range    Neutrophil % 69.0 42.7 - 76.0 %    Lymphocyte % 20.0 19.6 - 45.3 %    Monocyte % 2.0 (L) 5.0 - 12.0 %    Eosinophil % 3.0 0.3 - 6.2 %    Basophil % 1.0 0.0 - 1.5 %    Bands %  5.0 0.0 - 5.0 %    Neutrophils Absolute 7.33 (H) 1.70 - 7.00 10*3/mm3    Lymphocytes Absolute 1.98 0.70 - 3.10 10*3/mm3    Monocytes Absolute 0.20 0.10 - 0.90 10*3/mm3    Eosinophils Absolute 0.30 0.00 - 0.40 10*3/mm3    Basophils Absolute 0.10 0.00 - 0.20 10*3/mm3    Hypochromia Slight/1+ None Seen    Toxic Granulation Slight/1+ None Seen    Platelet Estimate Adequate Normal        Imaging:  MRI abdomen wo contrast mrcp  Narrative:    DATE OF EXAM:   8/14/2022 2:35 PM     PROCEDURE:   MRI ABDOMEN WO CONTRAST MRCP-     INDICATIONS:   Liver biopsy showed acute cholangitis; N17.9-Acute kidney failure,  unspecified; A41.9-Sepsis, unspecified organism; R65.21-Severe sepsis  with septic shock     COMPARISON:  CT abdomen and pelvis dated 08/09/2022, 08/06/2022, 08/05/2022     TECHNIQUE:   Multiplanar/multisequence MRI imaging of the abdomen was performed  without gadolinium contrast administration.     FINDINGS:   The liver is enlarged measuring 19.0 cm in craniocaudal dimension. There  is evidence of hepatic steatosis or iron deposition. There is a area of  suspected intraparenchymal hemorrhage within the posterior aspect of the  right hepatic lobe measuring approximately 7.5 x 5.9 cm involving  segment VII. There is a similar-appearing heterogeneous appearing blood  products along the posterior and lateral aspect of the posterior right  hepatic lobe with mass effect likely representing a subcapsular  hematoma.     There are multiple additional T2 hyperintense lesions within the liver  which are nonspecific and measure approximately 2.5 x 1.7 cm best seen  on image 2 of series 5 which is similar  in appearance to the prior CT  from 08/05/2022. These likely correspond to segment 8 of the liver.  There is a T2 hyperintense lesion within the lateral segments of the  left hepatic lobe segment II measuring 16 mm, also similar. There is no  intrahepatic or extra hepatic biliary ductal dilatation. The common bile  duct is normal in caliber in its entirety. There is no evidence of  filling defect to suggest choledocholithiasis. The gallbladder is  present without wall thickening. There is phrygian cap morphology. The  spleen is enlarged.     The adrenal glands appear unremarkable. There is normal intrinsic T1  signal within the pancreas. The kidneys appear symmetric in size. There  is a small simple cyst within the posterior right kidney. There is no  hydronephrosis. The gastrointestinal tract appears normal in caliber.  The aorta is normal in caliber. There are small bilateral pleural  effusions.     Impression: IMPRESSION :   1. Normal caliber common bile duct without evidence of obstructing mass  or stone.  2. Heterogeneous appearance of the liver parenchyma within the posterior  superior right hepatic lobe segment VII likely representing an area of  intraparenchymal hemorrhage. Additional subcapsular hematoma is present  along the posterior and lateral aspect of the right hepatic lobe.  3. Additional nonspecific T2 hyperintense lesions are present within  both the right and left hepatic lobe which appear overall similar to the  earliest prior CT dated 08/05/2022.  4. Mild hepatomegaly measuring 19 cm in craniocaudal dimension.     Electronically Signed By-Moe Montiel MD On:8/14/2022 3:49 PM  This report was finalized on [id number] by  Moe Montiel MD.       ASSESSMENT:  Pulmonary hypertension  Anxiety  HTN  MARIANNE  Suspected liver abscess with abdominal hematoma secondary to liver biopsy  E. coli enteritis  Metabolic acidosis- resolved  Coagulopathy probably from DIC  Acute anemia with hemoperitoneum  after liver biopsy  Anion gap metabolic acidosis  Esophagitis  Thrombocytopenia  Ulcer of left lateral tongue    PLAN:  Continue Hina's Magic mouthwash  Monitor pain  Monitor BPs  Antibiotics, completed  Electrolytes/ glycemic control  DVT and GI prophylaxis-SCD's    Discussed with Dr. Nikki Ojeda, APRN   8/15/2022  09:36 EDT     I personally have examined  and interviewed the patient. I have reviewed the history, data, problems, assessment and plan with our NP.  Critical care time in direct medical management (   ) minutes  Electronically signed by Tramaine Jordan MD. D, ABSM.

## 2022-08-15 NOTE — PLAN OF CARE
Goal Outcome Evaluation:  Patient with complaints of pain at hs, given prn Oxycodone for pain and med effective, patient also stated that Hina's magic mouthwash was aiding in pain relief from sores in mouth

## 2022-08-15 NOTE — SIGNIFICANT NOTE
08/15/22 1544   OTHER   Discipline physical therapy assistant   Rehab Time/Intention   Session Not Performed patient/family declined, not feeling well  (Pt expressed feeling nauseous, just getting back in bed, and has been up walking around the room without AD today. Pt declined PT on this date.)   Recommendation   PT - Next Appointment 08/17/22

## 2022-08-15 NOTE — PROGRESS NOTES
PROGRESS NOTE      Patient Name: Raul Gardner  : 1975  MRN: [id number]  Primary Care Physician: Maribel Graf MD  Date of admission: 2022    Patient Care Team:  Maribel Graf MD as PCP - General (Family Medicine)  AVA Cavanaugh MD as Consulting Physician (Cardiology)        Subjective   Subjective:     Patient is slightly better than yesterday but still lethargic sick looking  Review of systems:  Middle-age female complaining of body aches abdominal pain abdominal distention      Allergies:    Allergies   Allergen Reactions   • Cephalexin Rash   • Hydrocodone-Acetaminophen Rash       Objective   Exam:     Vital Signs  Temp:  [98.2 °F (36.8 °C)-98.7 °F (37.1 °C)] 98.2 °F (36.8 °C)  Heart Rate:  [78-83] 81  Resp:  [18-20] 18  BP: (155-168)/(86-92) 159/86  SpO2:  [93 %-95 %] 95 %  on   ;   Device (Oxygen Therapy): room air  Body mass index is 51.45 kg/m².    General: Middle-age  female i very lethargic  Head:      Normocephalic and atraumatic.    Eyes:      PERRL/EOM intact, conjunctiva and sclera clear with out nystagmus.    Neck:      No masses, thyromegaly,  trachea central with normal respiratory effort   Lungs:    Clear bilaterally to auscultation.    Heart:      Regular rate and rhythm, no murmur no gallop  Abd:        Diffusely tender with decreased bowel sounds  Pulses:   Pulses palpable  Extr:        No cyanosis or clubbing--no edema.    Neuro:    No focal deficits.   alert oriented x3  Skin:       Intact without lesions or rashes.    Psych:    Alert and cooperative; normal mood and affect; .      Results Review:  I have personally reviewed most recent Data :  CBC    Results from last 7 days   Lab Units 08/15/22  0246 22  0355 22  0400 22  0515 08/10/22  0431 22  0339 22  1322   WBC 10*3/mm3 9.90 12.60* 15.80* 16.20* 16.20* 16.20* 14.10*   HEMOGLOBIN g/dL 8.6* 7.8* 8.0* 8.2* 8.0* 7.4* 7.7*   PLATELETS 10*3/mm3 325 302 184 155 89* 57* 50*     CMP    Results from last 7 days   Lab Units 08/15/22  0246 08/14/22  0355 08/13/22  0609 08/12/22  0400 08/11/22  0515 08/10/22  0431 08/09/22  0339   SODIUM mmol/L 139 143 142 139 142 141 139   POTASSIUM mmol/L 3.0* 3.2* 3.3* 3.2* 3.6 3.4* 4.0   CHLORIDE mmol/L 96* 99 98 97* 101 103 104   CO2 mmol/L 31.0* 34.0* 35.0* 33.0* 32.0* 31.0* 27.0   BUN mg/dL 26* 29* 33* 36* 36* 34* 29*   CREATININE mg/dL 2.00* 2.10* 2.10* 2.45* 2.69* 2.88* 2.33*   GLUCOSE mg/dL 110* 114* 112* 112* 106* 110* 123*   ALBUMIN g/dL 3.40* 3.20* 3.20* 3.10* 3.40* 3.20* 3.20*   BILIRUBIN mg/dL 2.2* 1.7* 1.2 1.0 0.9 0.9 1.3*   ALK PHOS U/L 133* 133* 135* 146* 171* 188* 217*   AST (SGOT) U/L 35* 33* 27 28 38* 74* 141*   ALT (SGPT) U/L 35* 44* 52* 73* 108* 153* 208*     ABG        MRI abdomen wo contrast mrcp    Result Date: 8/14/2022  IMPRESSION : 1. Normal caliber common bile duct without evidence of obstructing mass or stone. 2. Heterogeneous appearance of the liver parenchyma within the posterior superior right hepatic lobe segment VII likely representing an area of intraparenchymal hemorrhage. Additional subcapsular hematoma is present along the posterior and lateral aspect of the right hepatic lobe. 3. Additional nonspecific T2 hyperintense lesions are present within both the right and left hepatic lobe which appear overall similar to the earliest prior CT dated 08/05/2022. 4. Mild hepatomegaly measuring 19 cm in craniocaudal dimension.  Electronically Signed By-Moe Montiel MD On:8/14/2022 3:49 PM This report was finalized on [id number] by  Moe Montiel MD.      Results for orders placed during the hospital encounter of 08/05/22    Adult Transthoracic Echo Complete w/ Color, Spectral and Contrast if Necessary Per Protocol    Interpretation Summary  · Left ventricular wall thickness is consistent with mild concentric hypertrophy.  · Estimated left ventricular EF = 75% Left ventricular systolic function is hyperdynamic (EF > 70%).  ·  Left ventricular diastolic function is consistent with (grade I) impaired relaxation.    Scheduled Meds:amLODIPine, 10 mg, Oral, Q24H  escitalopram, 10 mg, Oral, Daily  First Mouthwash (Magic Mouthwash), 5 mL, Swish & Spit, Q6H  hydroCHLOROthiazide, 25 mg, Oral, Daily  metoprolol tartrate, 100 mg, Oral, Q12H  pantoprazole, 40 mg, Oral, Q AM  simethicone, 80 mg, Oral, 4x Daily AC & at Bedtime  sodium chloride, 10 mL, Intravenous, Q12H  sodium chloride, 10 mL, Intravenous, Q12H      Continuous Infusions:   PRN Meds:•  acetaminophen **OR** acetaminophen  •  dextrose  •  dextrose  •  glucagon (human recombinant)  •  hydrALAZINE  •  nitroglycerin  •  ondansetron **OR** ondansetron  •  ondansetron ODT  •  oxyCODONE  •  simethicone  •  sodium chloride  •  sodium chloride  •  sodium chloride  •  sodium chloride    Assessment & Plan   Assessment and Plan:         Septic shock (HCC)    Pulmonary hypertension, unspecified (HCC)    Anxiety    Vitamin D deficiency    Essential hypertension    Suspected liver abscess    Diarrhea    Acute kidney injury (HCC)    Metabolic acidosis    E coli enteritis    Abdominal hematoma, secondary to liver biopsyh    ASSESSMENT:  · Acute kidney injury likely ATN secondary to sepsis and hemodynamic instability on pressors at this time  · Multiple liver lesion unlikely to be abscess  · Chronic kidney disease as per renal ultrasound with some increased echogenicity   · Significant lactic acidosis with increased anion gap   · Some evidence of volume overload   · History of hypertension   · Acute cholangitis  · E. coli sepsis           Plan:      · Patient is feeling much better septic shock recovered.  · Patient iwas on CRRT started 9/6/2022.  9/8/2022 no hemodynamically better blood pressure is on the high side  · Shiley catheter was removed yesterday  · Patient hemoglobin is stable at this time  · Some alkalosis but acceptable.  Volume status is acceptable  · Urine output more than  adequate  · Blood pressure still on the high side readjust antihypertensive medications  · We will start low-dose thiazide diuretics to improve blood pressure  · Potassium is still low sodium level is normal now  · Follow-up with volume status electrolytes later today and tomorrow morning  · So far all the serologies are unremarkable including KAREN, ANCA, complements    •           Electronically signed by Vikram Acosta MD,   [facility] kidney consultant  [phone]

## 2022-08-15 NOTE — PAYOR COMM NOTE
"RE: [id number]  CLINICAL UPDATE  ===================================    UTILIZATION REVIEW  GEORGE DEL VALLE RN   PH: [phone]  FAX: [phone]    [facility]  NPI# [id number]  TID # [id number]  ======================================    HIDA SCAN PENDING FOR TODAY    VSS-     LABS: CREATININE 2, POTASSIUM3.0, BILI 2.2, HGB 8.6    MEDS: ABX COMPLETED 8/14, IV ZOFRAN, PO PAIN MEDS            Raul Middleton (47 y.o. Female)             Date of Birth   1975    Social Security Number       Address   [address] [place] [address]    Home Phone   [phone]    MRN   [id number]       Orthodox   None    Marital Status                               Admission Date   8/5/22    Admission Type   Emergency    Admitting Provider   Shy Moreno MD    Attending Provider   Bob Dunne MD    Department, Room/Bed   [facility] SURGICAL INPATIENT, 4121/1       Discharge Date       Discharge Disposition       Discharge Destination                               Attending Provider: Bob Dunne MD    Allergies: Cephalexin, Hydrocodone-acetaminophen    Isolation: None   Infection: None   Code Status: CPR   Advance Care Planning Activity    Ht: 157.5 cm (62\")   Wt: 128 kg (281 lb 4.8 oz)    Admission Cmt: None   Principal Problem: Septic shock (HCC) [A41.9,R65.21]                 Active Insurance as of 8/5/2022     Primary Coverage     Payor Plan Insurance Group Employer/Plan Group    KEY BENEFIT ADMINISTRATORS LBP KEY BENEFIT ADMINISTRATORS LBP [id number]     Payor Plan Address Payor Plan Phone Number Payor Plan Fax Number Effective Dates    [address] [phone]  1/1/2022 - None Entered    [facility] [id number]       Subscriber Name Subscriber Birth Date Member ID       RAUL MIDDLETON MAHESH 1975 [id number]                 Emergency Contacts      (Rel.) Home Phone Work Phone Mobile Phone    CHUN MIDDLETON (Spouse) -- -- [phone]               Physician Progress Notes (last 24 " hours)      Vikram Acosta MD at 08/15/22 1219              PROGRESS NOTE      Patient Name: Raul Gardner  : 1975  MRN: [id number]  Primary Care Physician: Maribel Graf MD  Date of admission: 2022    Patient Care Team:  Maribel Graf MD as PCP - General (Family Medicine)  AVA Cavanaugh MD as Consulting Physician (Cardiology)        Subjective   Subjective:     Patient is slightly better than yesterday but still lethargic sick looking  Review of systems:  Middle-age female complaining of body aches abdominal pain abdominal distention      Allergies:    Allergies   Allergen Reactions   • Cephalexin Rash   • Hydrocodone-Acetaminophen Rash       Objective   Exam:     Vital Signs  Temp:  [98.2 °F (36.8 °C)-98.7 °F (37.1 °C)] 98.2 °F (36.8 °C)  Heart Rate:  [78-83] 81  Resp:  [18-20] 18  BP: (155-168)/(86-92) 159/86  SpO2:  [93 %-95 %] 95 %  on   ;   Device (Oxygen Therapy): room air  Body mass index is 51.45 kg/m².    General: Middle-age  female i very lethargic  Head:      Normocephalic and atraumatic.    Eyes:      PERRL/EOM intact, conjunctiva and sclera clear with out nystagmus.    Neck:      No masses, thyromegaly,  trachea central with normal respiratory effort   Lungs:    Clear bilaterally to auscultation.    Heart:      Regular rate and rhythm, no murmur no gallop  Abd:        Diffusely tender with decreased bowel sounds  Pulses:   Pulses palpable  Extr:        No cyanosis or clubbing--no edema.    Neuro:    No focal deficits.   alert oriented x3  Skin:       Intact without lesions or rashes.    Psych:    Alert and cooperative; normal mood and affect; .      Results Review:  I have personally reviewed most recent Data :  CBC    Results from last 7 days   Lab Units 08/15/22  0246 22  0355 22  0400 22  0515 08/10/22  0431 22  0339 22  1322   WBC 10*3/mm3 9.90 12.60* 15.80* 16.20* 16.20* 16.20* 14.10*   HEMOGLOBIN g/dL 8.6* 7.8* 8.0* 8.2* 8.0* 7.4* 7.7*    PLATELETS 10*3/mm3 325 302 184 155 89* 57* 50*     CMP   Results from last 7 days   Lab Units 08/15/22  0246 08/14/22  0355 08/13/22  0609 08/12/22  0400 08/11/22  0515 08/10/22  0431 08/09/22  0339   SODIUM mmol/L 139 143 142 139 142 141 139   POTASSIUM mmol/L 3.0* 3.2* 3.3* 3.2* 3.6 3.4* 4.0   CHLORIDE mmol/L 96* 99 98 97* 101 103 104   CO2 mmol/L 31.0* 34.0* 35.0* 33.0* 32.0* 31.0* 27.0   BUN mg/dL 26* 29* 33* 36* 36* 34* 29*   CREATININE mg/dL 2.00* 2.10* 2.10* 2.45* 2.69* 2.88* 2.33*   GLUCOSE mg/dL 110* 114* 112* 112* 106* 110* 123*   ALBUMIN g/dL 3.40* 3.20* 3.20* 3.10* 3.40* 3.20* 3.20*   BILIRUBIN mg/dL 2.2* 1.7* 1.2 1.0 0.9 0.9 1.3*   ALK PHOS U/L 133* 133* 135* 146* 171* 188* 217*   AST (SGOT) U/L 35* 33* 27 28 38* 74* 141*   ALT (SGPT) U/L 35* 44* 52* 73* 108* 153* 208*     ABG        MRI abdomen wo contrast mrcp    Result Date: 8/14/2022  IMPRESSION : 1. Normal caliber common bile duct without evidence of obstructing mass or stone. 2. Heterogeneous appearance of the liver parenchyma within the posterior superior right hepatic lobe segment VII likely representing an area of intraparenchymal hemorrhage. Additional subcapsular hematoma is present along the posterior and lateral aspect of the right hepatic lobe. 3. Additional nonspecific T2 hyperintense lesions are present within both the right and left hepatic lobe which appear overall similar to the earliest prior CT dated 08/05/2022. 4. Mild hepatomegaly measuring 19 cm in craniocaudal dimension.  Electronically Signed By-Moe Montiel MD On:8/14/2022 3:49 PM This report was finalized on [id number] by  Moe Montiel MD.      Results for orders placed during the hospital encounter of 08/05/22    Adult Transthoracic Echo Complete w/ Color, Spectral and Contrast if Necessary Per Protocol    Interpretation Summary  · Left ventricular wall thickness is consistent with mild concentric hypertrophy.  · Estimated left ventricular EF = 75% Left  ventricular systolic function is hyperdynamic (EF > 70%).  · Left ventricular diastolic function is consistent with (grade I) impaired relaxation.    Scheduled Meds:amLODIPine, 10 mg, Oral, Q24H  escitalopram, 10 mg, Oral, Daily  First Mouthwash (Magic Mouthwash), 5 mL, Swish & Spit, Q6H  hydroCHLOROthiazide, 25 mg, Oral, Daily  metoprolol tartrate, 100 mg, Oral, Q12H  pantoprazole, 40 mg, Oral, Q AM  simethicone, 80 mg, Oral, 4x Daily AC & at Bedtime  sodium chloride, 10 mL, Intravenous, Q12H  sodium chloride, 10 mL, Intravenous, Q12H      Continuous Infusions:   PRN Meds:•  acetaminophen **OR** acetaminophen  •  dextrose  •  dextrose  •  glucagon (human recombinant)  •  hydrALAZINE  •  nitroglycerin  •  ondansetron **OR** ondansetron  •  ondansetron ODT  •  oxyCODONE  •  simethicone  •  sodium chloride  •  sodium chloride  •  sodium chloride  •  sodium chloride    Assessment & Plan   Assessment and Plan:         Septic shock (HCC)    Pulmonary hypertension, unspecified (HCC)    Anxiety    Vitamin D deficiency    Essential hypertension    Suspected liver abscess    Diarrhea    Acute kidney injury (HCC)    Metabolic acidosis    E coli enteritis    Abdominal hematoma, secondary to liver biopsyh    ASSESSMENT:  · Acute kidney injury likely ATN secondary to sepsis and hemodynamic instability on pressors at this time  · Multiple liver lesion unlikely to be abscess  · Chronic kidney disease as per renal ultrasound with some increased echogenicity   · Significant lactic acidosis with increased anion gap   · Some evidence of volume overload   · History of hypertension   · Acute cholangitis  · E. coli sepsis           Plan:      · Patient is feeling much better septic shock recovered.  · Patient iwas on CRRT started 9/6/2022.  9/8/2022 no hemodynamically better blood pressure is on the high side  · Shiley catheter was removed yesterday  · Patient hemoglobin is stable at this time  · Some alkalosis but acceptable.  Volume  "status is acceptable  · Urine output more than adequate  · Blood pressure still on the high side readjust antihypertensive medications  · We will start low-dose thiazide diuretics to improve blood pressure  · Potassium is still low sodium level is normal now  · Follow-up with volume status electrolytes later today and tomorrow morning  · So far all the serologies are unremarkable including KAREN, ANCA, complements    •           Electronically signed by Vikram Acosta MD,   [facility] kidney consultant  [phone]          Electronically signed by Vikram Acosta MD at 08/15/22 1221     January Mcbride APRN at 08/15/22 1138           LOS: 10 days   Patient Care Team:  Maribel Graf MD as PCP - General (Family Medicine)  AVA Cavanaugh MD as Consulting Physician (Cardiology)      Subjective \" I still have upper abdominal pain\"    Interval History:     Subjective: Nausea or vomiting and tolerating clear liquids.  Having some looser stools with antibiotics.  Upper abdominal pain unchanged.  Tongue sores persist.      ROS:   No chest pain, shortness of breath, or cough.        Medication Review:     Current Facility-Administered Medications:   •  acetaminophen (TYLENOL) tablet 650 mg, 650 mg, Oral, Q4H PRN, 650 mg at 08/05/22 2147 **OR** acetaminophen (TYLENOL) suppository 650 mg, 650 mg, Rectal, Q4H PRN, Connor Rod, APRN  •  amLODIPine (NORVASC) tablet 10 mg, 10 mg, Oral, Q24H, Connor Rod, APRN, 10 mg at 08/15/22 0842  •  dextrose (D50W) (25 g/50 mL) IV injection 25 g, 25 g, Intravenous, Q15 Min PRN, Connor Rod, APRN  •  dextrose (GLUTOSE) oral gel 15 g, 15 g, Oral, Q15 Min PRN, Connor Rod, APRN  •  escitalopram (LEXAPRO) tablet 10 mg, 10 mg, Oral, Daily, Connor Rod, APRN, 10 mg at 08/15/22 0842  •  First Mouthwash (Magic Mouthwash) 5 mL, 5 mL, Swish & Spit, Q6H, Janeth Oquendo APRN, 5 mL at 08/15/22 0510  •  glucagon (human recombinant) (GLUCAGEN DIAGNOSTIC) 1 mg in sterile water " (preservative free) 1 mL injection, 1 mg, Intramuscular, Q15 Min PRN, Connor Rod APRN  •  hydrALAZINE (APRESOLINE) injection 10 mg, 10 mg, Intravenous, Q6H PRN, Connor Rod APRN, 10 mg at 08/14/22 1200  •  hydroCHLOROthiazide (HYDRODIURIL) tablet 25 mg, 25 mg, Oral, Daily, Vikram Acosta MD, 25 mg at 08/15/22 0842  •  metoprolol tartrate (LOPRESSOR) tablet 100 mg, 100 mg, Oral, Q12H, Connor Rod APRN, 100 mg at 08/15/22 0842  •  nitroglycerin (NITROSTAT) SL tablet 0.4 mg, 0.4 mg, Sublingual, Q5 Min PRN, Connor Rod APRN  •  ondansetron (ZOFRAN) tablet 4 mg, 4 mg, Oral, Q6H PRN **OR** ondansetron (ZOFRAN) injection 4 mg, 4 mg, Intravenous, Q6H PRN, Connor Rod APRN, 4 mg at 08/15/22 0801  •  ondansetron ODT (ZOFRAN-ODT) disintegrating tablet 4 mg, 4 mg, Oral, Q6H PRN, Connor Rod APRN, 4 mg at 08/11/22 0951  •  oxyCODONE (ROXICODONE) immediate release tablet 10 mg, 10 mg, Oral, Q4H PRN, Rios Borges MD, 10 mg at 08/14/22 2140  •  pantoprazole (PROTONIX) EC tablet 40 mg, 40 mg, Oral, Q AM, Connor Rod APRN, 40 mg at 08/15/22 0510  •  simethicone (MYLICON) chewable tablet 80 mg, 80 mg, Oral, 4x Daily PRN, Connor Rod APRN, 80 mg at 08/14/22 1529  •  simethicone (MYLICON) chewable tablet 80 mg, 80 mg, Oral, 4x Daily AC & at Bedtime, Janeth Oquendo APRN, 80 mg at 08/15/22 0842  •  sodium chloride 0.9 % flush 10 mL, 10 mL, Intravenous, PRN, Love, Connor E, APRN  •  sodium chloride 0.9 % flush 10 mL, 10 mL, Intravenous, Q12H, Love, Connor E, APRN, 10 mL at 08/15/22 1002  •  sodium chloride 0.9 % flush 10 mL, 10 mL, Intravenous, PRN, Love, Connor E, APRN  •  sodium chloride 0.9 % flush 10 mL, 10 mL, Intravenous, PRN, Love, Connor E, APRN  •  sodium chloride 0.9 % flush 10 mL, 10 mL, Intravenous, Q12H, Love, Connor E, APRN, 10 mL at 08/15/22 1001  •  sodium chloride 0.9 % flush 20 mL, 20 mL, Intravenous, PRN, Love, Connor E, APRN      Objective Resting  in chair, no acute distress, family and nurse in room    Vital Signs  Vitals:    08/14/22 2018 08/15/22 0500 08/15/22 0830 08/15/22 0845   BP: 155/92 168/88 156/90 159/86   BP Location: Left arm Left arm Left arm Left arm   Patient Position: Lying Lying Lying Sitting   Pulse: 83 83 81    Resp: 18 20 18    Temp: 98.7 °F (37.1 °C) 98.6 °F (37 °C) 98.2 °F (36.8 °C)    TempSrc: Oral Oral Oral    SpO2: 95% 93% 95%    Weight:  128 kg (281 lb 4.8 oz)     Height:           Physical Exam:     General Appearance:    Awake and alert, in no acute distress, obese   Head:    Normocephalic, without obvious abnormality   Eyes:          Conjunctivae normal, anicteric sclera   Throat:   No oral lesions, no thrush, oral mucosa moist   Neck:    supple, no JVD   Lungs:     respirations regular, even and unlabored   Abdomen:     Soft, abdominal tenderness without rebound , nondistended   Rectal:     Deferred   Extremities:   no cyanosis   Skin:   No bruising or rash, no jaundice        Results Review:    CBC    Results from last 7 days   Lab Units 08/15/22  0246 08/14/22  0355 08/12/22  0400 08/11/22  0515 08/10/22  0431 08/09/22  0339 08/08/22  1322   WBC 10*3/mm3 9.90 12.60* 15.80* 16.20* 16.20* 16.20* 14.10*   HEMOGLOBIN g/dL 8.6* 7.8* 8.0* 8.2* 8.0* 7.4* 7.7*   PLATELETS 10*3/mm3 325 302 184 155 89* 57* 50*     CMP   Results from last 7 days   Lab Units 08/15/22  0246 08/14/22  0355 08/13/22  0609 08/12/22  0400 08/11/22  0515 08/10/22  0431 08/09/22  0339   SODIUM mmol/L 139 143 142 139 142 141 139   POTASSIUM mmol/L 3.0* 3.2* 3.3* 3.2* 3.6 3.4* 4.0   CHLORIDE mmol/L 96* 99 98 97* 101 103 104   CO2 mmol/L 31.0* 34.0* 35.0* 33.0* 32.0* 31.0* 27.0   BUN mg/dL 26* 29* 33* 36* 36* 34* 29*   CREATININE mg/dL 2.00* 2.10* 2.10* 2.45* 2.69* 2.88* 2.33*   GLUCOSE mg/dL 110* 114* 112* 112* 106* 110* 123*   ALBUMIN g/dL 3.40* 3.20* 3.20* 3.10* 3.40* 3.20* 3.20*   BILIRUBIN mg/dL 2.2* 1.7* 1.2 1.0 0.9 0.9 1.3*   ALK PHOS U/L 133* 133* 135* 146*  171* 188* 217*   AST (SGOT) U/L 35* 33* 27 28 38* 74* 141*   ALT (SGPT) U/L 35* 44* 52* 73* 108* 153* 208*   MAGNESIUM mg/dL 1.7 1.7 1.8 1.9 2.1 2.3 2.9*   PHOSPHORUS mg/dL 3.9 4.1 3.7 4.0 4.3 3.2 3.0     Cr Clearance Estimated Creatinine Clearance: 45.6 mL/min (A) (by C-G formula based on SCr of 2 mg/dL (H)).  Coag   Results from last 7 days   Lab Units 08/09/22  0339   INR  1.11*     HbA1C No results found for: HGBA1C  Blood Glucose No results found for: POCGLU  Infection     UA      Radiology(recent) MRI abdomen wo contrast mrcp    Result Date: 8/14/2022  IMPRESSION : 1. Normal caliber common bile duct without evidence of obstructing mass or stone. 2. Heterogeneous appearance of the liver parenchyma within the posterior superior right hepatic lobe segment VII likely representing an area of intraparenchymal hemorrhage. Additional subcapsular hematoma is present along the posterior and lateral aspect of the right hepatic lobe. 3. Additional nonspecific T2 hyperintense lesions are present within both the right and left hepatic lobe which appear overall similar to the earliest prior CT dated 08/05/2022. 4. Mild hepatomegaly measuring 19 cm in craniocaudal dimension.  Electronically Signed By-Moe Montiel MD On:8/14/2022 3:49 PM This report was finalized on [id number] by  Moe Montiel MD.       Assessment & Plan   Acute cholangitis per liver biopsy   Elevated liver enzymes   Acute anemia -normocytic  Sepsis-improved  Enteropathic E. coli in stool  Abnormal CT showing hemoperitoneum s/p liver biopsy  Acute kidney injury -off CRRT  Thrombocytopenia -RESOLVED  Aphthous ulcer left lateral tongue     PLAN:  Elevated LFTs with fever and sepsis on admission with liver biopsy showing cholangitis is concerning for an underlying obstructive process of her bile duct but repeat MRCP without biliary dilation.  Check HIDA scan.  E. coli of the stool could be contributing to her sepsis/cholangitis issues as well.   "Continue supportive care with diet as tolerated.  Hemoglobin stable.  We will follow.    Electronically signed by GRACIELA Vera, 08/15/22, 11:39 AM EDT.           Electronically signed by January Mcbride APRN at 08/15/22 0303     Fartun Ojeda APRN at 08/15/22 0992          PULMONARY CRITICAL CARE Progress  NOTE      PATIENT IDENTIFICATION:  Name: Raul Gardner  MRN: [id number]  :  1975     Age: 47 y.o.  Sex: female    DATE OF Note:  8/15/2022   Referring Physician: Shy Moreno MD                  Subjective:   Feeling better, on room air no SOB, no chest or abdominal pain, no bowel or bladder issues reported    Objective:  tMax 24 hrs: Temp (24hrs), Av.5 °F (36.9 °C), Min:98.2 °F (36.8 °C), Max:98.7 °F (37.1 °C)      Vitals Ranges:   Temp:  [98.2 °F (36.8 °C)-98.7 °F (37.1 °C)] 98.2 °F (36.8 °C)  Heart Rate:  [78-90] 81  Resp:  [15-20] 18  BP: (155-189)/(86-92) 159/86    Intake and Output Last 3 Shifts:   I/O last 3 completed shifts:  In: -   Out: 1875 [Urine:1875]    Exam:  /86 (BP Location: Left arm, Patient Position: Sitting)   Pulse 81   Temp 98.2 °F (36.8 °C) (Oral)   Resp 18   Ht 157.5 cm (62\")   Wt 128 kg (281 lb 4.8 oz)   SpO2 95%   BMI 51.45 kg/m²     General Appearance:   Alert  HEENT:  Normocephalic, without obvious abnormality, Conjunctivae/corneas clear.  Normal external ear canals, nares normal, no drainage     Neck:  Supple, symmetrical, trachea midline. No JVD.  Lungs /Chest wall:   Bilateral basal rhonchi, respirations unlabored, symmetrical wall movement.     Heart:  Regular rate and rhythm, systolic murmur PMI left sternal border  Abdomen: Soft, nontender, no masses, no organomegaly.    Extremities: Trace edema, no clubbing or cyanosis        Medications:    Current Facility-Administered Medications:   •  acetaminophen (TYLENOL) tablet 650 mg, 650 mg, Oral, Q4H PRN, 650 mg at 22 2147 **OR** acetaminophen (TYLENOL) suppository 650 mg, 650 mg, Rectal, " Q4H PRN, Connor Rod APRN  •  amLODIPine (NORVASC) tablet 10 mg, 10 mg, Oral, Q24H, Connor Rod APRN, 10 mg at 08/15/22 0842  •  dextrose (D50W) (25 g/50 mL) IV injection 25 g, 25 g, Intravenous, Q15 Min PRN, Connor Rod, APRN  •  dextrose (GLUTOSE) oral gel 15 g, 15 g, Oral, Q15 Min PRN, Connor Rod APRN  •  escitalopram (LEXAPRO) tablet 10 mg, 10 mg, Oral, Daily, Connor Rod APRN, 10 mg at 08/15/22 0842  •  First Mouthwash (Magic Mouthwash) 5 mL, 5 mL, Swish & Spit, Q6H, Janeth Oquendo, APRN, 5 mL at 08/15/22 0510  •  glucagon (human recombinant) (GLUCAGEN DIAGNOSTIC) 1 mg in sterile water (preservative free) 1 mL injection, 1 mg, Intramuscular, Q15 Min PRN, Connor Rod APRN  •  hydrALAZINE (APRESOLINE) injection 10 mg, 10 mg, Intravenous, Q6H PRN, Connor Rod APRN, 10 mg at 08/14/22 1200  •  hydroCHLOROthiazide (HYDRODIURIL) tablet 25 mg, 25 mg, Oral, Daily, Vikram Acosta MD, 25 mg at 08/15/22 0842  •  metoprolol tartrate (LOPRESSOR) tablet 100 mg, 100 mg, Oral, Q12H, Connor Rod APRN, 100 mg at 08/15/22 0842  •  nitroglycerin (NITROSTAT) SL tablet 0.4 mg, 0.4 mg, Sublingual, Q5 Min PRN, Connor Rod APRN  •  ondansetron (ZOFRAN) tablet 4 mg, 4 mg, Oral, Q6H PRN **OR** ondansetron (ZOFRAN) injection 4 mg, 4 mg, Intravenous, Q6H PRN, Connor Rod APRN, 4 mg at 08/15/22 0801  •  ondansetron ODT (ZOFRAN-ODT) disintegrating tablet 4 mg, 4 mg, Oral, Q6H PRN, Connor Rod APRN, 4 mg at 08/11/22 0951  •  oxyCODONE (ROXICODONE) immediate release tablet 10 mg, 10 mg, Oral, Q4H PRN, Rios Borges MD, 10 mg at 08/14/22 2140  •  pantoprazole (PROTONIX) EC tablet 40 mg, 40 mg, Oral, Q AM, Connor Rod APRN, 40 mg at 08/15/22 0510  •  simethicone (MYLICON) chewable tablet 80 mg, 80 mg, Oral, 4x Daily PRN, Connor Rod APRN, 80 mg at 08/14/22 1529  •  simethicone (MYLICON) chewable tablet 80 mg, 80 mg, Oral, 4x Daily AC & at Bedtime, Janeth Oquendo  Aniya, APRN, 80 mg at 08/15/22 0842  •  sodium chloride 0.9 % flush 10 mL, 10 mL, Intravenous, PRN, Love, Connor E, APRN  •  sodium chloride 0.9 % flush 10 mL, 10 mL, Intravenous, Q12H, Love, Connor E, APRN, 10 mL at 08/13/22 0958  •  sodium chloride 0.9 % flush 10 mL, 10 mL, Intravenous, PRN, Love, Connor E, APRN  •  sodium chloride 0.9 % flush 10 mL, 10 mL, Intravenous, PRN, Love, Connor E, APRN  •  sodium chloride 0.9 % flush 10 mL, 10 mL, Intravenous, Q12H, Love, Connor E, APRN, 10 mL at 08/15/22 0011  •  sodium chloride 0.9 % flush 20 mL, 20 mL, Intravenous, PRN, Love, Connor E, APRN    Data Review:  All labs (24hrs):   Recent Results (from the past 24 hour(s))   Magnesium    Collection Time: 08/15/22  2:46 AM    Specimen: Blood   Result Value Ref Range    Magnesium 1.7 1.6 - 2.6 mg/dL   Phosphorus    Collection Time: 08/15/22  2:46 AM    Specimen: Blood   Result Value Ref Range    Phosphorus 3.9 2.5 - 4.5 mg/dL   Comprehensive Metabolic Panel    Collection Time: 08/15/22  2:46 AM    Specimen: Blood   Result Value Ref Range    Glucose 110 (H) 65 - 99 mg/dL    BUN 26 (H) 6 - 20 mg/dL    Creatinine 2.00 (H) 0.57 - 1.00 mg/dL    Sodium 139 136 - 145 mmol/L    Potassium 3.0 (L) 3.5 - 5.2 mmol/L    Chloride 96 (L) 98 - 107 mmol/L    CO2 31.0 (H) 22.0 - 29.0 mmol/L    Calcium 8.9 8.6 - 10.5 mg/dL    Total Protein 6.1 6.0 - 8.5 g/dL    Albumin 3.40 (L) 3.50 - 5.20 g/dL    ALT (SGPT) 35 (H) 1 - 33 U/L    AST (SGOT) 35 (H) 1 - 32 U/L    Alkaline Phosphatase 133 (H) 39 - 117 U/L    Total Bilirubin 2.2 (H) 0.0 - 1.2 mg/dL    Globulin 2.7 gm/dL    A/G Ratio 1.3 g/dL    BUN/Creatinine Ratio 13.0 7.0 - 25.0    Anion Gap 12.0 5.0 - 15.0 mmol/L    eGFR 30.9 (L) >60.0 mL/min/1.73   CBC Auto Differential    Collection Time: 08/15/22  2:46 AM    Specimen: Blood   Result Value Ref Range    WBC 9.90 3.40 - 10.80 10*3/mm3    RBC 3.01 (L) 3.77 - 5.28 10*6/mm3    Hemoglobin 8.6 (L) 12.0 - 15.9 g/dL    Hematocrit 25.1 (L) 34.0 -  46.6 %    MCV 83.4 79.0 - 97.0 fL    MCH 28.5 26.6 - 33.0 pg    MCHC 34.2 31.5 - 35.7 g/dL    RDW 15.5 (H) 12.3 - 15.4 %    RDW-SD 45.9 37.0 - 54.0 fl    MPV 7.9 6.0 - 12.0 fL    Platelets 325 140 - 450 10*3/mm3   Scan Slide    Collection Time: 08/15/22  2:46 AM    Specimen: Blood   Result Value Ref Range    Scan Slide     Manual Differential    Collection Time: 08/15/22  2:46 AM    Specimen: Blood   Result Value Ref Range    Neutrophil % 69.0 42.7 - 76.0 %    Lymphocyte % 20.0 19.6 - 45.3 %    Monocyte % 2.0 (L) 5.0 - 12.0 %    Eosinophil % 3.0 0.3 - 6.2 %    Basophil % 1.0 0.0 - 1.5 %    Bands %  5.0 0.0 - 5.0 %    Neutrophils Absolute 7.33 (H) 1.70 - 7.00 10*3/mm3    Lymphocytes Absolute 1.98 0.70 - 3.10 10*3/mm3    Monocytes Absolute 0.20 0.10 - 0.90 10*3/mm3    Eosinophils Absolute 0.30 0.00 - 0.40 10*3/mm3    Basophils Absolute 0.10 0.00 - 0.20 10*3/mm3    Hypochromia Slight/1+ None Seen    Toxic Granulation Slight/1+ None Seen    Platelet Estimate Adequate Normal        Imaging:  MRI abdomen wo contrast mrcp  Narrative:    DATE OF EXAM:   8/14/2022 2:35 PM     PROCEDURE:   MRI ABDOMEN WO CONTRAST MRCP-     INDICATIONS:   Liver biopsy showed acute cholangitis; N17.9-Acute kidney failure,  unspecified; A41.9-Sepsis, unspecified organism; R65.21-Severe sepsis  with septic shock     COMPARISON:  CT abdomen and pelvis dated 08/09/2022, 08/06/2022, 08/05/2022     TECHNIQUE:   Multiplanar/multisequence MRI imaging of the abdomen was performed  without gadolinium contrast administration.     FINDINGS:   The liver is enlarged measuring 19.0 cm in craniocaudal dimension. There  is evidence of hepatic steatosis or iron deposition. There is a area of  suspected intraparenchymal hemorrhage within the posterior aspect of the  right hepatic lobe measuring approximately 7.5 x 5.9 cm involving  segment VII. There is a similar-appearing heterogeneous appearing blood  products along the posterior and lateral aspect of the  posterior right  hepatic lobe with mass effect likely representing a subcapsular  hematoma.     There are multiple additional T2 hyperintense lesions within the liver  which are nonspecific and measure approximately 2.5 x 1.7 cm best seen  on image 2 of series 5 which is similar in appearance to the prior CT  from 08/05/2022. These likely correspond to segment 8 of the liver.  There is a T2 hyperintense lesion within the lateral segments of the  left hepatic lobe segment II measuring 16 mm, also similar. There is no  intrahepatic or extra hepatic biliary ductal dilatation. The common bile  duct is normal in caliber in its entirety. There is no evidence of  filling defect to suggest choledocholithiasis. The gallbladder is  present without wall thickening. There is phrygian cap morphology. The  spleen is enlarged.     The adrenal glands appear unremarkable. There is normal intrinsic T1  signal within the pancreas. The kidneys appear symmetric in size. There  is a small simple cyst within the posterior right kidney. There is no  hydronephrosis. The gastrointestinal tract appears normal in caliber.  The aorta is normal in caliber. There are small bilateral pleural  effusions.     Impression: IMPRESSION :   1. Normal caliber common bile duct without evidence of obstructing mass  or stone.  2. Heterogeneous appearance of the liver parenchyma within the posterior  superior right hepatic lobe segment VII likely representing an area of  intraparenchymal hemorrhage. Additional subcapsular hematoma is present  along the posterior and lateral aspect of the right hepatic lobe.  3. Additional nonspecific T2 hyperintense lesions are present within  both the right and left hepatic lobe which appear overall similar to the  earliest prior CT dated 08/05/2022.  4. Mild hepatomegaly measuring 19 cm in craniocaudal dimension.     Electronically Signed By-Moe Montiel MD On:8/14/2022 3:49 PM  This report was finalized on  [id number] by  Moe Montiel MD.       ASSESSMENT:  Pulmonary hypertension  Anxiety  HTN  MARIANNE  Suspected liver abscess with abdominal hematoma secondary to liver biopsy  E. coli enteritis  Metabolic acidosis- resolved  Coagulopathy probably from DIC  Acute anemia with hemoperitoneum after liver biopsy  Anion gap metabolic acidosis  Esophagitis  Thrombocytopenia  Ulcer of left lateral tongue    PLAN:  Continue Hina's Magic mouthwash  Monitor pain  Monitor BPs  Antibiotics, completed  Electrolytes/ glycemic control  DVT and GI prophylaxis-SCD's    Discussed with GRACIELA Jensen   8/15/2022  09:36 EDT       Electronically signed by Fartun Ojeda APRN at 08/15/22 0941     Mariya Pollock APRN at 08/14/22 1305     Attestation signed by Sagar Orozco MD at 08/14/22 2767    I have reviewed this documentation and agree.                  Infectious Diseases Progress Note      LOS: 9 days   Patient Care Team:  Maribel Graf MD as PCP - General (Family Medicine)  AVA Cavanaugh MD as Consulting Physician (Cardiology)    Chief Complaint: Weakness    Subjective     The patient had no fever during the last 24 hours.  She remained off vasopressors.  The patient is feeling better today.  Denied having diarrhea.  Patient is having some abdominal pain and very mild shortness of breath but nothing new or increased        Review of Systems:   Review of Systems   Constitutional: Positive for fatigue.   HENT: Negative.    Eyes: Negative.    Respiratory: Positive for shortness of breath.    Cardiovascular: Negative.    Gastrointestinal: Positive for abdominal pain.   Endocrine: Negative.    Genitourinary: Negative.    Musculoskeletal: Negative.    Skin: Negative.    Neurological: Negative.    Psychiatric/Behavioral: Negative.    All other systems reviewed and are negative.       Objective     Vital Signs  Temp:  [97.9 °F (36.6 °C)-98.7 °F (37.1 °C)] 98.4 °F (36.9 °C)  Heart Rate:  [79-95]  90  Resp:  [13-16] 15  BP: (148-189)/(85-90) 189/88    Physical Exam:  Physical Exam  Vitals and nursing note reviewed.   Constitutional:       Appearance: She is well-developed.   HENT:      Head: Normocephalic and atraumatic.   Eyes:      Pupils: Pupils are equal, round, and reactive to light.   Cardiovascular:      Rate and Rhythm: Normal rate and regular rhythm.      Heart sounds: Normal heart sounds.   Pulmonary:      Effort: Pulmonary effort is normal. No respiratory distress.      Breath sounds: Normal breath sounds. No wheezing or rales.   Abdominal:      General: Bowel sounds are normal. There is no distension.      Palpations: Abdomen is soft. There is no mass.      Tenderness: There is no abdominal tenderness. There is no guarding or rebound.   Genitourinary:     Comments: External catheter  Musculoskeletal:         General: No deformity. Normal range of motion.      Cervical back: Normal range of motion and neck supple.   Skin:     General: Skin is warm.      Findings: No erythema or rash.   Neurological:      Mental Status: She is alert and oriented to person, place, and time.      Cranial Nerves: No cranial nerve deficit.          Results Review:    I have reviewed all clinical data, test, lab, and imaging results.     Radiology  No Radiology Exams Resulted Within Past 24 Hours    Cardiology    Laboratory    Results from last 7 days   Lab Units 08/14/22  0355 08/12/22  0400 08/11/22  0515 08/10/22  0431 08/09/22  0339 08/08/22  1322 08/08/22  0812   WBC 10*3/mm3 12.60* 15.80* 16.20* 16.20* 16.20* 14.10* 14.10*   HEMOGLOBIN g/dL 7.8* 8.0* 8.2* 8.0* 7.4* 7.7* 7.6*   HEMATOCRIT % 23.9* 24.0* 25.5* 24.6* 22.5* 22.8* 22.6*   PLATELETS 10*3/mm3 302 184 155 89* 57* 50* 47*     Results from last 7 days   Lab Units 08/14/22  0355 08/13/22  0609 08/12/22  0400 08/11/22  0515 08/10/22  0431 08/09/22  0339 08/08/22  0812 08/08/22  0359   SODIUM mmol/L 143 142 139 142 141 139 138 140   POTASSIUM mmol/L 3.2* 3.3*  3.2* 3.6 3.4* 4.0 4.1 4.1   CHLORIDE mmol/L 99 98 97* 101 103 104 104 105   CO2 mmol/L 34.0* 35.0* 33.0* 32.0* 31.0* 27.0 28.0 28.0   BUN mg/dL 29* 33* 36* 36* 34* 29* 16 19   CREATININE mg/dL 2.10* 2.10* 2.45* 2.69* 2.88* 2.33* 1.35* 1.53*   GLUCOSE mg/dL 114* 112* 112* 106* 110* 123* 87 98   ALBUMIN g/dL 3.20* 3.20* 3.10* 3.40* 3.20* 3.20* 3.20* 3.00*   BILIRUBIN mg/dL 1.7* 1.2 1.0 0.9 0.9 1.3*  --  3.8*   ALK PHOS U/L 133* 135* 146* 171* 188* 217*  --  133*   AST (SGOT) U/L 33* 27 28 38* 74* 141*  --  233*   ALT (SGPT) U/L 44* 52* 73* 108* 153* 208*  --  259*   CALCIUM mg/dL 8.8 9.0 8.5* 8.4* 8.2* 8.0* 7.6* 7.6*                 Microbiology   Microbiology Results (last 10 days)     Procedure Component Value - Date/Time    Clostridioides difficile Toxin - Stool, Per Rectum [[id number]]  (Normal) Collected: 08/06/22 0703    Lab Status: Final result Specimen: Stool from Per Rectum Updated: 08/06/22 0758    Narrative:      The following orders were created for panel order Clostridioides difficile Toxin - Stool, Per Rectum.  Procedure                               Abnormality         Status                     ---------                               -----------         ------                     Clostridioides difficile...[[id number]]  Normal              Final result                 Please view results for these tests on the individual orders.    Clostridioides difficile EIA - Stool, Per Rectum [[id number]]  (Normal) Collected: 08/06/22 0703    Lab Status: Final result Specimen: Stool from Per Rectum Updated: 08/06/22 0758     C Diff GDH / Toxin Negative    Eosinophil Smear - Urine, Urine, Clean Catch [[id number]]  (Normal) Collected: 08/05/22 1917    Lab Status: Final result Specimen: Urine, Clean Catch Updated: 08/05/22 2047     Eosinophil Smear 0 % EOS/100 Cells     Body Fluid Culture - Body Fluid, Liver [[id number]] Collected: 08/05/22 1755    Lab Status: Final result Specimen: Body Fluid from Liver Updated: 08/08/22  0841     Body Fluid Culture No growth at 3 days     Gram Stain Few (2+) WBCs per low power field      No organisms seen    Anaerobic Culture - Swab, Liver [[id number]] Collected: 08/05/22 1755    Lab Status: Final result Specimen: Swab from Liver Updated: 08/10/22 0629     Anaerobic Culture No anaerobes isolated at 5 days    S. Pneumo Ag Urine or CSF - Urine, Urine, Clean Catch [[id number]]  (Normal) Collected: 08/05/22 1154    Lab Status: Final result Specimen: Urine, Clean Catch Updated: 08/05/22 1234     Strep Pneumo Ag Negative    Legionella Antigen, Urine - Urine, Urine, Clean Catch [[id number]]  (Normal) Collected: 08/05/22 1154    Lab Status: Final result Specimen: Urine, Clean Catch Updated: 08/05/22 1234     LEGIONELLA ANTIGEN, URINE Negative    MRSA Screen, PCR (Inpatient) - Swab, Nares [[id number]]  (Normal) Collected: 08/05/22 1057    Lab Status: Final result Specimen: Swab from Nares Updated: 08/05/22 1222     MRSA PCR No MRSA Detected    Narrative:      The negative predictive value of this diagnostic test is high and should only be used to consider de-escalating anti-MRSA therapy. A positive result may indicate colonization with MRSA and must be correlated clinically.    Gastrointestinal Panel, PCR - Stool, Per Rectum [[id number]]  (Abnormal) Collected: 08/05/22 1057    Lab Status: Final result Specimen: Stool from Per Rectum Updated: 08/05/22 1238     Campylobacter Not Detected     Plesiomonas shigelloides Not Detected     Salmonella Not Detected     Vibrio Not Detected     Vibrio cholerae Not Detected     Yersinia enterocolitica Not Detected     Enteroaggregative E. coli (EAEC) Not Detected     Enteropathogenic E. coli (EPEC) Detected     Enterotoxigenic E. coli (ETEC) lt/st Not Detected     Shiga-like toxin-producing E. coli (STEC) stx1/stx2 Not Detected     Shigella/Enteroinvasive E. coli (EIEC) Not Detected     Cryptosporidium Not Detected     Cyclospora cayetanensis Not Detected     Entamoeba  histolytica Not Detected     Giardia lamblia Not Detected     Adenovirus F40/41 Not Detected     Astrovirus Not Detected     Norovirus GI/GII Not Detected     Rotavirus A Not Detected     Sapovirus (I, II, IV or V) Not Detected    Urine Culture - Urine, Urine, Catheter [[id number]]  (Normal) Collected: 08/05/22 1012    Lab Status: Final result Specimen: Urine, Catheter Updated: 08/06/22 1409     Urine Culture No growth    Blood Culture - Blood, Blood, Central Line [[id number]]  (Normal) Collected: 08/05/22 0902    Lab Status: Final result Specimen: Blood, Central Line Updated: 08/10/22 0917     Blood Culture No growth at 5 days    Blood Culture - Blood, Blood, Central Line [[id number]]  (Normal) Collected: 08/05/22 0902    Lab Status: Final result Specimen: Blood, Central Line Updated: 08/10/22 0917     Blood Culture No growth at 5 days    Respiratory Panel PCR w/COVID-19(SARS-CoV-2) CELSA/JONATHAN/DOMINIQUE/PAD/COR/MAD/LISA In-House, NP Swab in UTM/VTM, 3-4 HR TAT - Swab, Nasopharynx [[id number]]  (Normal) Collected: 08/05/22 0815    Lab Status: Final result Specimen: Swab from Nasopharynx Updated: 08/05/22 0909     ADENOVIRUS, PCR Not Detected     Coronavirus 229E Not Detected     Coronavirus HKU1 Not Detected     Coronavirus NL63 Not Detected     Coronavirus OC43 Not Detected     COVID19 Not Detected     Human Metapneumovirus Not Detected     Human Rhinovirus/Enterovirus Not Detected     Influenza A PCR Not Detected     Influenza B PCR Not Detected     Parainfluenza Virus 1 Not Detected     Parainfluenza Virus 2 Not Detected     Parainfluenza Virus 3 Not Detected     Parainfluenza Virus 4 Not Detected     RSV, PCR Not Detected     Bordetella pertussis pcr Not Detected     Bordetella parapertussis PCR Not Detected     Chlamydophila pneumoniae PCR Not Detected     Mycoplasma pneumo by PCR Not Detected    Narrative:      In the setting of a positive respiratory panel with a viral infection PLUS a negative procalcitonin without other  underlying concern for bacterial infection, consider observing off antibiotics or discontinuation of antibiotics and continue supportive care. If the respiratory panel is positive for atypical bacterial infection (Bordetella pertussis, Chlamydophila pneumoniae, or Mycoplasma pneumoniae), consider antibiotic de-escalation to target atypical bacterial infection.          Medication Review:       Schedule Meds  amLODIPine, 10 mg, Oral, Q24H  cefTRIAXone, 2 g, Intravenous, Q24H  escitalopram, 10 mg, Oral, Daily  First Mouthwash (Magic Mouthwash), 5 mL, Swish & Spit, Q6H  hydroCHLOROthiazide, 25 mg, Oral, Daily  metoprolol tartrate, 100 mg, Oral, Q12H  pantoprazole, 40 mg, Oral, Q AM  sodium chloride, 10 mL, Intravenous, Q12H  sodium chloride, 10 mL, Intravenous, Q12H        Infusion Meds       PRN Meds  •  acetaminophen **OR** acetaminophen  •  dextrose  •  dextrose  •  glucagon (human recombinant)  •  hydrALAZINE  •  nitroglycerin  •  ondansetron **OR** ondansetron  •  ondansetron ODT  •  simethicone  •  sodium chloride  •  sodium chloride  •  sodium chloride  •  sodium chloride        Assessment & Plan       Antimicrobial Therapy   1.  IV ceftriaxone       Assessment     Severe hypovolemic/septic shock present on admission and was initially on 2 pressors but condition worsened after liver biopsy and was concerning secondary to hemorrhagic shock.  Patient was on 4 vasopressors at one point.  Improved significantly and currently off vasopressors  Repeat CT scan of abdomen pelvis on August 9, 2022 showed stable intra abdomen hemorrhage    The patient was admitted for gastroenteritis caused by enteropathogenic E. coli however the patient was very systemically ill and was febrile for a few days.  Work-up was negative for infection except for E. coli and stool    Abnormal liver lesion noted on imaging studies.  It might be incidental findings.  S/p biopsy and there was no purulence to suspect infection.  Pathology report is  still pending  -Cultures were negative for malignancy but did show acute cholangitis    Acute kidney injury.  Suspected to be prerenal.  Improving.  Currently off CRRT and making urine    Thrombocytopenia.  Probably secondary to sepsis.  Resolved    Reactive leukocytosis.  Mildly elevated may be now secondary to steroids        Plan    Patient received 10 days of IV Rocephin 2 g -last dose on 2022  Patient is scheduled for an MRCP later today  Monitor off antimicrobial therapy  continue supportive care  A.m. labs  Prognosis continues to improve  Case discussed with patient and family member at bedside        GRACIELA Davis  22  13:05 EDT    Note is dictated utilizing voice recognition software/Dragon      Electronically signed by Sagar Orozco MD at 22 [phone]     Rios Borges MD at 22 1244              [facility] Daily Progress Note    Patient Name: Raul Gardner  : 1975  MRN: [id number]  Primary Care Physician:  Maribel Graf MD  Date of admission: 2022      Subjective      Chief Complaint: *Generalized weakness     Note taken from Intensivist with additional editing:     History of Present Illness: Raul Gardner is a 47 y.o. female  with past medical history of melanoma, pulmonary hypertension who presented to [facility] on 2022 after not feeling well. Patient reported this started two days prior with a fever and body aches and on day of admission she started to have vomiting and diarrhea.  In ED, she had a fever of 103.1 and heart rate 146 with blood pressure 36/23 however that improved after IV fluid resuscitation and vasopressors to 103/62.  Patient was admitted to the ICU for further care management.  Infectious disease was consulted due to possible liver abscess.  Nephrology was consulted due to MARIANNE.  Interventional radiology was consulted and patient underwent liver biopsy.     2022: Patient received blood  transfusion.  Shiley was placed for possible CRRT.  General surgery was consulted.  Patient noted to be on vancomycin and meropenem per ID.  GI was consulted for acute liver injury.  Patient started on CRRT.     8/7/2022: Patient noted to be on Vanco, meropenem, and micafungin per ID.     8/8/2022: Patient noted to be on Rocephin per ID.     8/9/2022: Plan for hemodialysis tomorrow per nephrology.     8/10/2022: Patient is stable for downgrade.  Hospitalist were consulted for medical management.  Patient is currently on 3 L nasal cannula of oxygen and does not wear this at home.  She is having complaints of right-sided abdominal pain that she describes as sharp.  Her current pain is a 5 out of 10.  Movement hurts.  She denies any alleviating factors.  She reports she has been eating small amounts as she has had a decreased appetite.  She is now off the Cardene drip.       Patient Reports     8/11  Labs and vitals reviewed  Blood pressure is quite elevated now  Blood pressure medications adjusted by nephrologist  Cp addressed     8/12  Seen and examined vital signs reviewed  Blood pressure is moderately elevated on 1 L of oxygen saturating 95%  Potassium little low at 3.2 creatinine improving 2.4 White count slightly better 15.8  Had good urine output  Still w rt sided sharp cp  Check x ray  ekg 8/11 foir ant cp. wnl  Sat well on 1-2 l O2    8/13  Vitals and labs reviewed  Chest x-ray shows increasing atelectasis and effusion  No pneumothorax  Renal function spontaneously slowly improving  LFTs also improving  No CBC today  8/14  Vitals and labs and notes reviewed  C/o mouth sores  Slightly low potassium noted will replace  Slight drop in hemoglobin to 7.8  White count better 12.6  Seen by GI-MRCP planned      Review of Systems   All other systems reviewed and are negative.           Objective      Vitals:   Temp:  [97.9 °F (36.6 °C)-98.7 °F (37.1 °C)] 98.4 °F (36.9 °C)  Heart Rate:  [79-95] 90  Resp:  [13-16]  15  BP: (140-189)/(85-90) 189/88  Flow (L/min):  [0-1.5] 0    Physical Exam  Vitals and nursing note reviewed.   Constitutional:       General: She is not in acute distress.     Appearance: Normal appearance. She is well-developed. She is not ill-appearing, toxic-appearing or diaphoretic.   HENT:      Head: Normocephalic and atraumatic.      Right Ear: Ear canal and external ear normal.      Left Ear: Ear canal and external ear normal.      Nose: Nose normal. No congestion or rhinorrhea.      Mouth/Throat:      Mouth: Mucous membranes are moist.      Pharynx: No oropharyngeal exudate.   Eyes:      General: No scleral icterus.        Right eye: No discharge.         Left eye: No discharge.      Extraocular Movements: Extraocular movements intact.      Conjunctiva/sclera: Conjunctivae normal.      Pupils: Pupils are equal, round, and reactive to light.   Neck:      Thyroid: No thyromegaly.      Vascular: No carotid bruit or JVD.      Trachea: No tracheal deviation.   Cardiovascular:      Rate and Rhythm: Normal rate and regular rhythm.      Pulses: Normal pulses.      Heart sounds: Normal heart sounds. No murmur heard.    No friction rub. No gallop.   Pulmonary:      Effort: Pulmonary effort is normal. No respiratory distress.      Breath sounds: Normal breath sounds. No stridor. No wheezing, rhonchi or rales.   Chest:      Chest wall: No tenderness.   Abdominal:      General: Bowel sounds are normal. There is no distension.      Palpations: Abdomen is soft. There is no mass.      Tenderness: There is no abdominal tenderness. There is no guarding or rebound.      Hernia: No hernia is present.   Musculoskeletal:         General: No swelling, tenderness, deformity or signs of injury. Normal range of motion.      Cervical back: Normal range of motion and neck supple. No rigidity. No muscular tenderness.      Right lower leg: No edema.      Left lower leg: No edema.   Lymphadenopathy:      Cervical: No cervical  adenopathy.   Skin:     General: Skin is warm and dry.      Coloration: Skin is not jaundiced or pale.      Findings: No bruising, erythema or rash.   Neurological:      General: No focal deficit present.      Mental Status: She is alert and oriented to person, place, and time. Mental status is at baseline.      Cranial Nerves: No cranial nerve deficit.      Sensory: No sensory deficit.      Motor: No weakness or abnormal muscle tone.      Coordination: Coordination normal.   Psychiatric:         Mood and Affect: Mood normal.         Behavior: Behavior normal.         Thought Content: Thought content normal.         Judgment: Judgment normal.               Result Review    Result Review:  I have personally reviewed the results from the time of this admission to 8/14/2022 12:44 EDT and agree with these findings:  [x]  Laboratory  [x]  Microbiology  [x]  Radiology  []  EKG/Telemetry   []  Cardiology/Vascular   []  Pathology  []  Old records  []  Other:  Most notable findings include: As above    Wounds (last 24 hours)     LDA Wound     Row Name 08/13/22 2043 08/13/22 1251          Wound 08/09/22 0700 Right posterior torso Incision    Wound - Properties Group Placement Date: 08/09/22 -SR Placement Time: 0700 -SR Side: Right  -SR Orientation: posterior  -SR Location: torso  -SR Primary Wound Type: Incision  -SR Additional Comments: liver biopsy site  -SR     Dressing Appearance open to air  -JR open to air  -HT     Retired Wound - Properties Group Placement Date: 08/09/22 -SR Placement Time: 0700  -SR Side: Right  -SR Orientation: posterior  -SR Location: torso  -SR Primary Wound Type: Incision  -SR Additional Comments: liver biopsy site  -SR     Retired Wound - Properties Group Date first assessed: 08/09/22  -SR Time first assessed: 0700  -SR Side: Right  -SR Location: torso  -SR Primary Wound Type: Incision  -SR Additional Comments: liver biopsy site  -SR           User Key  (r) = Recorded By, (t) = Taken By, (c) =  Cosigned By    Initials Name Provider Type    SR Shaye Ramirez, RN Registered Nurse    Mariya Collazo RN Registered Nurse    Valerie Jackson LPN Licensed Nurse                  Assessment & Plan      Brief Patient Summary:  Raul Gardner is a 47 y.o. female who who presented with hemorrhagic/septic shock        amLODIPine, 10 mg, Oral, Q24H  cefTRIAXone, 2 g, Intravenous, Q24H  escitalopram, 10 mg, Oral, Daily  First Mouthwash (Magic Mouthwash), 5 mL, Swish & Spit, Q6H  hydroCHLOROthiazide, 25 mg, Oral, Daily  metoprolol tartrate, 100 mg, Oral, Q12H  pantoprazole, 40 mg, Oral, Q AM  sodium chloride, 10 mL, Intravenous, Q12H  sodium chloride, 10 mL, Intravenous, Q12H             Active Hospital Problems:  Active Hospital Problems    Diagnosis    • **Septic shock (HCC)    • Suspected liver abscess    • Diarrhea    • Acute kidney injury (HCC)    • Metabolic acidosis    • E coli enteritis    • Abdominal hematoma, secondary to liver biopsyh    • Essential hypertension    • Vitamin D deficiency    • Pulmonary hypertension, unspecified (HCC)    • Anxiety      Plan:        Septic/hemorrhagic shock  Without evidence of liver abscess  Acute liver injury   E. Coli enteritis   - CT abd/pelvis reviewed  - Liver biopsy -report pending  - IR attempted drainage but there was no fluid collection 8/5/2022 so CT-guided liver abscess [place]han cholangitis with malignancy.  -Negative HIV test,  - Venous duplex negative for DVT  - WBC 16.2, monitor   - Blood cultures X2- no growth thus far   - Monitor LFT's   - Continue Rocephin per ID recommendation for 10 days.  - ID, General surgery, and GI following      Acute kidney injury  Acute UTI  - CR 2.8, monitor   -Received CRRT, currently off   - Nephrology following  - Renal function improving  -Uncontrolled hypertension.  Started on Norvasc.  Also on metoprolol      CP  Right side  ? musculosk  ekg wnl  Troponin neg   x ray shows increasing atelectasis.  Oxygen saturation  remainsAppropriate and improving  Do not think patient has PE.  D-dimer would not be helpful given her renal failure at this time.  She had negative venous Doppler as well  Her echo shows no problems with the right cardiac parameters  Cannot do CT angiogram due to renal failure  Will rosibel jones  resolved    Acute blood loss anemia with hemoperitoneum   - 2/2 liver biopsy  - Hbg 8.0, monitor  - General surgery following- no surgical intervention at this time      shock liver.  Liver enzymes improving  Autoimmune panel reviewed.  Negative C ANCA antichromatin double-stranded DNA mitochondrial P ANCA smooth muscle antibody liver fraction CMV DNA  -Liver biopsy-8/5/2022 pathology shows acute cholangitis but no evidence of malignancy.  -mrcp pensing  reconsulted GI  .    History of mosquito bites exposure, awaiting West Nile virus antibodies  negative IgG and IgM West Nile virus antibodies     Mouth aphthous yulcers  Hina cortes might help      Essential HTN  - Controlled  - Monitor blood pressure  - Now off Cardene gtt   - Continue amlodipine and metoprolol      Anxiety   - Stable  - Continue lexapro      Morbid obesity  - BMI 51.1  - Encourage lifestyle changes        DVT prophylaxis: Heparin subcutaneous if okay with Dr. Oconnor  Disposition likely home with home health  Seen by PT OT    Mechanical DVT prophylaxis orders are present.    CODE STATUS:    Code Status (Patient has no pulse and is not breathing): CPR (Attempt to Resuscitate)  Medical Interventions (Patient has pulse or is breathing): Full Support      Disposition:  I expect patient to be discharged Home with [facility] pending acceptance. Watch for Home O2 needs.    This patient has been examined wearing appropriate Personal Protective Equipment and discussed with hospital infection control department. 08/14/22      Electronically signed by Rios Borges MD, 08/14/22, 12:44 EDT.  [facility] Hospitalist Team             Electronically signed by  Rios Borges MD at 08/14/22 1343       Consult Notes (last 24 hours)  Notes from 08/14/22 1241 through 08/15/22 1241   No notes of this type exist for this encounter.

## 2022-08-15 NOTE — PROGRESS NOTES
Infectious Diseases Progress Note      LOS: 10 days   Patient Care Team:  Maribel Graf MD as PCP - General (Family Medicine)  AVA Cavanaugh MD as Consulting Physician (Cardiology)    Chief Complaint: Weakness    Subjective     The patient had no fever during the last 24 hours.  She remained off vasopressors.  The patient is feeling better today.  Denied having diarrhea.  Patient is having some abdominal pain and very mild shortness of breath but nothing new or increased        Review of Systems:   Review of Systems   Constitutional: Positive for fatigue.   HENT: Negative.    Eyes: Negative.    Cardiovascular: Negative.    Gastrointestinal: Positive for abdominal pain.   Endocrine: Negative.    Genitourinary: Negative.    Musculoskeletal: Negative.    Skin: Negative.    Neurological: Negative.    Psychiatric/Behavioral: Negative.    All other systems reviewed and are negative.       Objective     Vital Signs  Temp:  [98.2 °F (36.8 °C)-98.7 °F (37.1 °C)] 98.5 °F (36.9 °C)  Heart Rate:  [76-83] 76  Resp:  [16-20] 16  BP: (155-168)/(85-92) 156/85    Physical Exam:  Physical Exam  Vitals and nursing note reviewed.   Constitutional:       Appearance: She is well-developed.   HENT:      Head: Normocephalic and atraumatic.   Eyes:      Pupils: Pupils are equal, round, and reactive to light.   Cardiovascular:      Rate and Rhythm: Normal rate and regular rhythm.      Heart sounds: Normal heart sounds.   Pulmonary:      Effort: Pulmonary effort is normal. No respiratory distress.      Breath sounds: Normal breath sounds. No wheezing or rales.   Abdominal:      General: Bowel sounds are normal. There is no distension.      Palpations: Abdomen is soft. There is no mass.      Tenderness: There is no abdominal tenderness. There is no guarding or rebound.   Genitourinary:     Comments: External catheter  Musculoskeletal:         General: No deformity. Normal range of motion.      Cervical back: Normal range of motion and  neck supple.   Skin:     General: Skin is warm.      Findings: No erythema or rash.   Neurological:      Mental Status: She is alert and oriented to person, place, and time.      Cranial Nerves: No cranial nerve deficit.          Results Review:    I have reviewed all clinical data, test, lab, and imaging results.     Radiology  No Radiology Exams Resulted Within Past 24 Hours    Cardiology    Laboratory    Results from last 7 days   Lab Units 08/15/22  0246 08/14/22  0355 08/12/22  0400 08/11/22  0515 08/10/22  0431 08/09/22  0339   WBC 10*3/mm3 9.90 12.60* 15.80* 16.20* 16.20* 16.20*   HEMOGLOBIN g/dL 8.6* 7.8* 8.0* 8.2* 8.0* 7.4*   HEMATOCRIT % 25.1* 23.9* 24.0* 25.5* 24.6* 22.5*   PLATELETS 10*3/mm3 325 302 184 155 89* 57*     Results from last 7 days   Lab Units 08/15/22  0246 08/14/22  0355 08/13/22  0609 08/12/22  0400 08/11/22  0515 08/10/22  0431 08/09/22  0339   SODIUM mmol/L 139 143 142 139 142 141 139   POTASSIUM mmol/L 3.0* 3.2* 3.3* 3.2* 3.6 3.4* 4.0   CHLORIDE mmol/L 96* 99 98 97* 101 103 104   CO2 mmol/L 31.0* 34.0* 35.0* 33.0* 32.0* 31.0* 27.0   BUN mg/dL 26* 29* 33* 36* 36* 34* 29*   CREATININE mg/dL 2.00* 2.10* 2.10* 2.45* 2.69* 2.88* 2.33*   GLUCOSE mg/dL 110* 114* 112* 112* 106* 110* 123*   ALBUMIN g/dL 3.40* 3.20* 3.20* 3.10* 3.40* 3.20* 3.20*   BILIRUBIN mg/dL 2.2* 1.7* 1.2 1.0 0.9 0.9 1.3*   ALK PHOS U/L 133* 133* 135* 146* 171* 188* 217*   AST (SGOT) U/L 35* 33* 27 28 38* 74* 141*   ALT (SGPT) U/L 35* 44* 52* 73* 108* 153* 208*   CALCIUM mg/dL 8.9 8.8 9.0 8.5* 8.4* 8.2* 8.0*                 Microbiology   Microbiology Results (last 10 days)     Procedure Component Value - Date/Time    Clostridioides difficile Toxin - Stool, Per Rectum [[id number]]  (Normal) Collected: 08/06/22 0703    Lab Status: Final result Specimen: Stool from Per Rectum Updated: 08/06/22 0758    Narrative:      The following orders were created for panel order Clostridioides difficile Toxin - Stool, Per Rectum.  Procedure                                Abnormality         Status                     ---------                               -----------         ------                     Clostridioides difficile...[[id number]]  Normal              Final result                 Please view results for these tests on the individual orders.    Clostridioides difficile EIA - Stool, Per Rectum [[id number]]  (Normal) Collected: 08/06/22 0703    Lab Status: Final result Specimen: Stool from Per Rectum Updated: 08/06/22 0758     C Diff GDH / Toxin Negative    Eosinophil Smear - Urine, Urine, Clean Catch [[id number]]  (Normal) Collected: 08/05/22 1917    Lab Status: Final result Specimen: Urine, Clean Catch Updated: 08/05/22 2047     Eosinophil Smear 0 % EOS/100 Cells     Body Fluid Culture - Body Fluid, Liver [[id number]] Collected: 08/05/22 1755    Lab Status: Final result Specimen: Body Fluid from Liver Updated: 08/08/22 0841     Body Fluid Culture No growth at 3 days     Gram Stain Few (2+) WBCs per low power field      No organisms seen    Anaerobic Culture - Swab, Liver [[id number]] Collected: 08/05/22 1755    Lab Status: Final result Specimen: Swab from Liver Updated: 08/10/22 0629     Anaerobic Culture No anaerobes isolated at 5 days          Medication Review:       Schedule Meds  amLODIPine, 10 mg, Oral, Q24H  escitalopram, 10 mg, Oral, Daily  First Mouthwash (Magic Mouthwash), 5 mL, Swish & Spit, Q6H  hydroCHLOROthiazide, 25 mg, Oral, Daily  metoprolol tartrate, 100 mg, Oral, Q12H  pantoprazole, 40 mg, Oral, Q AM  simethicone, 80 mg, Oral, 4x Daily AC & at Bedtime  sodium chloride, 10 mL, Intravenous, Q12H  sodium chloride, 10 mL, Intravenous, Q12H  triamcinolone, , Mouth/Throat, BID        Infusion Meds       PRN Meds  •  acetaminophen **OR** acetaminophen  •  dextrose  •  dextrose  •  glucagon (human recombinant)  •  hydrALAZINE  •  nitroglycerin  •  ondansetron **OR** ondansetron  •  ondansetron ODT  •  oxyCODONE  •  simethicone  •  sodium  chloride  •  sodium chloride  •  sodium chloride  •  sodium chloride        Assessment & Plan       Antimicrobial Therapy   1.  IV ceftriaxone       Assessment     Severe hypovolemic/septic shock present on admission and was initially on 2 pressors but condition worsened after liver biopsy and was concerning secondary to hemorrhagic shock.  Patient was on 4 vasopressors at one point.  Improved significantly and currently off vasopressors  Repeat CT scan of abdomen pelvis on August 9, 2022 showed stable intra abdomen hemorrhage    The patient was admitted for gastroenteritis caused by enteropathogenic E. coli however the patient was very systemically ill and was febrile for a few days.  Work-up was negative for infection except for E. coli and stool    Abnormal liver lesion noted on imaging studies.  It might be incidental findings.  S/p biopsy and there was no purulence to suspect infection.  Pathology report is still pending  -Cultures were negative for malignancy but did show acute cholangitis    Acute kidney injury.  Suspected to be prerenal.  Improving.  Currently off CRRT and making urine    Thrombocytopenia.  Probably secondary to sepsis.  Resolved    Reactive leukocytosis.  Mildly elevated may be now secondary to steroids.  Resolved        Plan    Patient received 10 days of IV Rocephin 2 g -last dose on 8/14/2022  The patient is scheduled for HIDA scan tomorrow  Monitor off antimicrobial therapy  continue supportive care              Sagar Orozco MD  08/15/22  15:23 EDT    Note is dictated utilizing voice recognition software/Dragon

## 2022-08-16 ENCOUNTER — APPOINTMENT (OUTPATIENT)
Dept: NUCLEAR MEDICINE | Facility: HOSPITAL | Age: 47
End: 2022-08-16

## 2022-08-16 ENCOUNTER — INPATIENT HOSPITAL (OUTPATIENT)
Dept: URBAN - METROPOLITAN AREA HOSPITAL 84 | Facility: HOSPITAL | Age: 47
End: 2022-08-16

## 2022-08-16 DIAGNOSIS — K83.09 OTHER CHOLANGITIS: ICD-10-CM

## 2022-08-16 DIAGNOSIS — R74.01 ELEVATION OF LEVELS OF LIVER TRANSAMINASE LEVELS: ICD-10-CM

## 2022-08-16 DIAGNOSIS — D50.0 IRON DEFICIENCY ANEMIA SECONDARY TO BLOOD LOSS (CHRONIC): ICD-10-CM

## 2022-08-16 DIAGNOSIS — A04.0 ENTEROPATHOGENIC ESCHERICHIA COLI INFECTION: ICD-10-CM

## 2022-08-16 DIAGNOSIS — R93.2 ABNORMAL FINDINGS ON DIAGNOSTIC IMAGING OF LIVER AND BILIARY: ICD-10-CM

## 2022-08-16 LAB
ALBUMIN SERPL-MCNC: 3.3 G/DL (ref 3.5–5.2)
ALBUMIN/GLOB SERPL: 1 G/DL
ALP SERPL-CCNC: 135 U/L (ref 39–117)
ALT SERPL W P-5'-P-CCNC: 33 U/L (ref 1–33)
ANION GAP SERPL CALCULATED.3IONS-SCNC: 13 MMOL/L (ref 5–15)
AST SERPL-CCNC: 37 U/L (ref 1–32)
BASOPHILS # BLD AUTO: 0.1 10*3/MM3 (ref 0–0.2)
BASOPHILS NFR BLD AUTO: 1.4 % (ref 0–1.5)
BILIRUB SERPL-MCNC: 2.9 MG/DL (ref 0–1.2)
BUN SERPL-MCNC: 26 MG/DL (ref 6–20)
BUN/CREAT SERPL: 13 (ref 7–25)
C1Q SERPL-MCNC: 9.4 MG/DL (ref 10.3–20.5)
CALCIUM SPEC-SCNC: 9.2 MG/DL (ref 8.6–10.5)
CHLORIDE SERPL-SCNC: 96 MMOL/L (ref 98–107)
CO2 SERPL-SCNC: 31 MMOL/L (ref 22–29)
CREAT SERPL-MCNC: 2 MG/DL (ref 0.57–1)
DEPRECATED RDW RBC AUTO: 46.4 FL (ref 37–54)
EGFRCR SERPLBLD CKD-EPI 2021: 30.9 ML/MIN/1.73
EOSINOPHIL # BLD AUTO: 0.3 10*3/MM3 (ref 0–0.4)
EOSINOPHIL NFR BLD AUTO: 3.4 % (ref 0.3–6.2)
ERYTHROCYTE [DISTWIDTH] IN BLOOD BY AUTOMATED COUNT: 15.7 % (ref 12.3–15.4)
GLOBULIN UR ELPH-MCNC: 3.2 GM/DL
GLUCOSE SERPL-MCNC: 113 MG/DL (ref 65–99)
HCT VFR BLD AUTO: 27.6 % (ref 34–46.6)
HGB BLD-MCNC: 9.3 G/DL (ref 12–15.9)
LYMPHOCYTES # BLD AUTO: 1.8 10*3/MM3 (ref 0.7–3.1)
LYMPHOCYTES NFR BLD AUTO: 20.1 % (ref 19.6–45.3)
MAGNESIUM SERPL-MCNC: 1.8 MG/DL (ref 1.6–2.6)
MCH RBC QN AUTO: 28.1 PG (ref 26.6–33)
MCHC RBC AUTO-ENTMCNC: 33.7 G/DL (ref 31.5–35.7)
MCV RBC AUTO: 83.4 FL (ref 79–97)
MONOCYTES # BLD AUTO: 1.1 10*3/MM3 (ref 0.1–0.9)
MONOCYTES NFR BLD AUTO: 12.6 % (ref 5–12)
NEUTROPHILS NFR BLD AUTO: 5.7 10*3/MM3 (ref 1.7–7)
NEUTROPHILS NFR BLD AUTO: 62.5 % (ref 42.7–76)
NRBC BLD AUTO-RTO: 0.2 /100 WBC (ref 0–0.2)
PHOSPHATE SERPL-MCNC: 4.3 MG/DL (ref 2.5–4.5)
PLATELET # BLD AUTO: 350 10*3/MM3 (ref 140–450)
PMV BLD AUTO: 7.8 FL (ref 6–12)
POTASSIUM SERPL-SCNC: 3.3 MMOL/L (ref 3.5–5.2)
PROT SERPL-MCNC: 6.5 G/DL (ref 6–8.5)
RBC # BLD AUTO: 3.31 10*6/MM3 (ref 3.77–5.28)
SODIUM SERPL-SCNC: 140 MMOL/L (ref 136–145)
WBC NRBC COR # BLD: 9.1 10*3/MM3 (ref 3.4–10.8)

## 2022-08-16 PROCEDURE — 99232 SBSQ HOSP IP/OBS MODERATE 35: CPT | Performed by: INTERNAL MEDICINE

## 2022-08-16 PROCEDURE — A9537 TC99M MEBROFENIN: HCPCS | Performed by: INTERNAL MEDICINE

## 2022-08-16 PROCEDURE — 83735 ASSAY OF MAGNESIUM: CPT | Performed by: NURSE PRACTITIONER

## 2022-08-16 PROCEDURE — 97530 THERAPEUTIC ACTIVITIES: CPT

## 2022-08-16 PROCEDURE — 78226 HEPATOBILIARY SYSTEM IMAGING: CPT

## 2022-08-16 PROCEDURE — 99232 SBSQ HOSP IP/OBS MODERATE 35: CPT | Performed by: NURSE PRACTITIONER

## 2022-08-16 PROCEDURE — 36415 COLL VENOUS BLD VENIPUNCTURE: CPT | Performed by: NURSE PRACTITIONER

## 2022-08-16 PROCEDURE — 80053 COMPREHEN METABOLIC PANEL: CPT | Performed by: NURSE PRACTITIONER

## 2022-08-16 PROCEDURE — 0 TECHNETIUM TC 99M MEBROFENIN KIT: Performed by: INTERNAL MEDICINE

## 2022-08-16 PROCEDURE — 84100 ASSAY OF PHOSPHORUS: CPT | Performed by: NURSE PRACTITIONER

## 2022-08-16 PROCEDURE — 85025 COMPLETE CBC W/AUTO DIFF WBC: CPT | Performed by: NURSE PRACTITIONER

## 2022-08-16 RX ORDER — KIT FOR THE PREPARATION OF TECHNETIUM TC 99M MEBROFENIN 45 MG/10ML
1 INJECTION, POWDER, LYOPHILIZED, FOR SOLUTION INTRAVENOUS
Status: COMPLETED | OUTPATIENT
Start: 2022-08-16 | End: 2022-08-16

## 2022-08-16 RX ADMIN — Medication 10 ML: at 09:06

## 2022-08-16 RX ADMIN — SIMETHICONE 80 MG: 80 TABLET, CHEWABLE ORAL at 20:37

## 2022-08-16 RX ADMIN — SIMETHICONE 80 MG: 80 TABLET, CHEWABLE ORAL at 09:05

## 2022-08-16 RX ADMIN — METOPROLOL TARTRATE 100 MG: 50 TABLET, FILM COATED ORAL at 09:06

## 2022-08-16 RX ADMIN — ESCITALOPRAM OXALATE 10 MG: 10 TABLET ORAL at 09:05

## 2022-08-16 RX ADMIN — DIPHENHYDRAMINE HYDROCHLORIDE AND LIDOCAINE HYDROCHLORIDE AND ALUMINUM HYDROXIDE AND MAGNESIUM HYDRO 5 ML: KIT at 05:14

## 2022-08-16 RX ADMIN — METOPROLOL TARTRATE 100 MG: 50 TABLET, FILM COATED ORAL at 20:37

## 2022-08-16 RX ADMIN — TRIAMCINOLONE ACETONIDE: 1 PASTE DENTAL at 20:41

## 2022-08-16 RX ADMIN — DIPHENHYDRAMINE HYDROCHLORIDE AND LIDOCAINE HYDROCHLORIDE AND ALUMINUM HYDROXIDE AND MAGNESIUM HYDRO 5 ML: KIT at 11:53

## 2022-08-16 RX ADMIN — PANTOPRAZOLE SODIUM 40 MG: 40 TABLET, DELAYED RELEASE ORAL at 09:05

## 2022-08-16 RX ADMIN — AMLODIPINE BESYLATE 10 MG: 5 TABLET ORAL at 09:06

## 2022-08-16 RX ADMIN — TRIAMCINOLONE ACETONIDE: 1 PASTE DENTAL at 09:07

## 2022-08-16 RX ADMIN — SIMETHICONE 80 MG: 80 TABLET, CHEWABLE ORAL at 11:53

## 2022-08-16 RX ADMIN — SIMETHICONE 80 MG: 80 TABLET, CHEWABLE ORAL at 17:00

## 2022-08-16 RX ADMIN — DIPHENHYDRAMINE HYDROCHLORIDE AND LIDOCAINE HYDROCHLORIDE AND ALUMINUM HYDROXIDE AND MAGNESIUM HYDRO 5 ML: KIT at 17:00

## 2022-08-16 RX ADMIN — MEBROFENIN 1 DOSE: 45 INJECTION, POWDER, LYOPHILIZED, FOR SOLUTION INTRAVENOUS at 07:51

## 2022-08-16 RX ADMIN — Medication 10 ML: at 20:38

## 2022-08-16 RX ADMIN — OXYCODONE 10 MG: 5 TABLET ORAL at 20:41

## 2022-08-16 RX ADMIN — HYDROCHLOROTHIAZIDE 25 MG: 25 TABLET ORAL at 09:05

## 2022-08-16 NOTE — PROGRESS NOTES
" LOS: 11 days   Patient Care Team:  Maribel Graf MD as PCP - General (Family Medicine)  AVA Cavanaugh MD as Consulting Physician (Cardiology)      Subjective \" I am doing okay\"    Interval History:     Subjective: Intermittent nausea especially with warm food but denies any vomiting.  Abdominal pain unchanged.  Multiple looser stools.  No fevers overnight.      ROS:   No chest pain, shortness of breath, or cough.        Medication Review:     Current Facility-Administered Medications:   •  acetaminophen (TYLENOL) tablet 650 mg, 650 mg, Oral, Q4H PRN, 650 mg at 08/05/22 2147 **OR** acetaminophen (TYLENOL) suppository 650 mg, 650 mg, Rectal, Q4H PRN, Connor Rod, APRN  •  amLODIPine (NORVASC) tablet 10 mg, 10 mg, Oral, Q24H, Connor Rod, APRN, 10 mg at 08/16/22 0906  •  dextrose (D50W) (25 g/50 mL) IV injection 25 g, 25 g, Intravenous, Q15 Min PRN, Connor Rod, APRN  •  dextrose (GLUTOSE) oral gel 15 g, 15 g, Oral, Q15 Min PRN, Connor Rod, APRN  •  escitalopram (LEXAPRO) tablet 10 mg, 10 mg, Oral, Daily, Connor Rod APRN, 10 mg at 08/16/22 0905  •  First Mouthwash (Magic Mouthwash) 5 mL, 5 mL, Swish & Spit, Q6H, Janeth Oquendo APRN, 5 mL at 08/16/22 1153  •  glucagon (human recombinant) (GLUCAGEN DIAGNOSTIC) 1 mg in sterile water (preservative free) 1 mL injection, 1 mg, Intramuscular, Q15 Min PRN, Connor Rod, APRN  •  hydrALAZINE (APRESOLINE) injection 10 mg, 10 mg, Intravenous, Q6H PRN, Connor Rod, APRN, 10 mg at 08/14/22 1200  •  hydroCHLOROthiazide (HYDRODIURIL) tablet 25 mg, 25 mg, Oral, Daily, Vikram Acosta MD, 25 mg at 08/16/22 0905  •  metoprolol tartrate (LOPRESSOR) tablet 100 mg, 100 mg, Oral, Q12H, Connor Rod, APRN, 100 mg at 08/16/22 0906  •  nitroglycerin (NITROSTAT) SL tablet 0.4 mg, 0.4 mg, Sublingual, Q5 Min PRN, Connor Rod, APRN  •  ondansetron (ZOFRAN) tablet 4 mg, 4 mg, Oral, Q6H PRN **OR** ondansetron (ZOFRAN) injection 4 mg, 4 mg, " Intravenous, Q6H PRN, Viola, Connor E, APRN, 4 mg at 08/15/22 1639  •  ondansetron ODT (ZOFRAN-ODT) disintegrating tablet 4 mg, 4 mg, Oral, Q6H PRN, Viola, Connor E, APRN, 4 mg at 08/11/22 0951  •  oxyCODONE (ROXICODONE) immediate release tablet 10 mg, 10 mg, Oral, Q4H PRN, Rios Borges MD, 10 mg at 08/15/22 2152  •  pantoprazole (PROTONIX) EC tablet 40 mg, 40 mg, Oral, Q AM, Love, Connor E, APRN, 40 mg at 08/16/22 0905  •  simethicone (MYLICON) chewable tablet 80 mg, 80 mg, Oral, 4x Daily PRN, Love, Connor E, APRN, 80 mg at 08/14/22 1529  •  simethicone (MYLICON) chewable tablet 80 mg, 80 mg, Oral, 4x Daily AC & at Bedtime, Janeth Oquendo, APRN, 80 mg at 08/16/22 1153  •  sodium chloride 0.9 % flush 10 mL, 10 mL, Intravenous, PRN, Love, Connor E, APRN  •  sodium chloride 0.9 % flush 10 mL, 10 mL, Intravenous, Q12H, Love, Connor E, APRN, 10 mL at 08/16/22 0906  •  sodium chloride 0.9 % flush 10 mL, 10 mL, Intravenous, PRN, Love, Connor E, APRN  •  sodium chloride 0.9 % flush 10 mL, 10 mL, Intravenous, PRN, Love, Connor E, APRN  •  sodium chloride 0.9 % flush 10 mL, 10 mL, Intravenous, Q12H, Love, Connor E, APRN, 10 mL at 08/16/22 0906  •  sodium chloride 0.9 % flush 20 mL, 20 mL, Intravenous, PRN, Love, Connor E, APRN  •  triamcinolone (KENALOG) 0.1 % paste, , Mouth/Throat, BID, January Mcbride, APRN, Given at 08/16/22 0907      Objective Resting in bed, ill-appearing but no acute distress, family and nurse in room    Vital Signs  Vitals:    08/15/22 2100 08/16/22 0454 08/16/22 0549 08/16/22 1217   BP: 172/91 143/83  136/87   BP Location: Left arm Left arm  Left arm   Patient Position: Sitting Lying  Lying   Pulse: 84 80  76   Resp: 19 20  18   Temp: 98.9 °F (37.2 °C) 98.4 °F (36.9 °C)  98.5 °F (36.9 °C)   TempSrc: Oral Oral  Oral   SpO2: 94% 98%  96%   Weight:   123 kg (271 lb 6.4 oz)    Height:           Physical Exam:     General Appearance:    Awake and alert, in no acute distress,  obese   Head:    Normocephalic, without obvious abnormality   Eyes:          Conjunctivae normal, icteric sclera   Throat:   No oral lesions, no thrush, oral mucosa moist   Neck:   supple, no JVD   Lungs:     respirations regular, even and unlabored       Rectal:     Deferred   Extremities:   No edema, no cyanosis   Skin:   No bruising or rash,  jaundice        Results Review:    CBC    Results from last 7 days   Lab Units 08/16/22  0400 08/15/22  0246 08/14/22  0355 08/12/22  0400 08/11/22  0515 08/10/22  0431   WBC 10*3/mm3 9.10 9.90 12.60* 15.80* 16.20* 16.20*   HEMOGLOBIN g/dL 9.3* 8.6* 7.8* 8.0* 8.2* 8.0*   PLATELETS 10*3/mm3 350 325 302 184 155 89*     CMP   Results from last 7 days   Lab Units 08/16/22  0400 08/15/22  0246 08/14/22  0355 08/13/22  0609 08/12/22  0400 08/11/22  0515 08/10/22  0431   SODIUM mmol/L 140 139 143 142 139 142 141   POTASSIUM mmol/L 3.3* 3.0* 3.2* 3.3* 3.2* 3.6 3.4*   CHLORIDE mmol/L 96* 96* 99 98 97* 101 103   CO2 mmol/L 31.0* 31.0* 34.0* 35.0* 33.0* 32.0* 31.0*   BUN mg/dL 26* 26* 29* 33* 36* 36* 34*   CREATININE mg/dL 2.00* 2.00* 2.10* 2.10* 2.45* 2.69* 2.88*   GLUCOSE mg/dL 113* 110* 114* 112* 112* 106* 110*   ALBUMIN g/dL 3.30* 3.40* 3.20* 3.20* 3.10* 3.40* 3.20*   BILIRUBIN mg/dL 2.9* 2.2* 1.7* 1.2 1.0 0.9 0.9   ALK PHOS U/L 135* 133* 133* 135* 146* 171* 188*   AST (SGOT) U/L 37* 35* 33* 27 28 38* 74*   ALT (SGPT) U/L 33 35* 44* 52* 73* 108* 153*   MAGNESIUM mg/dL 1.8 1.7 1.7 1.8 1.9 2.1 2.3   PHOSPHORUS mg/dL 4.3 3.9 4.1 3.7 4.0 4.3 3.2     Cr Clearance Estimated Creatinine Clearance: 44.5 mL/min (A) (by C-G formula based on SCr of 2 mg/dL (H)).  Coag     HbA1C No results found for: HGBA1C  Blood Glucose No results found for: POCGLU  Infection     UA      Radiology(recent) NM HIDA SCAN WITHOUT PHARMACOLOGICAL INTERVENTION    Result Date: 8/16/2022  1.     No significant abnormality.  Electronically Signed By-Gaetano Nielson MD On:8/16/2022 9:28 AM This report was finalized on  [id number] by  Gaetano Nielson MD.         Assessment & Plan   Acute cholangitis per liver biopsy   Elevated liver enzymes   Acute anemia -normocytic  Sepsis-improved  Enteropathic E. coli in stool  Abnormal CT showing hemoperitoneum s/p liver biopsy  Acute kidney injury -off CRRT  Thrombocytopenia -RESOLVED  Aphthous ulcer left lateral tongue     PLAN:  HIDA normal without evidence of chronic cholecystitis.  Elevated bilirubin probably related to hematoma reabsorption.  No fevers overnight and is tolerating some oral nutrition.  Looser stools likely secondary to recent antibiotic use.  Infectious disease following and currently off antibiotics with monitoring.  Continue diet as tolerated with antiemetics and analgesics as needed.  Continue supportive care.    Electronically signed by GRACIELA Vera, 08/16/22, 3:21 PM EDT.

## 2022-08-16 NOTE — PROGRESS NOTES
"PULMONARY CRITICAL CARE Progress  NOTE      PATIENT IDENTIFICATION:  Name: Raul Gardner  MRN: [id number]  :  1975     Age: 47 y.o.  Sex: female    DATE OF Note:  2022   Referring Physician: Shy Moreno MD                  Subjective:   Feeling a little better, on room air, no SOB, no chest or abdominal pain, no bowel or bladder issues reported, to have HIDA scan today    Objective:  tMax 24 hrs: Temp (24hrs), Av.5 °F (36.9 °C), Min:98.1 °F (36.7 °C), Max:98.9 °F (37.2 °C)      Vitals Ranges:   Temp:  [98.1 °F (36.7 °C)-98.9 °F (37.2 °C)] 98.5 °F (36.9 °C)  Heart Rate:  [76-86] 76  Resp:  [17-20] 18  BP: (136-172)/(83-91) 136/87    Intake and Output Last 3 Shifts:   I/O last 3 completed shifts:  In: 720 [P.O.:720]  Out: 775 [Urine:775]    Exam:  /87 (BP Location: Left arm, Patient Position: Lying)   Pulse 76   Temp 98.5 °F (36.9 °C) (Oral)   Resp 18   Ht 157.5 cm (62\")   Wt 123 kg (271 lb 6.4 oz)   SpO2 96%   BMI 49.64 kg/m²     General Appearance:   Alert  HEENT:  Normocephalic, without obvious abnormality, Conjunctivae/corneas clear.  Normal external ear canals, nares normal, no drainage     Neck:  Supple, symmetrical, trachea midline. No JVD.  Lungs /Chest wall:   Bilateral basal rhonchi, respirations unlabored, symmetrical wall movement.     Heart:  Regular rate and rhythm, systolic murmur PMI left sternal border  Abdomen: Soft, nontender, no masses, no organomegaly.    Extremities: Trace edema, no clubbing or cyanosis        Medications:    Current Facility-Administered Medications:   •  acetaminophen (TYLENOL) tablet 650 mg, 650 mg, Oral, Q4H PRN, 650 mg at 22 9464 **OR** acetaminophen (TYLENOL) suppository 650 mg, 650 mg, Rectal, Q4H PRN, Connor Rod APRN  •  amLODIPine (NORVASC) tablet 10 mg, 10 mg, Oral, Q24H, Connor Rod APRN, 10 mg at 22 0906  •  dextrose (D50W) (25 g/50 mL) IV injection 25 g, 25 g, Intravenous, Q15 Min PRN, Connor Rod APRN  •  " dextrose (GLUTOSE) oral gel 15 g, 15 g, Oral, Q15 Min PRN, Connor Rod, APRN  •  escitalopram (LEXAPRO) tablet 10 mg, 10 mg, Oral, Daily, Connor Rod APRN, 10 mg at 08/16/22 0905  •  First Mouthwash (Magic Mouthwash) 5 mL, 5 mL, Swish & Spit, Q6H, Janeth Oquendo, APRN, 5 mL at 08/16/22 1153  •  glucagon (human recombinant) (GLUCAGEN DIAGNOSTIC) 1 mg in sterile water (preservative free) 1 mL injection, 1 mg, Intramuscular, Q15 Min PRN, Connor Rod APRN  •  hydrALAZINE (APRESOLINE) injection 10 mg, 10 mg, Intravenous, Q6H PRN, Connor Rod APRN, 10 mg at 08/14/22 1200  •  hydroCHLOROthiazide (HYDRODIURIL) tablet 25 mg, 25 mg, Oral, Daily, Vikram Acosta MD, 25 mg at 08/16/22 0905  •  metoprolol tartrate (LOPRESSOR) tablet 100 mg, 100 mg, Oral, Q12H, Connor Rod APRN, 100 mg at 08/16/22 0906  •  nitroglycerin (NITROSTAT) SL tablet 0.4 mg, 0.4 mg, Sublingual, Q5 Min PRN, Connor Rod, APRN  •  ondansetron (ZOFRAN) tablet 4 mg, 4 mg, Oral, Q6H PRN **OR** ondansetron (ZOFRAN) injection 4 mg, 4 mg, Intravenous, Q6H PRN, Connor Rod APRN, 4 mg at 08/15/22 1639  •  ondansetron ODT (ZOFRAN-ODT) disintegrating tablet 4 mg, 4 mg, Oral, Q6H PRN, Connor Rod APRN, 4 mg at 08/11/22 0951  •  oxyCODONE (ROXICODONE) immediate release tablet 10 mg, 10 mg, Oral, Q4H PRN, Rios Borges MD, 10 mg at 08/15/22 2152  •  pantoprazole (PROTONIX) EC tablet 40 mg, 40 mg, Oral, Q AM, Connor Rod APRN, 40 mg at 08/16/22 0905  •  simethicone (MYLICON) chewable tablet 80 mg, 80 mg, Oral, 4x Daily PRN, Connor Rod APRN, 80 mg at 08/14/22 1529  •  simethicone (MYLICON) chewable tablet 80 mg, 80 mg, Oral, 4x Daily AC & at Bedtime, Janeth Oquendo APRN, 80 mg at 08/16/22 1153  •  sodium chloride 0.9 % flush 10 mL, 10 mL, Intravenous, PRN, Connor Rod APRN  •  sodium chloride 0.9 % flush 10 mL, 10 mL, Intravenous, Q12H, Connor Rod APRN, 10 mL at 08/16/22 0906  •  sodium  chloride 0.9 % flush 10 mL, 10 mL, Intravenous, PRN, Connor Rod E, APRN  •  sodium chloride 0.9 % flush 10 mL, 10 mL, Intravenous, PRN, Viola, Connor E, APRN  •  sodium chloride 0.9 % flush 10 mL, 10 mL, Intravenous, Q12H, Connor Rod, APRN, 10 mL at 08/16/22 0906  •  sodium chloride 0.9 % flush 20 mL, 20 mL, Intravenous, PRN, Connor Rod, APRN  •  triamcinolone (KENALOG) 0.1 % paste, , Mouth/Throat, BID, January Mcbride, APRN, Given at 08/16/22 0907    Data Review:  All labs (24hrs):   Recent Results (from the past 24 hour(s))   Magnesium    Collection Time: 08/16/22  4:00 AM    Specimen: Blood   Result Value Ref Range    Magnesium 1.8 1.6 - 2.6 mg/dL   Phosphorus    Collection Time: 08/16/22  4:00 AM    Specimen: Blood   Result Value Ref Range    Phosphorus 4.3 2.5 - 4.5 mg/dL   Comprehensive Metabolic Panel    Collection Time: 08/16/22  4:00 AM    Specimen: Blood   Result Value Ref Range    Glucose 113 (H) 65 - 99 mg/dL    BUN 26 (H) 6 - 20 mg/dL    Creatinine 2.00 (H) 0.57 - 1.00 mg/dL    Sodium 140 136 - 145 mmol/L    Potassium 3.3 (L) 3.5 - 5.2 mmol/L    Chloride 96 (L) 98 - 107 mmol/L    CO2 31.0 (H) 22.0 - 29.0 mmol/L    Calcium 9.2 8.6 - 10.5 mg/dL    Total Protein 6.5 6.0 - 8.5 g/dL    Albumin 3.30 (L) 3.50 - 5.20 g/dL    ALT (SGPT) 33 1 - 33 U/L    AST (SGOT) 37 (H) 1 - 32 U/L    Alkaline Phosphatase 135 (H) 39 - 117 U/L    Total Bilirubin 2.9 (H) 0.0 - 1.2 mg/dL    Globulin 3.2 gm/dL    A/G Ratio 1.0 g/dL    BUN/Creatinine Ratio 13.0 7.0 - 25.0    Anion Gap 13.0 5.0 - 15.0 mmol/L    eGFR 30.9 (L) >60.0 mL/min/1.73   CBC Auto Differential    Collection Time: 08/16/22  4:00 AM    Specimen: Blood   Result Value Ref Range    WBC 9.10 3.40 - 10.80 10*3/mm3    RBC 3.31 (L) 3.77 - 5.28 10*6/mm3    Hemoglobin 9.3 (L) 12.0 - 15.9 g/dL    Hematocrit 27.6 (L) 34.0 - 46.6 %    MCV 83.4 79.0 - 97.0 fL    MCH 28.1 26.6 - 33.0 pg    MCHC 33.7 31.5 - 35.7 g/dL    RDW 15.7 (H) 12.3 - 15.4 %    RDW-SD 46.4 37.0  - 54.0 fl    MPV 7.8 6.0 - 12.0 fL    Platelets 350 140 - 450 10*3/mm3    Neutrophil % 62.5 42.7 - 76.0 %    Lymphocyte % 20.1 19.6 - 45.3 %    Monocyte % 12.6 (H) 5.0 - 12.0 %    Eosinophil % 3.4 0.3 - 6.2 %    Basophil % 1.4 0.0 - 1.5 %    Neutrophils, Absolute 5.70 1.70 - 7.00 10*3/mm3    Lymphocytes, Absolute 1.80 0.70 - 3.10 10*3/mm3    Monocytes, Absolute 1.10 (H) 0.10 - 0.90 10*3/mm3    Eosinophils, Absolute 0.30 0.00 - 0.40 10*3/mm3    Basophils, Absolute 0.10 0.00 - 0.20 10*3/mm3    nRBC 0.2 0.0 - 0.2 /100 WBC        Imaging:  NM HIDA SCAN WITHOUT PHARMACOLOGICAL INTERVENTION  Narrative: DATE OF EXAM:  8/16/2022 7:49 AM     PROCEDURE:  NM HIDA SCAN WITHOUT PHARMACOLOGICAL INTERVENTION-     INDICATIONS:  Biliary colic, recurrent, gallbladder dyskinesia suspected; N17.9-Acute  kidney failure, unspecified; A41.9-Sepsis, unspecified organism;  R65.21-Severe sepsis with septic shock     COMPARISON:  No Comparisons Available     TECHNIQUE:   The patient received 6  mCi of technetium 99m Choletec intravenously and  images were obtained of the abdomen in the anterior projection through  60 minutes. The patient ingested 8 oz of the Boost Plus and images were  taken per protocol post ingestion.  Counts were obtained over the  gallbladder to calculate the ejection fraction        FINDINGS:  On initial static image there is uptake of the radiopharmaceutical by  the liver. There some diminished activity to the right aspect of the  liver which probably relates to the contour of the liver. Activity is  noted within gallbladder and biliary system by 30 minutes into the exam.  There were no symptoms on the administration of boost. An ejection  fraction of 88 % was derived which is within normal range.      Impression: 1.     No significant abnormality.     Electronically Signed By-Gaetano Nielson MD On:8/16/2022 9:28 AM  This report was finalized on [id number] by  Gaetano Nielson MD.       ASSESSMENT:  Pulmonary  hypertension  Anxiety  HTN  MARIANNE  Suspected liver abscess with abdominal hematoma secondary to liver biopsy  E. coli enteritis  Metabolic acidosis-resolved  Coagulopathy probably from DIC  Anemia with hemoperitoneum after liver biopsy  Anion gap metabolic acidosis  Esophagitis  Thrombocytopenia  Ulcer of left lateral tongue        PLAN:  HIDA scan today  Monitor pain  Electrolytes/ glycemic control  DVT and GI prophylaxis-SCD's    Discussed with Dr. Nikki Ojeda, APRN   8/16/2022  14:28 EDT     I personally have examined  and interviewed the patient. I have reviewed the history, data, problems, assessment and plan with our NP.  Critical care time in direct medical management (   ) minutes  Electronically signed by Tramaine Jordan MD. D, ABSM.

## 2022-08-16 NOTE — PLAN OF CARE
Goal Outcome Evaluation:  Plan of Care Reviewed With: patient        Progress: no change  Outcome Evaluation: Pt stable and comfortable. She should have a HIDA scan this morning. She has been NPO since midnight and has not had pain medication since 10PM. No complaints or concerns at this time. Will continue to monitor.

## 2022-08-16 NOTE — THERAPY TREATMENT NOTE
Subjective: Pt agreeable to therapeutic plan of care.  Cognition: arousal/alertness: Alert and Attentive    Objective:     Bed Mobility: SBA   Functional Transfers: SBA  Functional Ambulation: SBA    Lower Body Dressing: Supervision  ADL Position: edge of bed sitting    Pain: 5 VAS  Education: Provided education on importance of mobility and skilled verbal / tactile cueing throughout intervention.     Assessment: Raul Gardner presents with ADL impairments below baseline abilities which indicate the need for continued skilled intervention while inpatient. Pt painful this date, though motivated to work with therapy.  Reports pain with lower body dressing though able to complete with rest breaks.  Tolerating session today without incident. Will continue to follow and progress as tolerated.     Plan/Recommendations:   Pt would benefit from Home with Home Health, Home with family assist at discharge from facility.   Pt desires Home with Home Health and Home with family assist at discharge. Pt cooperative; agreeable to therapeutic recommendations and plan of care.     Modified [place]: N/A = No pre-op stroke/TIA    Post-Tx Position: Up in Chair, Alarms activated and Call light and personal items within reach  PPE: gloves, surgical mask, eyewear protection

## 2022-08-16 NOTE — PROGRESS NOTES
[facility] ER Medicine Services Daily Progress Note    Patient Name: Raul Gardner  : 1975  MRN: [id number]  Primary Care Physician:  Maribel Graf MD  Date of admission: 2022      Subjective      Chief Complaint: Generalized weakness     Patient reports     2022: Patient received blood transfusion.  Shiley was placed for possible CRRT.  General surgery was consulted.  Patient noted to be on vancomycin and meropenem per ID.  GI was consulted for acute liver injury.  Patient started on CRRT.     2022: Patient noted to be on Vanco, meropenem, and micafungin per ID.     2022: Patient noted to be on Rocephin per ID.     2022: Plan for hemodialysis tomorrow per nephrology.     8/10/2022: Patient is stable for downgrade.  Hospitalist were consulted for medical management.  Patient is currently on 3 L nasal cannula of oxygen and does not wear this at home.  She is having complaints of right-sided abdominal pain that she describes as sharp.  Her current pain is a 5 out of 10.  Movement hurts.  She denies any alleviating factors.  She reports she has been eating small amounts as she has had a decreased appetite.  She is now off the Cardene drip.      Labs and vitals reviewed  Blood pressure is quite elevated now  Blood pressure medications adjusted by nephrologist  Cp addressed       Seen and examined vital signs reviewed  Blood pressure is moderately elevated on 1 L of oxygen saturating 95%  Potassium little low at 3.2 creatinine improving 2.4 White count slightly better 15.8  Had good urine output  Still w rt sided sharp cp  Check x ray  ekg  foir ant cp. wnl  Sat well on 1-2 l O2      Vitals and labs reviewed  Chest x-ray shows increasing atelectasis and effusion  No pneumothorax  Renal function spontaneously slowly improving  LFTs also improving  No CBC today      Vitals and labs and notes reviewed  C/o mouth sores  Slightly low potassium noted will  replace  Slight drop in hemoglobin to 7.8  White count better 12.6  Seen by GI-MRCP planned    8/15/2022  Patient seen and examined  Complained of generalized body weakness otherwise no new symptoms  Scheduled for HIDA scan today    8/50/2022  Patient seen and examined  Complained of generalized body weakness  Had HIDA scan today which was insignificant      Review of Systems   Constitutional: Negative for chills and fever.   HENT: Negative for congestion.    Eyes: Negative for blurred vision.   Cardiovascular: Negative for chest pain.   Respiratory: Negative for shortness of breath.    Endocrine: Negative for cold intolerance and heat intolerance.   Gastrointestinal: Negative for abdominal pain, nausea and vomiting.   Genitourinary: Negative for flank pain.   Neurological: Positive for weakness.   Psychiatric/Behavioral: Negative for altered mental status.            Objective      Vitals:   Temp:  [98.1 °F (36.7 °C)-98.9 °F (37.2 °C)] 98.4 °F (36.9 °C)  Heart Rate:  [76-86] 80  Resp:  [16-20] 20  BP: (139-172)/(83-91) 143/83    Physical Exam  Vitals reviewed.   Constitutional:       General: She is not in acute distress.     Appearance: She is well-developed.   HENT:      Head: Normocephalic.      Nose: Nose normal.      Mouth/Throat:      Mouth: Mucous membranes are moist.   Eyes:      Extraocular Movements: Extraocular movements intact.      Conjunctiva/sclera: Conjunctivae normal.      Pupils: Pupils are equal, round, and reactive to light.   Neck:      Thyroid: No thyromegaly.      Vascular: No JVD.      Trachea: No tracheal deviation.   Cardiovascular:      Rate and Rhythm: Normal rate and regular rhythm.   Pulmonary:      Effort: No respiratory distress.      Breath sounds: Normal breath sounds.   Abdominal:      General: Bowel sounds are normal.      Palpations: Abdomen is soft.      Tenderness: There is no abdominal tenderness.   Musculoskeletal:         General: Normal range of motion.      Cervical back:  Normal range of motion. No muscular tenderness.   Skin:     General: Skin is warm and dry.   Neurological:      General: No focal deficit present.      Mental Status: She is alert and oriented to person, place, and time.      Motor: No abnormal muscle tone.   Psychiatric:         Mood and Affect: Mood normal.               Result Review    Result Review:  I have personally reviewed the results from the time of this admission to 8/16/2022 08:58 EDT and agree with these findings:  [x]  Laboratory  [x]  Microbiology  [x]  Radiology  []  EKG/Telemetry   []  Cardiology/Vascular   []  Pathology  []  Old records  []  Other:  Most notable findings include: As above    Wounds (last 24 hours)     LDA Wound     Row Name 08/16/22 0404 08/16/22 0035 08/15/22 2152       Wound 08/09/22 0700 Right posterior torso Incision    Wound - Properties Group Placement Date: 08/09/22 -SR Placement Time: 0700  -SR Side: Right  -SR Orientation: posterior  -SR Location: torso  -SR Primary Wound Type: Incision  -SR Additional Comments: liver biopsy site  -SR    Dressing Appearance open to air  -JL open to air  -JL open to air  -JL    Closure Approximated  -JL Approximated  -JL Approximated  -JL    Base clean;dry  -JL clean;dry  -JL dry  -JL    Retired Wound - Properties Group Placement Date: 08/09/22  -SR Placement Time: 0700  -SR Side: Right  -SR Orientation: posterior  -SR Location: torso  -SR Primary Wound Type: Incision  -SR Additional Comments: liver biopsy site  -SR    Retired Wound - Properties Group Date first assessed: 08/09/22  -SR Time first assessed: 0700  -SR Side: Right  -SR Location: torso  -SR Primary Wound Type: Incision  -SR Additional Comments: liver biopsy site  -SR    Row Name 08/15/22 1600 08/15/22 1200          Wound 08/09/22 0700 Right posterior torso Incision    Wound - Properties Group Placement Date: 08/09/22  -SR Placement Time: 0700  -SR Side: Right  -SR Orientation: posterior  -SR Location: torso  -SR Primary Wound  Type: Incision  -SR Additional Comments: liver biopsy site  -SR     Dressing Appearance open to air  -DA open to air  -DA     Closure Approximated  -DA Approximated  -DA     Base dry  -DA dry  -DA     Drainage Amount -- none  -DA     Dressing Care open to air  -DA open to air  -DA     Retired Wound - Properties Group Placement Date: 08/09/22 -SR Placement Time: 0700 -SR Side: Right  -SR Orientation: posterior  -SR Location: torso  -SR Primary Wound Type: Incision  -SR Additional Comments: liver biopsy site  -SR     Retired Wound - Properties Group Date first assessed: 08/09/22 -SR Time first assessed: 0700 -SR Side: Right  -SR Location: torso  -SR Primary Wound Type: Incision  -SR Additional Comments: liver biopsy site  -SR           User Key  (r) = Recorded By, (t) = Taken By, (c) = Cosigned By    Initials Name Provider Type    Shaye Bauer RN Registered Nurse    Maricruz Hagan LPN Licensed Nurse    Kelsey Gee RN Registered Nurse                  Assessment & Plan      Brief Patient Summary:    Patient is a 47-year-old with medical history significant for depression and hypertension.  Presented to [facility] ED on 8/5/2022 with complaints of generally feeling unwell for the last 2 days prior to presentation.  Reported having intermittent fever, body aches, vomiting and diarrhea.  In the ED patient was noted to have a temperature of 103.1, heart rate of 147 and significantly hypotensive.  Fluid resuscitation with vasopressor was initiated.  She was thought to have possible liver abscess and also had MARIANNE  Nephrologist and ID were consulted and patient admitted to the intensive care unit.  Was subsequently transferred to surgical floor and hospitalist consulted for medical management.      amLODIPine, 10 mg, Oral, Q24H  escitalopram, 10 mg, Oral, Daily  First Mouthwash (Magic Mouthwash), 5 mL, Swish & Spit, Q6H  hydroCHLOROthiazide, 25 mg, Oral, Daily  metoprolol tartrate, 100 mg,  Oral, Q12H  pantoprazole, 40 mg, Oral, Q AM  simethicone, 80 mg, Oral, 4x Daily AC & at Bedtime  sodium chloride, 10 mL, Intravenous, Q12H  sodium chloride, 10 mL, Intravenous, Q12H  triamcinolone, , Mouth/Throat, BID             Active Hospital Problems:  Active Hospital Problems    Diagnosis    • **Septic shock (HCC)    • Suspected liver abscess    • Diarrhea    • Acute kidney injury (HCC)    • Metabolic acidosis    • E coli enteritis    • Abdominal hematoma, secondary to liver biopsyh    • Essential hypertension    • Vitamin D deficiency    • Pulmonary hypertension, unspecified (HCC)    • Anxiety      Plan:     Severe hypovolemic/septic shock on admission  Initially on 2 pressors  Clinical condition worsened after liver biopsy-raising concern for possible hemorrhagic shock and patient was placed on 4 pressors at one-point.  CT abdomen/pelvis 8/9/2022-stable intra-abdominal hemorrhage  Improved  Off pressors  Hemodynamically stable  Supportive care    Gastroenteritis  Enteropathogenic E. coli  GI panel- enteropathogenic E. coli  Further work-up essentially insignificant  ID following  Had 10 days course of IV Rocephin.    Abnormal liver lesion noted on imaging  Status post liver biopsy 8/5/2022- no evidence of infection  Pathology reports revealed acute cholangitis but no evidence of malignancy.  Liver/body fluid culture-negative    Acute kidney injury  Most likely ATN secondary to sepsis and hemodynamic instability  Off CRRT  Improving  Nephrologist following    Thrombocytopenia  Most likely due to sepsis  Resolved    Reactive leukocytosis  Probably due to steroids/infection  Resolved    Hypertension  Blood pressure fairly controlled  On Norvasc and metoprolol  Started on hydrochlorothiazide  Monitor blood pressure and adjust medication as needed      Right-sided chest pain  Unlikely PE  Negative venous Dopplers  Resolved    Acute blood loss anemia with hemoperitoneum  Following liver biopsy  Was seen by general  surgeon-no surgical intervention recommended  Hemoglobin stable    Sepsis on admission  Fever with elevated liver enzymes  Liver biopsy showing acute cholangitis  ?  Underlying biliary obstructive process  Autoimmune panel nonrevealing  MRCP- normal common bile duct with no evidence of obstructing mass or stone  GI following-   HIDA scan-insignificant  Continue supportive    Depression-on Lexapro       Morbid obesity  BMI 51.1   Encourage lifestyle changes            Mechanical DVT prophylaxis orders are present.    CODE STATUS:    Code Status (Patient has no pulse and is not breathing): CPR (Attempt to Resuscitate)  Medical Interventions (Patient has pulse or is breathing): Full Support      Disposition: Pending clinical progress    This patient has been examined wearing appropriate Personal Protective Equipment and discussed with hospital infection control department. 08/16/22      Electronically signed by Bob Dunne MD, 08/16/22, 08:58 EDT.  [facility] Hospitalist Team

## 2022-08-16 NOTE — PLAN OF CARE
Goal Outcome Evaluation:  Plan of Care Reviewed With: patient        Progress: improving  Outcome Evaluation: On a regular diet with increased appetite this afternoon. C/o mild right mid outer quad pain level 2-3 denies need for pain medication. Ambulating in the room and up in the chair without difficulty. Plan of care is on going.

## 2022-08-16 NOTE — PROGRESS NOTES
PROGRESS NOTE      Patient Name: Raul Gardner  : 1975  MRN: [id number]  Primary Care Physician: Maribel Graf MD  Date of admission: 2022    Patient Care Team:  Maribel Graf MD as PCP - General (Family Medicine)  AVA Cavanaugh MD as Consulting Physician (Cardiology)        Subjective   Subjective:     Patient is slightly better than yesterday but still lethargic sick looking  Review of systems:  Middle-age female complaining of body aches abdominal pain abdominal distention      Allergies:    Allergies   Allergen Reactions   • Cephalexin Rash   • Hydrocodone-Acetaminophen Rash       Objective   Exam:     Vital Signs  Temp:  [98.1 °F (36.7 °C)-98.9 °F (37.2 °C)] 98.5 °F (36.9 °C)  Heart Rate:  [76-86] 76  Resp:  [17-20] 18  BP: (136-172)/(83-91) 136/87  SpO2:  [94 %-98 %] 96 %  on   ;   Device (Oxygen Therapy): room air  Body mass index is 49.64 kg/m².    General: Middle-age  female i very lethargic  Head:      Normocephalic and atraumatic.    Eyes:      PERRL/EOM intact, conjunctiva and sclera clear with out nystagmus.    Neck:      No masses, thyromegaly,  trachea central with normal respiratory effort   Lungs:    Clear bilaterally to auscultation.    Heart:      Regular rate and rhythm, no murmur no gallop  Abd:        Diffusely tender with decreased bowel sounds  Pulses:   Pulses palpable  Extr:        No cyanosis or clubbing--no edema.    Neuro:    No focal deficits.   alert oriented x3  Skin:       Intact without lesions or rashes.    Psych:    Alert and cooperative; normal mood and affect; .      Results Review:  I have personally reviewed most recent Data :  CBC    Results from last 7 days   Lab Units 22  0400 08/15/22  0246 22  0355 22  0400 22  0515 08/10/22  0431   WBC 10*3/mm3 9.10 9.90 12.60* 15.80* 16.20* 16.20*   HEMOGLOBIN g/dL 9.3* 8.6* 7.8* 8.0* 8.2* 8.0*   PLATELETS 10*3/mm3 350 325 302 184 155 89*     CMP   Results from last 7 days   Lab Units  08/16/22  0400 08/15/22  0246 08/14/22  0355 08/13/22  0609 08/12/22  0400 08/11/22  0515 08/10/22  0431   SODIUM mmol/L 140 139 143 142 139 142 141   POTASSIUM mmol/L 3.3* 3.0* 3.2* 3.3* 3.2* 3.6 3.4*   CHLORIDE mmol/L 96* 96* 99 98 97* 101 103   CO2 mmol/L 31.0* 31.0* 34.0* 35.0* 33.0* 32.0* 31.0*   BUN mg/dL 26* 26* 29* 33* 36* 36* 34*   CREATININE mg/dL 2.00* 2.00* 2.10* 2.10* 2.45* 2.69* 2.88*   GLUCOSE mg/dL 113* 110* 114* 112* 112* 106* 110*   ALBUMIN g/dL 3.30* 3.40* 3.20* 3.20* 3.10* 3.40* 3.20*   BILIRUBIN mg/dL 2.9* 2.2* 1.7* 1.2 1.0 0.9 0.9   ALK PHOS U/L 135* 133* 133* 135* 146* 171* 188*   AST (SGOT) U/L 37* 35* 33* 27 28 38* 74*   ALT (SGPT) U/L 33 35* 44* 52* 73* 108* 153*     ABG        NM HIDA SCAN WITHOUT PHARMACOLOGICAL INTERVENTION    Result Date: 8/16/2022  1.     No significant abnormality.  Electronically Signed By-Gaetano Nielson MD On:8/16/2022 9:28 AM This report was finalized on [id number] by  Gaetano Nielson MD.      Results for orders placed during the hospital encounter of 08/05/22    Adult Transthoracic Echo Complete w/ Color, Spectral and Contrast if Necessary Per Protocol    Interpretation Summary  · Left ventricular wall thickness is consistent with mild concentric hypertrophy.  · Estimated left ventricular EF = 75% Left ventricular systolic function is hyperdynamic (EF > 70%).  · Left ventricular diastolic function is consistent with (grade I) impaired relaxation.    Scheduled Meds:amLODIPine, 10 mg, Oral, Q24H  escitalopram, 10 mg, Oral, Daily  First Mouthwash (Magic Mouthwash), 5 mL, Swish & Spit, Q6H  hydroCHLOROthiazide, 25 mg, Oral, Daily  metoprolol tartrate, 100 mg, Oral, Q12H  pantoprazole, 40 mg, Oral, Q AM  simethicone, 80 mg, Oral, 4x Daily AC & at Bedtime  sodium chloride, 10 mL, Intravenous, Q12H  sodium chloride, 10 mL, Intravenous, Q12H  triamcinolone, , Mouth/Throat, BID      Continuous Infusions:   PRN Meds:•  acetaminophen **OR** acetaminophen  •  dextrose  •   dextrose  •  glucagon (human recombinant)  •  hydrALAZINE  •  nitroglycerin  •  ondansetron **OR** ondansetron  •  ondansetron ODT  •  oxyCODONE  •  simethicone  •  sodium chloride  •  sodium chloride  •  sodium chloride  •  sodium chloride    Assessment & Plan   Assessment and Plan:         Septic shock (HCC)    Pulmonary hypertension, unspecified (HCC)    Anxiety    Vitamin D deficiency    Essential hypertension    Suspected liver abscess    Diarrhea    Acute kidney injury (HCC)    Metabolic acidosis    E coli enteritis    Abdominal hematoma, secondary to liver biopsyh    ASSESSMENT:  · Acute kidney injury likely ATN secondary to sepsis and hemodynamic instability on pressors at this time  · Multiple liver lesion unlikely to be abscess  · Chronic kidney disease as per renal ultrasound with some increased echogenicity   · Significant lactic acidosis with increased anion gap   · Some evidence of volume overload   · History of hypertension   · Acute cholangitis  · E. coli sepsis           Plan:      · Patient is feeling much better septic shock recovered.  · Patient iwas on CRRT started 9/6/2022.  9/8/2022 no hemodynamically better blood pressure is on the high side  · Shiley catheter has been removed  · Patient hemoglobin is stable at this time  · Some alkalosis but acceptable.  Volume status is acceptable  · Urine output more than adequate  · Blood pressure is improving continue thiazide diuretics.  We will start low-dose thiazide diuretics to improve blood pressure  · Potassium is still low sodium level is normal now  · Follow-up with volume status electrolytes later today and tomorrow morning  · So far all the serologies are unremarkable including KAREN, ANCA, complements    •           Electronically signed by Vikram Acosta MD,   [facility] kidney consultant  [phone]

## 2022-08-16 NOTE — PROGRESS NOTES
Infectious Diseases Progress Note      LOS: 11 days   Patient Care Team:  Maribel Graf MD as PCP - General (Family Medicine)  AVA Cavanaugh MD as Consulting Physician (Cardiology)    Chief Complaint: Weakness    Subjective     The patient has no fever during the last 24 hours.  The patient is getting stronger and she was walking outside her room.  Denied having any new complaints today.        Review of Systems:   Review of Systems   Constitutional: Positive for fatigue.   HENT: Negative.    Eyes: Negative.    Cardiovascular: Negative.    Endocrine: Negative.    Genitourinary: Negative.    Musculoskeletal: Negative.    Skin: Negative.    Neurological: Negative.    Psychiatric/Behavioral: Negative.    All other systems reviewed and are negative.       Objective     Vital Signs  Temp:  [98.1 °F (36.7 °C)-98.9 °F (37.2 °C)] 98.5 °F (36.9 °C)  Heart Rate:  [76-86] 76  Resp:  [17-20] 18  BP: (136-172)/(83-91) 136/87    Physical Exam:  Physical Exam  Vitals and nursing note reviewed.   Constitutional:       Appearance: She is well-developed.   HENT:      Head: Normocephalic and atraumatic.   Eyes:      Pupils: Pupils are equal, round, and reactive to light.   Cardiovascular:      Rate and Rhythm: Normal rate and regular rhythm.      Heart sounds: Normal heart sounds.   Pulmonary:      Effort: Pulmonary effort is normal. No respiratory distress.      Breath sounds: Normal breath sounds. No wheezing or rales.   Abdominal:      General: Bowel sounds are normal. There is no distension.      Palpations: Abdomen is soft. There is no mass.      Tenderness: There is no abdominal tenderness. There is no guarding or rebound.   Genitourinary:     Comments: External catheter  Musculoskeletal:         General: No deformity. Normal range of motion.      Cervical back: Normal range of motion and neck supple.   Skin:     General: Skin is warm.      Findings: No erythema or rash.   Neurological:      Mental Status: She is alert  and oriented to person, place, and time.      Cranial Nerves: No cranial nerve deficit.          Results Review:    I have reviewed all clinical data, test, lab, and imaging results.     Radiology  NM HIDA SCAN WITHOUT PHARMACOLOGICAL INTERVENTION    Result Date: 8/16/2022  DATE OF EXAM: 8/16/2022 7:49 AM  PROCEDURE: NM HIDA SCAN WITHOUT PHARMACOLOGICAL INTERVENTION-  INDICATIONS: Biliary colic, recurrent, gallbladder dyskinesia suspected; N17.9-Acute kidney failure, unspecified; A41.9-Sepsis, unspecified organism; R65.21-Severe sepsis with septic shock  COMPARISON: No Comparisons Available  TECHNIQUE: The patient received 6  mCi of technetium 99m Choletec intravenously and images were obtained of the abdomen in the anterior projection through 60 minutes. The patient ingested 8 oz of the Boost Plus and images were taken per protocol post ingestion.  Counts were obtained over the gallbladder to calculate the ejection fraction   FINDINGS: On initial static image there is uptake of the radiopharmaceutical by the liver. There some diminished activity to the right aspect of the liver which probably relates to the contour of the liver. Activity is noted within gallbladder and biliary system by 30 minutes into the exam. There were no symptoms on the administration of boost. An ejection fraction of 88 % was derived which is within normal range.      1.     No significant abnormality.  Electronically Signed By-Gaetano Nielson MD On:8/16/2022 9:28 AM This report was finalized on [id number] by  Gaetano Nielson MD.      Cardiology    Laboratory    Results from last 7 days   Lab Units 08/16/22  0400 08/15/22  0246 08/14/22  0355 08/12/22  0400 08/11/22  0515 08/10/22  0431   WBC 10*3/mm3 9.10 9.90 12.60* 15.80* 16.20* 16.20*   HEMOGLOBIN g/dL 9.3* 8.6* 7.8* 8.0* 8.2* 8.0*   HEMATOCRIT % 27.6* 25.1* 23.9* 24.0* 25.5* 24.6*   PLATELETS 10*3/mm3 350 325 302 184 155 89*     Results from last 7 days   Lab Units 08/16/22  0400  08/15/22  0246 08/14/22  0355 08/13/22  0609 08/12/22  0400 08/11/22  0515 08/10/22  0431   SODIUM mmol/L 140 139 143 142 139 142 141   POTASSIUM mmol/L 3.3* 3.0* 3.2* 3.3* 3.2* 3.6 3.4*   CHLORIDE mmol/L 96* 96* 99 98 97* 101 103   CO2 mmol/L 31.0* 31.0* 34.0* 35.0* 33.0* 32.0* 31.0*   BUN mg/dL 26* 26* 29* 33* 36* 36* 34*   CREATININE mg/dL 2.00* 2.00* 2.10* 2.10* 2.45* 2.69* 2.88*   GLUCOSE mg/dL 113* 110* 114* 112* 112* 106* 110*   ALBUMIN g/dL 3.30* 3.40* 3.20* 3.20* 3.10* 3.40* 3.20*   BILIRUBIN mg/dL 2.9* 2.2* 1.7* 1.2 1.0 0.9 0.9   ALK PHOS U/L 135* 133* 133* 135* 146* 171* 188*   AST (SGOT) U/L 37* 35* 33* 27 28 38* 74*   ALT (SGPT) U/L 33 35* 44* 52* 73* 108* 153*   CALCIUM mg/dL 9.2 8.9 8.8 9.0 8.5* 8.4* 8.2*                 Microbiology   Microbiology Results (last 10 days)     ** No results found for the last 240 hours. **          Medication Review:       Schedule Meds  amLODIPine, 10 mg, Oral, Q24H  escitalopram, 10 mg, Oral, Daily  First Mouthwash (Magic Mouthwash), 5 mL, Swish & Spit, Q6H  hydroCHLOROthiazide, 25 mg, Oral, Daily  metoprolol tartrate, 100 mg, Oral, Q12H  pantoprazole, 40 mg, Oral, Q AM  simethicone, 80 mg, Oral, 4x Daily AC & at Bedtime  sodium chloride, 10 mL, Intravenous, Q12H  sodium chloride, 10 mL, Intravenous, Q12H  triamcinolone, , Mouth/Throat, BID        Infusion Meds       PRN Meds  •  acetaminophen **OR** acetaminophen  •  dextrose  •  dextrose  •  glucagon (human recombinant)  •  hydrALAZINE  •  nitroglycerin  •  ondansetron **OR** ondansetron  •  ondansetron ODT  •  oxyCODONE  •  simethicone  •  sodium chloride  •  sodium chloride  •  sodium chloride  •  sodium chloride        Assessment & Plan       Antimicrobial Therapy   1.  IV ceftriaxone       Assessment     Severe hypovolemic/septic shock present on admission and was initially on 2 pressors but condition worsened after liver biopsy and was concerning secondary to hemorrhagic shock.  Patient was on 4 vasopressors at  one point.  Improved significantly and currently off vasopressors  Repeat CT scan of abdomen pelvis on August 9, 2022 showed stable intra abdomen hemorrhage    The patient was admitted for gastroenteritis caused by enteropathogenic E. coli however the patient was very systemically ill and was febrile for a few days.  Work-up was negative for infection except for E. coli and stool    Abnormal liver lesion noted on imaging studies.  It might be incidental findings.  S/p biopsy and there was no purulence to suspect infection.  Pathology report is still pending  -Cultures were negative for malignancy but did show acute cholangitis    Acute kidney injury.  Suspected to be prerenal.  Improving.  Currently off CRRT and making urine    Thrombocytopenia.  Probably secondary to sepsis.  Resolved    Reactive leukocytosis.  Mildly elevated may be now secondary to steroids.  Resolved        Plan    Patient received 10 days of IV Rocephin 2 g -last dose on 8/14/2022  HIDA scan was negative  Monitor off antimicrobial therapy  Not much to add from infectious disease point.  I will sign off today.  Please call for any question              Sagar Orozco MD  08/16/22  15:37 EDT    Note is dictated utilizing voice recognition software/Dragon

## 2022-08-17 ENCOUNTER — TELEPHONE (OUTPATIENT)
Dept: FAMILY MEDICINE CLINIC | Facility: CLINIC | Age: 47
End: 2022-08-17

## 2022-08-17 ENCOUNTER — READMISSION MANAGEMENT (OUTPATIENT)
Dept: CALL CENTER | Facility: HOSPITAL | Age: 47
End: 2022-08-17

## 2022-08-17 VITALS
SYSTOLIC BLOOD PRESSURE: 106 MMHG | BODY MASS INDEX: 49.34 KG/M2 | DIASTOLIC BLOOD PRESSURE: 72 MMHG | HEART RATE: 79 BPM | RESPIRATION RATE: 16 BRPM | HEIGHT: 62 IN | WEIGHT: 268.1 LBS | OXYGEN SATURATION: 95 % | TEMPERATURE: 98.1 F

## 2022-08-17 LAB
ALBUMIN SERPL-MCNC: 3.4 G/DL (ref 3.5–5.2)
ALBUMIN/GLOB SERPL: 1.1 G/DL
ALP SERPL-CCNC: 128 U/L (ref 39–117)
ALT SERPL W P-5'-P-CCNC: 30 U/L (ref 1–33)
ANION GAP SERPL CALCULATED.3IONS-SCNC: 13 MMOL/L (ref 5–15)
AST SERPL-CCNC: 39 U/L (ref 1–32)
BASOPHILS # BLD AUTO: 0.1 10*3/MM3 (ref 0–0.2)
BASOPHILS NFR BLD AUTO: 1.2 % (ref 0–1.5)
BILIRUB SERPL-MCNC: 3 MG/DL (ref 0–1.2)
BUN SERPL-MCNC: 29 MG/DL (ref 6–20)
BUN/CREAT SERPL: 14.5 (ref 7–25)
CALCIUM SPEC-SCNC: 9.4 MG/DL (ref 8.6–10.5)
CHLORIDE SERPL-SCNC: 96 MMOL/L (ref 98–107)
CO2 SERPL-SCNC: 30 MMOL/L (ref 22–29)
CREAT SERPL-MCNC: 2 MG/DL (ref 0.57–1)
DEPRECATED RDW RBC AUTO: 46.8 FL (ref 37–54)
EGFRCR SERPLBLD CKD-EPI 2021: 30.9 ML/MIN/1.73
EOSINOPHIL # BLD AUTO: 0.4 10*3/MM3 (ref 0–0.4)
EOSINOPHIL NFR BLD AUTO: 4 % (ref 0.3–6.2)
ERYTHROCYTE [DISTWIDTH] IN BLOOD BY AUTOMATED COUNT: 15.9 % (ref 12.3–15.4)
GLOBULIN UR ELPH-MCNC: 3.1 GM/DL
GLUCOSE SERPL-MCNC: 117 MG/DL (ref 65–99)
HCT VFR BLD AUTO: 25.9 % (ref 34–46.6)
HGB BLD-MCNC: 8.8 G/DL (ref 12–15.9)
LYMPHOCYTES # BLD AUTO: 2 10*3/MM3 (ref 0.7–3.1)
LYMPHOCYTES NFR BLD AUTO: 23.3 % (ref 19.6–45.3)
MCH RBC QN AUTO: 28.1 PG (ref 26.6–33)
MCHC RBC AUTO-ENTMCNC: 34 G/DL (ref 31.5–35.7)
MCV RBC AUTO: 82.7 FL (ref 79–97)
MONOCYTES # BLD AUTO: 1.3 10*3/MM3 (ref 0.1–0.9)
MONOCYTES NFR BLD AUTO: 14.7 % (ref 5–12)
NEUTROPHILS NFR BLD AUTO: 5 10*3/MM3 (ref 1.7–7)
NEUTROPHILS NFR BLD AUTO: 56.8 % (ref 42.7–76)
NRBC BLD AUTO-RTO: 0.1 /100 WBC (ref 0–0.2)
PHOSPHATE SERPL-MCNC: 4.4 MG/DL (ref 2.5–4.5)
PLATELET # BLD AUTO: 355 10*3/MM3 (ref 140–450)
PMV BLD AUTO: 8.3 FL (ref 6–12)
POTASSIUM SERPL-SCNC: 3.3 MMOL/L (ref 3.5–5.2)
PROT SERPL-MCNC: 6.5 G/DL (ref 6–8.5)
RBC # BLD AUTO: 3.13 10*6/MM3 (ref 3.77–5.28)
SODIUM SERPL-SCNC: 139 MMOL/L (ref 136–145)
WBC NRBC COR # BLD: 8.8 10*3/MM3 (ref 3.4–10.8)

## 2022-08-17 PROCEDURE — 80053 COMPREHEN METABOLIC PANEL: CPT | Performed by: NURSE PRACTITIONER

## 2022-08-17 PROCEDURE — 25010000002 ONDANSETRON PER 1 MG: Performed by: NURSE PRACTITIONER

## 2022-08-17 PROCEDURE — 97116 GAIT TRAINING THERAPY: CPT

## 2022-08-17 PROCEDURE — 97535 SELF CARE MNGMENT TRAINING: CPT

## 2022-08-17 PROCEDURE — 85025 COMPLETE CBC W/AUTO DIFF WBC: CPT | Performed by: NURSE PRACTITIONER

## 2022-08-17 PROCEDURE — 99239 HOSP IP/OBS DSCHRG MGMT >30: CPT | Performed by: INTERNAL MEDICINE

## 2022-08-17 PROCEDURE — 84100 ASSAY OF PHOSPHORUS: CPT | Performed by: NURSE PRACTITIONER

## 2022-08-17 RX ORDER — POTASSIUM CHLORIDE 20 MEQ/1
40 TABLET, EXTENDED RELEASE ORAL DAILY
Status: DISCONTINUED | OUTPATIENT
Start: 2022-08-17 | End: 2022-08-17 | Stop reason: HOSPADM

## 2022-08-17 RX ORDER — POTASSIUM CHLORIDE 20 MEQ/1
20 TABLET, EXTENDED RELEASE ORAL DAILY
Qty: 30 TABLET | Refills: 0 | Status: SHIPPED | OUTPATIENT
Start: 2022-08-17 | End: 2022-09-16

## 2022-08-17 RX ORDER — AMLODIPINE BESYLATE 10 MG/1
10 TABLET ORAL
Qty: 30 TABLET | Refills: 0 | Status: SHIPPED | OUTPATIENT
Start: 2022-08-18 | End: 2022-08-23

## 2022-08-17 RX ORDER — METOPROLOL TARTRATE 50 MG/1
50 TABLET, FILM COATED ORAL EVERY 12 HOURS SCHEDULED
Qty: 60 TABLET | Refills: 0 | Status: SHIPPED | OUTPATIENT
Start: 2022-08-17 | End: 2022-08-23 | Stop reason: SDUPTHER

## 2022-08-17 RX ORDER — HYDROCHLOROTHIAZIDE 25 MG/1
25 TABLET ORAL DAILY
Qty: 30 TABLET | Refills: 0 | Status: SHIPPED | OUTPATIENT
Start: 2022-08-18 | End: 2022-08-23

## 2022-08-17 RX ORDER — OXYCODONE HYDROCHLORIDE 10 MG/1
10 TABLET ORAL EVERY 4 HOURS PRN
Qty: 20 EACH | Refills: 0 | Status: SHIPPED | OUTPATIENT
Start: 2022-08-17 | End: 2022-08-23

## 2022-08-17 RX ORDER — ONDANSETRON 4 MG/1
4 TABLET, ORALLY DISINTEGRATING ORAL EVERY 8 HOURS PRN
Qty: 30 TABLET | Refills: 0 | Status: SHIPPED | OUTPATIENT
Start: 2022-08-17 | End: 2022-08-23 | Stop reason: SDUPTHER

## 2022-08-17 RX ADMIN — SIMETHICONE 80 MG: 80 TABLET, CHEWABLE ORAL at 05:53

## 2022-08-17 RX ADMIN — DIPHENHYDRAMINE HYDROCHLORIDE AND LIDOCAINE HYDROCHLORIDE AND ALUMINUM HYDROXIDE AND MAGNESIUM HYDRO 5 ML: KIT at 00:37

## 2022-08-17 RX ADMIN — TRIAMCINOLONE ACETONIDE: 1 PASTE DENTAL at 10:55

## 2022-08-17 RX ADMIN — PANTOPRAZOLE SODIUM 40 MG: 40 TABLET, DELAYED RELEASE ORAL at 05:54

## 2022-08-17 RX ADMIN — METOPROLOL TARTRATE 100 MG: 50 TABLET, FILM COATED ORAL at 10:50

## 2022-08-17 RX ADMIN — SIMETHICONE 80 MG: 80 TABLET, CHEWABLE ORAL at 12:28

## 2022-08-17 RX ADMIN — Medication 10 ML: at 10:56

## 2022-08-17 RX ADMIN — DIPHENHYDRAMINE HYDROCHLORIDE AND LIDOCAINE HYDROCHLORIDE AND ALUMINUM HYDROXIDE AND MAGNESIUM HYDRO 5 ML: KIT at 05:53

## 2022-08-17 RX ADMIN — ESCITALOPRAM OXALATE 10 MG: 10 TABLET ORAL at 10:50

## 2022-08-17 RX ADMIN — POTASSIUM CHLORIDE 40 MEQ: 1500 TABLET, EXTENDED RELEASE ORAL at 12:28

## 2022-08-17 RX ADMIN — ONDANSETRON 4 MG: 2 INJECTION INTRAMUSCULAR; INTRAVENOUS at 08:21

## 2022-08-17 RX ADMIN — DIPHENHYDRAMINE HYDROCHLORIDE AND LIDOCAINE HYDROCHLORIDE AND ALUMINUM HYDROXIDE AND MAGNESIUM HYDRO 5 ML: KIT at 12:29

## 2022-08-17 NOTE — TELEPHONE ENCOUNTER
Please get her a hospital follow up appointment scheduled with me in the next 2 weeks.  I have seen her before.  She is not a new patient.

## 2022-08-17 NOTE — PROGRESS NOTES
PROGRESS NOTE      Patient Name: Raul Gardner  : 1975  MRN: [id number]  Primary Care Physician: Maribel Graf MD  Date of admission: 2022    Patient Care Team:  Maribel Graf MD as PCP - General (Family Medicine)  AVA Cavanaugh MD as Consulting Physician (Cardiology)        Subjective   Subjective:     Patient is slightly better than yesterday but still lethargic sick looking  Review of systems:  Middle-age female complaining of body aches abdominal pain abdominal distention      Allergies:    Allergies   Allergen Reactions   • Cephalexin Rash   • Hydrocodone-Acetaminophen Rash       Objective   Exam:     Vital Signs  Temp:  [98.1 °F (36.7 °C)-98.6 °F (37 °C)] 98.1 °F (36.7 °C)  Heart Rate:  [75-90] 79  Resp:  [16-20] 16  BP: (106-150)/(72-85) 106/72  SpO2:  [94 %-97 %] 95 %  on   ;   Device (Oxygen Therapy): room air  Body mass index is 49.04 kg/m².    General: Middle-age  female i very lethargic  Head:      Normocephalic and atraumatic.    Eyes:      PERRL/EOM intact, conjunctiva and sclera clear with out nystagmus.    Neck:      No masses, thyromegaly,  trachea central with normal respiratory effort   Lungs:    Clear bilaterally to auscultation.    Heart:      Regular rate and rhythm, no murmur no gallop  Abd:        Diffusely tender with decreased bowel sounds  Pulses:   Pulses palpable  Extr:        No cyanosis or clubbing--no edema.    Neuro:    No focal deficits.   alert oriented x3  Skin:       Intact without lesions or rashes.    Psych:    Alert and cooperative; normal mood and affect; .      Results Review:  I have personally reviewed most recent Data :  CBC    Results from last 7 days   Lab Units 22  0356 22  0400 08/15/22  0246 22  0355 22  0400 22  0515   WBC 10*3/mm3 8.80 9.10 9.90 12.60* 15.80* 16.20*   HEMOGLOBIN g/dL 8.8* 9.3* 8.6* 7.8* 8.0* 8.2*   PLATELETS 10*3/mm3 355 350 325 302 184 155     CMP   Results from last 7 days   Lab Units  08/17/22  0356 08/16/22  0400 08/15/22  0246 08/14/22  0355 08/13/22  0609 08/12/22  0400 08/11/22  0515   SODIUM mmol/L 139 140 139 143 142 139 142   POTASSIUM mmol/L 3.3* 3.3* 3.0* 3.2* 3.3* 3.2* 3.6   CHLORIDE mmol/L 96* 96* 96* 99 98 97* 101   CO2 mmol/L 30.0* 31.0* 31.0* 34.0* 35.0* 33.0* 32.0*   BUN mg/dL 29* 26* 26* 29* 33* 36* 36*   CREATININE mg/dL 2.00* 2.00* 2.00* 2.10* 2.10* 2.45* 2.69*   GLUCOSE mg/dL 117* 113* 110* 114* 112* 112* 106*   ALBUMIN g/dL 3.40* 3.30* 3.40* 3.20* 3.20* 3.10* 3.40*   BILIRUBIN mg/dL 3.0* 2.9* 2.2* 1.7* 1.2 1.0 0.9   ALK PHOS U/L 128* 135* 133* 133* 135* 146* 171*   AST (SGOT) U/L 39* 37* 35* 33* 27 28 38*   ALT (SGPT) U/L 30 33 35* 44* 52* 73* 108*     ABG        NM HIDA SCAN WITHOUT PHARMACOLOGICAL INTERVENTION    Result Date: 8/16/2022  1.     No significant abnormality.  Electronically Signed By-Gaetano Nielson MD On:8/16/2022 9:28 AM This report was finalized on [id number] by  Gaetano Nielson MD.      Results for orders placed during the hospital encounter of 08/05/22    Adult Transthoracic Echo Complete w/ Color, Spectral and Contrast if Necessary Per Protocol    Interpretation Summary  · Left ventricular wall thickness is consistent with mild concentric hypertrophy.  · Estimated left ventricular EF = 75% Left ventricular systolic function is hyperdynamic (EF > 70%).  · Left ventricular diastolic function is consistent with (grade I) impaired relaxation.    Scheduled Meds:  Continuous Infusions:No current facility-administered medications for this encounter.    PRN Meds:    Assessment & Plan   Assessment and Plan:         Septic shock (HCC)    Pulmonary hypertension, unspecified (HCC)    Anxiety    Vitamin D deficiency    Essential hypertension    Suspected liver abscess    Diarrhea    Acute kidney injury (HCC)    Metabolic acidosis    E coli enteritis    Abdominal hematoma, secondary to liver biopsyh    ASSESSMENT:  · Acute kidney injury likely ATN secondary to sepsis and  hemodynamic instability on pressors at this time  · Multiple liver lesion unlikely to be abscess  · Chronic kidney disease as per renal ultrasound with some increased echogenicity   · Significant lactic acidosis with increased anion gap   · Some evidence of volume overload   · History of hypertension   · Acute cholangitis  · E. coli sepsis           Plan:      · Patient is feeling much better septic shock recovered.  · Patient iwas on CRRT started 9/6/2022.  9/8/2022 no hemodynamically better blood pressure is on the high side  · Shiley catheter has been removed  · Patient hemoglobin is stable at this time  · Okay to be discharged if okay with primary  · Labs in 1 week renal clinic 2 weeks  · Discussed with the primary care in detail  · Discussed with the patient in detail  · Potassium is still low sodium level is normal now  · Follow-up with volume status electrolytes later today and tomorrow morning  · So far all the serologies are unremarkable including KAREN, ANCA, complements    •           Electronically signed by Vikram Acosta MD,   [facility] kidney consultant  [phone]

## 2022-08-17 NOTE — PROGRESS NOTES
"PULMONARY CRITICAL CARE Progress  NOTE      PATIENT IDENTIFICATION:  Name: Raul Gardner  MRN: [id number]  :  1975     Age: 47 y.o.  Sex: female    DATE OF Note:  2022   Referring Physician: Shy Moreno MD                  Subjective:   Feeling better, on room air, no SOB, no chest or abdominal pain, no bowel or bladder issues reported    Objective:  tMax 24 hrs: Temp (24hrs), Av.4 °F (36.9 °C), Min:98.1 °F (36.7 °C), Max:98.6 °F (37 °C)      Vitals Ranges:   Temp:  [98.1 °F (36.7 °C)-98.6 °F (37 °C)] 98.1 °F (36.7 °C)  Heart Rate:  [75-90] 79  Resp:  [16-20] 16  BP: (106-150)/(72-85) 106/72    Intake and Output Last 3 Shifts:   I/O last 3 completed shifts:  In: 720 [P.O.:720]  Out: -     Exam:  /72 (BP Location: Left arm, Patient Position: Sitting)   Pulse 79   Temp 98.1 °F (36.7 °C) (Oral)   Resp 16   Ht 157.5 cm (62\")   Wt 122 kg (268 lb 1.6 oz)   SpO2 95%   BMI 49.04 kg/m²     General Appearance:   Alert  HEENT:  Normocephalic, without obvious abnormality, Conjunctivae/corneas clear.  Normal external ear canals, nares normal, no drainage     Neck:  Supple, symmetrical, trachea midline. No JVD.  Lungs /Chest wall:   Bilateral basal rhonchi, respirations unlabored, symmetrical wall movement.     Heart:  Regular rate and rhythm, systolic murmur PMI left sternal border  Abdomen: Soft, nontender, no masses, no organomegaly.    Extremities: Trace edema, no clubbing or cyanosis        Medications:    Current Facility-Administered Medications:     acetaminophen (TYLENOL) tablet 650 mg, 650 mg, Oral, Q4H PRN, 650 mg at 22 0122 **OR** acetaminophen (TYLENOL) suppository 650 mg, 650 mg, Rectal, Q4H PRN, Connor Rod APRN    amLODIPine (NORVASC) tablet 10 mg, 10 mg, Oral, Q24H, Connor Rod APRN, 10 mg at 22 0906    dextrose (D50W) (25 g/50 mL) IV injection 25 g, 25 g, Intravenous, Q15 Min PRN, Connor Rod APRN    dextrose (GLUTOSE) oral gel 15 g, 15 g, Oral, Q15 Min " PRN, Connor Rod APRN    escitalopram (LEXAPRO) tablet 10 mg, 10 mg, Oral, Daily, Connor Rod APRN, 10 mg at 08/17/22 1050    First Mouthwash (Magic Mouthwash) 5 mL, 5 mL, Swish & Spit, Q6H, Janeth Oquendo, APRN, 5 mL at 08/17/22 1229    glucagon (human recombinant) (GLUCAGEN DIAGNOSTIC) 1 mg in sterile water (preservative free) 1 mL injection, 1 mg, Intramuscular, Q15 Min PRN, Connor Rod APRN    hydrALAZINE (APRESOLINE) injection 10 mg, 10 mg, Intravenous, Q6H PRN, Connor Rod APRN, 10 mg at 08/14/22 1200    hydroCHLOROthiazide (HYDRODIURIL) tablet 25 mg, 25 mg, Oral, Daily, Vikram Acosta MD, 25 mg at 08/16/22 0905    metoprolol tartrate (LOPRESSOR) tablet 100 mg, 100 mg, Oral, Q12H, Connor Rod APRN, 100 mg at 08/17/22 1050    nitroglycerin (NITROSTAT) SL tablet 0.4 mg, 0.4 mg, Sublingual, Q5 Min PRN, Connor Rod APRN    ondansetron (ZOFRAN) tablet 4 mg, 4 mg, Oral, Q6H PRN **OR** ondansetron (ZOFRAN) injection 4 mg, 4 mg, Intravenous, Q6H PRN, Connor Rod APRN, 4 mg at 08/17/22 0821    ondansetron ODT (ZOFRAN-ODT) disintegrating tablet 4 mg, 4 mg, Oral, Q6H PRN, Connor Rod APRN, 4 mg at 08/11/22 0951    oxyCODONE (ROXICODONE) immediate release tablet 10 mg, 10 mg, Oral, Q4H PRN, Rios Borges MD, 10 mg at 08/16/22 2041    pantoprazole (PROTONIX) EC tablet 40 mg, 40 mg, Oral, Q AM, Connor Rod APRN, 40 mg at 08/17/22 0554    potassium chloride (K-DUR,KLOR-CON) CR tablet 40 mEq, 40 mEq, Oral, Daily, Bob Dunne MD, 40 mEq at 08/17/22 1228    simethicone (MYLICON) chewable tablet 80 mg, 80 mg, Oral, 4x Daily PRN, Connor Rod APRN, 80 mg at 08/14/22 1529    simethicone (MYLICON) chewable tablet 80 mg, 80 mg, Oral, 4x Daily AC & at Bedtime, Janeth Oquendo APRN, 80 mg at 08/17/22 1228    sodium chloride 0.9 % flush 10 mL, 10 mL, Intravenous, PRN, Connor Rod APRN    sodium chloride 0.9 % flush 10 mL, 10 mL, Intravenous, Q12H, Love,  Connor E, APRN, 10 mL at 08/16/22 0906    sodium chloride 0.9 % flush 10 mL, 10 mL, Intravenous, PRN, Love, Connor E, APRN    sodium chloride 0.9 % flush 10 mL, 10 mL, Intravenous, PRN, Love, Connor E, APRN    sodium chloride 0.9 % flush 10 mL, 10 mL, Intravenous, Q12H, Love, Connor E, APRN, 10 mL at 08/17/22 1056    sodium chloride 0.9 % flush 20 mL, 20 mL, Intravenous, PRN, Love, Connor E, APRN    triamcinolone (KENALOG) 0.1 % paste, , Mouth/Throat, BID, January Mcbride, APRN, Given at 08/17/22 1055    Current Outpatient Medications:     [START ON 8/18/2022] amLODIPine (NORVASC) 10 MG tablet, Take 1 tablet by mouth Daily for 30 days., Disp: 30 tablet, Rfl: 0    escitalopram (LEXAPRO) 10 MG tablet, TAKE 1 TABLET BY MOUTH EVERY DAY, Disp: 90 tablet, Rfl: 2    [START ON 8/18/2022] hydroCHLOROthiazide (HYDRODIURIL) 25 MG tablet, Take 1 tablet by mouth Daily for 30 days., Disp: 30 tablet, Rfl: 0    metoprolol tartrate (LOPRESSOR) 50 MG tablet, Take 1 tablet by mouth Every 12 (Twelve) Hours for 30 days., Disp: 60 tablet, Rfl: 0    ondansetron ODT (ZOFRAN-ODT) 4 MG disintegrating tablet, Place 1 tablet on the tongue Every 8 (Eight) Hours As Needed for Nausea or Vomiting., Disp: 30 tablet, Rfl: 0    oxyCODONE (ROXICODONE) 10 MG tablet, Take 1 tablet by mouth Every 4 (Four) Hours As Needed for Moderate Pain ., Disp: 20 each, Rfl: 0    potassium chloride (K-DUR,KLOR-CON) 20 MEQ CR tablet, Take 1 tablet by mouth Daily for 30 days., Disp: 30 tablet, Rfl: 0    Data Review:  All labs (24hrs):   Recent Results (from the past 24 hour(s))   Phosphorus    Collection Time: 08/17/22  3:56 AM    Specimen: Blood   Result Value Ref Range    Phosphorus 4.4 2.5 - 4.5 mg/dL   Comprehensive Metabolic Panel    Collection Time: 08/17/22  3:56 AM    Specimen: Blood   Result Value Ref Range    Glucose 117 (H) 65 - 99 mg/dL    BUN 29 (H) 6 - 20 mg/dL    Creatinine 2.00 (H) 0.57 - 1.00 mg/dL    Sodium 139 136 - 145 mmol/L    Potassium 3.3  (L) 3.5 - 5.2 mmol/L    Chloride 96 (L) 98 - 107 mmol/L    CO2 30.0 (H) 22.0 - 29.0 mmol/L    Calcium 9.4 8.6 - 10.5 mg/dL    Total Protein 6.5 6.0 - 8.5 g/dL    Albumin 3.40 (L) 3.50 - 5.20 g/dL    ALT (SGPT) 30 1 - 33 U/L    AST (SGOT) 39 (H) 1 - 32 U/L    Alkaline Phosphatase 128 (H) 39 - 117 U/L    Total Bilirubin 3.0 (H) 0.0 - 1.2 mg/dL    Globulin 3.1 gm/dL    A/G Ratio 1.1 g/dL    BUN/Creatinine Ratio 14.5 7.0 - 25.0    Anion Gap 13.0 5.0 - 15.0 mmol/L    eGFR 30.9 (L) >60.0 mL/min/1.73   CBC Auto Differential    Collection Time: 08/17/22  3:56 AM    Specimen: Blood   Result Value Ref Range    WBC 8.80 3.40 - 10.80 10*3/mm3    RBC 3.13 (L) 3.77 - 5.28 10*6/mm3    Hemoglobin 8.8 (L) 12.0 - 15.9 g/dL    Hematocrit 25.9 (L) 34.0 - 46.6 %    MCV 82.7 79.0 - 97.0 fL    MCH 28.1 26.6 - 33.0 pg    MCHC 34.0 31.5 - 35.7 g/dL    RDW 15.9 (H) 12.3 - 15.4 %    RDW-SD 46.8 37.0 - 54.0 fl    MPV 8.3 6.0 - 12.0 fL    Platelets 355 140 - 450 10*3/mm3    Neutrophil % 56.8 42.7 - 76.0 %    Lymphocyte % 23.3 19.6 - 45.3 %    Monocyte % 14.7 (H) 5.0 - 12.0 %    Eosinophil % 4.0 0.3 - 6.2 %    Basophil % 1.2 0.0 - 1.5 %    Neutrophils, Absolute 5.00 1.70 - 7.00 10*3/mm3    Lymphocytes, Absolute 2.00 0.70 - 3.10 10*3/mm3    Monocytes, Absolute 1.30 (H) 0.10 - 0.90 10*3/mm3    Eosinophils, Absolute 0.40 0.00 - 0.40 10*3/mm3    Basophils, Absolute 0.10 0.00 - 0.20 10*3/mm3    nRBC 0.1 0.0 - 0.2 /100 WBC        Imaging:  NM HIDA SCAN WITHOUT PHARMACOLOGICAL INTERVENTION  Narrative: DATE OF EXAM:  8/16/2022 7:49 AM     PROCEDURE:  NM HIDA SCAN WITHOUT PHARMACOLOGICAL INTERVENTION-     INDICATIONS:  Biliary colic, recurrent, gallbladder dyskinesia suspected; N17.9-Acute  kidney failure, unspecified; A41.9-Sepsis, unspecified organism;  R65.21-Severe sepsis with septic shock     COMPARISON:  No Comparisons Available     TECHNIQUE:   The patient received 6  mCi of technetium 99m Choletec intravenously and  images were obtained of the  abdomen in the anterior projection through  60 minutes. The patient ingested 8 oz of the Boost Plus and images were  taken per protocol post ingestion.  Counts were obtained over the  gallbladder to calculate the ejection fraction        FINDINGS:  On initial static image there is uptake of the radiopharmaceutical by  the liver. There some diminished activity to the right aspect of the  liver which probably relates to the contour of the liver. Activity is  noted within gallbladder and biliary system by 30 minutes into the exam.  There were no symptoms on the administration of boost. An ejection  fraction of 88 % was derived which is within normal range.      Impression: 1.     No significant abnormality.     Electronically Signed By-Gaetano Nielson MD On:8/16/2022 9:28 AM  This report was finalized on [id number] by  Gaetano Nielson MD.       ASSESSMENT:  Pulmonary HTN  Anxiety  HTN  MARIANNE  Suspected liver abscess with abdominal hematoma secondary to liver biopsy  E. coli enteritis  Metabolic acidosis-resolved  Coagulopathy probably from DIC  Anemia with hemoperitoneum after liver biopsy  Anion gap metabolic acidosis  Esophagitis  Thrombocytopenia  Ulcer of left lateral tongue    PLAN:  May discharge and follow-up with our service in 2 weeks  Electrolytes/ glycemic control  DVT and GI prophylaxis    Discussed with Dr. Nikki Ojeda, APRN   8/17/2022  15:03 EDT     I personally have examined  and interviewed the patient. I have reviewed the history, data, problems, assessment and plan with our NP.  Critical care time in direct medical management (   ) minutes  Electronically signed by Tramaine Jordan MD. D, ABSM.

## 2022-08-17 NOTE — THERAPY TREATMENT NOTE
Subjective: Pt agreeable to therapeutic plan of care.  Cognition: oriented to Person, Place, Time and Situation    Objective:     Bed Mobility: N/A or Not attempted.   Functional Transfers: SBA  Functional Ambulation: SBA    Bathing: SBA and CGA  ADL Position: supported sitting and unsupported standing  ADL Comments: sponge bathing sitting and standing at sink.    Upper Body Dressing: CGA  ADL Position: supported standing  ADL Comments: doffing/donning gowns    Oral Hygiene: Supervision  ADL Position: supported standing      Vitals: WNL    Pain: 3 VAS  Education: Provided education on importance of mobility and skilled verbal / tactile cueing throughout intervention.     Assessment: Raul Gardner presents with ADL impairments below baseline abilities which indicate the need for continued skilled intervention while inpatient.  Pt completed functional mobility to bathroom with no AD with SBA.  Pt completed sponge bathing task in combination of sitting and standing with SBA-CGA.  Pt completed UBD task of donning two gowns with CGA.  Pt completed LBD task of donning socks with SBA.  Pt completed oral hygiene task with Supervision.  Pt completed functional mobility around room with SBA. Tolerating session today without incident. Will continue to follow and progress as tolerated.     Plan/Recommendations:   Pt would benefit from Home with Home Health, Home with family assist at discharge from facility.   Pt desires Home with Home Health and Home with family assist at discharge. Pt cooperative; agreeable to therapeutic recommendations and plan of care.     Modified Pablo: N/A = No pre-op stroke/TIA    Post-Tx Position: Up in Chair, Staff Present and Call light and personal items within reach  PPE: gloves, surgical mask, eyewear protection

## 2022-08-17 NOTE — TELEPHONE ENCOUNTER
Caller: FREDERICK    Relationship to patient: [facility]    Best call back number:     New or established patient?  [x] New  [] Established    Date of discharge: 08/17/22    Facility discharged from: [facility]    Diagnosis/Symptoms: HYPOVOLEMIC SEPTIC SHOCK    Length of stay (If applicable): 12 DAYS    Specialty Only: Did you see a [facility] health provider?    [] Yes  [] No  If so, who?

## 2022-08-17 NOTE — PLAN OF CARE
Goal Outcome Evaluation:  Plan of Care Reviewed With: patient        Progress: improving  Outcome Evaluation: Pt resting well. She has done well ambulating. She is still having pain in her right side treated per MAR. No concerns at this time. Will continue to monitor.

## 2022-08-17 NOTE — OUTREACH NOTE
Prep Survey    Flowsheet Row Responses   [facility] discharged [facility]? [facility]   Is LACE score < 7 ? No   Emergency Room discharge w/ pulse ox? No   Eligibility TCM   [facility]   Date of Admission 08/05/22   Date of Discharge 08/17/22   Discharge Disposition Home or Self Care   Discharge diagnosis Septic shock   Does the patient have one of the following disease processes/diagnoses(primary or secondary)? Sepsis   Does the patient have Home health ordered? Yes   What is the Home health agency?  [facility]   Is there a DME ordered? No   Prep survey completed? Yes          WALLY A - Registered Nurse

## 2022-08-17 NOTE — THERAPY TREATMENT NOTE
Subjective: Pt agreeable to therapeutic plan of care. States that her nausea has improved since earlier this morning.    Objective:     Bed mobility - N/A or Not attempted. Pt in chair upon initiation of treatment session.  Transfers - Supervision  Ambulation - 390 feet SBA decreased gait speed    Vitals: WNL    Pain: 8 VAS R flank  Education: Provided education on importance of mobility and skilled verbal / tactile cueing throughout intervention.     Assessment: Raul Gardner presents with functional mobility impairments which indicate the need for skilled intervention. Tolerating session today without incident. Will continue to follow and progress as tolerated. Improved tolerance to PT treatment this date. Pt able to ambulate 390 ft with SBA and no AD although, with decreased gait speed. Pain appears to be pt's primary limitation. Recommend d/c home with assist when medically appropriate.     Plan/Recommendations:   No ongoing therapy recommended post-acute care. No therapy needs.. Pt requires no DME at discharge.     Pt desires Home at discharge. Pt cooperative; agreeable to therapeutic recommendations and plan of care.         Basic Mobility 6-click:  Rollin = Total, A lot = 2, A little = 3; 4 = None  Supine>Sit:   1 = Total, A lot = 2, A little = 3; 4 = None   Sit>Stand with arms:  1 = Total, A lot = 2, A little = 3; 4 = None  Bed>Chair:   1 = Total, A lot = 2, A little = 3; 4 = None  Ambulate in room:  1 = Total, A lot = 2, A little = 3; 4 = None  3-5 Steps with railin = Total, A lot = 2, A little = 3; 4 = None  Score: 18    Modified [place]: N/A = No pre-op stroke/TIA    Post-Tx Position: Up in Chair and Call light and personal items within reach  PPE: gloves, surgical mask, eyewear protection

## 2022-08-17 NOTE — DISCHARGE SUMMARY
23w               [facility] Medicine Services  DISCHARGE SUMMARY    Patient Name: Raul Gardner  : 1975  MRN: [id number]    Date of Admission: 2022  Discharge Diagnosis:     Severe hypovolemic/septic shock on admission  Initially on 2 pressors  Clinical condition worsened after liver biopsy-raising concern for possible hemorrhagic shock and patient was placed on 4 pressors at one-point.  CT abdomen/pelvis 2022-stable intra-abdominal hemorrhage  Improved  Off pressors  Hemodynamically stable       Gastroenteritis  Enteropathogenic E. coli  GI panel- enteropathogenic E. coli  Further work-up essentially insignificant  ID followed while inpatient.  Had 10 days course of IV Rocephin.     Abnormal liver lesion noted on imaging  Status post liver biopsy 2022- no evidence of infection  Pathology reports revealed acute cholangitis but no evidence of malignancy.  Liver/body fluid culture-negative  MRCP normal caliber common bile duct without evidence of obstructing mass/stones.  HIDA scan-no significant abnormality     Acute kidney injury  Most likely ATN secondary to sepsis and hemodynamic instability  Off CRRT  Improving  Nephrologist followed while inpatient  Patient to follow-up with nephrologist as outpatient in 3 to 4 weeks  BMP in 2 weeks      Thrombocytopenia  Most likely due to sepsis  Resolved     Reactive leukocytosis  Probably due to steroids/infection  Resolved     Hypertension  Blood pressure controlled  On Norvasc, hydrochlorothiazide and metoprolol  Started on hydrochlorothiazide          Right-sided chest pain  Unlikely PE  Negative venous Dopplers  Resolved     Acute blood loss anemia with hemoperitoneum  Following liver biopsy  Was seen by general surgeon-no surgical intervention recommended  Hemoglobin stable     Sepsis on admission  Fever with elevated liver enzymes  Liver biopsy showing acute cholangitis  ?  Underlying biliary obstructive process  Autoimmune panel  nonrevealing  MRCP- normal common bile duct with no evidence of obstructing mass or stone  GI following-   HIDA scan-insignificant       Depression-on Lexapro        Morbid obesity  BMI 51.1   Encourage lifestyle changes               Date of Discharge: 8/17/2022  Primary Care Physician: Mariebl Graf MD      Presenting Problem:   Acute kidney injury (HCC) [N17.9]  Septic shock (HCC) [A41.9, R65.21]    Active and Resolved Hospital Problems:  Active Hospital Problems    Diagnosis POA   • **Septic shock (HCC) [A41.9, R65.21] Yes   • Suspected liver abscess [K75.0] Yes   • Diarrhea [R19.7] Yes   • Acute kidney injury (HCC) [N17.9] Yes   • Metabolic acidosis [E87.2] Yes   • E coli enteritis [A04.4] Yes   • Abdominal hematoma, secondary to liver biopsyh [S30.1XXA] No   • Essential hypertension [I10] Yes   • Vitamin D deficiency [E55.9] Yes   • Pulmonary hypertension, unspecified (HCC) [I27.20] Yes   • Anxiety [F41.9] Yes      Resolved Hospital Problems   No resolved problems to display.         Hospital Course     Hospital Course:  Patient is a 47-year-old with medical history significant for depression and hypertension.  Presented to [facility] ED on 8/5/2022 with complaints of generally feeling unwell for the last 2 days prior to presentation.  Reported having intermittent fever, body aches, vomiting and diarrhea.  In the ED patient was noted to have a temperature of 103.1, heart rate of 147 and significantly hypotensive.  Fluid resuscitation with vasopressor was initiated.  She was thought to have possible liver abscess and also had MARIANNE  Nephrologist and ID were consulted and patient admitted to the intensive care unit.  Was subsequently transferred to surgical floor and hospitalist consulted for medical management.    Conditions addressed while inpatient are as stated below     Severe hypovolemic/septic shock on admission  Initially on 2 pressors  Clinical condition worsened after liver biopsy-raising  concern for possible hemorrhagic shock and patient was placed on 4 pressors at one-point.  CT abdomen/pelvis 8/9/2022-stable intra-abdominal hemorrhage  Improved  Off pressors  Hemodynamically stable       Gastroenteritis  Enteropathogenic E. coli  GI panel- enteropathogenic E. coli  Further work-up essentially insignificant  ID followed while inpatient.  Had 10 days course of IV Rocephin.     Abnormal liver lesion noted on imaging  Status post liver biopsy 8/5/2022- no evidence of infection  Pathology reports revealed acute cholangitis but no evidence of malignancy.  Liver/body fluid culture-negative  MRCP normal caliber common bile duct without evidence of obstructing mass/stones.  HIDA scan-no significant abnormality     Acute kidney injury  Most likely ATN secondary to sepsis and hemodynamic instability  Off CRRT  Improving  Nephrologist followed while inpatient  Patient to follow-up with nephrologist as outpatient in 3 to 4 weeks  BMP in 2 weeks      Thrombocytopenia  Most likely due to sepsis  Resolved     Reactive leukocytosis  Probably due to steroids/infection  Resolved     Hypertension  Blood pressure controlled  On Norvasc, hydrochlorothiazide and metoprolol  Started on hydrochlorothiazide          Right-sided chest pain  Unlikely PE  Negative venous Dopplers  Resolved     Acute blood loss anemia with hemoperitoneum  Following liver biopsy  Was seen by general surgeon-no surgical intervention recommended  Hemoglobin stable     Sepsis on admission  Fever with elevated liver enzymes  Liver biopsy showing acute cholangitis  ?  Underlying biliary obstructive process  Autoimmune panel nonrevealing  MRCP- normal common bile duct with no evidence of obstructing mass or stone  GI following-   HIDA scan-insignificant       Depression-on Lexapro        Morbid obesity  BMI 51.1   Encourage lifestyle changes      Condition at discharge-stable    DISCHARGE Follow Up Recommendations for labs and diagnostics:        Reasons For Change In Medications and Indications for New Medications:      Day of Discharge     Vital Signs:  Temp:  [98.5 °F (36.9 °C)-98.6 °F (37 °C)] 98.5 °F (36.9 °C)  Heart Rate:  [75-90] 90  Resp:  [17-20] 17  BP: (125-150)/(82-85) 125/84    Physical Exam:  Physical Exam  Vitals reviewed.   Constitutional:       General: She is not in acute distress.  HENT:      Head: Normocephalic.      Nose: Nose normal.      Mouth/Throat:      Mouth: Mucous membranes are moist.   Eyes:      Extraocular Movements: Extraocular movements intact.      Conjunctiva/sclera: Conjunctivae normal.      Pupils: Pupils are equal, round, and reactive to light.   Cardiovascular:      Rate and Rhythm: Normal rate and regular rhythm.   Pulmonary:      Effort: No respiratory distress.      Breath sounds: Normal breath sounds.   Abdominal:      General: Bowel sounds are normal.      Palpations: Abdomen is soft.      Tenderness: There is no abdominal tenderness.   Musculoskeletal:         General: Normal range of motion.      Cervical back: Neck supple.   Skin:     General: Skin is warm and dry.   Neurological:      Mental Status: She is alert and oriented to person, place, and time.   Psychiatric:         Mood and Affect: Mood normal.            Pertinent  and/or Most Recent Results     LAB RESULTS:      Lab 08/17/22  0356 08/16/22  0400 08/15/22  0246 08/14/22  0355 08/12/22  0400 08/11/22  0515   WBC 8.80 9.10 9.90 12.60* 15.80* 16.20*   HEMOGLOBIN 8.8* 9.3* 8.6* 7.8* 8.0* 8.2*   HEMATOCRIT 25.9* 27.6* 25.1* 23.9* 24.0* 25.5*   PLATELETS 355 350 325 302 184 155   NEUTROS ABS 5.00 5.70 7.33* 8.06* 9.95* 9.23*   LYMPHS ABS 2.00 1.80  --   --   --   --    MONOS ABS 1.30* 1.10*  --   --   --   --    EOS ABS 0.40 0.30 0.30 0.13  --  0.65*   MCV 82.7 83.4 83.4 84.2 82.8 84.7   LDH  --   --   --   --  443*  --          Lab 08/17/22  0356 08/16/22  0400 08/15/22  0246 08/14/22  0355 08/13/22  0609 08/12/22  0400   SODIUM 139 140 139 143 142  139   POTASSIUM 3.3* 3.3* 3.0* 3.2* 3.3* 3.2*   CHLORIDE 96* 96* 96* 99 98 97*   CO2 30.0* 31.0* 31.0* 34.0* 35.0* 33.0*   ANION GAP 13.0 13.0 12.0 10.0 9.0 9.0   BUN 29* 26* 26* 29* 33* 36*   CREATININE 2.00* 2.00* 2.00* 2.10* 2.10* 2.45*   EGFR 30.9* 30.9* 30.9* 29.1* 29.1* 24.2*   GLUCOSE 117* 113* 110* 114* 112* 112*   CALCIUM 9.4 9.2 8.9 8.8 9.0 8.5*   MAGNESIUM  --  1.8 1.7 1.7 1.8 1.9   PHOSPHORUS 4.4 4.3 3.9 4.1 3.7 4.0         Lab 08/17/22  0356 08/16/22  0400 08/15/22  0246 08/14/22  0355 08/13/22  0609   TOTAL PROTEIN 6.5 6.5 6.1 6.0 5.9*   ALBUMIN 3.40* 3.30* 3.40* 3.20* 3.20*   GLOBULIN 3.1 3.2 2.7 2.8 2.7   ALT (SGPT) 30 33 35* 44* 52*   AST (SGOT) 39* 37* 35* 33* 27   BILIRUBIN 3.0* 2.9* 2.2* 1.7* 1.2   ALK PHOS 128* 135* 133* 133* 135*         Lab 08/11/22  1420   TROPONIN T <0.010             Lab 08/11/22  0515   IRON 22*   IRON SATURATION 6*   TIBC 364   TRANSFERRIN 244         Brief Urine Lab Results  (Last result in the past 365 days)      Color   Clarity   Blood   Leuk Est   Nitrite   Protein   CREAT   Urine HCG        08/05/22 1917             82.1         08/05/22 1917 Dark Yellow  Comment: Result checked    Turbid  Comment: Result checked    Large (3+)   Small (1+)   Negative   100 mg/dL (2+)               Microbiology Results (last 10 days)     ** No results found for the last 240 hours. **          CT Abdomen Pelvis Without Contrast    Result Date: 8/9/2022  Impression: 1. Hematoma collection at the inferior and subcapsular margin of the right hepatic lobe appears stable since 8/6/2022. Abdominal and pelvic hemoperitoneum likewise appears stable. 2. Ill-defined heterogeneous low density lesions scattered within liver are again noted. The dominant lesion in the posterior right hepatic lobe is larger than on 8/5/2022 while the others are stable. No new liver lesions are identified. Correlate with recent CT-guided biopsy findings. 3. Development of  generalized body wall edema. 4. Development  of small bilateral pleural effusions and posterior bilateral lower lobe atelectasis or pneumonia. 5. Persistent enhancement of the kidneys on this noncontrast examination. Correlate for nephropathy.  Electronically Signed By-Franca Gallegos MD On:8/9/2022 4:46 PM This report was finalized on [id number] by  Franca Gallegos MD.    CT Abdomen Pelvis Without Contrast    Result Date: 8/6/2022  Impression: Impression: 1. Moderate volume hemoperitoneum is new from prior and partially accumulates in the infrahepatic space. This suggests potential hepatic source of hemorrhage, likely from recent biopsy. 2. Bilateral small layering pleural effusions and streaky bibasilar atelectasis. Electronically signed by:  Bob Jackson M.D.  8/6/2022 9:03 PM    CT Chest Without Contrast Diagnostic    Result Date: 8/9/2022  Impression: 1. Hematoma collection at the inferior and subcapsular margin of the right hepatic lobe appears stable since 8/6/2022. Abdominal and pelvic hemoperitoneum likewise appears stable. 2. Ill-defined heterogeneous low density lesions scattered within liver are again noted. The dominant lesion in the posterior right hepatic lobe is larger than on 8/5/2022 while the others are stable. No new liver lesions are identified. Correlate with recent CT-guided biopsy findings. 3. Development of  generalized body wall edema. 4. Development of small bilateral pleural effusions and posterior bilateral lower lobe atelectasis or pneumonia. 5. Persistent enhancement of the kidneys on this noncontrast examination. Correlate for nephropathy.  Electronically Signed By-Franca Gallegos MD On:8/9/2022 4:46 PM This report was finalized on [id number] by  Franca Gallegos MD.    NM HIDA SCAN WITHOUT PHARMACOLOGICAL INTERVENTION    Result Date: 8/16/2022  Impression: 1.     No significant abnormality.  Electronically Signed By-Gaetano Nielson MD On:8/16/2022 9:28 AM This report was finalized on [id number] by  Gaetano Nielson MD.    CT  Abdomen Pelvis With Contrast    Result Date: 8/5/2022  Impression: 1. Multiple ill-defined low-density lesions are scattered within the liver parenchyma. These do not represent the appearance of typical cysts. In the context of history of septic shock, hepatic phlegmon or evolving hepatic abscesses could be considered. 2. Minimal stranding is seen at the level of the pancreatic tail which is nonspecific. This could represent normal variant for this patient. Very mild pancreatitis could have a similar appearance, although the remainder the pancreas itself appears unremarkable. Correlate with laboratory findings. 3. There is very mild interstitial thickening in both lungs and faint stranding within the anterior superior mediastinum, which may raise the possibility of mild early edema. 4. There is long segment thickening of the mid to lower thoracic esophagus with small esophageal hiatal hernia. Correlate for esophagitis symptoms. Endoscopic correlation may prove helpful. 5. Mild dependent atelectasis in both lungs. 6. No pulmonary embolism is seen.    Electronically Signed By-Franca Gallegos MD On:8/5/2022 8:58 AM This report was finalized on [id number] by  Franca Gallegos MD.    US Liver    Result Date: 8/6/2022  Impression: Multiple hyperechoic lesions within liver, which appear increased in size from prior ultrasound. Changes could be secondary to interval liver biopsy. Recommend correlation with pathology results.  Electronically Signed By-Mervin Martell MD On:8/6/2022 1:46 PM This report was finalized on [id number] by  Mervin Martell MD.    US Liver    Result Date: 8/5/2022  Impression:   IMPRESSION: 1. Echogenic foci within the right and left hepatic lobe without internal hypervascularity, superimposed upon background hepatic hypoechoic parenchyma. The echogenic foci do not have a typical appearance for fluid collections or abscesses. Other etiologies such as hemangiomas, areas of fatty sparing upon  background steatosis, focal fibrosis/scarring, could be considered. These may be further and better characterize utilizing MRI without and with contrast liver protocol. 2. No abnormal biliary dilation is seen.  Electronically Signed By-Franca Gallegos MD On:8/5/2022 1:17 PM This report was finalized on [id number] by  Franca Gallegos MD.    XR Chest 1 View    Result Date: 8/12/2022  Impression: Increased bibasilar airspace opacities, likely due to bilateral pleural fluid with underlying atelectasis and/or pneumonia.  Electronically Signed By-Hui Raman MD On:8/12/2022 4:52 PM This report was finalized on [id number] by  Hui Raman MD.    XR Chest 1 View    Result Date: 8/8/2022  Impression: 1. Stable right IJ central venous catheter and right upper extremity PICC line. 2. Persistent mild bibasilar airspace disease likely atelectasis. 3. No pneumothorax.  Electronically Signed By-Eleazar Narvaez MD On:8/8/2022 10:20 AM This report was finalized on [id number] by  Eleazar Narvaez MD.    XR Chest 1 View    Result Date: 8/6/2022  Impression: Right IJ catheter with tip at cavoatrial junction. No pneumothorax identified.  Electronically Signed By-Mervin Martell MD On:8/6/2022 6:27 PM This report was finalized on [id number] by  Mervin Martell MD.    XR Chest 1 View    Result Date: 8/6/2022  Impression:  New left lower lobe disease. With the provided history this is concerning for developing pneumonia, possibly aspiration given its location.  Unchanged right lower lobe atelectasis versus pneumonia.  Right-sided PICC line in good position.  No pneumothorax.  Electronically Signed By-Ankur Thapa On:8/6/2022 12:53 PM This report was finalized on [id number] by  Ankur Thapa, .    XR Chest 1 View    Result Date: 8/6/2022  Impression:  Right sided central line tip in the lower neck in the location of the lower internal jugular vein, just above the clavicle.  No pneumothorax. Mild right basilar atelectasis  versus pneumonia.  Electronically Signed By-Ankur Thapa On:8/6/2022 9:08 AM This report was finalized on [id number] by  Ankur Thapa, .    XR Chest 1 View    Result Date: 8/5/2022  Impression: 1.     Tip of the right internal jugular central venous catheter terminates in the right atrium. No visible pneumothorax. 2.     Low lung volumes without evidence of acute cardiopulmonary abnormality.  Electronically Signed By-Hui Raman MD On:8/5/2022 9:34 AM This report was finalized on [id number] by  Hui Raman MD.    CT Angiogram Chest Pulmonary Embolism    Result Date: 8/5/2022  Impression: 1. Multiple ill-defined low-density lesions are scattered within the liver parenchyma. These do not represent the appearance of typical cysts. In the context of history of septic shock, hepatic phlegmon or evolving hepatic abscesses could be considered. 2. Minimal stranding is seen at the level of the pancreatic tail which is nonspecific. This could represent normal variant for this patient. Very mild pancreatitis could have a similar appearance, although the remainder the pancreas itself appears unremarkable. Correlate with laboratory findings. 3. There is very mild interstitial thickening in both lungs and faint stranding within the anterior superior mediastinum, which may raise the possibility of mild early edema. 4. There is long segment thickening of the mid to lower thoracic esophagus with small esophageal hiatal hernia. Correlate for esophagitis symptoms. Endoscopic correlation may prove helpful. 5. Mild dependent atelectasis in both lungs. 6. No pulmonary embolism is seen.    Electronically Signed By-Franca Gallegos MD On:8/5/2022 8:58 AM This report was finalized on [id number] by  Franca Gallegos MD.    MRI abdomen wo contrast mrcp    Result Date: 8/14/2022  Impression: IMPRESSION : 1. Normal caliber common bile duct without evidence of obstructing mass or stone. 2. Heterogeneous appearance of the liver  parenchyma within the posterior superior right hepatic lobe segment VII likely representing an area of intraparenchymal hemorrhage. Additional subcapsular hematoma is present along the posterior and lateral aspect of the right hepatic lobe. 3. Additional nonspecific T2 hyperintense lesions are present within both the right and left hepatic lobe which appear overall similar to the earliest prior CT dated 08/05/2022. 4. Mild hepatomegaly measuring 19 cm in craniocaudal dimension.  Electronically Signed By-Moe Montiel MD On:8/14/2022 3:49 PM This report was finalized on [id number] by  Moe Montiel MD.    CT Needle Biopsy Liver    Result Date: 8/5/2022  Impression:  1. The areas identified on the patient's contrast-enhanced CT and recent ultrasound are not clearly identified on the noncontrast scan. Despite targeting the area when compared with CT, no purulent fluid could be aspirated. Ultrasound was utilized and to confirm that the area in question was being sampled. Core specimens were then obtained of this area. Given the absence of any purulent fluid and the nonvisualization on the noncontrast scan this is very unlikely to represent an intrahepatic abscess. These findings were discussed with Dr. Orozco by telephone immediately following the biopsy.  Electronically Signed By-Nahum Ross MD On:8/5/2022 6:26 PM This report was finalized on [id number] by  Nahum Ross MD.    US Renal Bilateral    Result Date: 8/5/2022  Impression: Nonobstructed hyperechoic kidneys compatible with medical renal disease  Electronically Signed By-Dave Cunningham On:8/5/2022 1:15 PM This report was finalized on [id number] by  Dave Cunningham, .    XR Abdomen KUB    Result Date: 8/6/2022  Impression:  Body habitus and a generalized paucity of bowel gas mildly limits evaluation. No significantly dilated gas-filled loops of small bowel are seen to suggest an obstruction. No free peritoneal air.  No suspicious calcifications  detected.  No acute osseous pathology.     Electronically signed by:  Nj Rosario  8/6/2022 4:47 AM      Results for orders placed during the hospital encounter of 08/05/22    Duplex Venous Lower Extremity - Bilateral CAR    Interpretation Summary  · Normal bilateral lower extremity venous duplex scan.      Results for orders placed during the hospital encounter of 08/05/22    Duplex Venous Lower Extremity - Bilateral CAR    Interpretation Summary  · Normal bilateral lower extremity venous duplex scan.      Results for orders placed during the hospital encounter of 08/05/22    Adult Transthoracic Echo Complete w/ Color, Spectral and Contrast if Necessary Per Protocol    Interpretation Summary  · Left ventricular wall thickness is consistent with mild concentric hypertrophy.  · Estimated left ventricular EF = 75% Left ventricular systolic function is hyperdynamic (EF > 70%).  · Left ventricular diastolic function is consistent with (grade I) impaired relaxation.      Labs Pending at Discharge:      Procedures Performed           Consults:   Consults     Date and Time Order Name Status Description    8/13/2022  6:17 PM Inpatient Gastroenterology Consult      8/10/2022 11:20 AM Inpatient Hospitalist Consult      8/6/2022  2:12 PM Inpatient Gastroenterology Consult      8/6/2022 10:31 AM Inpatient General Surgery Consult Completed     8/5/2022  3:34 PM Inpatient Nephrology Consult      8/5/2022 11:02 AM Inpatient Infectious Diseases Consult Completed             Discharge Details        Discharge Medications      New Medications      Instructions Start Date   amLODIPine 10 MG tablet  Commonly known as: NORVASC   10 mg, Oral, Every 24 Hours Scheduled   Start Date: August 18, 2022     hydroCHLOROthiazide 25 MG tablet  Commonly known as: HYDRODIURIL   25 mg, Oral, Daily   Start Date: August 18, 2022     metoprolol tartrate 50 MG tablet  Commonly known as: LOPRESSOR   50 mg, Oral, Every 12 Hours Scheduled       ondansetron ODT 4 MG disintegrating tablet  Commonly known as: ZOFRAN-ODT   4 mg, Translingual, Every 8 Hours PRN      oxyCODONE 10 MG tablet  Commonly known as: ROXICODONE   10 mg, Oral, Every 4 Hours PRN      potassium chloride 20 MEQ CR tablet  Commonly known as: K-DUR,KLOR-CON   20 mEq, Oral, Daily         Continue These Medications      Instructions Start Date   escitalopram 10 MG tablet  Commonly known as: LEXAPRO   TAKE 1 TABLET BY MOUTH EVERY DAY         Stop These Medications    lisinopril 5 MG tablet  Commonly known as: PRINIVIL,ZESTRIL            Allergies   Allergen Reactions   • Cephalexin Rash   • Hydrocodone-Acetaminophen Rash         Discharge Disposition:   Home or Self Care    Diet:  Hospital:  Diet Order   Procedures   • Diet Regular         Discharge Activity:   Activity Instructions     Gradually Increase Activity Until at Pre-Hospitalization Level              CODE STATUS:  Code Status and Medical Interventions:   Ordered at: 08/05/22 1025     Code Status (Patient has no pulse and is not breathing):    CPR (Attempt to Resuscitate)     Medical Interventions (Patient has pulse or is breathing):    Full Support         Future Appointments   Date Time Provider Department Center   9/22/2022  3:15 PM Dominic Richey MD [facility]       Additional Instructions for the Follow-ups that You Need to Schedule     Discharge Follow-up with PCP   As directed       Currently Documented PCP:    Maribel Graf MD    PCP Phone Number:    [phone]     Follow Up Details: Follow-up with PCP in 1 week         Discharge Follow-up with Specified Provider: Follow-up with your nephrologist in 3 to 4 weeks   As directed      To: Follow-up with your nephrologist in 3 to 4 weeks         Basic Metabolic Panel    Aug 31, 2022 (Approximate)      Release to patient: Routine Release               Time spent on Discharge including face to face service:  35 minutes    This patient has been examined  appropriate PPE . 08/17/22      Signature: Electronically signed by Bob Dunne MD, 08/17/22, 12:09 PM EDT.

## 2022-08-17 NOTE — PLAN OF CARE
Goal Outcome Evaluation:            Assessment: Raul Gardner presents with functional mobility impairments which indicate the need for skilled intervention. Tolerating session today without incident. Will continue to follow and progress as tolerated. Improved tolerance to PT treatment this date. Pt able to ambulate 390 ft with SBA and no AD although, with decreased gait speed. Pain appears to be pt's primary limitation. Recommend d/c home with assist when medically appropriate.

## 2022-08-17 NOTE — PLAN OF CARE
Goal Outcome Evaluation:   Raul Gardner presents with ADL impairments below baseline abilities which indicate the need for continued skilled intervention while inpatient.  Pt completed functional mobility to bathroom with no AD with SBA.  Pt completed sponge bathing task in combination of sitting and standing with SBA-CGA.  Pt completed UBD task of donning two gowns with CGA.  Pt completed LBD task of donning socks with SBA.  Pt completed oral hygiene task with Supervision.  Pt completed functional mobility around room with SBA. Tolerating session today without incident. Will continue to follow and progress as tolerated.

## 2022-08-18 ENCOUNTER — TRANSITIONAL CARE MANAGEMENT TELEPHONE ENCOUNTER (OUTPATIENT)
Dept: CALL CENTER | Facility: HOSPITAL | Age: 47
End: 2022-08-18

## 2022-08-18 NOTE — PAYOR COMM NOTE
"DC Notice - Raul Middleton  1975  Dc 22 Routine to Home    KAREL LOPEZ LPN UR  Utilization Review Nurse  [facility]  Direct & confidential phone # [phone]  Fax # [phone]    Raul Middleton (47 y.o. Female)             Date of Birth   1975    Social Security Number       Address   [address] [place]    Home Phone   [phone]    MRN   [id number]       Lutheran   None    Marital Status                               Admission Date   22    Admission Type   Emergency    Admitting Provider   Shy Moreno MD    Attending Provider       Department, Room/Bed   [facility] SURGICAL INPATIENT,        Discharge Date   2022    Discharge Disposition   Home or Self Care    Discharge Destination                               Attending Provider: (none)   Allergies: Cephalexin, Hydrocodone-acetaminophen    Isolation: None   Infection: None   Code Status: Prior   Advance Care Planning Activity    Ht: 157.5 cm (62\")   Wt: 122 kg (268 lb 1.6 oz)    Admission Cmt: None   Principal Problem: Septic shock (HCC) [A41.9,R65.21]                 Active Insurance as of 2022     Primary Coverage     Payor Plan Insurance Group Employer/Plan Group    KEY BENEFIT ADMINISTRATORS [facility] KEY BENEFIT ADMINISTRATORS [facility] [id number]     Payor Plan Address Payor Plan Phone Number Payor Plan Fax Number Effective Dates    [address] [phone]  2022 - None Entered    [facility] [id number]       Subscriber Name Subscriber Birth Date Member ID       RAUL MIDDLETON 1975 [id number]                 Emergency Contacts      (Rel.) Home Phone Work Phone Mobile Phone    CHUN MIDDLETON (Spouse) -- -- [phone]               Discharge Summary      Bob Dunne MD at 22 1157          23w               [facility] Medicine Services  DISCHARGE SUMMARY    Patient Name: Raul Middleton  : 1975  MRN: [id number]    Date of " Admission: 8/5/2022  Discharge Diagnosis:     Severe hypovolemic/septic shock on admission  Initially on 2 pressors  Clinical condition worsened after liver biopsy-raising concern for possible hemorrhagic shock and patient was placed on 4 pressors at one-point.  CT abdomen/pelvis 8/9/2022-stable intra-abdominal hemorrhage  Improved  Off pressors  Hemodynamically stable       Gastroenteritis  Enteropathogenic E. coli  GI panel- enteropathogenic E. coli  Further work-up essentially insignificant  ID followed while inpatient.  Had 10 days course of IV Rocephin.     Abnormal liver lesion noted on imaging  Status post liver biopsy 8/5/2022- no evidence of infection  Pathology reports revealed acute cholangitis but no evidence of malignancy.  Liver/body fluid culture-negative  MRCP normal caliber common bile duct without evidence of obstructing mass/stones.  HIDA scan-no significant abnormality     Acute kidney injury  Most likely ATN secondary to sepsis and hemodynamic instability  Off CRRT  Improving  Nephrologist followed while inpatient  Patient to follow-up with nephrologist as outpatient in 3 to 4 weeks  BMP in 2 weeks      Thrombocytopenia  Most likely due to sepsis  Resolved     Reactive leukocytosis  Probably due to steroids/infection  Resolved     Hypertension  Blood pressure controlled  On Norvasc, hydrochlorothiazide and metoprolol  Started on hydrochlorothiazide          Right-sided chest pain  Unlikely PE  Negative venous Dopplers  Resolved     Acute blood loss anemia with hemoperitoneum  Following liver biopsy  Was seen by general surgeon-no surgical intervention recommended  Hemoglobin stable     Sepsis on admission  Fever with elevated liver enzymes  Liver biopsy showing acute cholangitis  ?  Underlying biliary obstructive process  Autoimmune panel nonrevealing  MRCP- normal common bile duct with no evidence of obstructing mass or stone  GI following-   HIDA scan-insignificant       Depression-on  Lexapro        Morbid obesity  BMI 51.1   Encourage lifestyle changes               Date of Discharge: 8/17/2022  Primary Care Physician: Maribel Graf MD      Presenting Problem:   Acute kidney injury (HCC) [N17.9]  Septic shock (HCC) [A41.9, R65.21]    Active and Resolved Hospital Problems:  Active Hospital Problems    Diagnosis POA   • **Septic shock (HCC) [A41.9, R65.21] Yes   • Suspected liver abscess [K75.0] Yes   • Diarrhea [R19.7] Yes   • Acute kidney injury (HCC) [N17.9] Yes   • Metabolic acidosis [E87.2] Yes   • E coli enteritis [A04.4] Yes   • Abdominal hematoma, secondary to liver biopsyh [S30.1XXA] No   • Essential hypertension [I10] Yes   • Vitamin D deficiency [E55.9] Yes   • Pulmonary hypertension, unspecified (HCC) [I27.20] Yes   • Anxiety [F41.9] Yes      Resolved Hospital Problems   No resolved problems to display.         Hospital Course     Hospital Course:  Patient is a 47-year-old with medical history significant for depression and hypertension.  Presented to [facility] ED on 8/5/2022 with complaints of generally feeling unwell for the last 2 days prior to presentation.  Reported having intermittent fever, body aches, vomiting and diarrhea.  In the ED patient was noted to have a temperature of 103.1, heart rate of 147 and significantly hypotensive.  Fluid resuscitation with vasopressor was initiated.  She was thought to have possible liver abscess and also had MARIANNE  Nephrologist and ID were consulted and patient admitted to the intensive care unit.  Was subsequently transferred to surgical floor and hospitalist consulted for medical management.    Conditions addressed while inpatient are as stated below     Severe hypovolemic/septic shock on admission  Initially on 2 pressors  Clinical condition worsened after liver biopsy-raising concern for possible hemorrhagic shock and patient was placed on 4 pressors at one-point.  CT abdomen/pelvis 8/9/2022-stable intra-abdominal  hemorrhage  Improved  Off pressors  Hemodynamically stable       Gastroenteritis  Enteropathogenic E. coli  GI panel- enteropathogenic E. coli  Further work-up essentially insignificant  ID followed while inpatient.  Had 10 days course of IV Rocephin.     Abnormal liver lesion noted on imaging  Status post liver biopsy 8/5/2022- no evidence of infection  Pathology reports revealed acute cholangitis but no evidence of malignancy.  Liver/body fluid culture-negative  MRCP normal caliber common bile duct without evidence of obstructing mass/stones.  HIDA scan-no significant abnormality     Acute kidney injury  Most likely ATN secondary to sepsis and hemodynamic instability  Off CRRT  Improving  Nephrologist followed while inpatient  Patient to follow-up with nephrologist as outpatient in 3 to 4 weeks  BMP in 2 weeks      Thrombocytopenia  Most likely due to sepsis  Resolved     Reactive leukocytosis  Probably due to steroids/infection  Resolved     Hypertension  Blood pressure controlled  On Norvasc, hydrochlorothiazide and metoprolol  Started on hydrochlorothiazide          Right-sided chest pain  Unlikely PE  Negative venous Dopplers  Resolved     Acute blood loss anemia with hemoperitoneum  Following liver biopsy  Was seen by general surgeon-no surgical intervention recommended  Hemoglobin stable     Sepsis on admission  Fever with elevated liver enzymes  Liver biopsy showing acute cholangitis  ?  Underlying biliary obstructive process  Autoimmune panel nonrevealing  MRCP- normal common bile duct with no evidence of obstructing mass or stone  GI following-   HIDA scan-insignificant       Depression-on Lexapro        Morbid obesity  BMI 51.1   Encourage lifestyle changes      Condition at discharge-stable    DISCHARGE Follow Up Recommendations for labs and diagnostics:       Reasons For Change In Medications and Indications for New Medications:      Day of Discharge     Vital Signs:  Temp:  [98.5 °F (36.9 °C)-98.6 °F  (37 °C)] 98.5 °F (36.9 °C)  Heart Rate:  [75-90] 90  Resp:  [17-20] 17  BP: (125-150)/(82-85) 125/84    Physical Exam:  Physical Exam  Vitals reviewed.   Constitutional:       General: She is not in acute distress.  HENT:      Head: Normocephalic.      Nose: Nose normal.      Mouth/Throat:      Mouth: Mucous membranes are moist.   Eyes:      Extraocular Movements: Extraocular movements intact.      Conjunctiva/sclera: Conjunctivae normal.      Pupils: Pupils are equal, round, and reactive to light.   Cardiovascular:      Rate and Rhythm: Normal rate and regular rhythm.   Pulmonary:      Effort: No respiratory distress.      Breath sounds: Normal breath sounds.   Abdominal:      General: Bowel sounds are normal.      Palpations: Abdomen is soft.      Tenderness: There is no abdominal tenderness.   Musculoskeletal:         General: Normal range of motion.      Cervical back: Neck supple.   Skin:     General: Skin is warm and dry.   Neurological:      Mental Status: She is alert and oriented to person, place, and time.   Psychiatric:         Mood and Affect: Mood normal.            Pertinent  and/or Most Recent Results     LAB RESULTS:      Lab 08/17/22  0356 08/16/22  0400 08/15/22  0246 08/14/22  0355 08/12/22  0400 08/11/22  0515   WBC 8.80 9.10 9.90 12.60* 15.80* 16.20*   HEMOGLOBIN 8.8* 9.3* 8.6* 7.8* 8.0* 8.2*   HEMATOCRIT 25.9* 27.6* 25.1* 23.9* 24.0* 25.5*   PLATELETS 355 350 325 302 184 155   NEUTROS ABS 5.00 5.70 7.33* 8.06* 9.95* 9.23*   LYMPHS ABS 2.00 1.80  --   --   --   --    MONOS ABS 1.30* 1.10*  --   --   --   --    EOS ABS 0.40 0.30 0.30 0.13  --  0.65*   MCV 82.7 83.4 83.4 84.2 82.8 84.7   LDH  --   --   --   --  443*  --          Lab 08/17/22  0356 08/16/22  0400 08/15/22  0246 08/14/22  0355 08/13/22  0609 08/12/22  0400   SODIUM 139 140 139 143 142 139   POTASSIUM 3.3* 3.3* 3.0* 3.2* 3.3* 3.2*   CHLORIDE 96* 96* 96* 99 98 97*   CO2 30.0* 31.0* 31.0* 34.0* 35.0* 33.0*   ANION GAP 13.0 13.0 12.0  10.0 9.0 9.0   BUN 29* 26* 26* 29* 33* 36*   CREATININE 2.00* 2.00* 2.00* 2.10* 2.10* 2.45*   EGFR 30.9* 30.9* 30.9* 29.1* 29.1* 24.2*   GLUCOSE 117* 113* 110* 114* 112* 112*   CALCIUM 9.4 9.2 8.9 8.8 9.0 8.5*   MAGNESIUM  --  1.8 1.7 1.7 1.8 1.9   PHOSPHORUS 4.4 4.3 3.9 4.1 3.7 4.0         Lab 08/17/22  0356 08/16/22  0400 08/15/22  0246 08/14/22  0355 08/13/22  0609   TOTAL PROTEIN 6.5 6.5 6.1 6.0 5.9*   ALBUMIN 3.40* 3.30* 3.40* 3.20* 3.20*   GLOBULIN 3.1 3.2 2.7 2.8 2.7   ALT (SGPT) 30 33 35* 44* 52*   AST (SGOT) 39* 37* 35* 33* 27   BILIRUBIN 3.0* 2.9* 2.2* 1.7* 1.2   ALK PHOS 128* 135* 133* 133* 135*         Lab 08/11/22  1420   TROPONIN T <0.010             Lab 08/11/22  0515   IRON 22*   IRON SATURATION 6*   TIBC 364   TRANSFERRIN 244         Brief Urine Lab Results  (Last result in the past 365 days)      Color   Clarity   Blood   Leuk Est   Nitrite   Protein   CREAT   Urine HCG        08/05/22 1917             82.1         08/05/22 1917 Dark Yellow  Comment: Result checked    Turbid  Comment: Result checked    Large (3+)   Small (1+)   Negative   100 mg/dL (2+)               Microbiology Results (last 10 days)     ** No results found for the last 240 hours. **          CT Abdomen Pelvis Without Contrast    Result Date: 8/9/2022  Impression: 1. Hematoma collection at the inferior and subcapsular margin of the right hepatic lobe appears stable since 8/6/2022. Abdominal and pelvic hemoperitoneum likewise appears stable. 2. Ill-defined heterogeneous low density lesions scattered within liver are again noted. The dominant lesion in the posterior right hepatic lobe is larger than on 8/5/2022 while the others are stable. No new liver lesions are identified. Correlate with recent CT-guided biopsy findings. 3. Development of  generalized body wall edema. 4. Development of small bilateral pleural effusions and posterior bilateral lower lobe atelectasis or pneumonia. 5. Persistent enhancement of the kidneys on this  noncontrast examination. Correlate for nephropathy.  Electronically Signed By-Franca Gallegos MD On:8/9/2022 4:46 PM This report was finalized on [id number] by  Franca Gallegos MD.    CT Abdomen Pelvis Without Contrast    Result Date: 8/6/2022  Impression: Impression: 1. Moderate volume hemoperitoneum is new from prior and partially accumulates in the infrahepatic space. This suggests potential hepatic source of hemorrhage, likely from recent biopsy. 2. Bilateral small layering pleural effusions and streaky bibasilar atelectasis. Electronically signed by:  Bob Jackson M.D.  8/6/2022 9:03 PM    CT Chest Without Contrast Diagnostic    Result Date: 8/9/2022  Impression: 1. Hematoma collection at the inferior and subcapsular margin of the right hepatic lobe appears stable since 8/6/2022. Abdominal and pelvic hemoperitoneum likewise appears stable. 2. Ill-defined heterogeneous low density lesions scattered within liver are again noted. The dominant lesion in the posterior right hepatic lobe is larger than on 8/5/2022 while the others are stable. No new liver lesions are identified. Correlate with recent CT-guided biopsy findings. 3. Development of  generalized body wall edema. 4. Development of small bilateral pleural effusions and posterior bilateral lower lobe atelectasis or pneumonia. 5. Persistent enhancement of the kidneys on this noncontrast examination. Correlate for nephropathy.  Electronically Signed By-Franca Gallegos MD On:8/9/2022 4:46 PM This report was finalized on [id number] by  Franca Gallegos MD.    NM HIDA SCAN WITHOUT PHARMACOLOGICAL INTERVENTION    Result Date: 8/16/2022  Impression: 1.     No significant abnormality.  Electronically Signed By-Gaetano Nielson MD On:8/16/2022 9:28 AM This report was finalized on [id number] by  Gaetano Nielson MD.    CT Abdomen Pelvis With Contrast    Result Date: 8/5/2022  Impression: 1. Multiple ill-defined low-density lesions are scattered within the liver  parenchyma. These do not represent the appearance of typical cysts. In the context of history of septic shock, hepatic phlegmon or evolving hepatic abscesses could be considered. 2. Minimal stranding is seen at the level of the pancreatic tail which is nonspecific. This could represent normal variant for this patient. Very mild pancreatitis could have a similar appearance, although the remainder the pancreas itself appears unremarkable. Correlate with laboratory findings. 3. There is very mild interstitial thickening in both lungs and faint stranding within the anterior superior mediastinum, which may raise the possibility of mild early edema. 4. There is long segment thickening of the mid to lower thoracic esophagus with small esophageal hiatal hernia. Correlate for esophagitis symptoms. Endoscopic correlation may prove helpful. 5. Mild dependent atelectasis in both lungs. 6. No pulmonary embolism is seen.    Electronically Signed By-Franca Gallegos MD On:8/5/2022 8:58 AM This report was finalized on [id number] by  Franca Gallegos MD.    US Liver    Result Date: 8/6/2022  Impression: Multiple hyperechoic lesions within liver, which appear increased in size from prior ultrasound. Changes could be secondary to interval liver biopsy. Recommend correlation with pathology results.  Electronically Signed By-Mervin Martell MD On:8/6/2022 1:46 PM This report was finalized on [id number] by  Mervin Martell MD.    US Liver    Result Date: 8/5/2022  Impression:   IMPRESSION: 1. Echogenic foci within the right and left hepatic lobe without internal hypervascularity, superimposed upon background hepatic hypoechoic parenchyma. The echogenic foci do not have a typical appearance for fluid collections or abscesses. Other etiologies such as hemangiomas, areas of fatty sparing upon background steatosis, focal fibrosis/scarring, could be considered. These may be further and better characterize utilizing MRI without and with  contrast liver protocol. 2. No abnormal biliary dilation is seen.  Electronically Signed By-Franca Gallegos MD On:8/5/2022 1:17 PM This report was finalized on [id number] by  Franca Gallegos MD.    XR Chest 1 View    Result Date: 8/12/2022  Impression: Increased bibasilar airspace opacities, likely due to bilateral pleural fluid with underlying atelectasis and/or pneumonia.  Electronically Signed By-Hui Raman MD On:8/12/2022 4:52 PM This report was finalized on [id number] by  Hui Raman MD.    XR Chest 1 View    Result Date: 8/8/2022  Impression: 1. Stable right IJ central venous catheter and right upper extremity PICC line. 2. Persistent mild bibasilar airspace disease likely atelectasis. 3. No pneumothorax.  Electronically Signed By-Eleazar Narvaez MD On:8/8/2022 10:20 AM This report was finalized on [id number] by  Eleazar Narvaez MD.    XR Chest 1 View    Result Date: 8/6/2022  Impression: Right IJ catheter with tip at cavoatrial junction. No pneumothorax identified.  Electronically Signed By-Mervin Martell MD On:8/6/2022 6:27 PM This report was finalized on [id number] by  Mervin Martell MD.    XR Chest 1 View    Result Date: 8/6/2022  Impression:  New left lower lobe disease. With the provided history this is concerning for developing pneumonia, possibly aspiration given its location.  Unchanged right lower lobe atelectasis versus pneumonia.  Right-sided PICC line in good position.  No pneumothorax.  Electronically Signed By-Ankur Thapa On:8/6/2022 12:53 PM This report was finalized on [id number] by  Ankur Thapa, .    XR Chest 1 View    Result Date: 8/6/2022  Impression:  Right sided central line tip in the lower neck in the location of the lower internal jugular vein, just above the clavicle.  No pneumothorax. Mild right basilar atelectasis versus pneumonia.  Electronically Signed ByLouisa Thapa On:8/6/2022 9:08 AM This report was finalized on [id number] by  Ankur Thapa, .    XR  Chest 1 View    Result Date: 8/5/2022  Impression: 1.     Tip of the right internal jugular central venous catheter terminates in the right atrium. No visible pneumothorax. 2.     Low lung volumes without evidence of acute cardiopulmonary abnormality.  Electronically Signed By-Hui Raman MD On:8/5/2022 9:34 AM This report was finalized on [id number] by  Hui Raman MD.    CT Angiogram Chest Pulmonary Embolism    Result Date: 8/5/2022  Impression: 1. Multiple ill-defined low-density lesions are scattered within the liver parenchyma. These do not represent the appearance of typical cysts. In the context of history of septic shock, hepatic phlegmon or evolving hepatic abscesses could be considered. 2. Minimal stranding is seen at the level of the pancreatic tail which is nonspecific. This could represent normal variant for this patient. Very mild pancreatitis could have a similar appearance, although the remainder the pancreas itself appears unremarkable. Correlate with laboratory findings. 3. There is very mild interstitial thickening in both lungs and faint stranding within the anterior superior mediastinum, which may raise the possibility of mild early edema. 4. There is long segment thickening of the mid to lower thoracic esophagus with small esophageal hiatal hernia. Correlate for esophagitis symptoms. Endoscopic correlation may prove helpful. 5. Mild dependent atelectasis in both lungs. 6. No pulmonary embolism is seen.    Electronically Signed By-Franca Gallegos MD On:8/5/2022 8:58 AM This report was finalized on [id number] by  Franca Gallegos MD.    MRI abdomen wo contrast mrcp    Result Date: 8/14/2022  Impression: IMPRESSION : 1. Normal caliber common bile duct without evidence of obstructing mass or stone. 2. Heterogeneous appearance of the liver parenchyma within the posterior superior right hepatic lobe segment VII likely representing an area of intraparenchymal hemorrhage. Additional  subcapsular hematoma is present along the posterior and lateral aspect of the right hepatic lobe. 3. Additional nonspecific T2 hyperintense lesions are present within both the right and left hepatic lobe which appear overall similar to the earliest prior CT dated 08/05/2022. 4. Mild hepatomegaly measuring 19 cm in craniocaudal dimension.  Electronically Signed By-Moe Montiel MD On:8/14/2022 3:49 PM This report was finalized on [id number] by  Moe Montiel MD.    CT Needle Biopsy Liver    Result Date: 8/5/2022  Impression:  1. The areas identified on the patient's contrast-enhanced CT and recent ultrasound are not clearly identified on the noncontrast scan. Despite targeting the area when compared with CT, no purulent fluid could be aspirated. Ultrasound was utilized and to confirm that the area in question was being sampled. Core specimens were then obtained of this area. Given the absence of any purulent fluid and the nonvisualization on the noncontrast scan this is very unlikely to represent an intrahepatic abscess. These findings were discussed with Dr. Orozco by telephone immediately following the biopsy.  Electronically Signed By-Nahum Ross MD On:8/5/2022 6:26 PM This report was finalized on [id number] by  Nahum Ross MD.    US Renal Bilateral    Result Date: 8/5/2022  Impression: Nonobstructed hyperechoic kidneys compatible with medical renal disease  Electronically Signed By-Dave Cunningham On:8/5/2022 1:15 PM This report was finalized on [id number] by  Dave Cunningham, .    XR Abdomen KUB    Result Date: 8/6/2022  Impression:  Body habitus and a generalized paucity of bowel gas mildly limits evaluation. No significantly dilated gas-filled loops of small bowel are seen to suggest an obstruction. No free peritoneal air.  No suspicious calcifications detected.  No acute osseous pathology.     Electronically signed by:  Nj Rosario  8/6/2022 4:47 AM      Results for orders placed during  the hospital encounter of 08/05/22    Duplex Venous Lower Extremity - Bilateral CAR    Interpretation Summary  · Normal bilateral lower extremity venous duplex scan.      Results for orders placed during the hospital encounter of 08/05/22    Duplex Venous Lower Extremity - Bilateral CAR    Interpretation Summary  · Normal bilateral lower extremity venous duplex scan.      Results for orders placed during the hospital encounter of 08/05/22    Adult Transthoracic Echo Complete w/ Color, Spectral and Contrast if Necessary Per Protocol    Interpretation Summary  · Left ventricular wall thickness is consistent with mild concentric hypertrophy.  · Estimated left ventricular EF = 75% Left ventricular systolic function is hyperdynamic (EF > 70%).  · Left ventricular diastolic function is consistent with (grade I) impaired relaxation.      Labs Pending at Discharge:      Procedures Performed           Consults:   Consults     Date and Time Order Name Status Description    8/13/2022  6:17 PM Inpatient Gastroenterology Consult      8/10/2022 11:20 AM Inpatient Hospitalist Consult      8/6/2022  2:12 PM Inpatient Gastroenterology Consult      8/6/2022 10:31 AM Inpatient General Surgery Consult Completed     8/5/2022  3:34 PM Inpatient Nephrology Consult      8/5/2022 11:02 AM Inpatient Infectious Diseases Consult Completed             Discharge Details        Discharge Medications      New Medications      Instructions Start Date   amLODIPine 10 MG tablet  Commonly known as: NORVASC   10 mg, Oral, Every 24 Hours Scheduled   Start Date: August 18, 2022     hydroCHLOROthiazide 25 MG tablet  Commonly known as: HYDRODIURIL   25 mg, Oral, Daily   Start Date: August 18, 2022     metoprolol tartrate 50 MG tablet  Commonly known as: LOPRESSOR   50 mg, Oral, Every 12 Hours Scheduled      ondansetron ODT 4 MG disintegrating tablet  Commonly known as: ZOFRAN-ODT   4 mg, Translingual, Every 8 Hours PRN      oxyCODONE 10 MG tablet  Commonly  known as: ROXICODONE   10 mg, Oral, Every 4 Hours PRN      potassium chloride 20 MEQ CR tablet  Commonly known as: K-DUR,KLOR-CON   20 mEq, Oral, Daily         Continue These Medications      Instructions Start Date   escitalopram 10 MG tablet  Commonly known as: LEXAPRO   TAKE 1 TABLET BY MOUTH EVERY DAY         Stop These Medications    lisinopril 5 MG tablet  Commonly known as: PRINIVIL,ZESTRIL            Allergies   Allergen Reactions   • Cephalexin Rash   • Hydrocodone-Acetaminophen Rash         Discharge Disposition:   Home or Self Care    Diet:  Hospital:  Diet Order   Procedures   • Diet Regular         Discharge Activity:   Activity Instructions     Gradually Increase Activity Until at Pre-Hospitalization Level              CODE STATUS:  Code Status and Medical Interventions:   Ordered at: 08/05/22 1025     Code Status (Patient has no pulse and is not breathing):    CPR (Attempt to Resuscitate)     Medical Interventions (Patient has pulse or is breathing):    Full Support         Future Appointments   Date Time Provider Department Center   9/22/2022  3:15 PM Dominic Richey MD [facility]       Additional Instructions for the Follow-ups that You Need to Schedule     Discharge Follow-up with PCP   As directed       Currently Documented PCP:    Maribel Graf MD    PCP Phone Number:    [phone]     Follow Up Details: Follow-up with PCP in 1 week         Discharge Follow-up with Specified Provider: Follow-up with your nephrologist in 3 to 4 weeks   As directed      To: Follow-up with your nephrologist in 3 to 4 weeks         Basic Metabolic Panel    Aug 31, 2022 (Approximate)      Release to patient: Routine Release               Time spent on Discharge including face to face service:  35 minutes    This patient has been examined appropriate PPE . 08/17/22      Signature: Electronically signed by Bob Dunne MD, 08/17/22, 12:09 PM EDT.      Electronically signed by Bob Dunne MD at  08/17/22 5363

## 2022-08-18 NOTE — OUTREACH NOTE
Call Center TCM Note    Flowsheet Row Responses   [facility] facility patient discharged from? [facility]   Does the patient have one of the following disease processes/diagnoses(primary or secondary)? Sepsis   TCM attempt successful? No   Unsuccessful attempts Attempt 1          Hina Espinoza MA    8/18/2022, 16:00 EDT

## 2022-08-18 NOTE — OUTREACH NOTE
Call Center TCM Note    Flowsheet Row Responses   [facility] patient discharged from? [facility]   Does the patient have one of the following disease processes/diagnoses(primary or secondary)? Sepsis   TCM attempt successful? Yes   Call start time 1634   Call Status Rescheduled   Call end time 1635   Discharge diagnosis Septic shock   Person spoke with today (if not patient) and relationship Spouse   Does the patient have a primary care provider?  Yes   Comments regarding PCP hospital fu appt on 8/23/22 at 8:30 AM   Does the patient have an appointment with their PCP within 7 days of discharge? Yes   Has the patient kept scheduled appointments due by today? N/A   Did the patient receive a copy of their discharge instructions? Yes   Nursing interventions Reviewed instructions with patient   What is the patient's perception of their health status since discharge? Improving   TCM call completed? Yes   Wrap up additional comments Spouse shares he is not able to answer questions as he is not there. Request for reschedule.          Charlene Argueta RN    8/18/2022, 16:35 EDT

## 2022-08-23 ENCOUNTER — LAB (OUTPATIENT)
Dept: FAMILY MEDICINE CLINIC | Facility: CLINIC | Age: 47
End: 2022-08-23

## 2022-08-23 ENCOUNTER — OFFICE VISIT (OUTPATIENT)
Dept: FAMILY MEDICINE CLINIC | Facility: CLINIC | Age: 47
End: 2022-08-23

## 2022-08-23 VITALS
WEIGHT: 268 LBS | BODY MASS INDEX: 49.02 KG/M2 | HEART RATE: 88 BPM | OXYGEN SATURATION: 97 % | SYSTOLIC BLOOD PRESSURE: 118 MMHG | DIASTOLIC BLOOD PRESSURE: 81 MMHG | TEMPERATURE: 98 F

## 2022-08-23 DIAGNOSIS — R65.21 SEPTIC SHOCK: ICD-10-CM

## 2022-08-23 DIAGNOSIS — N17.9 ACUTE KIDNEY INJURY: ICD-10-CM

## 2022-08-23 DIAGNOSIS — A04.4 E COLI ENTERITIS: Primary | ICD-10-CM

## 2022-08-23 DIAGNOSIS — A41.9 SEPTIC SHOCK: ICD-10-CM

## 2022-08-23 DIAGNOSIS — I10 ESSENTIAL HYPERTENSION: ICD-10-CM

## 2022-08-23 PROBLEM — Q75.2 HYPERTELORISM: Status: RESOLVED | Noted: 2019-11-15 | Resolved: 2022-08-23

## 2022-08-23 LAB
ALBUMIN SERPL-MCNC: 4.2 G/DL (ref 3.5–5.2)
ALBUMIN/GLOB SERPL: 1.4 G/DL
ALP SERPL-CCNC: 103 U/L (ref 39–117)
ALT SERPL W P-5'-P-CCNC: 22 U/L (ref 1–33)
ANION GAP SERPL CALCULATED.3IONS-SCNC: 14 MMOL/L (ref 5–15)
AST SERPL-CCNC: 28 U/L (ref 1–32)
BASOPHILS # BLD AUTO: 0.14 10*3/MM3 (ref 0–0.2)
BASOPHILS NFR BLD AUTO: 2.1 % (ref 0–1.5)
BILIRUB SERPL-MCNC: 1.5 MG/DL (ref 0–1.2)
BUN SERPL-MCNC: 13 MG/DL (ref 6–20)
BUN/CREAT SERPL: 8 (ref 7–25)
CALCIUM SPEC-SCNC: 9.5 MG/DL (ref 8.6–10.5)
CHLORIDE SERPL-SCNC: 103 MMOL/L (ref 98–107)
CO2 SERPL-SCNC: 24 MMOL/L (ref 22–29)
CREAT SERPL-MCNC: 1.62 MG/DL (ref 0.57–1)
DEPRECATED RDW RBC AUTO: 43.9 FL (ref 37–54)
EGFRCR SERPLBLD CKD-EPI 2021: 39.8 ML/MIN/1.73
EOSINOPHIL # BLD AUTO: 0.28 10*3/MM3 (ref 0–0.4)
EOSINOPHIL NFR BLD AUTO: 4.2 % (ref 0.3–6.2)
ERYTHROCYTE [DISTWIDTH] IN BLOOD BY AUTOMATED COUNT: 14.1 % (ref 12.3–15.4)
GLOBULIN UR ELPH-MCNC: 3.1 GM/DL
GLUCOSE SERPL-MCNC: 91 MG/DL (ref 65–99)
HCT VFR BLD AUTO: 28.2 % (ref 34–46.6)
HGB BLD-MCNC: 9 G/DL (ref 12–15.9)
IMM GRANULOCYTES # BLD AUTO: 0.04 10*3/MM3 (ref 0–0.05)
IMM GRANULOCYTES NFR BLD AUTO: 0.6 % (ref 0–0.5)
LYMPHOCYTES # BLD AUTO: 1.79 10*3/MM3 (ref 0.7–3.1)
LYMPHOCYTES NFR BLD AUTO: 27 % (ref 19.6–45.3)
MCH RBC QN AUTO: 28.1 PG (ref 26.6–33)
MCHC RBC AUTO-ENTMCNC: 31.9 G/DL (ref 31.5–35.7)
MCV RBC AUTO: 88.1 FL (ref 79–97)
MONOCYTES # BLD AUTO: 0.5 10*3/MM3 (ref 0.1–0.9)
MONOCYTES NFR BLD AUTO: 7.6 % (ref 5–12)
NEUTROPHILS NFR BLD AUTO: 3.87 10*3/MM3 (ref 1.7–7)
NEUTROPHILS NFR BLD AUTO: 58.5 % (ref 42.7–76)
NRBC BLD AUTO-RTO: 0 /100 WBC (ref 0–0.2)
PLATELET # BLD AUTO: 384 10*3/MM3 (ref 140–450)
PMV BLD AUTO: 11.4 FL (ref 6–12)
POTASSIUM SERPL-SCNC: 4.2 MMOL/L (ref 3.5–5.2)
PROT SERPL-MCNC: 7.3 G/DL (ref 6–8.5)
RBC # BLD AUTO: 3.2 10*6/MM3 (ref 3.77–5.28)
SODIUM SERPL-SCNC: 141 MMOL/L (ref 136–145)
WBC NRBC COR # BLD: 6.62 10*3/MM3 (ref 3.4–10.8)

## 2022-08-23 PROCEDURE — 80053 COMPREHEN METABOLIC PANEL: CPT | Performed by: FAMILY MEDICINE

## 2022-08-23 PROCEDURE — 85025 COMPLETE CBC W/AUTO DIFF WBC: CPT | Performed by: FAMILY MEDICINE

## 2022-08-23 PROCEDURE — 99496 TRANSJ CARE MGMT HIGH F2F 7D: CPT | Performed by: FAMILY MEDICINE

## 2022-08-23 PROCEDURE — 1111F DSCHRG MED/CURRENT MED MERGE: CPT | Performed by: FAMILY MEDICINE

## 2022-08-23 PROCEDURE — 36415 COLL VENOUS BLD VENIPUNCTURE: CPT | Performed by: FAMILY MEDICINE

## 2022-08-23 RX ORDER — ONDANSETRON 4 MG/1
4 TABLET, ORALLY DISINTEGRATING ORAL EVERY 8 HOURS PRN
Qty: 30 TABLET | Refills: 0
Start: 2022-08-23 | End: 2022-09-22

## 2022-08-23 RX ORDER — METOPROLOL TARTRATE 50 MG/1
50 TABLET, FILM COATED ORAL EVERY 12 HOURS SCHEDULED
Qty: 60 TABLET | Refills: 0
Start: 2022-08-23 | End: 2022-09-22

## 2022-08-23 NOTE — PROGRESS NOTES
Transitional Care Follow Up Visit  Subjective     Raul Gardner is a 47 y.o. female who presents for a transitional care management visit.    Within 48 business hours after discharge our office contacted her via telephone to coordinate her care and needs.      I reviewed and discussed the details of that call along with the discharge summary, hospital problems, inpatient lab results, inpatient diagnostic studies, and consultation reports with Raul.     Current outpatient and discharge medications have been reconciled for the patient.  Reviewed by: Maribel Graf MD      Date of TCM Phone Call 8/17/2022   [facility]   Date of Admission 8/5/2022   Date of Discharge 8/17/2022   Discharge Disposition Home or Self Care     Risk for Readmission (LACE) Score: 12 (8/17/2022  6:01 AM)      History of Present Illness   Course During Hospital Stay:  Patient is a 47-year-old with medical history significant for depression and hypertension. Presented to [facility] ED on 8/5/2022 with complaints of generally feeling unwell for the last 2 days prior to presentation. Reported having intermittent fever, body aches, vomiting and diarrhea. In the ED patient was noted to have a temperature of 103.1, heart rate of 147 and significantly hypotensive. Fluid resuscitation with vasopressor was initiated. She was thought to have possible liver abscess and also had MARIANNE Nephrologist and ID were consulted and patient admitted to the intensive care unit. Was subsequently transferred to surgical floor and hospitalist consulted for medical managementSevere hypovolemic/septic shock on admission.  Initially on 2 pressors.  Clinical condition worsened after liver biopsy-raising concern for possible hemorrhagic shock and patient was placed on 4 pressors at one-point. CT abdomen/pelvis 8/9/2022-stable intra-abdominal hemorrhage Improved. She had a biopsy of her liver and subsequent hemorrhage with anemia.   She continues to have daily  nausea and occasional vomiting.      The following portions of the patient's history were reviewed and updated as appropriate: allergies, current medications, past family history, past medical history, past social history, past surgical history and problem list.    Review of Systems    Objective   Physical Exam  Vitals and nursing note reviewed.   Constitutional:       General: She is not in acute distress.     Appearance: She is well-developed.   HENT:      Head: Normocephalic.   Eyes:      General: Lids are normal.      Conjunctiva/sclera: Conjunctivae normal.   Neck:      Thyroid: No thyroid mass or thyromegaly.      Trachea: Trachea normal.   Cardiovascular:      Rate and Rhythm: Normal rate and regular rhythm.      Heart sounds: Normal heart sounds.   Pulmonary:      Effort: Pulmonary effort is normal.      Breath sounds: Normal breath sounds.   Abdominal:      Palpations: Abdomen is soft.      Tenderness: There is abdominal tenderness in the right upper quadrant.   Musculoskeletal:      Cervical back: Normal range of motion.   Lymphadenopathy:      Cervical: No cervical adenopathy.   Skin:     General: Skin is warm and dry.   Neurological:      Mental Status: She is alert and oriented to person, place, and time.   Psychiatric:         Attention and Perception: She is attentive.         Mood and Affect: Mood normal.         Speech: Speech normal.         Behavior: Behavior normal.         Assessment & Plan   Diagnoses and all orders for this visit:    1. E coli enteritis (Primary)  -     CBC & Differential  -     Comprehensive Metabolic Panel    2. Septic shock (HCC)  -     CBC & Differential  -     Comprehensive Metabolic Panel    3. Acute kidney injury (HCC)  -     CBC & Differential  -     Comprehensive Metabolic Panel    4. Essential hypertension  Assessment & Plan:  Hypertension is improving with treatment.  Continue current treatment regimen.  Blood pressure will be reassessed in 2 weeks.    Orders:  -      CBC & Differential  -     Comprehensive Metabolic Panel    Other orders  -     metoprolol tartrate (LOPRESSOR) 50 MG tablet; Take 1 tablet by mouth Every 12 (Twelve) Hours for 30 days.  Dispense: 60 tablet; Refill: 0  -     ondansetron ODT (ZOFRAN-ODT) 4 MG disintegrating tablet; Place 1 tablet on the tongue Every 8 (Eight) Hours As Needed for Nausea or Vomiting.  Dispense: 30 tablet; Refill: 0

## 2022-08-23 NOTE — ASSESSMENT & PLAN NOTE
Hypertension is improving with treatment.  Continue current treatment regimen.  Blood pressure will be reassessed in 2 weeks.

## 2022-08-25 ENCOUNTER — TELEPHONE (OUTPATIENT)
Dept: FAMILY MEDICINE CLINIC | Facility: CLINIC | Age: 47
End: 2022-08-25

## 2022-08-25 NOTE — TELEPHONE ENCOUNTER
Patient was informed that  is not in the office today but this information will be sent to her and will be addressed as soon as she can get to it tomorrow (Fri)

## 2022-08-25 NOTE — TELEPHONE ENCOUNTER
Caller: Raul Middleton    Relationship: Self    Best call back number: [phone] (M)    What is the best time to reach you: ANYTIME, ASAP    Who are you requesting to speak with (clinical staff, provider,  specific staff member): CLINICAL STAFF/ DR DENSON    Do you know the name of the person who called: RAUL MIDDLETON    What was the call regarding: PATIENT STATES SHE DOES NOT FEEL STRONG ENOUGH TO RETURN TO WORK ON Monday 08/29/2022 AND IS ASKING IF SHE CAN HAVE AN EXTENSION UNTIL THE FOLLOWING TUESDAY 09/06/2022 INSTEAD, PLEASE ADVISE PATIENT IF THIS CAN BE ACCOMMODATED ASAP    Do you require a callback: YES, ASAP

## 2022-08-26 ENCOUNTER — TELEPHONE (OUTPATIENT)
Dept: FAMILY MEDICINE CLINIC | Facility: CLINIC | Age: 47
End: 2022-08-26

## 2022-08-26 NOTE — TELEPHONE ENCOUNTER
Patient would like her work letter extended to the 6th she is still in pain. She has extreme weakness she stated she cannot even take a shower

## 2022-08-26 NOTE — TELEPHONE ENCOUNTER
Patient asked to speak with nurse asst. Due to needing to extend her work note that was stated for her to go back work on Monday but she is going to see her gastrologist on Monday for more plan of care concerning the pain she is having     She would like to explain to provider or nurse asst about whats going on     Patients phone number is correct in chart

## 2022-08-29 ENCOUNTER — TRANSCRIBE ORDERS (OUTPATIENT)
Dept: ADMINISTRATIVE | Facility: HOSPITAL | Age: 47
End: 2022-08-29

## 2022-08-29 ENCOUNTER — OFFICE (OUTPATIENT)
Dept: URBAN - METROPOLITAN AREA CLINIC 64 | Facility: CLINIC | Age: 47
End: 2022-08-29

## 2022-08-29 VITALS
HEIGHT: 70 IN | SYSTOLIC BLOOD PRESSURE: 134 MMHG | WEIGHT: 264 LBS | DIASTOLIC BLOOD PRESSURE: 85 MMHG | HEART RATE: 77 BPM

## 2022-08-29 DIAGNOSIS — R74.8 ELEVATED LIVER ENZYMES: Primary | ICD-10-CM

## 2022-08-29 DIAGNOSIS — R10.9 UNSPECIFIED ABDOMINAL PAIN: ICD-10-CM

## 2022-08-29 PROCEDURE — 99213 OFFICE O/P EST LOW 20 MIN: CPT | Performed by: NURSE PRACTITIONER

## 2022-08-29 RX ORDER — LISINOPRIL 5 MG/1
TABLET ORAL
Qty: 90 TABLET | Refills: 2 | OUTPATIENT
Start: 2022-08-29

## 2022-08-29 RX ORDER — ESCITALOPRAM OXALATE 10 MG/1
TABLET ORAL
Qty: 90 TABLET | Refills: 2 | Status: SHIPPED | OUTPATIENT
Start: 2022-08-29 | End: 2022-10-04 | Stop reason: SDUPTHER

## 2022-09-01 ENCOUNTER — TELEPHONE (OUTPATIENT)
Dept: FAMILY MEDICINE CLINIC | Facility: CLINIC | Age: 47
End: 2022-09-01

## 2022-09-21 ENCOUNTER — TELEPHONE (OUTPATIENT)
Dept: FAMILY MEDICINE CLINIC | Facility: CLINIC | Age: 47
End: 2022-09-21

## 2022-09-22 ENCOUNTER — APPOINTMENT (OUTPATIENT)
Dept: MRI IMAGING | Facility: HOSPITAL | Age: 47
End: 2022-09-22

## 2022-09-22 ENCOUNTER — OFFICE VISIT (OUTPATIENT)
Dept: CARDIOLOGY | Facility: CLINIC | Age: 47
End: 2022-09-22

## 2022-09-22 VITALS
WEIGHT: 270 LBS | HEART RATE: 69 BPM | OXYGEN SATURATION: 100 % | BODY MASS INDEX: 49.69 KG/M2 | HEIGHT: 62 IN | DIASTOLIC BLOOD PRESSURE: 83 MMHG | SYSTOLIC BLOOD PRESSURE: 135 MMHG | RESPIRATION RATE: 18 BRPM

## 2022-09-22 DIAGNOSIS — A04.4 E COLI ENTERITIS: ICD-10-CM

## 2022-09-22 DIAGNOSIS — K75.0 LIVER ABSCESS: ICD-10-CM

## 2022-09-22 DIAGNOSIS — N17.9 ACUTE KIDNEY INJURY: ICD-10-CM

## 2022-09-22 DIAGNOSIS — A09 DIARRHEA OF INFECTIOUS ORIGIN: ICD-10-CM

## 2022-09-22 DIAGNOSIS — I27.20 PULMONARY HYPERTENSION, UNSPECIFIED: ICD-10-CM

## 2022-09-22 DIAGNOSIS — S30.1XXA ABDOMINAL HEMATOMA: ICD-10-CM

## 2022-09-22 DIAGNOSIS — R65.21 SEPTIC SHOCK: ICD-10-CM

## 2022-09-22 DIAGNOSIS — A41.9 SEPTIC SHOCK: ICD-10-CM

## 2022-09-22 DIAGNOSIS — I10 ESSENTIAL HYPERTENSION: Primary | ICD-10-CM

## 2022-09-22 DIAGNOSIS — E87.20 METABOLIC ACIDOSIS: ICD-10-CM

## 2022-09-22 PROCEDURE — 99214 OFFICE O/P EST MOD 30 MIN: CPT | Performed by: INTERNAL MEDICINE

## 2022-09-22 RX ORDER — LISINOPRIL 5 MG/1
5 TABLET ORAL DAILY
COMMUNITY
Start: 2022-06-02 | End: 2022-09-22

## 2022-09-22 RX ORDER — HYDROCHLOROTHIAZIDE 25 MG/1
TABLET ORAL
COMMUNITY
Start: 2022-08-17 | End: 2022-09-22

## 2022-09-22 RX ORDER — POTASSIUM CHLORIDE 20 MEQ/1
TABLET, EXTENDED RELEASE ORAL
COMMUNITY
Start: 2022-08-17 | End: 2022-09-22

## 2022-09-22 RX ORDER — AMLODIPINE BESYLATE 2.5 MG/1
2.5 TABLET ORAL DAILY
Qty: 30 TABLET | Refills: 0 | Status: SHIPPED | OUTPATIENT
Start: 2022-09-22 | End: 2022-10-17

## 2022-09-22 RX ORDER — OXYCODONE HYDROCHLORIDE 10 MG/1
TABLET ORAL
COMMUNITY
Start: 2022-08-17 | End: 2022-09-22

## 2022-09-22 RX ORDER — AMLODIPINE BESYLATE 10 MG/1
TABLET ORAL
COMMUNITY
Start: 2022-08-17 | End: 2022-09-22

## 2022-09-22 NOTE — PROGRESS NOTES
Cardiology Office Visit      Encounter Date:  09/22/2022    Patient ID:   Raul Gardner is a 47 y.o. female.    Reason For Followup:  Shortness of breath  Dyspnea on exertion  Intermittent tachycardia  Recent hospitalization with cholangitis severe sepsis acute renal failure     Brief Clinical History:  Dear Dr. Graf, Maribel Dumont MD     I had the pleasure of seeing Raul Gardner today. As you are well aware, this is a 45 y.o. female with past medical history that is significant for prior diagnosis of pulmonary hypertension history of palpitations in the past was seen and evaluated by cardiology 3 years back was diagnosed with a mild to moderate pulmonary hypertension and moderate tricuspid regurgitation patient lost follow-up in between here for follow-up for evaluation of symptoms of fatigue weakness shortness of breath dyspnea on exertion     Interval History:  Recent hospitalization with severe sepsis acute tubular necrosis acute renal failure and also liver biopsy complicated by hemoperitoneum severe hypotension multiple pressor requirements patient is here for follow-up for further evaluation and treatment options  Patient clinically feels better blood pressure is somewhat elevated  Prolonged hospital stay        Interpretation Summary      · Low risk for ischemic heart disease.  · Findings consistent with a normal ECG stress test.  · Left ventricular ejection fraction is normal (Calculated EF = 68%).  · Myocardial perfusion imaging indicates a normal myocardial perfusion study with no evidence of ischemia.  · Impressions are consistent with a low risk study.      Interpretation Summary      · Estimated EF = 60%.  · Left ventricular systolic function is normal.               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 coli sepsis  Acute renal failure with  "acute tubular necrosis with resolution of the renal failure with improvement in the LV systolic function  Normal elevated proBNP secondary to diastolic dysfunction and renal failure  Liver biopsy complicated by hemoperitoneum  Questionable liver abscess  Recommendations:  Echocardiogram with normal LV systolic function and normal wall motion with mild LVH and diastolic dysfunction  Recheck proBNP  Medical records from recent hospitalization including labs medications imaging findings reviewed and discussed with patient  Stable from cardiac standpoint  Decrease the dose of metoprolol from 50 twice a day to 25 mg p.o. twice daily secondary to excessive fatigue  Start patient on Norvasc 2.5 mg p.o. once a day for better control of blood pressure  Continued aggressive risk factor modification  Consider repeat echocardiogram in 3 months  Follow-up in office in 3 months      Objective:    Vitals:  Vitals:    09/22/22 1520   BP: 135/83   BP Location: Left arm   Patient Position: Sitting   Cuff Size: Large Adult   Pulse: 69   Resp: 18   SpO2: 100%   Weight: 122 kg (270 lb)   Height: 157.5 cm (62\")       Physical Exam:    General: Alert, cooperative, no distress, appears stated age  Head:  Normocephalic, atraumatic, mucous membranes moist  Eyes:  Conjunctiva/corneas clear, EOM's intact     Neck:  Supple,  no adenopathy;      Lungs: Clear to auscultation bilaterally, no wheezes rhonchi rales are noted  Chest wall: No tenderness  Heart::  Regular rate and rhythm, S1 and S2 normal, no murmur, rub or gallop  Abdomen: Soft, non-tender, nondistended bowel sounds active  Extremities: No cyanosis, clubbing, or edema  Pulses: 2+ and symmetric all extremities  Skin:  No rashes or lesions  Neuro/psych: A&O x3. CN II through XII are grossly intact with appropriate affect      Allergies:  Allergies   Allergen Reactions   • Cephalexin Rash   • Hydrocodone-Acetaminophen Rash   • Adhesive Tape Hives   • Latex Itching and Rash "       Medication Review:     Current Outpatient Medications:   •  escitalopram (LEXAPRO) 10 MG tablet, TAKE 1 TABLET BY MOUTH EVERY DAY, Disp: 90 tablet, Rfl: 2  •  amLODIPine (NORVASC) 2.5 MG tablet, Take 1 tablet by mouth Daily., Disp: 30 tablet, Rfl: 0  •  metoprolol tartrate (LOPRESSOR) 25 MG tablet, Take 1 tablet by mouth 2 (Two) Times a Day., Disp: 180 tablet, Rfl: 3    Family History:  Family History   Problem Relation Age of Onset   • Kidney disease Mother    • Kidney disease Father    • Cancer Father         lung   • Diabetes Brother    • Heart disease Brother         CHF       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   • Melanoma (HCC)    • Near syncope 2017   • Pulmonary hypertension (HCC)    • Urinary tract infection     chronic hematuria, seen urology       Past surgical History:  Past Surgical History:   Procedure Laterality Date   •  SECTION  13 and 14   • SKIN CANCER EXCISION     • TUBAL ABDOMINAL LIGATION         Social History:  Social History     Socioeconomic History   • Marital status:    Tobacco Use   • Smoking status: Never Smoker   • Smokeless tobacco: Never Used   Vaping Use   • Vaping Use: Never used   Substance and Sexual Activity   • Alcohol use: Yes     Comment: caffeine 1 cup qd   • Drug use: No   • Sexual activity: Yes     Birth control/protection: Surgical       Review of Systems:  The following systems were reviewed as they relate to the cardiovascular system: Constitutional, Eyes, ENT, Cardiovascular, Respiratory, Gastrointestinal, Integumentary, Neurological, Psychiatric, Hematologic, Endocrine, Musculoskeletal, and Genitourinary. The pertinent cardiovascular findings are reported above with all other cardiovascular points within those systems being negative.    Diagnostic Study Review:     Current Electrocardiogram:  Procedures      NOTE: The following portions of the patient's history were reviewed  and updated this visit as appropriate: allergies, current medications, past family history, past medical history, past social history, past surgical history and problem list.

## 2022-09-27 ENCOUNTER — TELEPHONE (OUTPATIENT)
Dept: FAMILY MEDICINE CLINIC | Facility: CLINIC | Age: 47
End: 2022-09-27

## 2022-09-27 NOTE — TELEPHONE ENCOUNTER
Med changed was updated I did send a my chart to the patient letting her know we have seen this information and it has been addressed

## 2022-09-27 NOTE — TELEPHONE ENCOUNTER
Caller: Raul Gardner    Relationship: Self    Best call back number: [phone]    What is the best time to reach you: ANY TIME    Who are you requesting to speak with (clinical staff, provider,  specific staff member): DR. DENSON    What was the call regarding: PATIENT WANTS TO LET DR. DENSON KNOW THAT SHE SAW A CARDIOLOGIST, DR. RINCON,  ON Thursday 9/22/22 AND HE ADDED AMLODIPINE 2.5 TABLET DAILY AND DECREASED HER METROPROLOL TARTRATE TO 25 MG TWO TIMES A DAY.     Do you require a callback: IF NEEDED

## 2022-10-04 RX ORDER — ESCITALOPRAM OXALATE 10 MG/1
10 TABLET ORAL DAILY
Qty: 90 TABLET | Refills: 2 | Status: SHIPPED | OUTPATIENT
Start: 2022-10-04

## 2022-10-04 NOTE — TELEPHONE ENCOUNTER
Caller: Raul Gardner    Relationship: Self    Best call back number: [phone]    Requested Prescriptions:   Requested Prescriptions     Pending Prescriptions Disp Refills   • escitalopram (LEXAPRO) 10 MG tablet 90 tablet 2     Sig: Take 1 tablet by mouth Daily.        Pharmacy where request should be sent: [facility]/PHARMACY #[id number] - [place], [place] - [address] [place] [address] [phone] [facility] [phone]      Additional details provided by patient: PATIENT IS OUT    Does the patient have less than a 3 day supply:  [] Yes  [] No    Bronson Amado Rep   10/04/22 09:09 EDT

## 2022-10-10 ENCOUNTER — OFFICE (OUTPATIENT)
Dept: URBAN - METROPOLITAN AREA CLINIC 64 | Facility: CLINIC | Age: 47
End: 2022-10-10

## 2022-10-10 VITALS
HEIGHT: 70 IN | SYSTOLIC BLOOD PRESSURE: 134 MMHG | HEART RATE: 75 BPM | WEIGHT: 269 LBS | DIASTOLIC BLOOD PRESSURE: 85 MMHG

## 2022-10-10 DIAGNOSIS — K83.09 OTHER CHOLANGITIS: ICD-10-CM

## 2022-10-10 DIAGNOSIS — R74.8 ABNORMAL LEVELS OF OTHER SERUM ENZYMES: ICD-10-CM

## 2022-10-10 PROCEDURE — 99214 OFFICE O/P EST MOD 30 MIN: CPT | Performed by: INTERNAL MEDICINE

## 2022-10-17 RX ORDER — AMLODIPINE BESYLATE 2.5 MG/1
TABLET ORAL
Qty: 30 TABLET | Refills: 0 | Status: SHIPPED | OUTPATIENT
Start: 2022-10-17 | End: 2022-10-31 | Stop reason: SDUPTHER

## 2022-10-31 RX ORDER — AMLODIPINE BESYLATE 2.5 MG/1
2.5 TABLET ORAL DAILY
Qty: 90 TABLET | Refills: 2 | Status: SHIPPED | OUTPATIENT
Start: 2022-10-31

## 2023-02-03 DIAGNOSIS — I10 ESSENTIAL HYPERTENSION: ICD-10-CM

## 2023-02-03 RX ORDER — METOPROLOL SUCCINATE 25 MG/1
TABLET, EXTENDED RELEASE ORAL
Qty: 90 TABLET | Refills: 1 | OUTPATIENT
Start: 2023-02-03

## 2023-04-20 ENCOUNTER — TELEPHONE (OUTPATIENT)
Dept: FAMILY MEDICINE CLINIC | Facility: CLINIC | Age: 48
End: 2023-04-20

## 2023-04-20 NOTE — TELEPHONE ENCOUNTER
Caller: Raul Gardner    Relationship: Self    Best call back number: [phone]     What medication are you requesting: SOMETHING SIMILAR TO escitalopram (LEXAPRO) 10 MG tablet    If a prescription is needed, what is your preferred pharmacy and phone number: [facility]/PHARMACY #[id number] - [place], [place] - [address] [facility] [phone] [facility] [phone]      Additional notes: PATIENT STATES SHE WAS TOLD escitalopram (LEXAPRO) 10 MG tablet HAS BEEN ON BACK ORDER FOR 5-6 WEEKS.     PATIENT STATES SHE HAS BEEN WITHOUT HER PRESCRIPTION FOR 6 DAYS.

## 2023-04-21 NOTE — TELEPHONE ENCOUNTER
PATIENT CALLING TO FOLLOW UP ON THIS REQUEST.   PATIENT STATES SHE HAS BEEN OUT OF THE MEDICATION FOR ONE WEEK, AND THE PHARMACY HAS TRIED ON SEVERAL OCCASIONS TO CONTACT THE OFFICE TO GET AN ALTERNATIVE MEDICATION FOR THE PATIENT WITHOUT A RESPONSE.  PLEASE CONTACT THE PATIENT TO DISCUSS .

## 2023-04-21 NOTE — TELEPHONE ENCOUNTER
This is the first I have heard of a Lexapro backorder.  I will send in a prescription for Zoloft instead.

## 2023-08-11 RX ORDER — AMLODIPINE BESYLATE 2.5 MG/1
TABLET ORAL
Qty: 90 TABLET | Refills: 2 | Status: SHIPPED | OUTPATIENT
Start: 2023-08-11

## 2024-02-05 ENCOUNTER — OFFICE VISIT (OUTPATIENT)
Dept: FAMILY MEDICINE CLINIC | Facility: CLINIC | Age: 49
End: 2024-02-05
Payer: COMMERCIAL

## 2024-02-05 ENCOUNTER — LAB (OUTPATIENT)
Dept: FAMILY MEDICINE CLINIC | Facility: CLINIC | Age: 49
End: 2024-02-05
Payer: COMMERCIAL

## 2024-02-05 VITALS
SYSTOLIC BLOOD PRESSURE: 145 MMHG | BODY MASS INDEX: 53.88 KG/M2 | WEIGHT: 292.8 LBS | TEMPERATURE: 97.7 F | HEART RATE: 74 BPM | HEIGHT: 62 IN | DIASTOLIC BLOOD PRESSURE: 85 MMHG | OXYGEN SATURATION: 98 %

## 2024-02-05 DIAGNOSIS — R10.31 RLQ ABDOMINAL PAIN: Primary | ICD-10-CM

## 2024-02-05 LAB
BACTERIA UR QL AUTO: ABNORMAL /HPF
BILIRUB UR QL STRIP: NEGATIVE
CLARITY UR: ABNORMAL
COLOR UR: YELLOW
GLUCOSE UR STRIP-MCNC: NEGATIVE MG/DL
HGB UR QL STRIP.AUTO: ABNORMAL
HYALINE CASTS UR QL AUTO: ABNORMAL /LPF
KETONES UR QL STRIP: NEGATIVE
LEUKOCYTE ESTERASE UR QL STRIP.AUTO: NEGATIVE
NITRITE UR QL STRIP: NEGATIVE
PH UR STRIP.AUTO: 6 [PH] (ref 5–8)
PROT UR QL STRIP: ABNORMAL
RBC # UR STRIP: ABNORMAL /HPF
REF LAB TEST METHOD: ABNORMAL
SP GR UR STRIP: >=1.03 (ref 1–1.03)
SQUAMOUS #/AREA URNS HPF: ABNORMAL /HPF
UROBILINOGEN UR QL STRIP: ABNORMAL
WBC # UR STRIP: ABNORMAL /HPF

## 2024-02-05 PROCEDURE — 85025 COMPLETE CBC W/AUTO DIFF WBC: CPT | Performed by: FAMILY MEDICINE

## 2024-02-05 PROCEDURE — 87086 URINE CULTURE/COLONY COUNT: CPT | Performed by: FAMILY MEDICINE

## 2024-02-05 PROCEDURE — 81001 URINALYSIS AUTO W/SCOPE: CPT | Performed by: FAMILY MEDICINE

## 2024-02-05 PROCEDURE — 87186 SC STD MICRODIL/AGAR DIL: CPT | Performed by: FAMILY MEDICINE

## 2024-02-05 PROCEDURE — 99213 OFFICE O/P EST LOW 20 MIN: CPT | Performed by: FAMILY MEDICINE

## 2024-02-05 PROCEDURE — 36415 COLL VENOUS BLD VENIPUNCTURE: CPT | Performed by: FAMILY MEDICINE

## 2024-02-05 PROCEDURE — 80053 COMPREHEN METABOLIC PANEL: CPT | Performed by: FAMILY MEDICINE

## 2024-02-05 RX ORDER — OMEPRAZOLE 20 MG/1
20 CAPSULE, DELAYED RELEASE ORAL DAILY
COMMUNITY

## 2024-02-05 NOTE — PROGRESS NOTES
"Chief Complaint  Abdominal Pain (RLQ x 3 weeks )    Subjective        Raul Gardner presents to [facility]  History of Present Illness  She was seen at [facility] about 2 weeks ago and treated with Macrobid for UTI.  That helped with the urinary frequency but still having pain and occasional hematuria.   Abdominal Pain  This is a new problem. The current episode started more than 1 month ago. The onset quality is gradual. The problem occurs daily. The problem has been worse. The pain is located in the RLQ. The pain is moderate. The quality of the pain is aching. The abdominal pain does not radiate. Associated symptoms include hematuria. Pertinent negatives include no constipation, diarrhea, fever, flatus, frequency, hematochezia, melena, nausea or vomiting. Associated symptoms comments: fatigue. The pain is aggravated by certain positions.       Objective   Vital Signs:  /85 (BP Location: Right arm, Patient Position: Sitting, Cuff Size: Large Adult)   Pulse 74   Temp 97.7 °F (36.5 °C) (Infrared)   Ht 157.5 cm (62\")   Wt 133 kg (292 lb 12.8 oz)   SpO2 98%   BMI 53.55 kg/m²   Estimated body mass index is 53.55 kg/m² as calculated from the following:    Height as of this encounter: 157.5 cm (62\").    Weight as of this encounter: 133 kg (292 lb 12.8 oz).       Class 3 Severe Obesity (BMI >=40). Obesity-related health conditions include the following: hypertension. Obesity is unchanged. BMI is is above average; BMI management plan is completed. We discussed portion control and increasing exercise.      Physical Exam  Vitals and nursing note reviewed.   Constitutional:       General: She is not in acute distress.     Appearance: She is well-developed.   HENT:      Head: Normocephalic.   Eyes:      General: Lids are normal.   Neck:      Thyroid: No thyroid mass or thyromegaly.      Trachea: Trachea normal.   Cardiovascular:      Rate and Rhythm: Normal rate and regular rhythm. "      Heart sounds: Normal heart sounds.   Pulmonary:      Effort: Pulmonary effort is normal.      Breath sounds: Normal breath sounds.   Abdominal:      Palpations: Abdomen is soft.      Tenderness: There is abdominal tenderness in the right lower quadrant.   Musculoskeletal:      Cervical back: Normal range of motion.   Lymphadenopathy:      Cervical: No cervical adenopathy.   Skin:     General: Skin is warm and dry.   Neurological:      Mental Status: She is alert and oriented to person, place, and time.   Psychiatric:         Attention and Perception: She is attentive.         Mood and Affect: Mood normal.         Speech: Speech normal.         Behavior: Behavior normal.        Result Review :    The following data was reviewed by: Maribel Graf MD on 02/05/2024:  Common labs          2/22/2023    13:59   Common Labs   Creatinine 171.15          Details          This result is from an external source.                          Assessment and Plan     Diagnoses and all orders for this visit:    1. RLQ abdominal pain (Primary)  -     CBC & Differential  -     Comprehensive Metabolic Panel  -     CT Abdomen Pelvis With Contrast; Future  -     Urinalysis With Culture If Indicated - Urine, Clean Catch  -     [place] Urine Culture Tube - Urine, Clean Catch             Follow Up     No follow-ups on file.  Patient was given instructions and counseling regarding her condition or for health maintenance advice. Please see specific information pulled into the AVS if appropriate.

## 2024-02-06 LAB
ALBUMIN SERPL-MCNC: 4.3 G/DL (ref 3.5–5.2)
ALBUMIN/GLOB SERPL: 1.3 G/DL
ALP SERPL-CCNC: 97 U/L (ref 39–117)
ALT SERPL W P-5'-P-CCNC: 13 U/L (ref 1–33)
ANION GAP SERPL CALCULATED.3IONS-SCNC: 11 MMOL/L (ref 5–15)
AST SERPL-CCNC: 17 U/L (ref 1–32)
BASOPHILS # BLD AUTO: 0.03 10*3/MM3 (ref 0–0.2)
BASOPHILS NFR BLD AUTO: 0.4 % (ref 0–1.5)
BILIRUB SERPL-MCNC: 0.5 MG/DL (ref 0–1.2)
BUN SERPL-MCNC: 13 MG/DL (ref 6–20)
BUN/CREAT SERPL: 13.8 (ref 7–25)
CALCIUM SPEC-SCNC: 9.4 MG/DL (ref 8.6–10.5)
CHLORIDE SERPL-SCNC: 103 MMOL/L (ref 98–107)
CO2 SERPL-SCNC: 27 MMOL/L (ref 22–29)
CREAT SERPL-MCNC: 0.94 MG/DL (ref 0.57–1)
DEPRECATED RDW RBC AUTO: 40.7 FL (ref 37–54)
EGFRCR SERPLBLD CKD-EPI 2021: 75.9 ML/MIN/1.73
EOSINOPHIL # BLD AUTO: 0.23 10*3/MM3 (ref 0–0.4)
EOSINOPHIL NFR BLD AUTO: 2.8 % (ref 0.3–6.2)
ERYTHROCYTE [DISTWIDTH] IN BLOOD BY AUTOMATED COUNT: 13.1 % (ref 12.3–15.4)
GLOBULIN UR ELPH-MCNC: 3.3 GM/DL
GLUCOSE SERPL-MCNC: 121 MG/DL (ref 65–99)
HCT VFR BLD AUTO: 39.9 % (ref 34–46.6)
HGB BLD-MCNC: 12.7 G/DL (ref 12–15.9)
HOLD SPECIMEN: NORMAL
IMM GRANULOCYTES # BLD AUTO: 0.03 10*3/MM3 (ref 0–0.05)
IMM GRANULOCYTES NFR BLD AUTO: 0.4 % (ref 0–0.5)
LYMPHOCYTES # BLD AUTO: 1.83 10*3/MM3 (ref 0.7–3.1)
LYMPHOCYTES NFR BLD AUTO: 22.6 % (ref 19.6–45.3)
MCH RBC QN AUTO: 27.7 PG (ref 26.6–33)
MCHC RBC AUTO-ENTMCNC: 31.8 G/DL (ref 31.5–35.7)
MCV RBC AUTO: 86.9 FL (ref 79–97)
MONOCYTES # BLD AUTO: 0.49 10*3/MM3 (ref 0.1–0.9)
MONOCYTES NFR BLD AUTO: 6.1 % (ref 5–12)
NEUTROPHILS NFR BLD AUTO: 5.48 10*3/MM3 (ref 1.7–7)
NEUTROPHILS NFR BLD AUTO: 67.7 % (ref 42.7–76)
NRBC BLD AUTO-RTO: 0 /100 WBC (ref 0–0.2)
PLATELET # BLD AUTO: 275 10*3/MM3 (ref 140–450)
PMV BLD AUTO: 11.1 FL (ref 6–12)
POTASSIUM SERPL-SCNC: 4 MMOL/L (ref 3.5–5.2)
PROT SERPL-MCNC: 7.6 G/DL (ref 6–8.5)
RBC # BLD AUTO: 4.59 10*6/MM3 (ref 3.77–5.28)
SODIUM SERPL-SCNC: 141 MMOL/L (ref 136–145)
WBC NRBC COR # BLD AUTO: 8.09 10*3/MM3 (ref 3.4–10.8)

## 2024-02-06 NOTE — PROGRESS NOTES
No answer and her mailbox was full I was unable to leave a vm     I will attempt to reach the pt again later

## 2024-02-08 LAB — BACTERIA SPEC AEROBE CULT: ABNORMAL

## 2024-02-09 ENCOUNTER — PATIENT MESSAGE (OUTPATIENT)
Dept: FAMILY MEDICINE CLINIC | Facility: CLINIC | Age: 49
End: 2024-02-09
Payer: COMMERCIAL

## 2024-02-09 ENCOUNTER — TELEPHONE (OUTPATIENT)
Dept: FAMILY MEDICINE CLINIC | Facility: CLINIC | Age: 49
End: 2024-02-09

## 2024-02-09 RX ORDER — SULFAMETHOXAZOLE AND TRIMETHOPRIM 800; 160 MG/1; MG/1
1 TABLET ORAL 2 TIMES DAILY
Qty: 14 TABLET | Refills: 0 | Status: SHIPPED | OUTPATIENT
Start: 2024-02-09

## 2024-02-09 NOTE — TELEPHONE ENCOUNTER
We do not schedule that I called the pt to inform her of this and discuss more     No answer her vm was full I was unable to leave a vm

## 2024-02-09 NOTE — TELEPHONE ENCOUNTER
Caller: Raul Gardner    Relationship: Self    Best call back number: [phone]     What was the call regarding: PATIENT WOULD LIKE TO KNOW IF THE CT SCAN HAS BEEN SCHEDULED YET    PLEASE CALL AND ADVISE     10/27/22 7:56 AM        Thank you for your request  Your request has been received, reviewed, and the patient chart updated  The PCP has successfully been removed with a patient attribution note  This message will now be completed          Thank you  Rosalba Middleton

## 2024-05-13 RX ORDER — AMLODIPINE BESYLATE 2.5 MG/1
TABLET ORAL
Qty: 90 TABLET | Refills: 2 | OUTPATIENT
Start: 2024-05-13

## 2024-05-20 RX ORDER — AMLODIPINE BESYLATE 2.5 MG/1
TABLET ORAL
Qty: 90 TABLET | Refills: 2 | OUTPATIENT
Start: 2024-05-20

## 2024-08-12 ENCOUNTER — OFFICE VISIT (OUTPATIENT)
Dept: FAMILY MEDICINE CLINIC | Facility: CLINIC | Age: 49
End: 2024-08-12
Payer: COMMERCIAL

## 2024-08-12 ENCOUNTER — LAB (OUTPATIENT)
Dept: FAMILY MEDICINE CLINIC | Facility: CLINIC | Age: 49
End: 2024-08-12
Payer: COMMERCIAL

## 2024-08-12 VITALS
HEART RATE: 79 BPM | OXYGEN SATURATION: 97 % | DIASTOLIC BLOOD PRESSURE: 92 MMHG | HEIGHT: 62 IN | TEMPERATURE: 98.2 F | WEIGHT: 293 LBS | BODY MASS INDEX: 53.92 KG/M2 | SYSTOLIC BLOOD PRESSURE: 148 MMHG

## 2024-08-12 DIAGNOSIS — I10 ESSENTIAL HYPERTENSION: Chronic | ICD-10-CM

## 2024-08-12 DIAGNOSIS — Z00.00 WELLNESS EXAMINATION: Primary | ICD-10-CM

## 2024-08-12 DIAGNOSIS — Z12.31 ENCOUNTER FOR SCREENING MAMMOGRAM FOR BREAST CANCER: ICD-10-CM

## 2024-08-12 DIAGNOSIS — Z12.11 SCREEN FOR COLON CANCER: ICD-10-CM

## 2024-08-12 DIAGNOSIS — R73.9 HYPERGLYCEMIA: ICD-10-CM

## 2024-08-12 PROCEDURE — 82043 UR ALBUMIN QUANTITATIVE: CPT | Performed by: FAMILY MEDICINE

## 2024-08-12 PROCEDURE — 99396 PREV VISIT EST AGE 40-64: CPT | Performed by: FAMILY MEDICINE

## 2024-08-12 PROCEDURE — 36415 COLL VENOUS BLD VENIPUNCTURE: CPT | Performed by: FAMILY MEDICINE

## 2024-08-12 PROCEDURE — 80053 COMPREHEN METABOLIC PANEL: CPT | Performed by: FAMILY MEDICINE

## 2024-08-12 PROCEDURE — 83036 HEMOGLOBIN GLYCOSYLATED A1C: CPT | Performed by: FAMILY MEDICINE

## 2024-08-12 PROCEDURE — 82570 ASSAY OF URINE CREATININE: CPT | Performed by: FAMILY MEDICINE

## 2024-08-12 RX ORDER — OMEPRAZOLE 20 MG/1
20 CAPSULE, DELAYED RELEASE ORAL DAILY
Qty: 90 CAPSULE | Refills: 3 | Status: SHIPPED | OUTPATIENT
Start: 2024-08-12

## 2024-08-12 RX ORDER — AMLODIPINE BESYLATE 2.5 MG/1
2.5 TABLET ORAL DAILY
Qty: 90 TABLET | Refills: 3 | Status: SHIPPED | OUTPATIENT
Start: 2024-08-12

## 2024-08-12 NOTE — PROGRESS NOTES
"Subjective   Raul Gardner is a 49 y.o. female and is here for a comprehensive physical exam. The patient reports no problems.    Do you take any herbs or supplements that were not prescribed by a doctor? no  Are you taking calcium supplements? no  Are you taking aspirin daily? no    The following portions of the patient's history were reviewed and updated as appropriate: allergies, current medications, past family history, past medical history, past social history, past surgical history, and problem list.    Review of Systems  Do you have pain that bothers you in your daily life? not asked  A comprehensive review of systems was negative.    Objective   /92 (BP Location: Left arm, Patient Position: Sitting, Cuff Size: Large Adult)   Pulse 79   Temp 98.2 °F (36.8 °C) (Infrared)   Ht 157.5 cm (62\")   Wt (!) 139 kg (306 lb 9.6 oz)   SpO2 97%   BMI 56.08 kg/m²   General appearance: alert, appears stated age, and cooperative  Head: Normocephalic, without obvious abnormality, atraumatic  Eyes: conjunctivae/corneas clear. PERRL, EOM's intact. Fundi benign.  Ears: normal TM's and external ear canals both ears  Throat: lips, mucosa, and tongue normal; teeth and gums normal  Neck: no adenopathy, no JVD, supple, symmetrical, trachea midline, and thyroid not enlarged, symmetric, no tenderness/mass/nodules  Lungs: clear to auscultation bilaterally  Heart: regular rate and rhythm, S1, S2 normal, no murmur, click, rub or gallop  Extremities: extremities normal, atraumatic, no cyanosis or edema  Skin: Skin color, texture, turgor normal. No rashes or lesions  Lymph nodes: Cervical, supraclavicular, and axillary nodes normal.  Neurologic: Grossly normal     No visits with results within 1 Week(s) from this visit.   Latest known visit with results is:   Office Visit on 02/05/2024   Component Date Value Ref Range Status    Glucose 02/05/2024 121 (H)  65 - 99 mg/dL Final    BUN 02/05/2024 13  6 - 20 mg/dL Final    Creatinine " 02/05/2024 0.94  0.57 - 1.00 mg/dL Final    Sodium 02/05/2024 141  136 - 145 mmol/L Final    Potassium 02/05/2024 4.0  3.5 - 5.2 mmol/L Final    Chloride 02/05/2024 103  98 - 107 mmol/L Final    CO2 02/05/2024 27.0  22.0 - 29.0 mmol/L Final    Calcium 02/05/2024 9.4  8.6 - 10.5 mg/dL Final    Total Protein 02/05/2024 7.6  6.0 - 8.5 g/dL Final    Albumin 02/05/2024 4.3  3.5 - 5.2 g/dL Final    ALT (SGPT) 02/05/2024 13  1 - 33 U/L Final    AST (SGOT) 02/05/2024 17  1 - 32 U/L Final    Alkaline Phosphatase 02/05/2024 97  39 - 117 U/L Final    Total Bilirubin 02/05/2024 0.5  0.0 - 1.2 mg/dL Final    Globulin 02/05/2024 3.3  gm/dL Final    A/G Ratio 02/05/2024 1.3  g/dL Final    BUN/Creatinine Ratio 02/05/2024 13.8  7.0 - 25.0 Final    Anion Gap 02/05/2024 11.0  5.0 - 15.0 mmol/L Final    eGFR 02/05/2024 75.9  >60.0 mL/min/1.73 Final    Color, UA 02/05/2024 Yellow  Yellow, Straw Final    Appearance, UA 02/05/2024 Cloudy (A)  Clear Final    pH, UA 02/05/2024 6.0  5.0 - 8.0 Final    Specific Gravity, UA 02/05/2024 >=1.030  1.005 - 1.030 Final    Glucose, UA 02/05/2024 Negative  Negative Final    Ketones, UA 02/05/2024 Negative  Negative Final    Bilirubin, UA 02/05/2024 Negative  Negative Final    Blood, UA 02/05/2024 Large (3+) (A)  Negative Final    Protein, UA 02/05/2024 30 mg/dL (1+) (A)  Negative Final    Leuk Esterase, UA 02/05/2024 Negative  Negative Final    Nitrite, UA 02/05/2024 Negative  Negative Final    Urobilinogen, UA 02/05/2024 1.0 E.U./dL  0.2 - 1.0 E.U./dL Final    WBC 02/05/2024 8.09  3.40 - 10.80 10*3/mm3 Final    RBC 02/05/2024 4.59  3.77 - 5.28 10*6/mm3 Final    Hemoglobin 02/05/2024 12.7  12.0 - 15.9 g/dL Final    Hematocrit 02/05/2024 39.9  34.0 - 46.6 % Final    MCV 02/05/2024 86.9  79.0 - 97.0 fL Final    MCH 02/05/2024 27.7  26.6 - 33.0 pg Final    MCHC 02/05/2024 31.8  31.5 - 35.7 g/dL Final    RDW 02/05/2024 13.1  12.3 - 15.4 % Final    RDW-SD 02/05/2024 40.7  37.0 - 54.0 fl Final    MPV  02/05/2024 11.1  6.0 - 12.0 fL Final    Platelets 02/05/2024 275  140 - 450 10*3/mm3 Final    Neutrophil % 02/05/2024 67.7  42.7 - 76.0 % Final    Lymphocyte % 02/05/2024 22.6  19.6 - 45.3 % Final    Monocyte % 02/05/2024 6.1  5.0 - 12.0 % Final    Eosinophil % 02/05/2024 2.8  0.3 - 6.2 % Final    Basophil % 02/05/2024 0.4  0.0 - 1.5 % Final    Immature Grans % 02/05/2024 0.4  0.0 - 0.5 % Final    Neutrophils, Absolute 02/05/2024 5.48  1.70 - 7.00 10*3/mm3 Final    Lymphocytes, Absolute 02/05/2024 1.83  0.70 - 3.10 10*3/mm3 Final    Monocytes, Absolute 02/05/2024 0.49  0.10 - 0.90 10*3/mm3 Final    Eosinophils, Absolute 02/05/2024 0.23  0.00 - 0.40 10*3/mm3 Final    Basophils, Absolute 02/05/2024 0.03  0.00 - 0.20 10*3/mm3 Final    Immature Grans, Absolute 02/05/2024 0.03  0.00 - 0.05 10*3/mm3 Final    nRBC 02/05/2024 0.0  0.0 - 0.2 /100 WBC Final    Extra Tube 02/05/2024 Hold for add-ons.   Final    Auto resulted.    RBC, UA 02/05/2024 0-2  None Seen, 0-2 /HPF Final    WBC, UA 02/05/2024 6-10 (A)  None Seen, 0-2 /HPF Final    Bacteria, UA 02/05/2024 None Seen  None Seen /HPF Final    Squamous Epithelial Cells, UA 02/05/2024 21-30 (A)  None Seen, 0-2 /HPF Final    Hyaline Casts, UA 02/05/2024 3-6  None Seen /LPF Final    Methodology 02/05/2024 Automated Microscopy   Final    Urine Culture 02/05/2024 50,000 CFU/mL Escherichia coli (A)   Final       Assessment & Plan   Healthy female exam.   Diagnoses and all orders for this visit:    1. Wellness examination (Primary)    2. Encounter for screening mammogram for breast cancer  -     Mammo Screening Digital Tomosynthesis Bilateral With CAD; Future    3. Screen for colon cancer  -     Cologuard - Stool, Per Rectum; Future    4. Essential hypertension    5. Hyperglycemia  -     Comprehensive Metabolic Panel  -     Hemoglobin A1c  -     Microalbumin / Creatinine Urine Ratio - Urine, Clean Catch    Other orders  -     metoprolol tartrate (LOPRESSOR) 25 MG tablet; Take 1  tablet by mouth 2 (Two) Times a Day. Must be seen for refills  Dispense: 60 tablet; Refill: 11  -     amLODIPine (NORVASC) 2.5 MG tablet; Take 1 tablet by mouth Daily.  Dispense: 90 tablet; Refill: 3  -     omeprazole (priLOSEC) 20 MG capsule; Take 1 capsule by mouth Daily.  Dispense: 90 capsule; Refill: 3  -     sertraline (ZOLOFT) 50 MG tablet; Take 1 tablet by mouth Daily.  Dispense: 90 tablet; Refill: 3      1. Well exam.   2. Patient Counseling:  --Nutrition: Stressed importance of moderation in sodium/caffeine intake, saturated fat and cholesterol, caloric balance, sufficient intake of fresh fruits, vegetables, fiber, calcium, iron, and 1 mg of folate supplement per day (for females capable of pregnancy).  --Exercise: Stressed the importance of regular exercise.   --Dental health: Discussed importance of regular tooth brushing, flossing, and dental visits.  --Immunizations reviewed.  --Discussed benefits of screening colonoscopy.    3. Discussed the patient's BMI with her.  The BMI is above average; BMI management plan is completed  4. Follow up in one year

## 2024-08-12 NOTE — PROGRESS NOTES
"Chief Complaint  Hypertension    Subjective        Raul Gardner presents to [facility]  Hypertension        Objective   Vital Signs:  /92 (BP Location: Left arm, Patient Position: Sitting, Cuff Size: Large Adult)   Pulse 79   Temp 98.2 °F (36.8 °C) (Infrared)   Ht 157.5 cm (62\")   Wt (!) 139 kg (306 lb 9.6 oz)   SpO2 97%   BMI 56.08 kg/m²   Estimated body mass index is 56.08 kg/m² as calculated from the following:    Height as of this encounter: 157.5 cm (62\").    Weight as of this encounter: 139 kg (306 lb 9.6 oz).               Physical Exam  Vitals and nursing note reviewed.   Constitutional:       General: She is not in acute distress.     Appearance: She is well-developed.   HENT:      Head: Normocephalic.   Eyes:      General: Lids are normal.      Conjunctiva/sclera: Conjunctivae normal.   Neck:      Thyroid: No thyroid mass or thyromegaly.      Trachea: Trachea normal.   Cardiovascular:      Rate and Rhythm: Normal rate and regular rhythm.      Heart sounds: Normal heart sounds.   Pulmonary:      Effort: Pulmonary effort is normal.      Breath sounds: Normal breath sounds.   Abdominal:      Palpations: Abdomen is soft.   Musculoskeletal:      Cervical back: Normal range of motion.      Right lower leg: Edema present.      Left lower leg: Edema present.   Lymphadenopathy:      Cervical: No cervical adenopathy.   Skin:     General: Skin is warm and dry.   Neurological:      Mental Status: She is alert and oriented to person, place, and time.   Psychiatric:         Attention and Perception: She is attentive.         Mood and Affect: Mood normal.         Speech: Speech normal.         Behavior: Behavior normal.        Result Review :    The following data was reviewed by: Maribel Graf MD on 08/12/2024:  Common labs          2/5/2024    15:30   Common Labs   Glucose 121    BUN 13    Creatinine 0.94    Sodium 141    Potassium 4.0    Chloride 103    Calcium 9.4  "   Albumin 4.3    Total Bilirubin 0.5    Alkaline Phosphatase 97    AST (SGOT) 17    ALT (SGPT) 13    WBC 8.09    Hemoglobin 12.7    Hematocrit 39.9    Platelets 275                   Assessment and Plan     Diagnoses and all orders for this visit:    1. Wellness examination (Primary)    2. Encounter for screening mammogram for breast cancer  -     Mammo Screening Digital Tomosynthesis Bilateral With CAD; Future    3. Screen for colon cancer  -     Cologuard - Stool, Per Rectum; Future    4. Essential hypertension    5. Hyperglycemia  -     Comprehensive Metabolic Panel  -     Hemoglobin A1c  -     Microalbumin / Creatinine Urine Ratio - Urine, Clean Catch    Other orders  -     metoprolol tartrate (LOPRESSOR) 25 MG tablet; Take 1 tablet by mouth 2 (Two) Times a Day. Must be seen for refills  Dispense: 60 tablet; Refill: 11  -     amLODIPine (NORVASC) 2.5 MG tablet; Take 1 tablet by mouth Daily.  Dispense: 90 tablet; Refill: 3  -     omeprazole (priLOSEC) 20 MG capsule; Take 1 capsule by mouth Daily.  Dispense: 90 capsule; Refill: 3  -     sertraline (ZOLOFT) 50 MG tablet; Take 1 tablet by mouth Daily.  Dispense: 90 tablet; Refill: 3             Follow Up     No follow-ups on file.  Patient was given instructions and counseling regarding her condition or for health maintenance advice. Please see specific information pulled into the AVS if appropriate.

## 2024-08-13 LAB
ALBUMIN SERPL-MCNC: 4.2 G/DL (ref 3.5–5.2)
ALBUMIN UR-MCNC: 4.8 MG/DL
ALBUMIN/GLOB SERPL: 1.3 G/DL
ALP SERPL-CCNC: 93 U/L (ref 39–117)
ALT SERPL W P-5'-P-CCNC: 18 U/L (ref 1–33)
ANION GAP SERPL CALCULATED.3IONS-SCNC: 6.9 MMOL/L (ref 5–15)
AST SERPL-CCNC: 17 U/L (ref 1–32)
BILIRUB SERPL-MCNC: 0.6 MG/DL (ref 0–1.2)
BUN SERPL-MCNC: 12 MG/DL (ref 6–20)
BUN/CREAT SERPL: 13.3 (ref 7–25)
CALCIUM SPEC-SCNC: 9.1 MG/DL (ref 8.6–10.5)
CHLORIDE SERPL-SCNC: 105 MMOL/L (ref 98–107)
CO2 SERPL-SCNC: 26.1 MMOL/L (ref 22–29)
CREAT SERPL-MCNC: 0.9 MG/DL (ref 0.57–1)
CREAT UR-MCNC: 271.1 MG/DL
EGFRCR SERPLBLD CKD-EPI 2021: 79.5 ML/MIN/1.73
GLOBULIN UR ELPH-MCNC: 3.2 GM/DL
GLUCOSE SERPL-MCNC: 123 MG/DL (ref 65–99)
HBA1C MFR BLD: 5.2 % (ref 4.8–5.6)
MICROALBUMIN/CREAT UR: 17.7 MG/G (ref 0–29)
POTASSIUM SERPL-SCNC: 3.4 MMOL/L (ref 3.5–5.2)
PROT SERPL-MCNC: 7.4 G/DL (ref 6–8.5)
SODIUM SERPL-SCNC: 138 MMOL/L (ref 136–145)

## 2024-09-05 ENCOUNTER — OFFICE VISIT (OUTPATIENT)
Dept: FAMILY MEDICINE CLINIC | Facility: CLINIC | Age: 49
End: 2024-09-05
Payer: COMMERCIAL

## 2024-09-05 VITALS
OXYGEN SATURATION: 98 % | HEART RATE: 92 BPM | BODY MASS INDEX: 53.92 KG/M2 | WEIGHT: 293 LBS | HEIGHT: 62 IN | TEMPERATURE: 97.3 F | RESPIRATION RATE: 18 BRPM | DIASTOLIC BLOOD PRESSURE: 98 MMHG | SYSTOLIC BLOOD PRESSURE: 150 MMHG

## 2024-09-05 DIAGNOSIS — J20.9 ACUTE BRONCHITIS, UNSPECIFIED ORGANISM: Primary | ICD-10-CM

## 2024-09-05 DIAGNOSIS — J04.0 LARYNGITIS, ACUTE: ICD-10-CM

## 2024-09-05 PROCEDURE — 96372 THER/PROPH/DIAG INJ SC/IM: CPT | Performed by: NURSE PRACTITIONER

## 2024-09-05 PROCEDURE — 99213 OFFICE O/P EST LOW 20 MIN: CPT | Performed by: NURSE PRACTITIONER

## 2024-09-05 RX ORDER — METHYLPREDNISOLONE SODIUM SUCCINATE 125 MG/2ML
125 INJECTION, POWDER, LYOPHILIZED, FOR SOLUTION INTRAMUSCULAR; INTRAVENOUS ONCE
Status: COMPLETED | OUTPATIENT
Start: 2024-09-05 | End: 2024-09-05

## 2024-09-05 RX ORDER — METHYLPREDNISOLONE 4 MG
TABLET, DOSE PACK ORAL
COMMUNITY
Start: 2024-09-04 | End: 2024-09-11

## 2024-09-05 RX ORDER — ALBUTEROL SULFATE 90 UG/1
2 AEROSOL, METERED RESPIRATORY (INHALATION) EVERY 4 HOURS PRN
Qty: 8 G | Refills: 0 | Status: SHIPPED | OUTPATIENT
Start: 2024-09-05

## 2024-09-05 RX ORDER — AZITHROMYCIN 250 MG/1
250 TABLET, FILM COATED ORAL DAILY
COMMUNITY
Start: 2024-09-04 | End: 2024-09-09

## 2024-09-05 RX ORDER — DEXTROMETHORPHAN HYDROBROMIDE AND PROMETHAZINE HYDROCHLORIDE 15; 6.25 MG/5ML; MG/5ML
5 SYRUP ORAL 4 TIMES DAILY PRN
Qty: 120 ML | Refills: 0 | Status: SHIPPED | OUTPATIENT
Start: 2024-09-05

## 2024-09-05 RX ADMIN — METHYLPREDNISOLONE SODIUM SUCCINATE 125 MG: 125 INJECTION, POWDER, LYOPHILIZED, FOR SOLUTION INTRAMUSCULAR; INTRAVENOUS at 16:11

## 2024-09-05 NOTE — PROGRESS NOTES
"Chief Complaint  laringitis (Cough and fever started last Wednesday. Started feeling better over the wknd. Then started to cough on Sunday. Now she is coughing and having lots of phlem coming up. Off and on productive cough. )    Subjective        Raul Gardner presents to [facility]  History of Present Illness    Upper respiratory symptoms:   Onset of symptoms 8 days ago. Severity of symptoms are moderate. Status is worsening. Frequency is persistent. Context includes sick contact. Works as MA at [employer].  Started z-pack and medrol dose pack one day ago. Home Covid test negative x 2. Symptoms are aggravated by lying down. Symptoms are unrelieved by OTC cough.  Associated symptoms include fever (resolved), hoarse voice, cough, nausea, right ear pressure, wheezing, and sputum. Pertinent negatives include dyspnea, facial pain, headache, hemoptysis, myalgia, nasal congestion, otalgia, pharyngitis, fatigue, rash, sinus pressure, tooth pain, pleuritic pain, vomiting, and diarrhea.         Objective   Vital Signs:  /98 (BP Location: Left arm, Patient Position: Sitting, Cuff Size: Adult)   Pulse 92   Temp 97.3 °F (36.3 °C)   Resp 18   Ht 157.5 cm (62\")   Wt (!) 137 kg (301 lb)   SpO2 98%   BMI 55.05 kg/m²   Estimated body mass index is 55.05 kg/m² as calculated from the following:    Height as of this encounter: 157.5 cm (62\").    Weight as of this encounter: 137 kg (301 lb).            Physical Exam  Constitutional:       General: She is not in acute distress.  HENT:      Head: Normocephalic and atraumatic.      Right Ear: Tympanic membrane, ear canal and external ear normal.      Left Ear: Tympanic membrane, ear canal and external ear normal.      Nose: Nose normal. No rhinorrhea.      Mouth/Throat:      Mouth: Mucous membranes are moist.      Pharynx: Oropharynx is clear. No posterior oropharyngeal erythema.      Comments: Hoarse voice.    Eyes:      Conjunctiva/sclera: " Conjunctivae normal.   Cardiovascular:      Rate and Rhythm: Normal rate and regular rhythm.      Heart sounds: S1 normal and S2 normal.   Pulmonary:      Effort: Pulmonary effort is normal.      Breath sounds: Examination of the right-upper field reveals wheezing. Examination of the left-upper field reveals wheezing and rhonchi. Examination of the right-lower field reveals wheezing. Wheezing present.      Comments: Cough hacking.   Musculoskeletal:      Cervical back: Neck supple.   Lymphadenopathy:      Cervical: No cervical adenopathy.   Skin:     General: Skin is warm and dry.      Findings: No rash.   Neurological:      Mental Status: She is alert and oriented to person, place, and time.      Gait: Gait is intact.   Psychiatric:         Mood and Affect: Mood normal.         Thought Content: Thought content normal.        Result Review :    CMP          2/5/2024    15:30 8/12/2024    14:45   CMP   Glucose 121  123    BUN 13  12    Creatinine 0.94  0.90    EGFR 75.9  79.5    Sodium 141  138    Potassium 4.0  3.4    Chloride 103  105    Calcium 9.4  9.1    Total Protein 7.6  7.4    Albumin 4.3  4.2    Globulin 3.3  3.2    Total Bilirubin 0.5  0.6    Alkaline Phosphatase 97  93    AST (SGOT) 17  17    ALT (SGPT) 13  18    Albumin/Globulin Ratio 1.3  1.3    BUN/Creatinine Ratio 13.8  13.3    Anion Gap 11.0  6.9      CBC          2/5/2024    15:30   CBC   WBC 8.09    RBC 4.59    Hemoglobin 12.7    Hematocrit 39.9    MCV 86.9    MCH 27.7    MCHC 31.8    RDW 13.1    Platelets 275                Assessment and Plan   Diagnoses and all orders for this visit:    1. Acute bronchitis, unspecified organism (Primary)  -     methylPREDNISolone sodium succinate (SOLU-Medrol) injection 125 mg  -     albuterol sulfate  (90 Base) MCG/ACT inhaler; Inhale 2 puffs Every 4 (Four) Hours As Needed for Wheezing or Shortness of Air.  Dispense: 8 g; Refill: 0  -     promethazine-dextromethorphan (PROMETHAZINE-DM) 6.25-15 MG/5ML  syrup; Take 5 mL by mouth 4 (Four) Times a Day As Needed for Cough.  Dispense: 120 mL; Refill: 0    2. Laryngitis, acute  -     methylPREDNISolone sodium succinate (SOLU-Medrol) injection 125 mg      Recommended Mucinex for congestion.  Increase fluids.  Discussed chest x-ray if no significant improvement in cough.        Follow Up   Return if symptoms worsen or fail to improve.  Patient was given instructions and counseling regarding her condition or for health maintenance advice. Please see specific information pulled into the AVS if appropriate.

## 2024-10-31 ENCOUNTER — OFFICE VISIT (OUTPATIENT)
Dept: FAMILY MEDICINE CLINIC | Facility: CLINIC | Age: 49
End: 2024-10-31
Payer: COMMERCIAL

## 2024-10-31 VITALS
TEMPERATURE: 95.9 F | DIASTOLIC BLOOD PRESSURE: 74 MMHG | WEIGHT: 292.3 LBS | RESPIRATION RATE: 16 BRPM | HEART RATE: 82 BPM | HEIGHT: 62 IN | BODY MASS INDEX: 53.79 KG/M2 | SYSTOLIC BLOOD PRESSURE: 104 MMHG | OXYGEN SATURATION: 95 %

## 2024-10-31 DIAGNOSIS — R05.9 COUGH IN ADULT: Primary | ICD-10-CM

## 2024-10-31 DIAGNOSIS — R50.9 FEVER, UNSPECIFIED FEVER CAUSE: ICD-10-CM

## 2024-10-31 DIAGNOSIS — J10.1 INFLUENZA A: ICD-10-CM

## 2024-10-31 LAB
EXPIRATION DATE: ABNORMAL
FLUAV AG UPPER RESP QL IA.RAPID: DETECTED
FLUBV AG UPPER RESP QL IA.RAPID: NOT DETECTED
INTERNAL CONTROL: ABNORMAL
Lab: ABNORMAL
SARS-COV-2 AG UPPER RESP QL IA.RAPID: NOT DETECTED

## 2024-10-31 PROCEDURE — 87428 SARSCOV & INF VIR A&B AG IA: CPT | Performed by: FAMILY MEDICINE

## 2024-10-31 PROCEDURE — 99213 OFFICE O/P EST LOW 20 MIN: CPT | Performed by: FAMILY MEDICINE

## 2024-10-31 RX ORDER — OSELTAMIVIR PHOSPHATE 75 MG/1
75 CAPSULE ORAL 2 TIMES DAILY
Qty: 10 CAPSULE | Refills: 0 | Status: SHIPPED | OUTPATIENT
Start: 2024-10-31 | End: 2024-11-05

## 2024-10-31 NOTE — PROGRESS NOTES
Subjective   Raul Gardner is a 49 y.o. female.     Cough  This is a new problem. Episode onset: in the past 3 days. The problem has been worse. The cough is Productive of sputum. Associated symptoms include a fever, headaches, nasal congestion, postnasal drip, rhinorrhea, a sore throat, shortness of breath and wheezing. Pertinent negatives include no chest pain or heartburn. She has tried OTC cough suppressant for the symptoms. The treatment provided no relief.        The following portions of the patient's history were reviewed and updated as appropriate: past medical history, past social history, past surgical history and problem list.    Review of Systems   Constitutional:  Positive for fatigue and fever.   HENT:  Positive for congestion, postnasal drip, rhinorrhea and sore throat.    Respiratory:  Positive for cough, shortness of breath and wheezing.    Cardiovascular:  Negative for chest pain.   Neurological:  Positive for dizziness.       Objective   Physical Exam  Vitals reviewed.   HENT:      Right Ear: Tympanic membrane, ear canal and external ear normal.      Left Ear: Tympanic membrane, ear canal and external ear normal.      Nose: Rhinorrhea present.      Mouth/Throat:      Pharynx: Posterior oropharyngeal erythema present.   Eyes:      Conjunctiva/sclera: Conjunctivae normal.   Pulmonary:      Effort: Pulmonary effort is normal.      Breath sounds: Normal breath sounds. No wheezing.   Neurological:      Mental Status: She is alert and oriented to person, place, and time.         Vitals:    10/31/24 1444   BP: 104/74   Pulse: 82   Resp: 16   Temp: 95.9 °F (35.5 °C)   SpO2: 95%     Current Outpatient Medications on File Prior to Visit   Medication Sig Dispense Refill    amLODIPine (NORVASC) 2.5 MG tablet Take 1 tablet by mouth Daily. 90 tablet 3    metoprolol tartrate (LOPRESSOR) 25 MG tablet Take 1 tablet by mouth 2 (Two) Times a Day. Must be seen for refills 60 tablet 11    omeprazole (priLOSEC) 20 MG  capsule Take 1 capsule by mouth Daily. 90 capsule 3    promethazine-dextromethorphan (PROMETHAZINE-DM) 6.25-15 MG/5ML syrup Take 5 mL by mouth 4 (Four) Times a Day As Needed for Cough. 120 mL 0    sertraline (ZOLOFT) 50 MG tablet Take 1 tablet by mouth Daily. 90 tablet 3    [DISCONTINUED] albuterol sulfate  (90 Base) MCG/ACT inhaler Inhale 2 puffs Every 4 (Four) Hours As Needed for Wheezing or Shortness of Air. (Patient not taking: Reported on 10/31/2024) 8 g 0     No current facility-administered medications on file prior to visit.         Assessment & Plan   Problems Addressed this Visit       Cough in adult - Primary    Relevant Orders    POCT SARS-CoV-2 + Flu Antigen ADELA (Completed)    Fever    Relevant Orders    POCT SARS-CoV-2 + Flu Antigen ADELA (Completed)    Influenza A     Advised fluids, rest and Tamiflu take as directed.         Relevant Medications    oseltamivir (Tamiflu) 75 MG capsule     Diagnoses         Codes Comments    Cough in adult    -  Primary ICD-10-CM: R05.9  ICD-9-CM: 786.2     Fever, unspecified fever cause     ICD-10-CM: R50.9  ICD-9-CM: 780.60     Influenza A     ICD-10-CM: J10.1  ICD-9-CM: 487.1

## 2024-11-13 ENCOUNTER — HOSPITAL ENCOUNTER (EMERGENCY)
Facility: HOSPITAL | Age: 49
Discharge: HOME | End: 2024-11-13
Attending: EMERGENCY MEDICINE
Payer: COMMERCIAL

## 2024-11-13 VITALS
BODY MASS INDEX: 41.68 KG/M2 | HEIGHT: 68 IN | HEART RATE: 106 BPM | RESPIRATION RATE: 18 BRPM | DIASTOLIC BLOOD PRESSURE: 97 MMHG | TEMPERATURE: 98.3 F | WEIGHT: 275 LBS | SYSTOLIC BLOOD PRESSURE: 170 MMHG | OXYGEN SATURATION: 98 %

## 2024-11-13 DIAGNOSIS — R04.0 ACUTE ANTERIOR EPISTAXIS: Primary | ICD-10-CM

## 2024-11-13 PROCEDURE — 99283 EMERGENCY DEPT VISIT LOW MDM: CPT

## 2024-11-13 PROCEDURE — 99282 EMERGENCY DEPT VISIT SF MDM: CPT | Performed by: EMERGENCY MEDICINE

## 2024-11-13 RX ORDER — TRANEXAMIC ACID 100 MG/ML
500 INJECTION, SOLUTION INTRAVENOUS ONCE
Status: COMPLETED | OUTPATIENT
Start: 2024-11-13 | End: 2024-11-13

## 2024-11-13 RX ADMIN — TRANEXAMIC ACID 500 MG: 100 INJECTION, SOLUTION INTRAVENOUS at 07:30

## 2024-11-13 NOTE — FSED PROVIDER NOTE
Subjective   History of Present Illness  Pt presents with nosebleed from L. Nares this am, squirted Afrin and came in here for eval and nasal clamp placed, no prior hx of this, no blood thinners, no cough/uri/fever, no cp/soa/ha, no n/v/d or abd pain    History provided by:  Patient   used: No        Review of Systems   HENT:  Positive for nosebleeds.    Respiratory:  Negative for apnea.    All other systems reviewed and are negative.      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    Melanoma     Near syncope 2017    Pulmonary hypertension     Urinary tract infection     chronic hematuria, seen urology       Allergies   Allergen Reactions    Cephalexin Rash    Hydrocodone-Acetaminophen Rash    Adhesive Tape Hives    Latex Itching and Rash       Past Surgical History:   Procedure Laterality Date     SECTION  13 and 14    SKIN CANCER EXCISION      TUBAL ABDOMINAL LIGATION         Family History   Problem Relation Age of Onset    Kidney disease Mother     Kidney disease Father     Cancer Father         lung    Diabetes Brother     Heart disease Brother         CHF       Social History     Socioeconomic History    Marital status:    Tobacco Use    Smoking status: Never     Passive exposure: Never    Smokeless tobacco: Never   Vaping Use    Vaping status: Never Used   Substance and Sexual Activity    Alcohol use: Yes     Comment: caffeine 1 cup qd    Drug use: No    Sexual activity: Yes     Birth control/protection: Surgical           Objective   Physical Exam  Vitals and nursing note reviewed.   HENT:      Head: Normocephalic.      Nose:      Comments: Dried blood L. Nares, nothing active     Mouth/Throat:      Mouth: Mucous membranes are moist.   Eyes:      Conjunctiva/sclera: Conjunctivae normal.   Cardiovascular:      Rate and Rhythm: Normal rate.   Pulmonary:      Effort: Pulmonary effort is normal.   Musculoskeletal:         General: Normal  range of motion.      Cervical back: Normal range of motion.   Skin:     General: Skin is warm.   Neurological:      General: No focal deficit present.      Mental Status: She is alert.   Psychiatric:         Mood and Affect: Mood normal.         Procedures           ED Course                                           Medical Decision Making  Will place txa for further insurance but bleeding is all but gone    Risk  Prescription drug management.        Final diagnoses:   Acute anterior epistaxis       ED Disposition  ED Disposition       ED Disposition   Discharge    Condition   Stable    Comment   --               Maribel Graf MD  [address] [address]  [phone]    In 3 days  If symptoms worsen         Medication List      No changes were made to your prescriptions during this visit.

## 2025-08-11 RX ORDER — OMEPRAZOLE 20 MG/1
20 CAPSULE, DELAYED RELEASE ORAL DAILY
Qty: 90 CAPSULE | Refills: 3 | Status: SHIPPED | OUTPATIENT
Start: 2025-08-11

## 2025-08-11 RX ORDER — AMLODIPINE BESYLATE 2.5 MG/1
2.5 TABLET ORAL DAILY
Qty: 90 TABLET | Refills: 3 | Status: SHIPPED | OUTPATIENT
Start: 2025-08-11